# Patient Record
Sex: FEMALE | Race: WHITE | NOT HISPANIC OR LATINO
[De-identification: names, ages, dates, MRNs, and addresses within clinical notes are randomized per-mention and may not be internally consistent; named-entity substitution may affect disease eponyms.]

---

## 2017-05-09 ENCOUNTER — APPOINTMENT (OUTPATIENT)
Dept: DERMATOLOGY | Facility: CLINIC | Age: 82
End: 2017-05-09

## 2018-03-04 ENCOUNTER — TRANSCRIPTION ENCOUNTER (OUTPATIENT)
Age: 83
End: 2018-03-04

## 2019-06-11 ENCOUNTER — APPOINTMENT (OUTPATIENT)
Dept: DERMATOLOGY | Facility: CLINIC | Age: 84
End: 2019-06-11

## 2019-06-19 ENCOUNTER — APPOINTMENT (OUTPATIENT)
Dept: DERMATOLOGY | Facility: CLINIC | Age: 84
End: 2019-06-19
Payer: MEDICARE

## 2019-06-19 PROCEDURE — 99212 OFFICE O/P EST SF 10 MIN: CPT

## 2019-08-03 ENCOUNTER — APPOINTMENT (OUTPATIENT)
Dept: DERMATOLOGY | Facility: CLINIC | Age: 84
End: 2019-08-03
Payer: MEDICARE

## 2019-08-03 PROCEDURE — 99213 OFFICE O/P EST LOW 20 MIN: CPT

## 2019-08-28 ENCOUNTER — FORM ENCOUNTER (OUTPATIENT)
Age: 84
End: 2019-08-28

## 2019-08-29 ENCOUNTER — APPOINTMENT (OUTPATIENT)
Dept: GASTROENTEROLOGY | Facility: CLINIC | Age: 84
End: 2019-08-29
Payer: MEDICARE

## 2019-08-29 ENCOUNTER — OUTPATIENT (OUTPATIENT)
Dept: OUTPATIENT SERVICES | Facility: HOSPITAL | Age: 84
LOS: 1 days | End: 2019-08-29
Payer: MEDICARE

## 2019-08-29 ENCOUNTER — APPOINTMENT (OUTPATIENT)
Dept: CT IMAGING | Facility: CLINIC | Age: 84
End: 2019-08-29
Payer: MEDICARE

## 2019-08-29 VITALS
SYSTOLIC BLOOD PRESSURE: 130 MMHG | BODY MASS INDEX: 18.52 KG/M2 | DIASTOLIC BLOOD PRESSURE: 60 MMHG | HEART RATE: 70 BPM | WEIGHT: 118 LBS | HEIGHT: 67 IN

## 2019-08-29 DIAGNOSIS — K57.32 DIVERTICULITIS OF LARGE INTESTINE W/OUT PERFORATION OR ABSCESS W/OUT BLEEDING: ICD-10-CM

## 2019-08-29 DIAGNOSIS — K57.32 DIVERTICULITIS OF LARGE INTESTINE WITHOUT PERFORATION OR ABSCESS WITHOUT BLEEDING: ICD-10-CM

## 2019-08-29 PROCEDURE — 74176 CT ABD & PELVIS W/O CONTRAST: CPT | Mod: 26

## 2019-08-29 PROCEDURE — 82272 OCCULT BLD FECES 1-3 TESTS: CPT

## 2019-08-29 PROCEDURE — 74176 CT ABD & PELVIS W/O CONTRAST: CPT

## 2019-08-29 PROCEDURE — 99204 OFFICE O/P NEW MOD 45 MIN: CPT

## 2019-08-29 RX ORDER — AMLODIPINE BESYLATE 10 MG/1
10 TABLET ORAL
Refills: 0 | Status: ACTIVE | COMMUNITY

## 2019-08-29 RX ORDER — CLONIDINE HYDROCHLORIDE 0.3 MG/1
TABLET ORAL
Refills: 0 | Status: ACTIVE | COMMUNITY

## 2019-08-29 RX ORDER — SOTALOL HYDROCHLORIDE 80 MG/1
80 TABLET ORAL
Refills: 0 | Status: ACTIVE | COMMUNITY

## 2019-08-29 RX ORDER — FUROSEMIDE 20 MG/1
20 TABLET ORAL
Refills: 0 | Status: ACTIVE | COMMUNITY

## 2019-08-29 RX ORDER — SIMVASTATIN 20 MG/1
20 TABLET, FILM COATED ORAL
Refills: 0 | Status: ACTIVE | COMMUNITY

## 2019-08-29 RX ORDER — MULTIVIT-MIN/FA/LYCOPEN/LUTEIN .4-300-25
TABLET ORAL
Refills: 0 | Status: ACTIVE | COMMUNITY

## 2019-08-29 RX ORDER — ENALAPRIL MALEATE 20 MG/1
20 TABLET ORAL
Refills: 0 | Status: ACTIVE | COMMUNITY

## 2019-08-29 RX ORDER — RIVAROXABAN 15 MG/1
15 TABLET, FILM COATED ORAL
Refills: 0 | Status: ACTIVE | COMMUNITY

## 2019-08-29 NOTE — HISTORY OF PRESENT ILLNESS
[de-identified] : patient was in usual health until about 6 weeks ago had an episode of severe lower abdominal cramps associated vomiting and diarrhea and a temperature 100.1. She had no rectal bleeding. This lasted about a day and by the following day she was a systematic. She did take Imodium. The patient is okay for about 3 weeks and had another episode but less severe which also lasted for a day and then a few days ago had a third episode this time there is no fever and was also less severe it also lasted for a day. The patient was seen here or 5 years ago for colonoscopy for evaluation of diarrhea and GI symptoms and she showed extensive diverticulosis and large hemorrhoids. A CT enteroclysis that time was also negative. She was last seen here 2 years ago for episodic diarrhea and G-tube was taking Imodium on a daily basis once a day Nexium to control her symptoms. She has no symptoms at this time including abdominal pain or diarrhea or weight loss

## 2019-08-29 NOTE — ASSESSMENT
[FreeTextEntry1] : the patient was concern that this may not be an episode of diverticulitis. Although she did have fever and abdominal pain and resolve her he quickly and this is possible the diagnoses but probably unlikely. We'll would not expect it rapidly up resolution with recurrent symptoms Monday to time. However to further evaluate etiology symptoms and rule out diverticulitis a few be appropriate to do abdominal pelvic CT scan. She did have a normal colonoscopy 5 years ago other than extensive diverticulosis. In the meantime she is asymptomatic and I do not think empiric therapy is warranted at this time. The patient will call his results with CT as well as routine blood work and then we will determine what further treatment and evaluation as appropriate

## 2019-08-29 NOTE — PHYSICAL EXAM
[General Appearance - Alert] : alert [Sclera] : the sclera and conjunctiva were normal [PERRL With Normal Accommodation] : pupils were equal in size, round, and reactive to light [General Appearance - In No Acute Distress] : in no acute distress [Extraocular Movements] : extraocular movements were intact [Neck Appearance] : the appearance of the neck was normal [Neck Cervical Mass (___cm)] : no neck mass was observed [Jugular Venous Distention Increased] : there was no jugular-venous distention [Thyroid Diffuse Enlargement] : the thyroid was not enlarged [Thyroid Nodule] : there were no palpable thyroid nodules [Auscultation Breath Sounds / Voice Sounds] : lungs were clear to auscultation bilaterally [Edema] : there was no peripheral edema [Bowel Sounds] : normal bowel sounds [Abdomen Soft] : soft [Abdomen Tenderness] : non-tender [Normal Sphincter Tone] : normal sphincter tone [Abdomen Mass (___ Cm)] : no abdominal mass palpated [No Rectal Mass] : no rectal mass [No CVA Tenderness] : no ~M costovertebral angle tenderness [Occult Blood Positive] : stool was negative for occult blood [] : no rash [Oriented To Time, Place, And Person] : oriented to person, place, and time [Impaired Insight] : insight and judgment were intact [Affect] : the affect was normal

## 2019-08-30 ENCOUNTER — TRANSCRIPTION ENCOUNTER (OUTPATIENT)
Age: 84
End: 2019-08-30

## 2019-08-30 LAB
ALBUMIN SERPL ELPH-MCNC: 4.8 G/DL
ALP BLD-CCNC: 67 U/L
ALT SERPL-CCNC: 33 U/L
ANION GAP SERPL CALC-SCNC: 15 MMOL/L
AST SERPL-CCNC: 36 U/L
BASOPHILS # BLD AUTO: 0.06 K/UL
BASOPHILS NFR BLD AUTO: 0.9 %
BILIRUB SERPL-MCNC: 0.8 MG/DL
BUN SERPL-MCNC: 11 MG/DL
CALCIUM SERPL-MCNC: 10 MG/DL
CHLORIDE SERPL-SCNC: 89 MMOL/L
CO2 SERPL-SCNC: 27 MMOL/L
CREAT SERPL-MCNC: 0.48 MG/DL
EOSINOPHIL # BLD AUTO: 0.15 K/UL
EOSINOPHIL NFR BLD AUTO: 2.3 %
GLUCOSE SERPL-MCNC: 104 MG/DL
HCT VFR BLD CALC: 40 %
HGB BLD-MCNC: 13.2 G/DL
IMM GRANULOCYTES NFR BLD AUTO: 0.3 %
LYMPHOCYTES # BLD AUTO: 1.6 K/UL
LYMPHOCYTES NFR BLD AUTO: 24.9 %
MAN DIFF?: NORMAL
MCHC RBC-ENTMCNC: 31.4 PG
MCHC RBC-ENTMCNC: 33 GM/DL
MCV RBC AUTO: 95.2 FL
MONOCYTES # BLD AUTO: 0.72 K/UL
MONOCYTES NFR BLD AUTO: 11.2 %
NEUTROPHILS # BLD AUTO: 3.88 K/UL
NEUTROPHILS NFR BLD AUTO: 60.4 %
PLATELET # BLD AUTO: 282 K/UL
POTASSIUM SERPL-SCNC: 4.8 MMOL/L
PROT SERPL-MCNC: 7.5 G/DL
RBC # BLD: 4.2 M/UL
RBC # FLD: 14.7 %
SODIUM SERPL-SCNC: 131 MMOL/L
WBC # FLD AUTO: 6.43 K/UL

## 2019-09-05 ENCOUNTER — APPOINTMENT (OUTPATIENT)
Dept: GASTROENTEROLOGY | Facility: CLINIC | Age: 84
End: 2019-09-05
Payer: MEDICARE

## 2019-09-05 VITALS — SYSTOLIC BLOOD PRESSURE: 153 MMHG | HEIGHT: 67 IN | DIASTOLIC BLOOD PRESSURE: 69 MMHG | HEART RATE: 63 BPM

## 2019-09-05 DIAGNOSIS — R19.7 DIARRHEA, UNSPECIFIED: ICD-10-CM

## 2019-09-05 PROCEDURE — 99214 OFFICE O/P EST MOD 30 MIN: CPT

## 2019-09-05 NOTE — HISTORY OF PRESENT ILLNESS
[de-identified] : patient seen last week with episodes of the last 3 months of abdominal pain and diarrhea. She felt she had diverticulitis as she had in the past. She is sent for CT scan which showed diverticulosis and CBC was unremarkable. Patient's been asymptomatic for the last week no abdominal pain or diarrhea. She feels that her symptoms were related to certain foods and she is stopped them and now she's fine.

## 2019-09-05 NOTE — PHYSICAL EXAM
[General Appearance - Alert] : alert [General Appearance - In No Acute Distress] : in no acute distress [Sclera] : the sclera and conjunctiva were normal [PERRL With Normal Accommodation] : pupils were equal in size, round, and reactive to light [Extraocular Movements] : extraocular movements were intact [Neck Appearance] : the appearance of the neck was normal [Neck Cervical Mass (___cm)] : no neck mass was observed [Jugular Venous Distention Increased] : there was no jugular-venous distention [Thyroid Nodule] : there were no palpable thyroid nodules [Thyroid Diffuse Enlargement] : the thyroid was not enlarged [Auscultation Breath Sounds / Voice Sounds] : lungs were clear to auscultation bilaterally [Heart Rate And Rhythm] : heart rate was normal and rhythm regular [Heart Sounds] : normal S1 and S2 [Murmurs] : no murmurs [Heart Sounds Gallop] : no gallops [Heart Sounds Pericardial Friction Rub] : no pericardial rub [Bowel Sounds] : normal bowel sounds [Abdomen Soft] : soft [Abdomen Tenderness] : non-tender [] : no hepato-splenomegaly [Abdomen Mass (___ Cm)] : no abdominal mass palpated [Oriented To Time, Place, And Person] : oriented to person, place, and time [Impaired Insight] : insight and judgment were intact [Affect] : the affect was normal

## 2019-09-05 NOTE — ASSESSMENT
[FreeTextEntry1] : I reviewed the findings with the patient and explained that she does not have diverticulitis. It sounds as if she had a reaction to certain foods which gave her abdominal discomfort and loose bowel movements. I advised to continue watching her diet and stay away from foods that bother her she did develop another problem we could possibly do further evaluation. She will call me as

## 2019-09-23 ENCOUNTER — FORM ENCOUNTER (OUTPATIENT)
Age: 84
End: 2019-09-23

## 2019-09-23 ENCOUNTER — CLINICAL ADVICE (OUTPATIENT)
Age: 84
End: 2019-09-23

## 2019-09-24 ENCOUNTER — APPOINTMENT (OUTPATIENT)
Dept: CT IMAGING | Facility: CLINIC | Age: 84
End: 2019-09-24
Payer: MEDICARE

## 2019-09-24 ENCOUNTER — OUTPATIENT (OUTPATIENT)
Dept: OUTPATIENT SERVICES | Facility: HOSPITAL | Age: 84
LOS: 1 days | End: 2019-09-24
Payer: MEDICARE

## 2019-09-24 DIAGNOSIS — R19.7 DIARRHEA, UNSPECIFIED: ICD-10-CM

## 2019-09-24 PROCEDURE — 74177 CT ABD & PELVIS W/CONTRAST: CPT

## 2019-09-24 PROCEDURE — 82565 ASSAY OF CREATININE: CPT

## 2019-09-24 PROCEDURE — 74177 CT ABD & PELVIS W/CONTRAST: CPT | Mod: 26

## 2019-09-26 ENCOUNTER — OTHER (OUTPATIENT)
Age: 84
End: 2019-09-26

## 2019-09-26 DIAGNOSIS — K58.9 IRRITABLE BOWEL SYNDROME W/OUT DIARRHEA: ICD-10-CM

## 2019-09-26 RX ORDER — DICYCLOMINE HYDROCHLORIDE 10 MG/1
10 CAPSULE ORAL TWICE DAILY
Qty: 180 | Refills: 0 | Status: ACTIVE | COMMUNITY
Start: 2019-09-26 | End: 1900-01-01

## 2020-03-27 RX ORDER — BIFIDOBACTERIUM LONGUM 10MM CELL
4 CAPSULE ORAL DAILY
Qty: 60 | Refills: 3 | Status: ACTIVE | COMMUNITY
Start: 2020-03-27 | End: 1900-01-01

## 2023-05-29 ENCOUNTER — EMERGENCY (EMERGENCY)
Facility: HOSPITAL | Age: 88
LOS: 1 days | Discharge: HOME | End: 2023-05-29
Attending: STUDENT IN AN ORGANIZED HEALTH CARE EDUCATION/TRAINING PROGRAM | Admitting: STUDENT IN AN ORGANIZED HEALTH CARE EDUCATION/TRAINING PROGRAM
Payer: MEDICARE

## 2023-05-29 VITALS
OXYGEN SATURATION: 99 % | TEMPERATURE: 97 F | RESPIRATION RATE: 16 BRPM | HEIGHT: 67 IN | WEIGHT: 119.93 LBS | HEART RATE: 60 BPM | SYSTOLIC BLOOD PRESSURE: 177 MMHG | DIASTOLIC BLOOD PRESSURE: 77 MMHG

## 2023-05-29 VITALS
OXYGEN SATURATION: 99 % | RESPIRATION RATE: 18 BRPM | TEMPERATURE: 97 F | HEART RATE: 60 BPM | SYSTOLIC BLOOD PRESSURE: 145 MMHG | DIASTOLIC BLOOD PRESSURE: 70 MMHG

## 2023-05-29 LAB
ALBUMIN SERPL ELPH-MCNC: 3.6 G/DL — SIGNIFICANT CHANGE UP (ref 3.3–5.2)
ALP SERPL-CCNC: 54 U/L — SIGNIFICANT CHANGE UP (ref 40–120)
ALT FLD-CCNC: 23 U/L — SIGNIFICANT CHANGE UP
ANION GAP SERPL CALC-SCNC: 11 MMOL/L — SIGNIFICANT CHANGE UP (ref 5–17)
APTT BLD: 37.3 SEC — HIGH (ref 27.5–35.5)
AST SERPL-CCNC: 28 U/L — SIGNIFICANT CHANGE UP
BASOPHILS # BLD AUTO: 0.03 K/UL — SIGNIFICANT CHANGE UP (ref 0–0.2)
BASOPHILS NFR BLD AUTO: 0.4 % — SIGNIFICANT CHANGE UP (ref 0–2)
BILIRUB SERPL-MCNC: 0.4 MG/DL — SIGNIFICANT CHANGE UP (ref 0.4–2)
BUN SERPL-MCNC: 42.3 MG/DL — HIGH (ref 8–20)
CALCIUM SERPL-MCNC: 9.2 MG/DL — SIGNIFICANT CHANGE UP (ref 8.4–10.5)
CHLORIDE SERPL-SCNC: 98 MMOL/L — SIGNIFICANT CHANGE UP (ref 96–108)
CO2 SERPL-SCNC: 27 MMOL/L — SIGNIFICANT CHANGE UP (ref 22–29)
CREAT SERPL-MCNC: 0.62 MG/DL — SIGNIFICANT CHANGE UP (ref 0.5–1.3)
EGFR: 85 ML/MIN/1.73M2 — SIGNIFICANT CHANGE UP
EOSINOPHIL # BLD AUTO: 0.07 K/UL — SIGNIFICANT CHANGE UP (ref 0–0.5)
EOSINOPHIL NFR BLD AUTO: 0.9 % — SIGNIFICANT CHANGE UP (ref 0–6)
GLUCOSE SERPL-MCNC: 105 MG/DL — HIGH (ref 70–99)
HCT VFR BLD CALC: 37.6 % — SIGNIFICANT CHANGE UP (ref 34.5–45)
HGB BLD-MCNC: 12.9 G/DL — SIGNIFICANT CHANGE UP (ref 11.5–15.5)
IMM GRANULOCYTES NFR BLD AUTO: 0.3 % — SIGNIFICANT CHANGE UP (ref 0–0.9)
INR BLD: 2.33 RATIO — HIGH (ref 0.88–1.16)
LIDOCAIN IGE QN: 40 U/L — SIGNIFICANT CHANGE UP (ref 22–51)
LYMPHOCYTES # BLD AUTO: 1.54 K/UL — SIGNIFICANT CHANGE UP (ref 1–3.3)
LYMPHOCYTES # BLD AUTO: 20.5 % — SIGNIFICANT CHANGE UP (ref 13–44)
MCHC RBC-ENTMCNC: 33.4 PG — SIGNIFICANT CHANGE UP (ref 27–34)
MCHC RBC-ENTMCNC: 34.3 GM/DL — SIGNIFICANT CHANGE UP (ref 32–36)
MCV RBC AUTO: 97.4 FL — SIGNIFICANT CHANGE UP (ref 80–100)
MONOCYTES # BLD AUTO: 0.78 K/UL — SIGNIFICANT CHANGE UP (ref 0–0.9)
MONOCYTES NFR BLD AUTO: 10.4 % — SIGNIFICANT CHANGE UP (ref 2–14)
NEUTROPHILS # BLD AUTO: 5.09 K/UL — SIGNIFICANT CHANGE UP (ref 1.8–7.4)
NEUTROPHILS NFR BLD AUTO: 67.5 % — SIGNIFICANT CHANGE UP (ref 43–77)
PLATELET # BLD AUTO: 189 K/UL — SIGNIFICANT CHANGE UP (ref 150–400)
POTASSIUM SERPL-MCNC: 3.9 MMOL/L — SIGNIFICANT CHANGE UP (ref 3.5–5.3)
POTASSIUM SERPL-SCNC: 3.9 MMOL/L — SIGNIFICANT CHANGE UP (ref 3.5–5.3)
PROT SERPL-MCNC: 6.4 G/DL — LOW (ref 6.6–8.7)
PROTHROM AB SERPL-ACNC: 27.3 SEC — HIGH (ref 10.5–13.4)
RBC # BLD: 3.86 M/UL — SIGNIFICANT CHANGE UP (ref 3.8–5.2)
RBC # FLD: 14.7 % — HIGH (ref 10.3–14.5)
SODIUM SERPL-SCNC: 135 MMOL/L — SIGNIFICANT CHANGE UP (ref 135–145)
WBC # BLD: 7.53 K/UL — SIGNIFICANT CHANGE UP (ref 3.8–10.5)
WBC # FLD AUTO: 7.53 K/UL — SIGNIFICANT CHANGE UP (ref 3.8–10.5)

## 2023-05-29 PROCEDURE — 70450 CT HEAD/BRAIN W/O DYE: CPT | Mod: MA

## 2023-05-29 PROCEDURE — 93010 ELECTROCARDIOGRAM REPORT: CPT

## 2023-05-29 PROCEDURE — 71045 X-RAY EXAM CHEST 1 VIEW: CPT

## 2023-05-29 PROCEDURE — 36415 COLL VENOUS BLD VENIPUNCTURE: CPT

## 2023-05-29 PROCEDURE — 72125 CT NECK SPINE W/O DYE: CPT | Mod: MA

## 2023-05-29 PROCEDURE — 99285 EMERGENCY DEPT VISIT HI MDM: CPT | Mod: GC

## 2023-05-29 PROCEDURE — 72125 CT NECK SPINE W/O DYE: CPT | Mod: 26,MA

## 2023-05-29 PROCEDURE — 99285 EMERGENCY DEPT VISIT HI MDM: CPT | Mod: 25

## 2023-05-29 PROCEDURE — 82962 GLUCOSE BLOOD TEST: CPT

## 2023-05-29 PROCEDURE — 71045 X-RAY EXAM CHEST 1 VIEW: CPT | Mod: 26

## 2023-05-29 PROCEDURE — 85610 PROTHROMBIN TIME: CPT

## 2023-05-29 PROCEDURE — 85025 COMPLETE CBC W/AUTO DIFF WBC: CPT

## 2023-05-29 PROCEDURE — 80053 COMPREHEN METABOLIC PANEL: CPT

## 2023-05-29 PROCEDURE — 70450 CT HEAD/BRAIN W/O DYE: CPT | Mod: 26,MA

## 2023-05-29 PROCEDURE — 83690 ASSAY OF LIPASE: CPT

## 2023-05-29 PROCEDURE — 93005 ELECTROCARDIOGRAM TRACING: CPT

## 2023-05-29 PROCEDURE — 85730 THROMBOPLASTIN TIME PARTIAL: CPT

## 2023-05-29 RX ORDER — ONDANSETRON 8 MG/1
4 TABLET, FILM COATED ORAL ONCE
Refills: 0 | Status: COMPLETED | OUTPATIENT
Start: 2023-05-29 | End: 2023-05-29

## 2023-05-29 NOTE — ED PROVIDER NOTE - NSFOLLOWUPINSTRUCTIONS_ED_ALL_ED_FT
Return to the ER should your symptoms worsen. follow up with your primary care physician.     Nausea / Vomiting    Nausea is the feeling that you have to vomit. As nausea gets worse, it can lead to vomiting. Vomiting puts you at an increased risk for dehydration. Older adults and people with other diseases or a weak immune system are at higher risk for dehydration. Drink clear fluids in small but frequent amounts as tolerated. Eat bland, easy-to-digest foods in small amounts as tolerated.    SEEK IMMEDIATE MEDICAL CARE IF YOU HAVE ANY OF THE FOLLOWING SYMPTOMS: fever, inability to keep sufficient fluids down, black or bloody vomitus, black or bloody stools, lightheadedness/dizziness, chest pain, severe headache, rash, shortness of breath, cold or clammy skin, confusion, pain with urination, or any signs of dehydration.

## 2023-05-29 NOTE — ED PROVIDER NOTE - OBJECTIVE STATEMENT
89 yo female history of AFIB on Xarelto, CHF, chronic LE edema, HTN, HLD presents s/p falling on concrete earlier this evening. She states that she fell down and struck her head. She is unsure of how long she passed out for. She states that she feels nauseous at this time. Denies fever/chills, cough, sore throat, sob, chest pain, abd pain, n/v/d, urinary complaints, headache, focal neurologic complaints. Priority CT called on arrival, patient taken to CT scan for emergent imaging.

## 2023-05-29 NOTE — ED ADULT TRIAGE NOTE - MODE OF ARRIVAL
Nutrition Care Plan    Nutrition Diagnosis:   Inadequate energy intake related to decreased appetite, nausea as evidenced by diet recall revealing liquid diet 5 PTA. Intervention:  Current Diet: Regular Diet  Daily W Breakfast; Ensure Enlive/standard Oral Supplement, Strawberry 4oz Oral Nutrition Supplement  Modified diet:   recommend consistent carbohydrate moderate diet (hx of type 2 DM, elevated blood sugar)  Commercial beverage:   with breakfast 4 oz standard oral nutrition supplement (175 kcal, 10 gram protein); patient consumes protein shake daily at home    Monitoring and Evaluation:  Amount of food:   Goal for patient to consume >75% of 2-3 balanced meals/day with one or more protein sources. Liquid meal replacement or supplement:   Goal for patient to consume >75% of oral nutrition supplement offered daily. [facility]

## 2023-05-29 NOTE — ED PROVIDER NOTE - CARE PLAN
Principal Discharge DX:	Fall in home  Secondary Diagnosis:	Neck pain  Secondary Diagnosis:	Nausea   1

## 2023-05-29 NOTE — ED ADULT NURSE NOTE - CAS EDP DISCH TYPE
Changing patient's antibiotic for her acute cystitis as her urine culture showed strep agalactiae.  Patient has documented rash allergy to penicillins, therefore we will prescribe cephalosporin Keflex 500 mg every 6 hours for 7-day course.  Portal message sent to patient regarding the above.    Terry Vargas DO  Internal Medicine PGY-3  [facility]    
Home

## 2023-05-29 NOTE — ED PROVIDER NOTE - PATIENT PORTAL LINK FT
You can access the FollowMyHealth Patient Portal offered by [facility] by registering at the following website: [email or web address]. By joining [email or web address]’s FollowMyHealth portal, you will also be able to view your health information using other applications (apps) compatible with our system.

## 2023-05-29 NOTE — ED PROVIDER NOTE - CLINICAL SUMMARY MEDICAL DECISION MAKING FREE TEXT BOX
89 yo female s/p fall at home. Complaining of nausea, generalized weakness. Will obtain CT scan. Basic labs, urine. Monitor and reassess. 88 yo female s/p fall at home. Complaining of nausea, generalized weakness. Will obtain CT scan. Basic labs, urine. Monitor and reassess.    Progress: Labs wnl. Patient feels improved at this time. Discussed return precautions and need for primary care follow up. Spoke daniel isabel at [facility]. Will dc the patient w transportation back to assisted living facility.

## 2023-05-29 NOTE — ED ADULT TRIAGE NOTE - NS ED NURSE DIRECT TO ROOM YN
Health Call Center    Phone Message    May a detailed message be left on voicemail: yes    Reason for Call: Other: Rahel states that patient is receiving Ocrevus through [facility] home infusion will be discharging from service.     Action Taken: Message routed to:  [facility] ([facility] neurology    
Yes

## 2023-05-29 NOTE — ED ADULT TRIAGE NOTE - TEMPERATURE IN CELSIUS (DEGREES C)
PRINCIPAL DISCHARGE DIAGNOSIS  Diagnosis: Immune thrombocytopenic purpura  Assessment and Plan of Treatment: 36.3 PRINCIPAL DISCHARGE DIAGNOSIS  Diagnosis: Immune thrombocytopenic purpura  Assessment and Plan of Treatment: received iv Ig here, cont decadron until 5/25/20  and then prednisone as ordered,  follow up with hematology in 1-2 weeks      SECONDARY DISCHARGE DIAGNOSES  Diagnosis: Transaminitis  Assessment and Plan of Treatment: stable, out patient follow up with PCP PRINCIPAL DISCHARGE DIAGNOSIS  Diagnosis: Immune thrombocytopenic purpura  Assessment and Plan of Treatment: received iv Ig here, cont prednisone as ordered,  follow up with hematology in 1-2 weeks      SECONDARY DISCHARGE DIAGNOSES  Diagnosis: Transaminitis  Assessment and Plan of Treatment: stable, out patient follow up with PCP

## 2023-05-29 NOTE — ED PROVIDER NOTE - PHYSICAL EXAMINATION
Gen: Well appearing in NAD  Head: NC/AT  Neck: trachea midline  Resp:  No distress, lungs cta b/l  Cardiac: Heart irregularly irregular.   Abdomen: Soft, nontender, nondistended.   Ext: no deformities  Neuro:  A&O appears non focal  Skin:  Warm and dry as visualized  Psych:  Normal affect and mood

## 2023-05-29 NOTE — ED ADULT NURSE NOTE - OBJECTIVE STATEMENT
pt comes to ED via [facility] s.p mechanical fall, pt with no loc, reports her walker gave out and she fell, hitting her head. states she takes Xarelto for Afib. pt is in no apparent distress, no obvious head injuries or contusions noted. MTZ with strength and purpose. no dizziness no other complaints.

## 2023-05-29 NOTE — ED ADULT NURSE NOTE - NSFALLOOBREASON_ED_ALL_ED
[facility]  [address]  [facility] [address]  Phone:  [phone]  Fax:  [phone]                                    Briana Jose   MRN: [id number]    Department:  [facility]   Date of Visit:  1/17/2020           After Visit Summary Signature Page    I have received my discharge instructions, and my questions have been answered. I have discussed any challenges I see with this plan with the nurse or doctor.    ..........................................................................................................................................  Patient/Patient Representative Signature      ..........................................................................................................................................  Patient Representative Print Name and Relationship to Patient    ..................................................               ................................................  Date                                   Time    ..........................................................................................................................................  Reviewed by Signature/Title    ...................................................              ..............................................  Date                                               Time          22EPIC Rev 08/18       
Cognitively impaired

## 2023-05-29 NOTE — ED PROVIDER NOTE - ATTENDING CONTRIBUTION TO CARE
ALLAN Quan: 89-year-old female presenting from the [facility] after fall with questionable down time, here is very well appearing, reports she had ?aphasia during the fall, workup here grossly negative, patient feeling asymptomatic, already on statin / anticoagulation, does not want to stay for further eval here, will d/c back to [facility], return precautions advised.     I have personally performed a face to face diagnostic evaluation on this patient.  I have reviewed the resident's note and agree with the history, exam, and plan of care, except as noted.   My medical decision making and observations are found above.

## 2023-05-29 NOTE — ED ADULT TRIAGE NOTE - CHIEF COMPLAINT QUOTE
pt states she was walking to the bathroom today when her walker slipped causing her to fall to the ground.  pt states she thinks she hit her head.  c/o nausea at this time.  Pt is on xarelto.  MD called for dallin in triage.

## 2023-07-08 ENCOUNTER — INPATIENT (INPATIENT)
Facility: HOSPITAL | Age: 88
LOS: 4 days | Discharge: NURSING, REHAB OR LONG-TERM CARE | DRG: 291 | End: 2023-07-13
Attending: GENERAL ACUTE CARE HOSPITAL | Admitting: STUDENT IN AN ORGANIZED HEALTH CARE EDUCATION/TRAINING PROGRAM
Payer: MEDICARE

## 2023-07-08 VITALS
HEIGHT: 67 IN | RESPIRATION RATE: 32 BRPM | DIASTOLIC BLOOD PRESSURE: 91 MMHG | TEMPERATURE: 98 F | OXYGEN SATURATION: 92 % | HEART RATE: 112 BPM | SYSTOLIC BLOOD PRESSURE: 185 MMHG

## 2023-07-08 LAB
ALBUMIN SERPL ELPH-MCNC: 4 G/DL — SIGNIFICANT CHANGE UP (ref 3.3–5.2)
ALP SERPL-CCNC: 80 U/L — SIGNIFICANT CHANGE UP (ref 40–120)
ALT FLD-CCNC: 29 U/L — SIGNIFICANT CHANGE UP
ANION GAP SERPL CALC-SCNC: 13 MMOL/L — SIGNIFICANT CHANGE UP (ref 5–17)
APTT BLD: 33.7 SEC — SIGNIFICANT CHANGE UP (ref 27.5–35.5)
AST SERPL-CCNC: 36 U/L — HIGH
BASOPHILS # BLD AUTO: 0.04 K/UL — SIGNIFICANT CHANGE UP (ref 0–0.2)
BASOPHILS NFR BLD AUTO: 0.4 % — SIGNIFICANT CHANGE UP (ref 0–2)
BILIRUB SERPL-MCNC: 0.5 MG/DL — SIGNIFICANT CHANGE UP (ref 0.4–2)
BUN SERPL-MCNC: 27.4 MG/DL — HIGH (ref 8–20)
CALCIUM SERPL-MCNC: 9.3 MG/DL — SIGNIFICANT CHANGE UP (ref 8.4–10.5)
CHLORIDE SERPL-SCNC: 98 MMOL/L — SIGNIFICANT CHANGE UP (ref 96–108)
CO2 SERPL-SCNC: 27 MMOL/L — SIGNIFICANT CHANGE UP (ref 22–29)
CREAT SERPL-MCNC: 0.5 MG/DL — SIGNIFICANT CHANGE UP (ref 0.5–1.3)
EGFR: 90 ML/MIN/1.73M2 — SIGNIFICANT CHANGE UP
EOSINOPHIL # BLD AUTO: 0.16 K/UL — SIGNIFICANT CHANGE UP (ref 0–0.5)
EOSINOPHIL NFR BLD AUTO: 1.5 % — SIGNIFICANT CHANGE UP (ref 0–6)
GLUCOSE SERPL-MCNC: 99 MG/DL — SIGNIFICANT CHANGE UP (ref 70–99)
HCT VFR BLD CALC: 36.5 % — SIGNIFICANT CHANGE UP (ref 34.5–45)
HGB BLD-MCNC: 12.2 G/DL — SIGNIFICANT CHANGE UP (ref 11.5–15.5)
IMM GRANULOCYTES NFR BLD AUTO: 0.4 % — SIGNIFICANT CHANGE UP (ref 0–0.9)
INR BLD: 2.23 RATIO — HIGH (ref 0.88–1.16)
LIDOCAIN IGE QN: 23 U/L — SIGNIFICANT CHANGE UP (ref 22–51)
LYMPHOCYTES # BLD AUTO: 1.4 K/UL — SIGNIFICANT CHANGE UP (ref 1–3.3)
LYMPHOCYTES # BLD AUTO: 13.1 % — SIGNIFICANT CHANGE UP (ref 13–44)
MAGNESIUM SERPL-MCNC: 1.9 MG/DL — SIGNIFICANT CHANGE UP (ref 1.6–2.6)
MCHC RBC-ENTMCNC: 33.4 GM/DL — SIGNIFICANT CHANGE UP (ref 32–36)
MCHC RBC-ENTMCNC: 34.2 PG — HIGH (ref 27–34)
MCV RBC AUTO: 102.2 FL — HIGH (ref 80–100)
MONOCYTES # BLD AUTO: 0.82 K/UL — SIGNIFICANT CHANGE UP (ref 0–0.9)
MONOCYTES NFR BLD AUTO: 7.7 % — SIGNIFICANT CHANGE UP (ref 2–14)
NEUTROPHILS # BLD AUTO: 8.24 K/UL — HIGH (ref 1.8–7.4)
NEUTROPHILS NFR BLD AUTO: 76.9 % — SIGNIFICANT CHANGE UP (ref 43–77)
NT-PROBNP SERPL-SCNC: 6990 PG/ML — HIGH (ref 0–300)
PLATELET # BLD AUTO: 271 K/UL — SIGNIFICANT CHANGE UP (ref 150–400)
POTASSIUM SERPL-MCNC: 3.7 MMOL/L — SIGNIFICANT CHANGE UP (ref 3.5–5.3)
POTASSIUM SERPL-SCNC: 3.7 MMOL/L — SIGNIFICANT CHANGE UP (ref 3.5–5.3)
PROT SERPL-MCNC: 7.2 G/DL — SIGNIFICANT CHANGE UP (ref 6.6–8.7)
PROTHROM AB SERPL-ACNC: 26.1 SEC — HIGH (ref 10.5–13.4)
RBC # BLD: 3.57 M/UL — LOW (ref 3.8–5.2)
RBC # FLD: 14.2 % — SIGNIFICANT CHANGE UP (ref 10.3–14.5)
SODIUM SERPL-SCNC: 138 MMOL/L — SIGNIFICANT CHANGE UP (ref 135–145)
TROPONIN T SERPL-MCNC: <0.01 NG/ML — SIGNIFICANT CHANGE UP (ref 0–0.06)
WBC # BLD: 10.7 K/UL — HIGH (ref 3.8–10.5)
WBC # FLD AUTO: 10.7 K/UL — HIGH (ref 3.8–10.5)

## 2023-07-08 PROCEDURE — 93010 ELECTROCARDIOGRAM REPORT: CPT

## 2023-07-08 PROCEDURE — 71045 X-RAY EXAM CHEST 1 VIEW: CPT | Mod: 26

## 2023-07-08 PROCEDURE — 99285 EMERGENCY DEPT VISIT HI MDM: CPT

## 2023-07-08 RX ORDER — NITROGLYCERIN 6.5 MG
100 CAPSULE, EXTENDED RELEASE ORAL
Qty: 50 | Refills: 0 | Status: DISCONTINUED | OUTPATIENT
Start: 2023-07-08 | End: 2023-07-09

## 2023-07-08 RX ORDER — RIVAROXABAN 15 MG-20MG
15 KIT ORAL ONCE
Refills: 0 | Status: COMPLETED | OUTPATIENT
Start: 2023-07-08 | End: 2023-07-08

## 2023-07-08 RX ORDER — FUROSEMIDE 40 MG
80 TABLET ORAL ONCE
Refills: 0 | Status: COMPLETED | OUTPATIENT
Start: 2023-07-08 | End: 2023-07-08

## 2023-07-08 RX ORDER — CEFTRIAXONE 500 MG/1
1000 INJECTION, POWDER, FOR SOLUTION INTRAMUSCULAR; INTRAVENOUS ONCE
Refills: 0 | Status: COMPLETED | OUTPATIENT
Start: 2023-07-08 | End: 2023-07-08

## 2023-07-08 RX ORDER — NITROGLYCERIN 6.5 MG
0.4 CAPSULE, EXTENDED RELEASE ORAL
Refills: 0 | Status: DISCONTINUED | OUTPATIENT
Start: 2023-07-08 | End: 2023-07-13

## 2023-07-08 RX ORDER — ONDANSETRON 8 MG/1
4 TABLET, FILM COATED ORAL ONCE
Refills: 0 | Status: COMPLETED | OUTPATIENT
Start: 2023-07-08 | End: 2023-07-08

## 2023-07-08 RX ADMIN — Medication 30 MICROGRAM(S)/MIN: at 20:25

## 2023-07-08 RX ADMIN — Medication 0.4 MILLIGRAM(S): at 19:35

## 2023-07-08 RX ADMIN — Medication 80 MILLIGRAM(S): at 19:50

## 2023-07-08 RX ADMIN — CEFTRIAXONE 100 MILLIGRAM(S): 500 INJECTION, POWDER, FOR SOLUTION INTRAMUSCULAR; INTRAVENOUS at 23:50

## 2023-07-08 RX ADMIN — Medication 20 MILLIGRAM(S): at 23:50

## 2023-07-08 RX ADMIN — ONDANSETRON 4 MILLIGRAM(S): 8 TABLET, FILM COATED ORAL at 20:40

## 2023-07-08 RX ADMIN — Medication 0.3 MILLIGRAM(S): at 23:50

## 2023-07-08 NOTE — ED ADULT NURSE NOTE - NSFALLHARMRISKINTERV_ED_ALL_ED

## 2023-07-08 NOTE — ED ADULT TRIAGE NOTE - MEANS OF ARRIVAL
-- DO NOT REPLY / DO NOT REPLY ALL --  -- Message is from Engagement Center Operations (ECO) --    General Patient Message: Kaylynn called back stating pharmacy has then  amphetamine-dextroamphetamine (Adderall XR) 20 MG 24 hr capsule  (month) and the amphetamine-dextroamphetamine XR (Adderall XR) 5 MG 24 hr capsule (month) do not have 5 MG but have extended relief 20 ML needs doctor to approve and send to pharmacy. Patient is out of medication needing this cared for right away. To call patient to verify.     Caller Information       Type Contact Phone/Fax    04/06/2023 04:04 PM CDT Phone (Incoming) Miri Macias (Self) [phone] (M)    04/06/2023 04:17 PM CDT Phone (Outgoing) Miri Macias (Self) [phone] (M)    Spoke with Patient         Alternative phone number: [phone]    Can a detailed message be left? Yes    Message Turnaround: [place]:    Refer to site's KB page for routing instructions    Please give this turnaround time to the caller:   \"You can expect to receive a response 1-3 business days after your provider's clinical team reviews the message\"               stretcher

## 2023-07-08 NOTE — ED ADULT NURSE NOTE - OBJECTIVE STATEMENT
Assumed care of patient at 1950 in CC. Pt A&Ox3, asking repetitive questions c/o difficulty breathing beginning this PM. Patient currently living at assisted living facility. At time of assessment, patient presents w/ tachypnea, stating "I have too much fluid I can't breathe". 4+ pitting edema in BLE. Patient placed on BiPAP for tachypnea and comfort. In addition, patient c/o RLE tenderness, stating "I have a blood sore here". Patient placed on CM in a-fib rate controlled, SPO2 WNL on BiPAP. EKG obtained at triage.

## 2023-07-08 NOTE — ED PROVIDER NOTE - OBJECTIVE STATEMENT
88yo female with pmh of AFIB on Xarelto, CHF, chronic LE edema, HTN, HLD presents with sob. Pt states for the past few days worsening LE edema, despite taking her lasix and normal urination. Pt also with sob which worsened today, unable to catch her breath and a wet cough. Pt denies fevers/chills, ha, loc, focal neuro deficits, cp/palp,  abd pain/n/v/d, urinary symptoms, recent travel

## 2023-07-08 NOTE — ED PROVIDER NOTE - CLINICAL SUMMARY MEDICAL DECISION MAKING FREE TEXT BOX
89yo female with pmh of AFIB on Xarelto, CHF, chronic LE edema, HTN, HLD presents with sob. 88yo female with pmh of AFIB on Xarelto, CHF, chronic LE edema, HTN, HLD presents with sob, acute resp failure due to CHF exacerbation, pt requiring BIPAP, nitro gtt, lasix, MICU consult, admit 88yo female with pmh of AFIB on Xarelto, CHF, chronic LE edema, HTN, HLD presents with sob, acute resp failure due to CHF exacerbation, pt requiring BIPAP, nitro gtt, lasix, MICU consult, admit    icu evaluated pt - took off bipap and weaned off nitro drip breathing well on NC no icu at this time will admit to tele

## 2023-07-08 NOTE — ED ADULT NURSE REASSESSMENT NOTE - NS ED NURSE REASSESS COMMENT FT1
Patient remains hypertensive, remains on BiPAP. MD Betts made aware, started on Nitroglycerin gtt @ 100mcg/hr.

## 2023-07-08 NOTE — ED ADULT TRIAGE NOTE - CHIEF COMPLAINT QUOTE
patient complaining of shortness of breath with rales, bilateral leg swelling since this morning, tachpneic

## 2023-07-08 NOTE — ED PROVIDER NOTE - PHYSICAL EXAMINATION
Const: Awake, alert and oriented. in resp distress  Eyes: No scleral icterus. EOMI.  Neck:. Soft and supple. Full ROM without pain.  Cardiac: Irregular rate and irregular rhythm. +S1/S2. Peripheral pulses 2+ and symmetric.+pitting LE edema.  Resp: short sentences, decrease air entry, +rales  Abd: Soft, non-tender, non-distended. Normal bowel sounds in all 4 quadrants. No guarding or rebound.  Back: Spine midline and non-tender. No CVAT.  Skin: No rashes, abrasions or lacerations.  Lymph: No cervical lymphadenopathy.  Neuro: Awake, alert & oriented x 3. Moves all extremities symmetrically.

## 2023-07-09 DIAGNOSIS — I50.9 HEART FAILURE, UNSPECIFIED: ICD-10-CM

## 2023-07-09 PROBLEM — I48.91 UNSPECIFIED ATRIAL FIBRILLATION: Chronic | Status: ACTIVE | Noted: 2023-05-29

## 2023-07-09 LAB
ALBUMIN SERPL ELPH-MCNC: 3.5 G/DL — SIGNIFICANT CHANGE UP (ref 3.3–5.2)
ALP SERPL-CCNC: 76 U/L — SIGNIFICANT CHANGE UP (ref 40–120)
ALT FLD-CCNC: 24 U/L — SIGNIFICANT CHANGE UP
ANION GAP SERPL CALC-SCNC: 11 MMOL/L — SIGNIFICANT CHANGE UP (ref 5–17)
AST SERPL-CCNC: 30 U/L — SIGNIFICANT CHANGE UP
BILIRUB SERPL-MCNC: 0.6 MG/DL — SIGNIFICANT CHANGE UP (ref 0.4–2)
BUN SERPL-MCNC: 21.4 MG/DL — HIGH (ref 8–20)
CALCIUM SERPL-MCNC: 9 MG/DL — SIGNIFICANT CHANGE UP (ref 8.4–10.5)
CHLORIDE SERPL-SCNC: 96 MMOL/L — SIGNIFICANT CHANGE UP (ref 96–108)
CO2 SERPL-SCNC: 32 MMOL/L — HIGH (ref 22–29)
CREAT SERPL-MCNC: 0.41 MG/DL — LOW (ref 0.5–1.3)
CRP SERPL-MCNC: 34 MG/L — HIGH
EGFR: 94 ML/MIN/1.73M2 — SIGNIFICANT CHANGE UP
GLUCOSE SERPL-MCNC: 101 MG/DL — HIGH (ref 70–99)
MAGNESIUM SERPL-MCNC: 1.9 MG/DL — SIGNIFICANT CHANGE UP (ref 1.8–2.6)
MAGNESIUM SERPL-MCNC: 2 MG/DL — SIGNIFICANT CHANGE UP (ref 1.6–2.6)
POTASSIUM SERPL-MCNC: 3.5 MMOL/L — SIGNIFICANT CHANGE UP (ref 3.5–5.3)
POTASSIUM SERPL-SCNC: 3.5 MMOL/L — SIGNIFICANT CHANGE UP (ref 3.5–5.3)
PROT SERPL-MCNC: 6.6 G/DL — SIGNIFICANT CHANGE UP (ref 6.6–8.7)
SODIUM SERPL-SCNC: 138 MMOL/L — SIGNIFICANT CHANGE UP (ref 135–145)
TSH SERPL-MCNC: 0.68 UIU/ML — SIGNIFICANT CHANGE UP (ref 0.27–4.2)

## 2023-07-09 PROCEDURE — 93306 TTE W/DOPPLER COMPLETE: CPT | Mod: 26

## 2023-07-09 PROCEDURE — 93970 EXTREMITY STUDY: CPT | Mod: 26

## 2023-07-09 PROCEDURE — 99223 1ST HOSP IP/OBS HIGH 75: CPT

## 2023-07-09 RX ORDER — FUROSEMIDE 40 MG
40 TABLET ORAL
Refills: 0 | Status: DISCONTINUED | OUTPATIENT
Start: 2023-07-09 | End: 2023-07-11

## 2023-07-09 RX ORDER — CEFAZOLIN SODIUM 1 G
1000 VIAL (EA) INJECTION EVERY 8 HOURS
Refills: 0 | Status: DISCONTINUED | OUTPATIENT
Start: 2023-07-09 | End: 2023-07-09

## 2023-07-09 RX ORDER — CEFAZOLIN SODIUM 1 G
1000 VIAL (EA) INJECTION EVERY 8 HOURS
Refills: 0 | Status: DISCONTINUED | OUTPATIENT
Start: 2023-07-09 | End: 2023-07-11

## 2023-07-09 RX ORDER — METOPROLOL TARTRATE 50 MG
2.5 TABLET ORAL EVERY 6 HOURS
Refills: 0 | Status: DISCONTINUED | OUTPATIENT
Start: 2023-07-09 | End: 2023-07-13

## 2023-07-09 RX ORDER — ACETAMINOPHEN 500 MG
650 TABLET ORAL EVERY 6 HOURS
Refills: 0 | Status: DISCONTINUED | OUTPATIENT
Start: 2023-07-09 | End: 2023-07-13

## 2023-07-09 RX ORDER — RIVAROXABAN 15 MG-20MG
15 KIT ORAL DAILY
Refills: 0 | Status: DISCONTINUED | OUTPATIENT
Start: 2023-07-09 | End: 2023-07-13

## 2023-07-09 RX ORDER — LANOLIN ALCOHOL/MO/W.PET/CERES
3 CREAM (GRAM) TOPICAL AT BEDTIME
Refills: 0 | Status: DISCONTINUED | OUTPATIENT
Start: 2023-07-09 | End: 2023-07-13

## 2023-07-09 RX ORDER — METOPROLOL TARTRATE 50 MG
25 TABLET ORAL THREE TIMES A DAY
Refills: 0 | Status: DISCONTINUED | OUTPATIENT
Start: 2023-07-09 | End: 2023-07-13

## 2023-07-09 RX ORDER — RIVAROXABAN 15 MG-20MG
1 KIT ORAL
Refills: 0 | DISCHARGE

## 2023-07-09 RX ORDER — ATORVASTATIN CALCIUM 80 MG/1
40 TABLET, FILM COATED ORAL AT BEDTIME
Refills: 0 | Status: DISCONTINUED | OUTPATIENT
Start: 2023-07-09 | End: 2023-07-12

## 2023-07-09 RX ORDER — CEFTRIAXONE 500 MG/1
1000 INJECTION, POWDER, FOR SOLUTION INTRAMUSCULAR; INTRAVENOUS EVERY 24 HOURS
Refills: 0 | Status: DISCONTINUED | OUTPATIENT
Start: 2023-07-09 | End: 2023-07-09

## 2023-07-09 RX ORDER — BENZOCAINE AND MENTHOL 5; 1 G/100ML; G/100ML
1 LIQUID ORAL EVERY 6 HOURS
Refills: 0 | Status: DISCONTINUED | OUTPATIENT
Start: 2023-07-09 | End: 2023-07-13

## 2023-07-09 RX ORDER — FUROSEMIDE 40 MG
1 TABLET ORAL
Refills: 0 | DISCHARGE

## 2023-07-09 RX ORDER — HYDRALAZINE HCL 50 MG
25 TABLET ORAL EVERY 8 HOURS
Refills: 0 | Status: DISCONTINUED | OUTPATIENT
Start: 2023-07-09 | End: 2023-07-12

## 2023-07-09 RX ORDER — RIVAROXABAN 15 MG-20MG
20 KIT ORAL
Refills: 0 | Status: DISCONTINUED | OUTPATIENT
Start: 2023-07-09 | End: 2023-07-09

## 2023-07-09 RX ORDER — HYDRALAZINE HCL 50 MG
5 TABLET ORAL EVERY 6 HOURS
Refills: 0 | Status: DISCONTINUED | OUTPATIENT
Start: 2023-07-09 | End: 2023-07-12

## 2023-07-09 RX ORDER — ONDANSETRON 8 MG/1
4 TABLET, FILM COATED ORAL EVERY 8 HOURS
Refills: 0 | Status: DISCONTINUED | OUTPATIENT
Start: 2023-07-09 | End: 2023-07-13

## 2023-07-09 RX ORDER — BENZOCAINE AND MENTHOL 5; 1 G/100ML; G/100ML
1 LIQUID ORAL ONCE
Refills: 0 | Status: COMPLETED | OUTPATIENT
Start: 2023-07-09 | End: 2023-07-09

## 2023-07-09 RX ORDER — SOTALOL HCL 120 MG
80 TABLET ORAL EVERY 12 HOURS
Refills: 0 | Status: DISCONTINUED | OUTPATIENT
Start: 2023-07-09 | End: 2023-07-09

## 2023-07-09 RX ADMIN — Medication 80 MILLIGRAM(S): at 07:11

## 2023-07-09 RX ADMIN — Medication 40 MILLIGRAM(S): at 15:46

## 2023-07-09 RX ADMIN — Medication 1000 MILLIGRAM(S): at 15:46

## 2023-07-09 RX ADMIN — Medication 25 MILLIGRAM(S): at 22:59

## 2023-07-09 RX ADMIN — Medication 20 MILLIGRAM(S): at 06:20

## 2023-07-09 RX ADMIN — RIVAROXABAN 15 MILLIGRAM(S): KIT at 00:18

## 2023-07-09 RX ADMIN — Medication 5 MILLIGRAM(S): at 20:00

## 2023-07-09 RX ADMIN — Medication 40 MILLIGRAM(S): at 06:25

## 2023-07-09 RX ADMIN — Medication 25 MILLIGRAM(S): at 15:45

## 2023-07-09 RX ADMIN — Medication 0.3 MILLIGRAM(S): at 22:58

## 2023-07-09 RX ADMIN — CEFTRIAXONE 1000 MILLIGRAM(S): 500 INJECTION, POWDER, FOR SOLUTION INTRAMUSCULAR; INTRAVENOUS at 00:15

## 2023-07-09 RX ADMIN — ATORVASTATIN CALCIUM 40 MILLIGRAM(S): 80 TABLET, FILM COATED ORAL at 21:07

## 2023-07-09 RX ADMIN — ONDANSETRON 4 MILLIGRAM(S): 8 TABLET, FILM COATED ORAL at 21:12

## 2023-07-09 RX ADMIN — Medication 1000 MILLIGRAM(S): at 21:08

## 2023-07-09 RX ADMIN — Medication 25 MILLIGRAM(S): at 15:46

## 2023-07-09 RX ADMIN — Medication 600 MILLIGRAM(S): at 08:30

## 2023-07-09 RX ADMIN — Medication 25 MILLIGRAM(S): at 22:58

## 2023-07-09 RX ADMIN — RIVAROXABAN 15 MILLIGRAM(S): KIT at 15:45

## 2023-07-09 RX ADMIN — Medication 100 MICROGRAM(S)/MIN: at 01:14

## 2023-07-09 RX ADMIN — BENZOCAINE AND MENTHOL 1 LOZENGE: 5; 1 LIQUID ORAL at 17:17

## 2023-07-09 RX ADMIN — ONDANSETRON 4 MILLIGRAM(S): 8 TABLET, FILM COATED ORAL at 08:29

## 2023-07-09 RX ADMIN — Medication 25 MILLIGRAM(S): at 08:30

## 2023-07-09 RX ADMIN — Medication 0.3 MILLIGRAM(S): at 06:19

## 2023-07-09 RX ADMIN — BENZOCAINE AND MENTHOL 1 LOZENGE: 5; 1 LIQUID ORAL at 01:14

## 2023-07-09 RX ADMIN — Medication 0.3 MILLIGRAM(S): at 15:45

## 2023-07-09 NOTE — CONSULT NOTE ADULT - SUBJECTIVE AND OBJECTIVE BOX
Patient is a 89 y/o female who presents with a chief complaint of shortness of breath.    BRIEF HOSPITAL COURSE: 89 y/o F with PMHx of HTN, HLD, and a fib on xarelto who presented to ER complaining of shortness of breath x 1 day and worsening lower extremity edema. Upon arrival, pt was in respiratory distress and subsequently placed on NIPPV. Also found to be hypertensive (max /90). Pt was administered lasix 80mg IV and a nitroglycerin infusion was initiated. ICU consulted.    PAST MEDICAL & SURGICAL HISTORY:  Afib    Allergies  Allergy Status Unknown    Intolerances    FAMILY HISTORY:  Unknown    SOCIAL HISTORY:   No hx of EtOH, tobacco, or illicit drug use.    Review of Systems:  CONSTITUTIONAL: No fever, chills, or fatigue.  EYES: No eye pain, visual disturbances, or discharge.  ENMT:  No difficulty hearing, tinnitus, or vertigo. No sinus or throat pain.  NECK: No pain or stiffness.  RESPIRATORY: No shortness of breath, cough, or wheezing.  CARDIOVASCULAR: No chest pain, palpitations, dizziness, or leg swelling.  GASTROINTESTINAL: No abdominal or epigastric pain. No nausea, vomiting,  diarrhea, or constipation. No hematemesis, melena, or hematochezia.  GENITOURINARY: No dysuria, frequency, hematuria, or incontinence.  NEUROLOGICAL: No headaches, memory loss, loss of strength, numbness, or tremors.  SKIN: No itching, burning, rashes, or lesions.  MUSCULOSKELETAL: No joint pain or swelling; No muscle, back, or extremity pain.  PSYCHIATRIC: No depression, anxiety, mood swings, or difficulty sleeping.    Medications:  nitroglycerin     SubLingual 0.4 milliGRAM(s) SubLingual every 5 minutes PRN  nitroglycerin  Infusion 100 MICROgram(s)/Min IV Continuous <Continuous>    ICU Vital Signs Last 24 Hrs  T(C): 36.9 (08 Jul 2023 19:30), Max: 36.9 (08 Jul 2023 19:30)  T(F): 98.5 (08 Jul 2023 19:30), Max: 98.5 (08 Jul 2023 19:30)  HR: 87 (09 Jul 2023 02:03) (87 - 112)  BP: 125/58 (09 Jul 2023 02:03) (125/58 - 200/90)  BP(mean): --  ABP: --  ABP(mean): --  RR: 18 (09 Jul 2023 02:03) (18 - 32)  SpO2: 100% (09 Jul 2023 02:03) (92% - 100%)    O2 Parameters below as of 09 Jul 2023 02:03  Patient On (Oxygen Delivery Method): nasal cannula  O2 Flow (L/min): 3    Vital Signs Last 24 Hrs  T(C): 36.9 (08 Jul 2023 19:30), Max: 36.9 (08 Jul 2023 19:30)  T(F): 98.5 (08 Jul 2023 19:30), Max: 98.5 (08 Jul 2023 19:30)  HR: 87 (09 Jul 2023 02:03) (87 - 112)  BP: 125/58 (09 Jul 2023 02:03) (125/58 - 200/90)  BP(mean): --  RR: 18 (09 Jul 2023 02:03) (18 - 32)  SpO2: 100% (09 Jul 2023 02:03) (92% - 100%)    Parameters below as of 09 Jul 2023 02:03  Patient On (Oxygen Delivery Method): nasal cannula  O2 Flow (L/min): 3    I&O's Detail    08 Jul 2023 07:01  -  09 Jul 2023 03:33  --------------------------------------------------------  IN:  Total IN: 0 mL    OUT:    Voided (mL): 950 mL  Total OUT: 950 mL    Total NET: -950 mL    LABS:                        12.2   10.70 )-----------( 271      ( 08 Jul 2023 19:40 )             36.5     07-08    138  |  98  |  27.4<H>  ----------------------------<  99  3.7   |  27.0  |  0.50    Ca    9.3      08 Jul 2023 19:40  Mg     1.9     07-08    TPro  7.2  /  Alb  4.0  /  TBili  0.5  /  DBili  x   /  AST  36<H>  /  ALT  29  /  AlkPhos  80  07-08    CARDIAC MARKERS ( 08 Jul 2023 19:40 )  x     / <0.01 ng/mL / x     / x     / x        CAPILLARY BLOOD GLUCOSE    PT/INR - ( 08 Jul 2023 19:40 )   PT: 26.1 sec;   INR: 2.23 ratio    PTT - ( 08 Jul 2023 19:40 )  PTT:33.7 sec    Urinalysis Basic - ( 08 Jul 2023 19:40 )  Color: x / Appearance: x / SG: x / pH: x  Gluc: 99 mg/dL / Ketone: x  / Bili: x / Urobili: x   Blood: x / Protein: x / Nitrite: x   Leuk Esterase: x / RBC: x / WBC x   Sq Epi: x / Non Sq Epi: x / Bacteria: x    CULTURES:    Physical Examination:    General: In no acute distress.    HEENT: Pupils equal, reactive to light. Symmetric. No scleral icterus or injection.    PULM: Clear to auscultation B/L.    NECK: Supple, no lymphadenopathy, trachea midline.    CVS: Regular rate and rhythm, no murmurs appreciated, +s1/s2.    ABD: Soft, nondistended, nontender, normoactive bowel sounds.    EXT: No edema, nontender.    SKIN: Warm and well perfused, no rashes noted.    NEURO: Alert, oriented, interactive, nonfocal.    CRITICAL CARE TIME SPENT: 40 minutes

## 2023-07-09 NOTE — PATIENT PROFILE ADULT - FALL HARM RISK - RISK INTERVENTIONS

## 2023-07-09 NOTE — CONSULT NOTE ADULT - NS PANP COMMENT GEN_ALL_CORE FT
88 y/o F , hard of hearing, with hx of HTN, CHF on lasix,  Afib on xarelto (pt of Dr. Gaming), s/p ablation 2016 + 2019, s/p watchman LA appendage closure on 8/8/2022, uterine cancer s/p hysterectomy, IBS, diverticulosis,  chronic bilateral lower extremity edema, present from [facility] for worsening SOB. Patient was hypertensive, tachypneic, noted to have rales on exam, with b/l LE edema, concern for fluid overload 2/2 to HTN emergency.  Started on lasix/nitro drip/bipap.   CXR does not appear to have significant fluid, but cannot rule out underlying mild vascular congestion.  Patient clinically improved with lasix and nitro.      #acute hypoxic respiratory failure   #HTN emergency  #?mild pulm edema  - can continue lasix 40 mg IV BID for now  - continue PO clonidine, vasotec, and rest of home medication, but avoid hypotension  - Goal  to 160  - obtain echo   - continue sotalol for afib  - cardiology consult   - bipap on stand by   - wean O2 as tolerated, maintain SpO2 > 92%   - if patient reports worsening respiratory status, consider obtaining CT chest     #hx of afib s/p ablation , s/p watchman   - continue xarelto     #b/l LE edema with RLE cellulitis?    - c/w ceftriaxone   - obtain blood culture, UA, urine culture for infectious work up     Dispo: stable for tele, agree with rest of EVELIO Handy's plan.

## 2023-07-09 NOTE — H&P ADULT - HISTORY OF PRESENT ILLNESS
88 y/o F PMHx  HTN, CHF on lasix,  Afib on xarelto (pt of Dr. Gaming), s/p ablation 2016 + 2019, s/p watchman [place] appendage closure on 8/8/2022, uterine cancer s/p hysterectomy, IBS, diverticulosis,  chronic bilateral lower extremity edema, present from [facility] for worsening SOB. Pt reports worsened with exertion. Pt reports that her symptoms started when she swallowed her pills yesteday and felt that one of the pills got stuck in her throat. Pt reported difficulty swallowing since then. Also endorsed b/l leg swelling. Denied chest pain, palpitations, nausea, vomiting. No recent sick contacts.  89 y/o F PMHx  HTN, CHF on lasix,  Afib on xarelto (pt of Dr. Gaming), s/p ablation 2016 + 2019, s/p watchman LA appendage closure on 8/8/2022, uterine cancer s/p hysterectomy, IBS, diverticulosis,  chronic bilateral lower extremity edema, present from [facility] for worsening SOB. Pt reports worsened with exertion. Pt reports that her symptoms started when she swallowed her pills yesteday and felt that one of the pills got stuck in her throat. Pt reported difficulty swallowing since then. Also endorsed b/l leg swelling. Denied chest pain, palpitations, nausea, vomiting. No recent sick contacts.     Pt uses a walker to ambulate.    In the ED, /89>161/84. tachycardic 112, Labs with WBC 10.70, PT 26.1, INR 2.23, BUN/Cr 13/27.4. AST 36, proBNP 6990. CXR read as no acute process, + cardiomegaly. Placed on BIPAP and nitro drip started and lasix 80 mg IV adminstered. ICU was consulted, respiratory status improved and nitro  89 y/o F PMHx  HTN, CHF on lasix,  Afib on xarelto (pt of Dr. Gaming), s/p ablation 2016 + 2019, s/p watchman LA appendage closure on 8/8/2022, uterine cancer s/p hysterectomy, IBS, diverticulosis,  chronic bilateral lower extremity edema, present from [facility] for worsening SOB. Pt reports worsened with exertion. Pt reports that her symptoms started when she swallowed her pills yesteday and felt that one of the pills got stuck in her throat. Pt reported difficulty swallowing since then. Also endorsed b/l leg swelling. Denied chest pain, palpitations, nausea, vomiting. No recent sick contacts.     Pt uses a walker to ambulate.    In the ED, /89>161/84. tachycardic 112, Labs with WBC 10.70, PT 26.1, INR 2.23, BUN/Cr 13/27.4. AST 36, proBNP 6990. CXR read as no acute process, + cardiomegaly. Placed on BIPAP and nitro drip started and lasix 80 mg IV adminstered. ICU was consulted, respiratory status improved and nitro.

## 2023-07-09 NOTE — H&P ADULT - ASSESSMENT
89 y/o F PMHx  HTN, CHF on lasix,  Afib on xarelto (pt of Dr. Gaming), s/p ablation 2016 + 2019, s/p watchman LA appendage closure on 8/8/2022, uterine cancer s/p hysterectomy, IBS, diverticulosis,  chronic bilateral lower extremity edema, present from [facility] for worsening SOB. Imaging with possible pulmonary edema (not read but frank B lines and cephalization present) in the setting of hypertensive urgency and with proBNP elevated concern for CHF exacerbation.    #acute hypoxic respiratory failure  #HTN urgency  #CHF exacerbation?  #history of HTN  s/p lasix and nitro gtt and BIPAP with improvement in symptoms  titrate down O2   continue with lasix 40 mg BID (pt normally on lasix 40 mg PO daily)  follow up ECHO  restart home sotalol, statin  Restart home clonidine TID and enalapril 20 mg daily  Telemetry with cardiac monitoring   Monitor BP--target around 140-160 SBP      #afib  rate controlled  continue with sotalol and xarelto    DVT prophylaxis: Xarelto 88 y/o F PMHx  HTN, CHF on lasix,  Afib on xarelto (pt of Dr. Gaming), s/p ablation 2016 + 2019, s/p watchman LA appendage closure on 8/8/2022, uterine cancer s/p hysterectomy, IBS, diverticulosis,  chronic bilateral lower extremity edema, present from [facility] for worsening SOB. Imaging with possible pulmonary edema (not read but frank B lines and cephalization present) in the setting of hypertensive urgency and with proBNP elevated concern for CHF exacerbation.    #acute hypoxic respiratory failure  #HTN urgency  #CHF exacerbation?  #history of HTN  s/p lasix and nitro gtt and BIPAP with improvement in symptoms  titrate down O2   continue with lasix 40 mg BID (pt normally on lasix 40 mg PO daily)  follow up ECHO  restart home sotalol, statin  Restart home clonidine TID and enalapril 20 mg daily  Telemetry with cardiac monitoring   Monitor BP--target around 140-160 SBP      #afib  rate controlled  continue with sotalol and xarelto    #b/l lower extremity redness  -pt started on ceftriaxone in the ED for possible cellulitis  -continued with ceftriaxone  -follow up ESR and CRP and consider discontinuing if low    #right calf ulcer  present on admission   wound care     DVT prophylaxis: Xarelto

## 2023-07-09 NOTE — CONSULT NOTE ADULT - ASSESSMENT
89 y/o F with PMHx of HTN, HLD, and a fib on xarelto admitted with:    Acute hypoxic respiratory failure, acute pulmonary edema, hypertensive emergency, right lower extremity cellulitis    Patient was seen and examined at the bedside in the ER. Respiratory status much improved s/p initiation of BiPAP and dose of lasix. Pt was transitioned to 3 L nasal cannula, and remained comfortable with adequate O2 saturation. Pt remained on nitroglycerin infusion at 150mcg/min. Pt was administered clonidine 0.3mg and enalapril 20mg with the intent to titrate off nitroglycerin infusion.    Pt was re-evaluated after administration of oral antihypertensives. Now off nitroglycerin infusion with adequate blood pressure. Respiratory status has remained stable as well.    Would also recommend obtaining TTE, continuation of diuretic and home antihypertensive regimen, abx for possible right lower extremity cellulitis, and lower extremities dopplers to rule out DVT.    At this time, pt does not require ICU level of care. Please re-consult if pt's condition deteriorates.    Case discussed with ICU attending, Dr. Hawkins.

## 2023-07-09 NOTE — CHART NOTE - NSCHARTNOTEFT_GEN_A_CORE
patient being seen for chf exacerbation. Patient seen at bedside post duplex and tte.     patient complains of throat pain.     vitals appreciated  pe  GENERAL: patient appears well, n  EYES: sclera clear, no exudates  ENMT: oropharynx clear without erythema, no exudates, no erythema   NECK: supple, soft, no thyromegaly noted  LUNGS: good air entry bilaterally, clear to auscultation, symmetric breath sounds,   HEART: soft S1/S2, irregular rate and rhythm, no murmurs noted, b/l extremity edema 2-3+. +JVD  GASTROINTESTINAL: abdomen is soft, nontender, nondistended, normoactive bowel sounds, no palpable masses  INTEGUMENT: le warm to touch, ERYTHEMA,.   MUSCULOSKELETAL: no clubbing or cyanosis, no obvious deformity  NEUROLOGIC: awake, alert, oriented x3, good muscle tone in 4 extremities,       A/P  88 y/o F PMHx  HTN, CHF on lasix,  Afib on xarelto (pt of Dr. Gaming), s/p ablation 2016 + 2019, s/p watchman [place] appendage closure on 8/8/2022, uterine cancer s/p hysterectomy, IBS, diverticulosis,  chronic bilateral lower extremity edema, presentS from [facility] for worsening SOB. Imaging with possible pulmonary edema (not read but frank B lines and cephalization present) in the setting of hypertensive urgency and with proBNP elevated concern for CHF exacerbation.    #acute hypoxic respiratory failure  #HTN urgency  #CHF exacerbation?  #history of HTN  s/p lasix and nitro gtt and BIPAP with improvement in symptoms  titrate down O2 now on 2Liters   continue with lasix 40 mg BID (pt normally on lasix 40 mg PO daily)  follow up ECHO - if reduced ef will consult cardio    home statin  - change sotalol to metoprolol    home clonidine TID and enalapril 20 mg daily  added po hydraalzine for better bp control  - iv hydralazine prn       #afib  rate controlled  continue with metoprolol and xarelto    #b/l lower extremity redness  -pt started on ceftriaxone in the ED for possible cellulitis  -change to Iv ancef  - blood cultures not sent  - duplex neg for dvt    #right calf ulcer  present on admission   wound care consult pending     #sore throat - cepacol prn     DVT prophylaxis: Xarelto

## 2023-07-09 NOTE — CONSULT NOTE ADULT - SUBJECTIVE AND OBJECTIVE BOX
Patient is a 89y old  Female who presents with a chief complaint of     BRIEF HOSPITAL COURSE:   88 y/o F , hard of hearing, with hx of HTN, CHF on lasix,  Afib on xarelto (pt of Dr. Gaming), s/p ablation 2016 + 2019, s/p watchman [place] appendage closure on 8/8/2022, uterine cancer s/p hysterectomy, IBS, diverticulosis,  chronic bilateral lower extremity edema, present from [facility] for worsening SOB.   Patient reports that she has been taking her vasotec once a day instead of twice a day, and only take clonidine if her BP was high, and her last dose was yesterday, and only taken once.  Was here on 5/2023 for s/p fall, d/c after ct head negative, and BP at the time was 177/77.    Patient present again 7/8/23 for SOB, noted to have wet cough, b/l rales,  elevated BNP, SBP was in 180s, concern for possible pulm edema from HTN emergency.  Started on nitro drip, placed on bipap, received lasix 80 mg IVP 1x, and MICU consulted.     Upon evaluation, patient speaks in full sentences, not in respiratory distress, able to come off bipap.  Nitro drip came down to 100 mcg.  Patient reports feeling better.  Received PO enalapril 20 mg, and clonidine 0.3 mg, also received ceftriaxone for possible cellulitis at the RLE extremity given the painful sore on the calf.      Nitrodrip off at 1 am , SBP remains less than 160 and remains off bipap.   Patient denies acute complaint, reports breathing overall improves.     PAST MEDICAL & SURGICAL HISTORY:  Afib        Allergies    Allergy Status Unknown    Intolerances      FAMILY HISTORY:      Family history otherwise noncontributory.    Social History: Lives in [facility], never smoke     Review of Systems:    ALL OTHER REVIEW OF SYSTEMS EXCEPT PER HPI NEGATIVE.      Medications:    nitroglycerin     SubLingual 0.4 milliGRAM(s) SubLingual every 5 minutes PRN  nitroglycerin  Infusion 100 MICROgram(s)/Min IV Continuous <Continuous>      Vital Signs Last 24 Hrs  T(C): 36.9 (08 Jul 2023 19:30), Max: 36.9 (08 Jul 2023 19:30)  T(F): 98.5 (08 Jul 2023 19:30), Max: 98.5 (08 Jul 2023 19:30)  HR: 87 (09 Jul 2023 02:03) (87 - 112)  BP: 125/58 (09 Jul 2023 02:03) (125/58 - 200/90)  BP(mean): --  RR: 18 (09 Jul 2023 02:03) (18 - 32)  SpO2: 100% (09 Jul 2023 02:03) (92% - 100%)    Parameters below as of 09 Jul 2023 02:03  Patient On (Oxygen Delivery Method): nasal cannula  O2 Flow (L/min): 3          I&O's Detail    08 Jul 2023 07:01  -  09 Jul 2023 03:36  --------------------------------------------------------  IN:  Total IN: 0 mL    OUT:    Voided (mL): 950 mL  Total OUT: 950 mL    Total NET: -950 mL            LABS:                        12.2   10.70 )-----------( 271      ( 08 Jul 2023 19:40 )             36.5     07-08    138  |  98  |  27.4<H>  ----------------------------<  99  3.7   |  27.0  |  0.50    Ca    9.3      08 Jul 2023 19:40  Mg     1.9     07-08    TPro  7.2  /  Alb  4.0  /  TBili  0.5  /  DBili  x   /  AST  36<H>  /  ALT  29  /  AlkPhos  80  07-08      CARDIAC MARKERS ( 08 Jul 2023 19:40 )  x     / <0.01 ng/mL / x     / x     / x          CAPILLARY BLOOD GLUCOSE        PT/INR - ( 08 Jul 2023 19:40 )   PT: 26.1 sec;   INR: 2.23 ratio         PTT - ( 08 Jul 2023 19:40 )  PTT:33.7 sec  Urinalysis Basic - ( 08 Jul 2023 19:40 )    Color: x / Appearance: x / SG: x / pH: x  Gluc: 99 mg/dL / Ketone: x  / Bili: x / Urobili: x   Blood: x / Protein: x / Nitrite: x   Leuk Esterase: x / RBC: x / WBC x   Sq Epi: x / Non Sq Epi: x / Bacteria: x      CULTURES:      Physical Examination:  GENERAL: In NAD   HEENT: NC/AT  NECK: Supple, trachea midline  PULM: CTA b/l   CVS: +S1, S2, RRR  ABD: Soft, non-tender  EXTREMITIES: No pedal edema B/L  SKIN: No open wounds  NEURO: Grossly non-focal    DEVICES:     RADIOLOGY:   C XR clear

## 2023-07-09 NOTE — CONSULT NOTE ADULT - ASSESSMENT
Assessment   89 y/o F , hard of hearing, with hx of HTN, CHF on lasix,  Afib on xarelto (pt of Dr. Gaming), s/p ablation 2016 + 2019, s/p watchman LA appendage closure on 8/8/2022, uterine cancer s/p hysterectomy, IBS, diverticulosis,  chronic bilateral lower extremity edema, present from [facility] for worsening SOB. Patient was hypertensive, tachypneic, noted to have rales on exam, with b/l LE edema, concern for fluid overload 2/2 to HTN emergency.  Started on lasix/nitro drip/bipap.  Patient clinically improved, able to come off nitro and resumed PO HTN med.      #acute hypoxic respiratory failure   #HTN emergency  #?mild pulm edema  - can continue lasix 40 mg IV BID for now  - continue PO clonidine, vasotec, and rest of home medication, but avoid hypotension  - Goal  to 160  - obtain echo   - continue sotalol for afib  - cardiology consult   - bipap on stand by   - wean O2 as tolerated, maintain SpO2 > 92%     #hx of afib s/p ablation , s/p watchman   - continue xarelto     #b/l LE edema with RLE cellulitis?    - c/w ceftriaxone   - obtain blood culture, UA, urine culture for infectious work up       Sedation/Analgesia: none  Vasoactive medications: none  DVT prophylaxis: on xarelto     GI prophylaxis: none  Nutrition: diet  Central line: none  Forman: purewick   Mobility: as tolerated with PT   Family/Patient engagement/GOC: Full     Paul Hawkins M.D  Attending Physician  Pulmonary & Critical Care Medicine  [facility]

## 2023-07-09 NOTE — H&P ADULT - NSHPPHYSICALEXAM_GEN_ALL_CORE
T(C): 36.8 (07-09-23 @ 05:35), Max: 36.9 (07-08-23 @ 19:30)  HR: 96 (07-09-23 @ 05:35) (80 - 112)  BP: 193/99 (07-09-23 @ 05:35) (125/58 - 200/90)  RR: 16 (07-09-23 @ 05:35) (16 - 32)  SpO2: 100% (07-09-23 @ 05:35) (92% - 100%)    GENERAL: patient appears well, no acute distress, appropriate, pleasant  EYES: sclera clear, no exudates  ENMT: oropharynx clear without erythema, no exudates, moist mucous membranes  NECK: supple, soft, no thyromegaly noted  LUNGS: good air entry bilaterally, clear to auscultation, symmetric breath sounds, no wheezing or rhonchi appreciated  HEART: soft S1/S2, irregular rate and rhythm, no murmurs noted, b/l extremity edema 2-3+. +JVD  GASTROINTESTINAL: abdomen is soft, nontender, nondistended, normoactive bowel sounds, no palpable masses  INTEGUMENT: good skin turgor, warm skin, appears well perfused  MUSCULOSKELETAL: no clubbing or cyanosis, no obvious deformity  NEUROLOGIC: awake, alert, oriented x3, good muscle tone in 4 extremities, no obvious sensory deficits  PSYCHIATRIC: mood is good, affect is congruent, linear and logical thought process  HEME/LYMPH: no palpable supraclavicular nodules, no obvious ecchymosis or petechiae T(C): 36.8 (07-09-23 @ 05:35), Max: 36.9 (07-08-23 @ 19:30)  HR: 96 (07-09-23 @ 05:35) (80 - 112)  BP: 193/99 (07-09-23 @ 05:35) (125/58 - 200/90)  RR: 16 (07-09-23 @ 05:35) (16 - 32)  SpO2: 100% (07-09-23 @ 05:35) (92% - 100%)    GENERAL: patient appears well, no acute distress, appropriate, pleasant  EYES: sclera clear, no exudates  ENMT: oropharynx clear without erythema, no exudates, moist mucous membranes  NECK: supple, soft, no thyromegaly noted  LUNGS: good air entry bilaterally, clear to auscultation, symmetric breath sounds, no wheezing or rhonchi appreciated  HEART: soft S1/S2, irregular rate and rhythm, no murmurs noted, b/l extremity edema 2-3+. +JVD  GASTROINTESTINAL: abdomen is soft, nontender, nondistended, normoactive bowel sounds, no palpable masses  INTEGUMENT: good skin turgor, warm skin, appears well perfused. right calf ulceration.   MUSCULOSKELETAL: no clubbing or cyanosis, no obvious deformity  NEUROLOGIC: awake, alert, oriented x3, good muscle tone in 4 extremities, no obvious sensory deficits  PSYCHIATRIC: mood is good, affect is congruent, linear and logical thought process  HEME/LYMPH: no palpable supraclavicular nodules, no obvious ecchymosis or petechiae

## 2023-07-10 DIAGNOSIS — R49.8 OTHER VOICE AND RESONANCE DISORDERS: ICD-10-CM

## 2023-07-10 LAB
ALBUMIN SERPL ELPH-MCNC: 3.5 G/DL — SIGNIFICANT CHANGE UP (ref 3.3–5.2)
ALP SERPL-CCNC: 73 U/L — SIGNIFICANT CHANGE UP (ref 40–120)
ALT FLD-CCNC: 21 U/L — SIGNIFICANT CHANGE UP
ANION GAP SERPL CALC-SCNC: 12 MMOL/L — SIGNIFICANT CHANGE UP (ref 5–17)
ANION GAP SERPL CALC-SCNC: 15 MMOL/L — SIGNIFICANT CHANGE UP (ref 5–17)
AST SERPL-CCNC: 28 U/L — SIGNIFICANT CHANGE UP
BASOPHILS # BLD AUTO: 0.03 K/UL — SIGNIFICANT CHANGE UP (ref 0–0.2)
BASOPHILS NFR BLD AUTO: 0.2 % — SIGNIFICANT CHANGE UP (ref 0–2)
BILIRUB SERPL-MCNC: 0.6 MG/DL — SIGNIFICANT CHANGE UP (ref 0.4–2)
BUN SERPL-MCNC: 28.8 MG/DL — HIGH (ref 8–20)
BUN SERPL-MCNC: 28.8 MG/DL — HIGH (ref 8–20)
CALCIUM SERPL-MCNC: 8.7 MG/DL — SIGNIFICANT CHANGE UP (ref 8.4–10.5)
CALCIUM SERPL-MCNC: 9.1 MG/DL — SIGNIFICANT CHANGE UP (ref 8.4–10.5)
CHLORIDE SERPL-SCNC: 91 MMOL/L — LOW (ref 96–108)
CHLORIDE SERPL-SCNC: 91 MMOL/L — LOW (ref 96–108)
CO2 SERPL-SCNC: 33 MMOL/L — HIGH (ref 22–29)
CO2 SERPL-SCNC: 35 MMOL/L — HIGH (ref 22–29)
CREAT SERPL-MCNC: 0.75 MG/DL — SIGNIFICANT CHANGE UP (ref 0.5–1.3)
CREAT SERPL-MCNC: 0.78 MG/DL — SIGNIFICANT CHANGE UP (ref 0.5–1.3)
EGFR: 73 ML/MIN/1.73M2 — SIGNIFICANT CHANGE UP
EGFR: 76 ML/MIN/1.73M2 — SIGNIFICANT CHANGE UP
EOSINOPHIL # BLD AUTO: 0 K/UL — SIGNIFICANT CHANGE UP (ref 0–0.5)
EOSINOPHIL NFR BLD AUTO: 0 % — SIGNIFICANT CHANGE UP (ref 0–6)
GLUCOSE SERPL-MCNC: 110 MG/DL — HIGH (ref 70–99)
GLUCOSE SERPL-MCNC: 119 MG/DL — HIGH (ref 70–99)
HCT VFR BLD CALC: 38.1 % — SIGNIFICANT CHANGE UP (ref 34.5–45)
HGB BLD-MCNC: 12.5 G/DL — SIGNIFICANT CHANGE UP (ref 11.5–15.5)
IMM GRANULOCYTES NFR BLD AUTO: 0.4 % — SIGNIFICANT CHANGE UP (ref 0–0.9)
INR BLD: 3.54 RATIO — HIGH (ref 0.88–1.16)
LYMPHOCYTES # BLD AUTO: 1.11 K/UL — SIGNIFICANT CHANGE UP (ref 1–3.3)
LYMPHOCYTES # BLD AUTO: 7 % — LOW (ref 13–44)
MAGNESIUM SERPL-MCNC: 1.8 MG/DL — SIGNIFICANT CHANGE UP (ref 1.6–2.6)
MAGNESIUM SERPL-MCNC: 1.8 MG/DL — SIGNIFICANT CHANGE UP (ref 1.6–2.6)
MCHC RBC-ENTMCNC: 32.8 GM/DL — SIGNIFICANT CHANGE UP (ref 32–36)
MCHC RBC-ENTMCNC: 33.6 PG — SIGNIFICANT CHANGE UP (ref 27–34)
MCV RBC AUTO: 102.4 FL — HIGH (ref 80–100)
MONOCYTES # BLD AUTO: 1.39 K/UL — HIGH (ref 0–0.9)
MONOCYTES NFR BLD AUTO: 8.8 % — SIGNIFICANT CHANGE UP (ref 2–14)
NEUTROPHILS # BLD AUTO: 13.16 K/UL — HIGH (ref 1.8–7.4)
NEUTROPHILS NFR BLD AUTO: 83.6 % — HIGH (ref 43–77)
PLATELET # BLD AUTO: 234 K/UL — SIGNIFICANT CHANGE UP (ref 150–400)
POTASSIUM SERPL-MCNC: 3.1 MMOL/L — LOW (ref 3.5–5.3)
POTASSIUM SERPL-MCNC: 3.6 MMOL/L — SIGNIFICANT CHANGE UP (ref 3.5–5.3)
POTASSIUM SERPL-SCNC: 3.1 MMOL/L — LOW (ref 3.5–5.3)
POTASSIUM SERPL-SCNC: 3.6 MMOL/L — SIGNIFICANT CHANGE UP (ref 3.5–5.3)
PROT SERPL-MCNC: 6.5 G/DL — LOW (ref 6.6–8.7)
PROTHROM AB SERPL-ACNC: 41.6 SEC — HIGH (ref 10.5–13.4)
RBC # BLD: 3.72 M/UL — LOW (ref 3.8–5.2)
RBC # FLD: 14.1 % — SIGNIFICANT CHANGE UP (ref 10.3–14.5)
SARS-COV-2 RNA SPEC QL NAA+PROBE: SIGNIFICANT CHANGE UP
SODIUM SERPL-SCNC: 138 MMOL/L — SIGNIFICANT CHANGE UP (ref 135–145)
SODIUM SERPL-SCNC: 138 MMOL/L — SIGNIFICANT CHANGE UP (ref 135–145)
WBC # BLD: 15.76 K/UL — HIGH (ref 3.8–10.5)
WBC # FLD AUTO: 15.76 K/UL — HIGH (ref 3.8–10.5)

## 2023-07-10 PROCEDURE — 99232 SBSQ HOSP IP/OBS MODERATE 35: CPT

## 2023-07-10 RX ORDER — MAGNESIUM SULFATE 500 MG/ML
1 VIAL (ML) INJECTION ONCE
Refills: 0 | Status: COMPLETED | OUTPATIENT
Start: 2023-07-10 | End: 2023-07-10

## 2023-07-10 RX ORDER — POTASSIUM CHLORIDE 20 MEQ
40 PACKET (EA) ORAL EVERY 4 HOURS
Refills: 0 | Status: COMPLETED | OUTPATIENT
Start: 2023-07-10 | End: 2023-07-10

## 2023-07-10 RX ORDER — POTASSIUM CHLORIDE 20 MEQ
40 PACKET (EA) ORAL EVERY 4 HOURS
Refills: 0 | Status: DISCONTINUED | OUTPATIENT
Start: 2023-07-10 | End: 2023-07-10

## 2023-07-10 RX ADMIN — Medication 1000 MILLIGRAM(S): at 21:14

## 2023-07-10 RX ADMIN — Medication 25 MILLIGRAM(S): at 10:42

## 2023-07-10 RX ADMIN — ATORVASTATIN CALCIUM 40 MILLIGRAM(S): 80 TABLET, FILM COATED ORAL at 21:14

## 2023-07-10 RX ADMIN — Medication 40 MILLIEQUIVALENT(S): at 14:32

## 2023-07-10 RX ADMIN — Medication 0.3 MILLIGRAM(S): at 10:42

## 2023-07-10 RX ADMIN — Medication 40 MILLIEQUIVALENT(S): at 16:46

## 2023-07-10 RX ADMIN — Medication 1000 MILLIGRAM(S): at 06:20

## 2023-07-10 RX ADMIN — RIVAROXABAN 15 MILLIGRAM(S): KIT at 14:32

## 2023-07-10 RX ADMIN — Medication 25 MILLIGRAM(S): at 06:25

## 2023-07-10 RX ADMIN — Medication 1000 MILLIGRAM(S): at 14:32

## 2023-07-10 RX ADMIN — Medication 25 MILLIGRAM(S): at 21:15

## 2023-07-10 RX ADMIN — Medication 40 MILLIGRAM(S): at 16:46

## 2023-07-10 RX ADMIN — Medication 20 MILLIGRAM(S): at 10:42

## 2023-07-10 RX ADMIN — Medication 40 MILLIGRAM(S): at 06:22

## 2023-07-10 RX ADMIN — Medication 100 GRAM(S): at 10:42

## 2023-07-10 RX ADMIN — Medication 0.1 MILLIGRAM(S): at 21:14

## 2023-07-10 RX ADMIN — Medication 600 MILLIGRAM(S): at 06:56

## 2023-07-10 NOTE — PROGRESS NOTE ADULT - SUBJECTIVE AND OBJECTIVE BOX
NAN GAYEL    [id number]    89y      Female    INTERVAL HPI/OVERNIGHT EVENTS: patient being seen for chf exacerbation and hoarse voice.     patient continues to have voice loss issues.       REVIEW OF SYSTEMS:    CONSTITUTIONAL: No fever, weight loss, or fatigue  RESPIRATORY: No cough, wheezing, hemoptysis; No shortness of breath  CARDIOVASCULAR: No chest pain, palpitations  GASTROINTESTINAL: No abdominal or epigastric pain. No nausea, vomiting  NEUROLOGICAL: No headaches, memory loss, loss of strength.  MISCELLANEOUS:      Vital Signs Last 24 Hrs  T(C): 36.6 (10 Jul 2023 10:59), Max: 37.2 (09 Jul 2023 19:10)  T(F): 97.9 (10 Jul 2023 10:59), Max: 99 (09 Jul 2023 19:10)  HR: 99 (10 Jul 2023 10:59) (62 - 110)  BP: 160/68 (10 Jul 2023 10:59) (125/60 - 186/85)  BP(mean): 119 (09 Jul 2023 19:10) (119 - 119)  RR: 18 (10 Jul 2023 10:59) (18 - 18)  SpO2: 99% (10 Jul 2023 10:59) (97% - 100%)    Parameters below as of 10 Jul 2023 10:59  Patient On (Oxygen Delivery Method): nasal cannula  O2 Flow (L/min): 3      PHYSICAL EXAM:  GENERAL: patient appears well,   EYES: sclera clear, no exudates  ENMT: oropharynx clear without erythema, no exudates, no erythema   NECK: supple, soft, no thyromegaly noted  LUNGS: good air entry bilaterally, clear to auscultation, symmetric breath sounds,   HEART: soft S1/S2, irregular rate and rhythm, no murmurs noted, b/l extremity edema 2-3+. +JVD  GASTROINTESTINAL: abdomen is soft, nontender, nondistended, normoactive bowel sounds, no palpable masses  INTEGUMENT: le warm to touch, ERYTHEMA,.   MUSCULOSKELETAL: no clubbing or cyanosis, no obvious deformity  NEUROLOGIC: awake, alert, oriented x3, good muscle tone in 4 extremities,     LABS:                        12.5   15.76 )-----------( 234      ( 10 Jul 2023 02:28 )             38.1     07-10    138  |  91<L>  |  28.8<H>  ----------------------------<  110<H>  3.1<L>   |  33.0<H>  |  0.78    Ca    8.7      10 Jul 2023 02:28  Mg     1.8     07-10    TPro  6.5<L>  /  Alb  3.5  /  TBili  0.6  /  DBili  x   /  AST  28  /  ALT  21  /  AlkPhos  73  07-10    PT/INR - ( 10 Jul 2023 02:28 )   PT: 41.6 sec;   INR: 3.54 ratio         PTT - ( 08 Jul 2023 19:40 )  PTT:33.7 sec  Urinalysis Basic - ( 10 Jul 2023 02:28 )    Color: x / Appearance: x / SG: x / pH: x  Gluc: 110 mg/dL / Ketone: x  / Bili: x / Urobili: x   Blood: x / Protein: x / Nitrite: x   Leuk Esterase: x / RBC: x / WBC x   Sq Epi: x / Non Sq Epi: x / Bacteria: x          MEDICATIONS  (STANDING):  atorvastatin 40 milliGRAM(s) Oral at bedtime  ceFAZolin  Injectable. 1000 milliGRAM(s) IV Push every 8 hours  cloNIDine 0.3 milliGRAM(s) Oral three times a day  enalapril 20 milliGRAM(s) Oral daily  furosemide   Injectable 40 milliGRAM(s) IV Push two times a day  hydrALAZINE 25 milliGRAM(s) Oral every 8 hours  metoprolol tartrate 25 milliGRAM(s) Oral three times a day  potassium chloride   Powder 40 milliEquivalent(s) Oral every 4 hours  rivaroxaban 15 milliGRAM(s) Oral daily    MEDICATIONS  (PRN):  acetaminophen     Tablet .. 650 milliGRAM(s) Oral every 6 hours PRN Temp greater or equal to 38C (100.4F), Mild Pain (1 - 3)  aluminum hydroxide/magnesium hydroxide/simethicone Suspension 30 milliLiter(s) Oral every 4 hours PRN Dyspepsia  benzocaine/menthol Lozenge 1 Lozenge Oral every 6 hours PRN Sore Throat  guaiFENesin  milliGRAM(s) Oral every 12 hours PRN Cough  hydrALAZINE Injectable 5 milliGRAM(s) IV Push every 6 hours PRN for sbp above 160 mmhg  melatonin 3 milliGRAM(s) Oral at bedtime PRN Insomnia  metoprolol tartrate Injectable 2.5 milliGRAM(s) IV Push every 6 hours PRN for heart rate above 110 bpm  nitroglycerin     SubLingual 0.4 milliGRAM(s) SubLingual every 5 minutes PRN Chest Pain  ondansetron Injectable 4 milliGRAM(s) IV Push every 8 hours PRN Nausea and/or Vomiting      RADIOLOGY & ADDITIONAL TESTS:

## 2023-07-10 NOTE — SWALLOW BEDSIDE ASSESSMENT ADULT - SLP PERTINENT HISTORY OF CURRENT PROBLEM
89 y/o F PMHx  HTN, CHF on lasix,  Afib on xarelto (pt of Dr. Gaming), s/p ablation 2016 + 2019, s/p watchman [place] appendage closure on 8/8/2022, uterine cancer s/p hysterectomy, IBS, diverticulosis,  chronic bilateral lower extremity edema, present from [facility] for worsening SOB. Pt reports worsened with exertion. Pt reports that her symptoms started when she swallowed her pills yesteday and felt that one of the pills got stuck in her throat. Pt reported difficulty swallowing since then. Also endorsed b/l leg swelling. Denied chest pain, palpitations, nausea, vomiting. No recent sick contacts. Pt reports swallowing difficulty has resolved, reporting eating dinner yesterday and breakfast this morning without incident

## 2023-07-10 NOTE — SWALLOW BEDSIDE ASSESSMENT ADULT - SLP GENERAL OBSERVATIONS
Pt recd A&A, 0x4 in bed, +02 via nc, Pt recd A&A, 0x4 in bed, +02 via nc, hoarse vocal quality (reportedly new onset following pill being stuck), baseline intermittent cough with report of "phlegm," pain 0/10 pre/post eval

## 2023-07-10 NOTE — CONSULT NOTE ADULT - SUBJECTIVE AND OBJECTIVE BOX
[place] [facility], [facility]                                   [address] [place] [address]                                                      PHONE: [phone]                                                         FAX: [phone]  [email or web address]  ---------------------------------------------------------------------------------------------------------------------------------    Reason for Consult: SOB  [facility]: Dr Gaming  HPI:  NAN GAYLE is an 89y Female PMhx Afib sp ablation, ILR, sp Watchman, Chronic Pulm HTN and edema pw SOB that occured after she started choking on her pills.  Afterwards she is hoarse and noted some wheezing. Pt states LE edema is at her baseline but still dose not feel breathing is back to normal.    PAST MEDICAL & SURGICAL HISTORY:  Afib          Allergy Status Unknown      MEDICATIONS  (STANDING):  atorvastatin 40 milliGRAM(s) Oral at bedtime  ceFAZolin  Injectable. 1000 milliGRAM(s) IV Push every 8 hours  cloNIDine 0.3 milliGRAM(s) Oral three times a day  enalapril 20 milliGRAM(s) Oral daily  furosemide   Injectable 40 milliGRAM(s) IV Push two times a day  hydrALAZINE 25 milliGRAM(s) Oral every 8 hours  metoprolol tartrate 25 milliGRAM(s) Oral three times a day  potassium chloride   Powder 40 milliEquivalent(s) Oral every 4 hours  rivaroxaban 15 milliGRAM(s) Oral daily    MEDICATIONS  (PRN):  acetaminophen     Tablet .. 650 milliGRAM(s) Oral every 6 hours PRN Temp greater or equal to 38C (100.4F), Mild Pain (1 - 3)  aluminum hydroxide/magnesium hydroxide/simethicone Suspension 30 milliLiter(s) Oral every 4 hours PRN Dyspepsia  benzocaine/menthol Lozenge 1 Lozenge Oral every 6 hours PRN Sore Throat  guaiFENesin  milliGRAM(s) Oral every 12 hours PRN Cough  hydrALAZINE Injectable 5 milliGRAM(s) IV Push every 6 hours PRN for sbp above 160 mmhg  melatonin 3 milliGRAM(s) Oral at bedtime PRN Insomnia  metoprolol tartrate Injectable 2.5 milliGRAM(s) IV Push every 6 hours PRN for heart rate above 110 bpm  nitroglycerin     SubLingual 0.4 milliGRAM(s) SubLingual every 5 minutes PRN Chest Pain  ondansetron Injectable 4 milliGRAM(s) IV Push every 8 hours PRN Nausea and/or Vomiting      Social History:  Cigarettes:     no               Alchohol:     no            Illicit Drug Abuse:  no  FHx no SCD  ROS: Negative other than as mentioned in HPI.    Vital Signs Last 24 Hrs  T(C): 36.6 (10 Jul 2023 10:59), Max: 37.2 (09 Jul 2023 19:10)  T(F): 97.9 (10 Jul 2023 10:59), Max: 99 (09 Jul 2023 19:10)  HR: 99 (10 Jul 2023 10:59) (62 - 110)  BP: 160/68 (10 Jul 2023 10:59) (125/60 - 192/74)  BP(mean): 119 (09 Jul 2023 19:10) (119 - 119)  RR: 18 (10 Jul 2023 10:59) (18 - 18)  SpO2: 99% (10 Jul 2023 10:59) (97% - 100%)    Parameters below as of 10 Jul 2023 10:59  Patient On (Oxygen Delivery Method): nasal cannula  O2 Flow (L/min): 3    ICU Vital Signs Last 24 Hrs  NAN GAYLE  I&O's Detail    09 Jul 2023 07:01  -  10 Jul 2023 07:00  --------------------------------------------------------  IN:  Total IN: 0 mL    OUT:    Voided (mL): 1000 mL  Total OUT: 1000 mL    Total NET: -1000 mL        I&O's Summary    09 Jul 2023 07:01  -  10 Jul 2023 07:00  --------------------------------------------------------  IN: 0 mL / OUT: 1000 mL / NET: -1000 mL      Drug Dosing Weight  NAN GAYLE      PHYSICAL EXAM:  General:  alert and cooperative.  HEENT: Head; normocephalic, atraumatic.  Eyes: Pupils reactive, cornea wnl.  Neck: ++JVD  CARDIOVASCULAR: Normal S1 and S2, No murmur, rub, gallop or lift.   LUNGS: No rales, rhonchi or wheeze. Normal breath sounds bilaterally.  ABDOMEN: Soft, nontender without mass or organomegaly. bowel sounds normoactive.  EXTREMITIES: 1+ edema. Distal pulses wnl.   SKIN: warm and dry with normal turgor.  NEURO: Alert/oriented x 3/normal motor exam. No pathologic reflexes.    PSYCH: normal affect.        LABS:                        12.5   15.76 )-----------( 234      ( 10 Jul 2023 02:28 )             38.1     07-10    138  |  91<L>  |  28.8<H>  ----------------------------<  110<H>  3.1<L>   |  33.0<H>  |  0.78    Ca    8.7      10 Jul 2023 02:28  Mg     1.8     07-10    TPro  6.5<L>  /  Alb  3.5  /  TBili  0.6  /  DBili  x   /  AST  28  /  ALT  21  /  AlkPhos  73  07-10    NAN GAYLE  CARDIAC MARKERS ( 08 Jul 2023 19:40 )  x     / <0.01 ng/mL / x     / x     / x          PT/INR - ( 10 Jul 2023 02:28 )   PT: 41.6 sec;   INR: 3.54 ratio         PTT - ( 08 Jul 2023 19:40 )  PTT:33.7 sec  Urinalysis Basic - ( 10 Jul 2023 02:28 )    Color: x / Appearance: x / SG: x / pH: x  Gluc: 110 mg/dL / Ketone: x  / Bili: x / Urobili: x   Blood: x / Protein: x / Nitrite: x   Leuk Esterase: x / RBC: x / WBC x   Sq Epi: x / Non Sq Epi: x / Bacteria: x        RADIOLOGY & ADDITIONAL STUDIES:    INTERPRETATION OF TELEMETRY (personally reviewed): no events    ECG: Afib @ 90 PVCs no acute ischemic changes    ECHO: < from: TTE Echo Complete w/ Contrast w/ Doppler (07.09.23 @ 10:34) >  Summary:   1. Normal left ventricular internal cavity size.   2. Normal global left ventricular systolic function.   3. Left ventricular ejection fraction, by visual estimation, is 55 to   60%.   4. Moderate to severe left atrial enlargement.   5. Moderate to severe right atrial enlargement.   6. The mitral in-flow pattern reveals no discernable A-wave, therefore   no comment on diastolic function can be made.   7. Mild mitral annular calcification.   8. Mild thickening of the anterior and posterior mitral valve leaflets.   9. Moderate mitral valve regurgitation.  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  11. Mild-moderate tricuspid regurgitation.  12. Sclerotic aortic valve with normal opening.  13. Mild to moderate aortic regurgitation.  14. Mild to moderate pulmonic valve regurgitation.  15. Estimated pulmonary artery systolic pressure is 51.6 mmHg assuming a   right atrial pressure of 8 mmHg, which is consistent with moderate   pulmonary hypertension.  16. There is no evidence of pericardial effusion.    MD Carlito Electronically signed on 7/9/2023 at 4:42:37 PM    < end of copied text >           Assessment and Plan:  In summary, NAN GAYLE is an 89y Female with PMhx Afib sp ablation, ILR, sp Watchman, Chronic Pulm HTN and edema pw SOB that occured after she started choking on her pills.  Afterwards she is hoarse and noted some wheezing. Pt states LE edema is at her baseline but still dose not feel breathing is back to normal.    1) CW IV lasix, STrict I/Os would consider one more day  2) SOB likely multifactorial including irritation from pills  3) upon discharge would increase daily lasix to 60mg daily

## 2023-07-10 NOTE — CONSULT NOTE ADULT - ASSESSMENT
89F PMHx  HTN, CHF on lasix,  Afib on xarelto (pt of Dr. Gaming), s/p ablation 2016 + 2019, s/p watchman LA appendage closure on 8/8/2022, uterine cancer s/p hysterectomy, IBS, diverticulosis,  chronic bilateral lower extremity edema, present from [facility] for worsening SOB. Pt reports worsened with exertion. Pt reports that her symptoms started when she swallowed her pills yesteday and felt that one of the pills got stuck in her throat. She endorses dysphonia and dysphagia since. Says the dysphonia has been improving and is now able to speak, whereas she had completely lost her voice yesterday. Denies SOB, stridor, odynophagia, globus,  recent surgeries.

## 2023-07-10 NOTE — CONSULT NOTE ADULT - PROBLEM SELECTOR RECOMMENDATION 9
Attempted to perform bedside laryngoscopy, but patient refused saying "I don't need that, it's not necessary."   Explained the risks vs benefits of performing laryngoscopy to determine VC integrity and function   If symptoms are persistent beyond discharge, she may follow up with Dr. Alcaraz as an outpatient  Please reconsult as needed     Plan of care discussed with Dr. Alcaraz

## 2023-07-10 NOTE — PHYSICAL THERAPY INITIAL EVALUATION ADULT - ADDITIONAL COMMENTS
Pt lives in a [facility], PTA, reports amb with RW independently and Modified [place] with ADLs and self care.

## 2023-07-10 NOTE — SWALLOW BEDSIDE ASSESSMENT ADULT - SWALLOW EVAL: RECOMMENDED FEEDING/EATING TECHNIQUES
crush medication PRN or take one at a time with water/in puree/crush medication (when feasible)/oral hygiene/position upright (90 degrees)/small sips/bites

## 2023-07-10 NOTE — SWALLOW BEDSIDE ASSESSMENT ADULT - SWALLOW EVAL: DIAGNOSIS
Oral and pharyngeal stages of swallow appear functional for administered consistencies. No overt s/s aspiration or c/o globus sensation post swallow.

## 2023-07-10 NOTE — CONSULT NOTE ADULT - SUBJECTIVE AND OBJECTIVE BOX
CC: Hypophonia     HPI:   89F PMHx  HTN, CHF on lasix,  Afib on xarelto (pt of Dr. Gaming), s/p ablation 2016 + 2019, s/p watchman LA appendage closure on 8/8/2022, uterine cancer s/p hysterectomy, IBS, diverticulosis,  chronic bilateral lower extremity edema, present from [facility] for worsening SOB. Pt reports worsened with exertion. Pt reports that her symptoms started when she swallowed her pills yesteday and felt that one of the pills got stuck in her throat. She endorses dysphonia and dysphagia since. Says the dysphonia has been improving and is now able to speak, whereas she had completely lost her voice yesterday. Denies SOB, stridor, odynophagia, globus,  recent surgeries.       PAST MEDICAL & SURGICAL HISTORY:  Afib      Allergies  Allergy Status Unknown        MEDICATIONS  (STANDING):  atorvastatin 40 milliGRAM(s) Oral at bedtime  ceFAZolin  Injectable. 1000 milliGRAM(s) IV Push every 8 hours  cloNIDine 0.1 milliGRAM(s) Oral three times a day  enalapril 20 milliGRAM(s) Oral daily  furosemide   Injectable 40 milliGRAM(s) IV Push two times a day  hydrALAZINE 25 milliGRAM(s) Oral every 8 hours  metoprolol tartrate 25 milliGRAM(s) Oral three times a day  potassium chloride   Powder 40 milliEquivalent(s) Oral every 4 hours  rivaroxaban 15 milliGRAM(s) Oral daily    MEDICATIONS  (PRN):  acetaminophen     Tablet .. 650 milliGRAM(s) Oral every 6 hours PRN Temp greater or equal to 38C (100.4F), Mild Pain (1 - 3)  aluminum hydroxide/magnesium hydroxide/simethicone Suspension 30 milliLiter(s) Oral every 4 hours PRN Dyspepsia  benzocaine/menthol Lozenge 1 Lozenge Oral every 6 hours PRN Sore Throat  guaiFENesin  milliGRAM(s) Oral every 12 hours PRN Cough  hydrALAZINE Injectable 5 milliGRAM(s) IV Push every 6 hours PRN for sbp above 160 mmhg  melatonin 3 milliGRAM(s) Oral at bedtime PRN Insomnia  metoprolol tartrate Injectable 2.5 milliGRAM(s) IV Push every 6 hours PRN for heart rate above 110 bpm  nitroglycerin     SubLingual 0.4 milliGRAM(s) SubLingual every 5 minutes PRN Chest Pain  ondansetron Injectable 4 milliGRAM(s) IV Push every 8 hours PRN Nausea and/or Vomiting      Social History:   No pertinent social history     Family history:  No pertinent family history     ROS:   ENT: all negative except as noted in HPI   CV: denies palpitations  Pulm: denies SOB, cough, hemoptysis  GI: denies change in appetite, indigestion, n/v  : denies pertinent urinary symptoms, urgency  Neuro: denies numbness/tingling, loss of sensation  Psych: denies anxiety  MS: denies muscle weakness, instability  Heme: denies easy bruising or bleeding  Endo: denies heat/cold intolerance, excessive sweating  Vascular: denies LE edema    Vital Signs Last 24 Hrs  T(C): 36.6 (10 Jul 2023 10:59), Max: 37.2 (09 Jul 2023 19:10)  T(F): 97.9 (10 Jul 2023 10:59), Max: 99 (09 Jul 2023 19:10)  HR: 84 (10 Jul 2023 12:46) (62 - 110)  BP: 98/55 (10 Jul 2023 12:46) (98/55 - 186/85)  BP(mean): 119 (09 Jul 2023 19:10) (119 - 119)  RR: 18 (10 Jul 2023 10:59) (18 - 18)  SpO2: 99% (10 Jul 2023 10:59) (97% - 100%)    Parameters below as of 10 Jul 2023 10:59  Patient On (Oxygen Delivery Method): nasal cannula  O2 Flow (L/min): 3                            12.5   15.76 )-----------( 234      ( 10 Jul 2023 02:28 )             38.1    07-10    138  |  91<L>  |  28.8<H>  ----------------------------<  110<H>  3.1<L>   |  33.0<H>  |  0.78    Ca    8.7      10 Jul 2023 02:28  Mg     1.8     07-10    TPro  6.5<L>  /  Alb  3.5  /  TBili  0.6  /  DBili  x   /  AST  28  /  ALT  21  /  AlkPhos  73  07-10   PT/INR - ( 10 Jul 2023 02:28 )   PT: 41.6 sec;   INR: 3.54 ratio         PTT - ( 08 Jul 2023 19:40 )  PTT:33.7 sec    PHYSICAL EXAM:  Gen: NAD  Skin: No rashes, bruises, or lesions  Head: Normocephalic, Atraumatic  Face: no edema, erythema, or fluctuance. Parotid glands soft without mass  Eyes: no scleral injection  Ears: external ears without deformity, drainage, discoloration   Nose: Nares bilaterally patent, no discharge   Mouth: No Stridor / Drooling / Trismus.  Mucosa moist, tongue/uvula midline, oropharynx clear  Neck: Flat, supple, no lymphadenopathy, trachea midline, no masses  Lymphatic: No lymphadenopathy  Resp: breathing easily, no stridor  CV: no peripheral edema/cyanosis  GI: nondistended   Peripheral vascular: no JVD or edema  Neuro: facial nerve intact, no facial droop

## 2023-07-11 ENCOUNTER — TRANSCRIPTION ENCOUNTER (OUTPATIENT)
Age: 88
End: 2023-07-11

## 2023-07-11 LAB
ALBUMIN SERPL ELPH-MCNC: 3.3 G/DL — SIGNIFICANT CHANGE UP (ref 3.3–5.2)
ALP SERPL-CCNC: 67 U/L — SIGNIFICANT CHANGE UP (ref 40–120)
ALT FLD-CCNC: 11 U/L — SIGNIFICANT CHANGE UP
ANION GAP SERPL CALC-SCNC: 8 MMOL/L — SIGNIFICANT CHANGE UP (ref 5–17)
AST SERPL-CCNC: 23 U/L — SIGNIFICANT CHANGE UP
BASOPHILS # BLD AUTO: 0.03 K/UL — SIGNIFICANT CHANGE UP (ref 0–0.2)
BASOPHILS NFR BLD AUTO: 0.3 % — SIGNIFICANT CHANGE UP (ref 0–2)
BILIRUB SERPL-MCNC: 0.7 MG/DL — SIGNIFICANT CHANGE UP (ref 0.4–2)
BUN SERPL-MCNC: 40 MG/DL — HIGH (ref 8–20)
CALCIUM SERPL-MCNC: 8.9 MG/DL — SIGNIFICANT CHANGE UP (ref 8.4–10.5)
CHLORIDE SERPL-SCNC: 94 MMOL/L — LOW (ref 96–108)
CO2 SERPL-SCNC: 37 MMOL/L — HIGH (ref 22–29)
CREAT SERPL-MCNC: 0.87 MG/DL — SIGNIFICANT CHANGE UP (ref 0.5–1.3)
EGFR: 64 ML/MIN/1.73M2 — SIGNIFICANT CHANGE UP
EOSINOPHIL # BLD AUTO: 0.09 K/UL — SIGNIFICANT CHANGE UP (ref 0–0.5)
EOSINOPHIL NFR BLD AUTO: 0.8 % — SIGNIFICANT CHANGE UP (ref 0–6)
GLUCOSE SERPL-MCNC: 105 MG/DL — HIGH (ref 70–99)
HCT VFR BLD CALC: 37 % — SIGNIFICANT CHANGE UP (ref 34.5–45)
HGB BLD-MCNC: 12.3 G/DL — SIGNIFICANT CHANGE UP (ref 11.5–15.5)
IMM GRANULOCYTES NFR BLD AUTO: 0.3 % — SIGNIFICANT CHANGE UP (ref 0–0.9)
INR BLD: 2.47 RATIO — HIGH (ref 0.88–1.16)
LYMPHOCYTES # BLD AUTO: 1.54 K/UL — SIGNIFICANT CHANGE UP (ref 1–3.3)
LYMPHOCYTES # BLD AUTO: 13.4 % — SIGNIFICANT CHANGE UP (ref 13–44)
MAGNESIUM SERPL-MCNC: 2.3 MG/DL — SIGNIFICANT CHANGE UP (ref 1.6–2.6)
MCHC RBC-ENTMCNC: 33.2 GM/DL — SIGNIFICANT CHANGE UP (ref 32–36)
MCHC RBC-ENTMCNC: 33.6 PG — SIGNIFICANT CHANGE UP (ref 27–34)
MCV RBC AUTO: 101.1 FL — HIGH (ref 80–100)
MONOCYTES # BLD AUTO: 1.43 K/UL — HIGH (ref 0–0.9)
MONOCYTES NFR BLD AUTO: 12.5 % — SIGNIFICANT CHANGE UP (ref 2–14)
NEUTROPHILS # BLD AUTO: 8.33 K/UL — HIGH (ref 1.8–7.4)
NEUTROPHILS NFR BLD AUTO: 72.7 % — SIGNIFICANT CHANGE UP (ref 43–77)
PLATELET # BLD AUTO: 248 K/UL — SIGNIFICANT CHANGE UP (ref 150–400)
POTASSIUM SERPL-MCNC: 4.8 MMOL/L — SIGNIFICANT CHANGE UP (ref 3.5–5.3)
POTASSIUM SERPL-SCNC: 4.8 MMOL/L — SIGNIFICANT CHANGE UP (ref 3.5–5.3)
PROT SERPL-MCNC: 6.3 G/DL — LOW (ref 6.6–8.7)
PROTHROM AB SERPL-ACNC: 28.9 SEC — HIGH (ref 10.5–13.4)
RBC # BLD: 3.66 M/UL — LOW (ref 3.8–5.2)
RBC # FLD: 14.2 % — SIGNIFICANT CHANGE UP (ref 10.3–14.5)
SODIUM SERPL-SCNC: 139 MMOL/L — SIGNIFICANT CHANGE UP (ref 135–145)
WBC # BLD: 11.45 K/UL — HIGH (ref 3.8–10.5)
WBC # FLD AUTO: 11.45 K/UL — HIGH (ref 3.8–10.5)

## 2023-07-11 PROCEDURE — 99232 SBSQ HOSP IP/OBS MODERATE 35: CPT

## 2023-07-11 RX ORDER — FUROSEMIDE 40 MG
40 TABLET ORAL
Refills: 0 | Status: DISCONTINUED | OUTPATIENT
Start: 2023-07-11 | End: 2023-07-11

## 2023-07-11 RX ORDER — FUROSEMIDE 40 MG
40 TABLET ORAL
Refills: 0 | Status: DISCONTINUED | OUTPATIENT
Start: 2023-07-11 | End: 2023-07-12

## 2023-07-11 RX ADMIN — Medication 25 MILLIGRAM(S): at 21:20

## 2023-07-11 RX ADMIN — Medication 0.1 MILLIGRAM(S): at 05:28

## 2023-07-11 RX ADMIN — RIVAROXABAN 15 MILLIGRAM(S): KIT at 16:24

## 2023-07-11 RX ADMIN — Medication 1000 MILLIGRAM(S): at 05:29

## 2023-07-11 RX ADMIN — Medication 25 MILLIGRAM(S): at 14:17

## 2023-07-11 RX ADMIN — Medication 25 MILLIGRAM(S): at 05:30

## 2023-07-11 RX ADMIN — Medication 0.1 MILLIGRAM(S): at 21:20

## 2023-07-11 RX ADMIN — Medication 2.5 MILLIGRAM(S): at 10:33

## 2023-07-11 RX ADMIN — Medication 40 MILLIGRAM(S): at 05:29

## 2023-07-11 RX ADMIN — Medication 25 MILLIGRAM(S): at 05:29

## 2023-07-11 RX ADMIN — Medication 0.1 MILLIGRAM(S): at 14:17

## 2023-07-11 RX ADMIN — Medication 600 MILLIGRAM(S): at 03:47

## 2023-07-11 RX ADMIN — Medication 5 MILLIGRAM(S): at 08:34

## 2023-07-11 RX ADMIN — ATORVASTATIN CALCIUM 40 MILLIGRAM(S): 80 TABLET, FILM COATED ORAL at 21:20

## 2023-07-11 RX ADMIN — Medication 40 MILLIGRAM(S): at 14:17

## 2023-07-11 RX ADMIN — Medication 20 MILLIGRAM(S): at 05:29

## 2023-07-11 NOTE — DISCHARGE NOTE NURSING/CASE MANAGEMENT/SOCIAL WORK - NSDCFUADDAPPT_GEN_ALL_CORE_FT
STAR INFO:  D/C PLAN IS RETURN TO [facility]  CARDIO F/U-[facility] will follow up with the patient.  PMD F/U-     STAR INFO:  D/C PLAN IS RETURN TO [facility]  CARDIO F/U-[facility] will follow up with the patient.  PMD F/U-    Patient is accepted to [facility]- preferred facility for subacute rehab. Marga, admissions coordinator, confirmed they can accept the patient at 3:30pm today. Marga is aware [facility] will follow up with the patient for follow up appointment. Patient's son, Roderick Zabala ([phone]), is aware and in agreement for patient to attend [facility] today at 3:30pm.

## 2023-07-11 NOTE — PROGRESS NOTE ADULT - SUBJECTIVE AND OBJECTIVE BOX
NAN GAYLE    [id number]    89y      Female    INTERVAL HPI/OVERNIGHT EVENTS:  patient being seen for chf/ pul htn. patient seen at bedside and states feeling better but still fatigued    REVIEW OF SYSTEMS:    CONSTITUTIONAL:  fatigue  RESPIRATORY: No cough, wheezing, hemoptysis; No shortness of breath  CARDIOVASCULAR: No chest pain, palpitations  GASTROINTESTINAL: No abdominal or epigastric pain. No nausea, vomiting  NEUROLOGICAL: No headaches, memory loss, loss of strength.  MISCELLANEOUS:      Vital Signs Last 24 Hrs  T(C): 36.7 (11 Jul 2023 10:51), Max: 36.8 (10 Jul 2023 23:20)  T(F): 98 (11 Jul 2023 10:51), Max: 98.2 (10 Jul 2023 23:20)  HR: 80 (11 Jul 2023 10:51) (48 - 92)  BP: 96/53 (11 Jul 2023 10:51) (96/53 - 176/72)  BP(mean): --  RR: 16 (11 Jul 2023 10:51) (16 - 18)  SpO2: 99% (11 Jul 2023 10:51) (96% - 100%)    Parameters below as of 11 Jul 2023 10:51  Patient On (Oxygen Delivery Method): nasal cannula  O2 Flow (L/min): 3      PHYSICAL EXAM:    GENERAL: patient appears well, thin, cachetic  EYES: sclera clear, no exudates  ENMT: oropharynx clear without erythema, no exudates, no erythema   NECK: supple, soft, no thyromegaly noted  LUNGS: good air entry bilaterally, thin chest  HEART: soft S1/S2, irregular rate and rhythm, no murmurs noted, b/l extremity edema 2-3+. +JVD  GASTROINTESTINAL: abdomen is soft, nontender, nondistended, normoactive bowel sounds, no palpable masses  INTEGUMENT: le improved, less erythema, +edema, muscle wasting  NEUROLOGIC: awake, alert, oriented x3,    LABS:                        12.3   11.45 )-----------( 248      ( 11 Jul 2023 02:56 )             37.0     07-11    139  |  94<L>  |  40.0<H>  ----------------------------<  105<H>  4.8   |  37.0<H>  |  0.87    Ca    8.9      11 Jul 2023 02:56  Mg     2.3     07-11    TPro  6.3<L>  /  Alb  3.3  /  TBili  0.7  /  DBili  x   /  AST  23  /  ALT  11  /  AlkPhos  67  07-11    PT/INR - ( 11 Jul 2023 02:56 )   PT: 28.9 sec;   INR: 2.47 ratio           Urinalysis Basic - ( 11 Jul 2023 02:56 )    Color: x / Appearance: x / SG: x / pH: x  Gluc: 105 mg/dL / Ketone: x  / Bili: x / Urobili: x   Blood: x / Protein: x / Nitrite: x   Leuk Esterase: x / RBC: x / WBC x   Sq Epi: x / Non Sq Epi: x / Bacteria: x          MEDICATIONS  (STANDING):  atorvastatin 40 milliGRAM(s) Oral at bedtime  cloNIDine 0.1 milliGRAM(s) Oral three times a day  enalapril 20 milliGRAM(s) Oral daily  furosemide   Injectable 40 milliGRAM(s) IV Push two times a day  hydrALAZINE 25 milliGRAM(s) Oral every 8 hours  metoprolol tartrate 25 milliGRAM(s) Oral three times a day  rivaroxaban 15 milliGRAM(s) Oral daily    MEDICATIONS  (PRN):  acetaminophen     Tablet .. 650 milliGRAM(s) Oral every 6 hours PRN Temp greater or equal to 38C (100.4F), Mild Pain (1 - 3)  aluminum hydroxide/magnesium hydroxide/simethicone Suspension 30 milliLiter(s) Oral every 4 hours PRN Dyspepsia  benzocaine/menthol Lozenge 1 Lozenge Oral every 6 hours PRN Sore Throat  guaiFENesin  milliGRAM(s) Oral every 12 hours PRN Cough  hydrALAZINE Injectable 5 milliGRAM(s) IV Push every 6 hours PRN for sbp above 160 mmhg  melatonin 3 milliGRAM(s) Oral at bedtime PRN Insomnia  metoprolol tartrate Injectable 2.5 milliGRAM(s) IV Push every 6 hours PRN for heart rate above 110 bpm  nitroglycerin     SubLingual 0.4 milliGRAM(s) SubLingual every 5 minutes PRN Chest Pain  ondansetron Injectable 4 milliGRAM(s) IV Push every 8 hours PRN Nausea and/or Vomiting      RADIOLOGY & ADDITIONAL TESTS:

## 2023-07-11 NOTE — DIETITIAN INITIAL EVALUATION ADULT - NS FNS DIET ORDER
Diet, DASH/TLC:   Sodium & Cholesterol Restricted  1200mL Fluid Restriction ([id number]) (07-09-23 @ 07:39)

## 2023-07-11 NOTE — ADVANCED PRACTICE NURSE CONSULT - RECOMMEDATIONS
·	Cleanse with normal saline, Pat dry, paint periwound with Cavilon liquid skin barrier, cover wound with XEROFORM nonadherent dressing, secure with Allevyn - date and initial dressing changes - (DO NOT PUT ALLEVYN DIRECTLY ONTO OPEN SKIN).    ·	Turn and Reposition Q2-4H  ·	Offload sacral-coccygeal area with positioner pillow, alternate sides Q2-4H  ·	Incontinence care with repositioning Q2-4H  ·	Apply Heel-Offloading cAIRboots or vertical pillows under each leg at all times while in bed. - provide skin checks and foot placement Q8H  ·	Green Waffle Seat cushion at all times when patient is out of bed in chair    -Continue turning/positioning patient from side-to-side q2h while in bed, q1h when/if OOB chair, or in accordance w/ pt's plan of care. Utilize pillows and/or Spry positioner pillow to assist w/ turning/positioning. When/if OOB chair, utilize pillows or chair cushion to offload pressure.   -Continue to offload heels from bed surface with soft pillow under calfs or by applying offloading boots to BLEs.   -Continue applying Coloplast George Protect moisture barrier cream to buttock and perineal area daily and prn after each incontinent episode.    -Continue utilizing one underpad underneath patient to contain incontinence episodes; change pad when saturated/soiled.   -Consider utilizing female incontinence device/Primafit (if patient candidate) to contain urinary incontinence.  -Continue nutrition consult for optimal wound healing & nutritional status.   -Assess skin/wound qshift, report changes to primary provider.     Plan of Care: Primary RN made aware of above recs. Spoke w/ provider Dr. Cohen in regards to above. No further needs/recs from [facility] service at this time. Staff RN to perform routine skin/wound assessment and manage wound care. Questions or concerns or if wound worsens and reconsult needed, please contact [facility].

## 2023-07-11 NOTE — PROGRESS NOTE ADULT - SUBJECTIVE AND OBJECTIVE BOX
[facility] - [facility], THE HEART [facility]ENTER                                   [address] [place] [address]                                                      PHONE: [phone]                                                         FAX: [phone]  [email or web address]  ---------------------------------------------------------------------------------------------------------------------------------    Overnight events/patient complaints: Patient states that she still is hoarse but her voice is improving. Patient also states her breathing is improving.      Allergy Status Unknown    MEDICATIONS  (STANDING):  atorvastatin 40 milliGRAM(s) Oral at bedtime  ceFAZolin  Injectable. 1000 milliGRAM(s) IV Push every 8 hours  cloNIDine 0.1 milliGRAM(s) Oral three times a day  enalapril 20 milliGRAM(s) Oral daily  furosemide    Tablet 40 milliGRAM(s) Oral two times a day  hydrALAZINE 25 milliGRAM(s) Oral every 8 hours  metoprolol tartrate 25 milliGRAM(s) Oral three times a day  rivaroxaban 15 milliGRAM(s) Oral daily    MEDICATIONS  (PRN):  acetaminophen     Tablet .. 650 milliGRAM(s) Oral every 6 hours PRN Temp greater or equal to 38C (100.4F), Mild Pain (1 - 3)  aluminum hydroxide/magnesium hydroxide/simethicone Suspension 30 milliLiter(s) Oral every 4 hours PRN Dyspepsia  benzocaine/menthol Lozenge 1 Lozenge Oral every 6 hours PRN Sore Throat  guaiFENesin  milliGRAM(s) Oral every 12 hours PRN Cough  hydrALAZINE Injectable 5 milliGRAM(s) IV Push every 6 hours PRN for sbp above 160 mmhg  melatonin 3 milliGRAM(s) Oral at bedtime PRN Insomnia  metoprolol tartrate Injectable 2.5 milliGRAM(s) IV Push every 6 hours PRN for heart rate above 110 bpm  nitroglycerin     SubLingual 0.4 milliGRAM(s) SubLingual every 5 minutes PRN Chest Pain  ondansetron Injectable 4 milliGRAM(s) IV Push every 8 hours PRN Nausea and/or Vomiting      Vital Signs Last 24 Hrs  T(C): 36.6 (11 Jul 2023 08:03), Max: 36.8 (10 Jul 2023 23:20)  T(F): 97.8 (11 Jul 2023 08:03), Max: 98.2 (10 Jul 2023 23:20)  HR: 74 (11 Jul 2023 08:03) (48 - 99)  BP: 176/72 (11 Jul 2023 08:03) (98/55 - 176/72)  BP(mean): --  RR: 16 (11 Jul 2023 08:03) (16 - 18)  SpO2: 100% (11 Jul 2023 08:03) (96% - 100%)    Parameters below as of 11 Jul 2023 08:03  Patient On (Oxygen Delivery Method): nasal cannula  O2 Flow (L/min): 3    ICU Vital Signs Last 24 Hrs  NAN NALINI  I&O's Detail    Drug Dosing Weight  NAN GAYLE      PHYSICAL EXAM:  General: NAD  HEENT: Head; normocephalic, atraumatic.  Eyes: EOMI, conjunctiva normal  Neck: Supple, +JVD noted  CARDIOVASCULAR: Irregularly irregular rhythm, regular rate, No murmurs or gallops  LUNGS: Poor inspiratory effort  ABDOMEN: Soft, nontender, non-distended, +bowel sounds  EXTREMITIES: 2+edema b/l (as per patient chronic edema is stable), no cyanosis   SKIN: warm and dry  NEURO: Alert  PSYCH: normal affect.        LABS:                        12.3   11.45 )-----------( 248      ( 11 Jul 2023 02:56 )             37.0     07-11    139  |  94<L>  |  40.0<H>  ----------------------------<  105<H>  4.8   |  37.0<H>  |  0.87    Ca    8.9      11 Jul 2023 02:56  Mg     2.3     07-11    TPro  6.3<L>  /  Alb  3.3  /  TBili  0.7  /  DBili  x   /  AST  23  /  ALT  11  /  AlkPhos  67  07-11    NAN GAYLE      PT/INR - ( 11 Jul 2023 02:56 )   PT: 28.9 sec;   INR: 2.47 ratio           Urinalysis Basic - ( 11 Jul 2023 02:56 )    Color: x / Appearance: x / SG: x / pH: x  Gluc: 105 mg/dL / Ketone: x  / Bili: x / Urobili: x   Blood: x / Protein: x / Nitrite: x   Leuk Esterase: x / RBC: x / WBC x   Sq Epi: x / Non Sq Epi: x / Bacteria: x        RADIOLOGY & ADDITIONAL STUDIES:    INTERPRETATION OF TELEMETRY (personally reviewed): No significant cardiac events.    ECHO: 7/9/23  Summary:   1. Normal left ventricular internal cavity size.   2. Normal global left ventricular systolic function.   3. Left ventricular ejection fraction, by visual estimation, is 55 to   60%.   4. Moderate to severe left atrial enlargement.   5. Moderate to severe right atrial enlargement.   6. The mitral in-flow pattern reveals no discernable A-wave, therefore   no comment on diastolic function can be made.   7. Mild mitral annular calcification.   8. Mild thickening of the anterior and posterior mitral valve leaflets.   9. Moderate mitral valve regurgitation.  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  11. Mild-moderate tricuspid regurgitation.  12. Sclerotic aortic valve with normal opening.  13. Mild to moderate aortic regurgitation.  14. Mild to moderate pulmonic valve regurgitation.  15. Estimated pulmonary artery systolic pressure is 51.6 mmHg assuming a   right atrial pressure of 8 mmHg, which is consistent with moderate   pulmonary hypertension.  16. There is no evidence of pericardial effusion.      ASSESSMENT AND PLAN:  89 year old female with a PMHx of Afib s/p ablation, ILR, s/p Watchman, Chronic Pulm HTN and edema who presents with SOB that occurred after she started choking on her pills.  Afterwards she is hoarse and noted some wheezing. Pt states LE edema is at her baseline but still dose not feel breathing is back to normal.    SOB  - patient hypervolemic on exam   - patient remains on 2L NC, wean as tolerated  - would continue lasix 40mg IV BID for today. Hopefully deescalate tomorrow  - strict I/O  - daily chemistry to evaluate renal function and electrolytes  - daily weights  - patient refused laryngoscopy    Thank you for letting [facility] to assist in the management of this patient. Please call with any questions.

## 2023-07-11 NOTE — DIETITIAN INITIAL EVALUATION ADULT - NSFNSPHYEXAMSKINFT_GEN_A_CORE
Pressure Injury 1: Right:, calf, Stage II  Pressure Injury 2: none, none  Pressure Injury 3: none, none  Pressure Injury 4: none, none  Pressure Injury 5: none, none  Pressure Injury 6: none, none  Pressure Injury 7: none, none  Pressure Injury 8: none, none  Pressure Injury 9: none, none  Pressure Injury 10: none, none  Pressure Injury 11: none, none

## 2023-07-11 NOTE — DISCHARGE NOTE NURSING/CASE MANAGEMENT/SOCIAL WORK - PATIENT PORTAL LINK FT
You can access the FollowMyHealth Patient Portal offered by [facility] by registering at the following website: [email or web address]. By joining [facility]’s FollowMyHealth portal, you will also be able to view your health information using other applications (apps) compatible with our system.

## 2023-07-11 NOTE — ADVANCED PRACTICE NURSE CONSULT - ASSESSMENT
Patient laying supine in bed. HOB elevated 30 degrees. Pt awake, A&Ox3, made aware of purpose of WOCN visit, agreeable to consult.  Patient with limited mobility in bed. 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  Ordered for DASH/TLC diet, current Ayad score of 15. With assistance, patient turned patient to side for skin assessment. Skin assessment as below:    ·	Right Medial Posterior Calf - Mixed Abrasion with fissuring due to edema (5x4.5cm) - circumscribed indurated area with open red/purple friable open skin fissuring.

## 2023-07-11 NOTE — DIETITIAN NUTRITION RISK NOTIFICATION - TREATMENT: THE FOLLOWING DIET HAS BEEN RECOMMENDED
Diet, DASH/TLC:   Sodium & Cholesterol Restricted  1200mL Fluid Restriction ([id number]) (07-09-23 @ 07:39) [Active]

## 2023-07-11 NOTE — DIETITIAN INITIAL EVALUATION ADULT - PERTINENT MEDS FT
MEDICATIONS  (STANDING):  atorvastatin 40 milliGRAM(s) Oral at bedtime  ceFAZolin  Injectable. 1000 milliGRAM(s) IV Push every 8 hours  cloNIDine 0.1 milliGRAM(s) Oral three times a day  enalapril 20 milliGRAM(s) Oral daily  furosemide    Tablet 40 milliGRAM(s) Oral two times a day  hydrALAZINE 25 milliGRAM(s) Oral every 8 hours  metoprolol tartrate 25 milliGRAM(s) Oral three times a day  rivaroxaban 15 milliGRAM(s) Oral daily    MEDICATIONS  (PRN):  acetaminophen     Tablet .. 650 milliGRAM(s) Oral every 6 hours PRN Temp greater or equal to 38C (100.4F), Mild Pain (1 - 3)  aluminum hydroxide/magnesium hydroxide/simethicone Suspension 30 milliLiter(s) Oral every 4 hours PRN Dyspepsia  benzocaine/menthol Lozenge 1 Lozenge Oral every 6 hours PRN Sore Throat  guaiFENesin  milliGRAM(s) Oral every 12 hours PRN Cough  hydrALAZINE Injectable 5 milliGRAM(s) IV Push every 6 hours PRN for sbp above 160 mmhg  melatonin 3 milliGRAM(s) Oral at bedtime PRN Insomnia  metoprolol tartrate Injectable 2.5 milliGRAM(s) IV Push every 6 hours PRN for heart rate above 110 bpm  nitroglycerin     SubLingual 0.4 milliGRAM(s) SubLingual every 5 minutes PRN Chest Pain  ondansetron Injectable 4 milliGRAM(s) IV Push every 8 hours PRN Nausea and/or Vomiting

## 2023-07-11 NOTE — DIETITIAN INITIAL EVALUATION ADULT - PERTINENT LABORATORY DATA
07-11    139  |  94<L>  |  40.0<H>  ----------------------------<  105<H>  4.8   |  37.0<H>  |  0.87    Ca    8.9      11 Jul 2023 02:56  Mg     2.3     07-11    TPro  6.3<L>  /  Alb  3.3  /  TBili  0.7  /  DBili  x   /  AST  23  /  ALT  11  /  AlkPhos  67  07-11

## 2023-07-11 NOTE — DIETITIAN INITIAL EVALUATION ADULT - ORAL INTAKE PTA/DIET HISTORY
Pt reports having decreased appetite over past few months; recently has been trying to intentionally gain weight but has been unsuccessful. Pt reports suffering from IBS and eats only plain/bland foods. Pt endorses weight loss; however unable to quantify. Food preferences obtained. NFPE conducted.

## 2023-07-11 NOTE — DIETITIAN INITIAL EVALUATION ADULT - OTHER INFO
Pt is a 89 y/o F PMHx  HTN, CHF on lasix,  Afib on xarelto (pt of Dr. Gaming), s/p ablation 2016 + 2019, s/p watchman LA appendage closure on 8/8/2022, uterine cancer s/p hysterectomy, IBS, diverticulosis,  chronic bilateral lower extremity edema, presentS from [facility] for worsening SOB. Imaging with possible pulmonary edema in the setting of hypertensive urgency and with proBNP elevated concern for CHF exacerbation.

## 2023-07-11 NOTE — DIETITIAN INITIAL EVALUATION ADULT - ETIOLOGY
Related to inability to meet sufficient protein energy requirements in setting of advanced age, CHF, skin breakdown

## 2023-07-12 ENCOUNTER — TRANSCRIPTION ENCOUNTER (OUTPATIENT)
Age: 88
End: 2023-07-12

## 2023-07-12 LAB
ANION GAP SERPL CALC-SCNC: 10 MMOL/L — SIGNIFICANT CHANGE UP (ref 5–17)
BUN SERPL-MCNC: 40.9 MG/DL — HIGH (ref 8–20)
CALCIUM SERPL-MCNC: 9.2 MG/DL — SIGNIFICANT CHANGE UP (ref 8.4–10.5)
CHLORIDE SERPL-SCNC: 93 MMOL/L — LOW (ref 96–108)
CO2 SERPL-SCNC: 35 MMOL/L — HIGH (ref 22–29)
CREAT SERPL-MCNC: 0.62 MG/DL — SIGNIFICANT CHANGE UP (ref 0.5–1.3)
EGFR: 85 ML/MIN/1.73M2 — SIGNIFICANT CHANGE UP
GLUCOSE SERPL-MCNC: 100 MG/DL — HIGH (ref 70–99)
MAGNESIUM SERPL-MCNC: 2.2 MG/DL — SIGNIFICANT CHANGE UP (ref 1.6–2.6)
POTASSIUM SERPL-MCNC: 4.3 MMOL/L — SIGNIFICANT CHANGE UP (ref 3.5–5.3)
POTASSIUM SERPL-SCNC: 4.3 MMOL/L — SIGNIFICANT CHANGE UP (ref 3.5–5.3)
SODIUM SERPL-SCNC: 138 MMOL/L — SIGNIFICANT CHANGE UP (ref 135–145)

## 2023-07-12 PROCEDURE — 99497 ADVNCD CARE PLAN 30 MIN: CPT | Mod: 25

## 2023-07-12 PROCEDURE — 99233 SBSQ HOSP IP/OBS HIGH 50: CPT

## 2023-07-12 PROCEDURE — 71045 X-RAY EXAM CHEST 1 VIEW: CPT | Mod: 26

## 2023-07-12 PROCEDURE — 99223 1ST HOSP IP/OBS HIGH 75: CPT

## 2023-07-12 RX ORDER — FUROSEMIDE 40 MG
40 TABLET ORAL DAILY
Refills: 0 | Status: DISCONTINUED | OUTPATIENT
Start: 2023-07-12 | End: 2023-07-13

## 2023-07-12 RX ORDER — CEPHALEXIN 500 MG
500 CAPSULE ORAL
Refills: 0 | Status: DISCONTINUED | OUTPATIENT
Start: 2023-07-12 | End: 2023-07-13

## 2023-07-12 RX ORDER — IPRATROPIUM/ALBUTEROL SULFATE 18-103MCG
3 AEROSOL WITH ADAPTER (GRAM) INHALATION EVERY 6 HOURS
Refills: 0 | Status: DISCONTINUED | OUTPATIENT
Start: 2023-07-12 | End: 2023-07-13

## 2023-07-12 RX ADMIN — RIVAROXABAN 15 MILLIGRAM(S): KIT at 13:20

## 2023-07-12 RX ADMIN — Medication 0.1 MILLIGRAM(S): at 05:23

## 2023-07-12 RX ADMIN — Medication 500 MILLIGRAM(S): at 21:26

## 2023-07-12 RX ADMIN — Medication 25 MILLIGRAM(S): at 13:20

## 2023-07-12 RX ADMIN — Medication 3 MILLILITER(S): at 20:44

## 2023-07-12 RX ADMIN — Medication 20 MILLIGRAM(S): at 05:23

## 2023-07-12 RX ADMIN — Medication 3 MILLILITER(S): at 14:24

## 2023-07-12 RX ADMIN — Medication 0.1 MILLIGRAM(S): at 21:26

## 2023-07-12 RX ADMIN — Medication 25 MILLIGRAM(S): at 05:23

## 2023-07-12 RX ADMIN — Medication 40 MILLIGRAM(S): at 13:21

## 2023-07-12 RX ADMIN — Medication 500 MILLIGRAM(S): at 23:15

## 2023-07-12 RX ADMIN — Medication 0.1 MILLIGRAM(S): at 13:21

## 2023-07-12 RX ADMIN — Medication 25 MILLIGRAM(S): at 05:24

## 2023-07-12 RX ADMIN — Medication 25 MILLIGRAM(S): at 21:26

## 2023-07-12 RX ADMIN — Medication 40 MILLIGRAM(S): at 05:23

## 2023-07-12 NOTE — DISCHARGE NOTE PROVIDER - NSDCMRMEDTOKEN_GEN_ALL_CORE_FT
cloNIDine 0.3 mg oral tablet: 1 orally 3 times a day  enalapril 20 mg oral tablet: 1 orally once a day  Lasix 40 mg oral tablet: 1 orally once a day  simvastatin 20 mg oral tablet: 1 orally once a day  sotalol 80 mg oral tablet: 1 orally 2 times a day  Xarelto 15 mg oral tablet: 1 orally once a day   cloNIDine 0.3 mg oral tablet: 1 orally 3 times a day  enalapril 20 mg oral tablet: 1 orally once a day  ipratropium-albuterol 0.5 mg-2.5 mg/3 mL inhalation solution: 3 milliliter(s) inhaled every 6 hours  Lasix 40 mg oral tablet: 1 orally once a day  metoprolol tartrate 25 mg oral tablet: 1 tab(s) orally 3 times a day  Xarelto 15 mg oral tablet: 1 orally once a day

## 2023-07-12 NOTE — CONSULT NOTE ADULT - TIME BILLING
D/W pt, RN, hospitalist Dr Franco    Total time also includes discussion during interdisciplinary team rounds, chart review including but limited to prior admissions/ GOC discussions, review of established ACP documentations ( ex. living will, HCP, MOLST form),  review of medications/ labs/ imaging, examination, care coordination with other health care professionals, documentation EXCLUDING goals of care or advance care planning discussions.

## 2023-07-12 NOTE — CONSULT NOTE ADULT - CONVERSATION DETAILS
Met with pt to introduce role and scope of palliative care team as supportive in the setting of complex comorbidities/serious illnesses, to assist with complex medical decision making and any associated pain or symptom management. Pt was engaged in conversation however she was initially guarded after hearing "Palliative" and stating "doesn't that mean the end." Writer clarified difference between palliative vs hospice care and our supportive care at any stage of serious/chronic illness. She acknowledged understanding.  We reviewed baseline function, comorbidities, current hospital course and plan of care. She states feeling better since fluid had been removed and that her legs are much less swollen. Her primary complaint is that she still feels very weak but is hopeful that with rehab, she will be able to restore her strength back before returning to LTC.    Reviewed any prior HCP / Living Will/ Advance Directive or MOLST Forms.   She stated that primary support system are two sons but that youngest son Roderick is her designated HCP. She did complete a formal HCP as part of her living will. Writer also inquired if any prior conversations regarding life sustaining treatments such as CPR and mechanical intubation if ever down the road her heart should stop or if she was unable to breath. She states "yes" and that "I want everything done" and that her son knows her wishes. Reviewed risk and benefits as well as the importance of ongoing discussions with any acute health events  to ensure prior wishes are still in line with current. She acknowledged understanding. She was appreciative of the support and visit. Psychosocial support provided and all questions answered.     GOC: continue active medical interventions without limitations; FULL CODE. Her goal is to go to [facility] when medically optimized. No MOLST completion warranted at this time given known wishes.    Updated hospitalist on above conversation.

## 2023-07-12 NOTE — DISCHARGE NOTE PROVIDER - ATTENDING DISCHARGE PHYSICAL EXAMINATION:
PHYSICAL EXAM:    GENERAL: elderly female, frail, laying in bed, NAD  HEAD:  Atraumatic, Normocephalic  EYES: EOMI, PERRLA, conjunctiva and sclera clear  ENMT: Moist mucous membranes  NECK: Supple   NERVOUS SYSTEM:  Alert & Oriented X3, Motor Strength 5/5 B/L upper and lower extremities   CHEST/LUNG: Clear to auscultation bilaterally; No rales, rhonchi, wheezing, or rubs  HEART: Regular rate and rhythm; + S1/S2  ABDOMEN: Soft, Nontender, Nondistended; Bowel sounds present  EXTREMITIES: no edema, mild erythema

## 2023-07-12 NOTE — DISCHARGE NOTE PROVIDER - NSDCCPCAREPLAN_GEN_ALL_CORE_FT
PRINCIPAL DISCHARGE DIAGNOSIS  Diagnosis: Acute respiratory failure with hypoxia  Assessment and Plan of Treatment: - In the setting of hypertensive emergency and CHF exacerbation. Treated and improved with IV nitro, IV diuresis, and supplemental oxygen.   - Followed by cardiology, medication regimen adjusted. Follow up outpatient with your PCP and cardiologist for monitoring.      SECONDARY DISCHARGE DIAGNOSES  Diagnosis: CHF exacerbation  Assessment and Plan of Treatment: - Admitted with acute CHF exacerbation. Treated and improving with diuresis.  - TTE revealed preserved EF, b/l enlarged atria, mild-mod valvular disease, pHTN.   - Evalauted by cardiology, please contnue with cardiac medications as directed and follow up outpatient with Cardiology and your PCP.    Diagnosis: Hoarseness  Assessment and Plan of Treatment: - Evaluated by ENT, patient refused laryngoscopy. Follow up outpatient as needed.    Diagnosis: Hypokalemia  Assessment and Plan of Treatment: - Supplemented.    Diagnosis: Leg edema  Assessment and Plan of Treatment: - Started on antibiotics by ED for concern of cellulitis. Treated with diuretics and improving. Follow up outpatient with your PCP for monitoring.     PRINCIPAL DISCHARGE DIAGNOSIS  Diagnosis: Acute respiratory failure with hypoxia  Assessment and Plan of Treatment: - In the setting of hypertensive emergency and CHF exacerbation. Treated and improved with IV nitro, IV diuresis, and supplemental oxygen.   - Please continue PO lasix as ordered  - Repeat CXR as outpatient to ensure resolution of small right effusion  - Follow up outpatient with your PCP and cardiologist for monitoring.      SECONDARY DISCHARGE DIAGNOSES  Diagnosis: CHF exacerbation  Assessment and Plan of Treatment: - Admitted with acute CHF exacerbation. Treated and improving with diuresis.  - TTE revealed preserved EF, b/l enlarged atria, mild-mod valvular disease, pHTN.   - Evalauted by cardiology, please contnue with cardiac medications as directed and follow up outpatient with Cardiology and your PCP.    Diagnosis: Hoarseness  Assessment and Plan of Treatment: - Evaluated by ENT, patient refused laryngoscopy. Follow up outpatient as needed.  - Hoarseness resolved    Diagnosis: Leg edema  Assessment and Plan of Treatment: - Started on antibiotics by ED for concern of cellulitis. Treated with diuretics and improving. Follow up outpatient with your PCP for monitoring.

## 2023-07-12 NOTE — PROGRESS NOTE ADULT - ASSESSMENT
89 y/o F PMHx  HTN, CHF on lasix,  Afib on xarelto (pt of Dr. Gaming), s/p ablation 2016 + 2019, s/p watchman LA appendage closure on 8/8/2022, uterine cancer s/p hysterectomy, IBS, diverticulosis,  chronic bilateral lower extremity edema, presentS from [facility] for worsening SOB. Imaging with possible pulmonary edema (not read but frank B lines and cephalization present) in the setting of hypertensive urgency and with proBNP elevated concern for CHF exacerbation. Patient admitted to medicine and seen by cardio, and continued on iv diuresis. Patient also complained of losing her voice and ent consulted but refused laryngoscopy.     #Acute Hypoxic Respiratory Failure due to Acute on Chronic Diastolic Heart Failure  -hypoxia resolved today; saturating well on room air  -s/p nitro gtt and bipap  -wet cough - CXR with small right effusion  -switch to PO lasix  -continue enalapril and metoprolol  -fluid restrictions  -TTE reviewed  -strict I/os, daily weights  -cardio recs appreciated    #HTN urgency  -BP improved  -continue clonidine, enalapril and lasix  -low sodium    #Hypophonia - resolved  -refused laryngoscopy  -ENT recs appreciated    #Chronic Atrial Fib  rate controlled  continue with metoprolol   continue xarelto    #Bilateral LE cellulitis  -blood cultures negative  -received 3 days of Ancef  -switch to Keflex to complete course      #Right calf ulcer  -present on admission   -continue local wound care    DVT prophylaxis- Xarelto.    Dispo- Medically stable for discharge to [facility] on 7/13.    Plan discussed with patient, RN, SW
89 y/o F PMHx  HTN, CHF on lasix,  Afib on xarelto (pt of Dr. Gaming), s/p ablation 2016 + 2019, s/p watchman LA appendage closure on 8/8/2022, uterine cancer s/p hysterectomy, IBS, diverticulosis,  chronic bilateral lower extremity edema, presentS from [facility] for worsening SOB. Imaging with possible pulmonary edema (not read but frank B lines and cephalization present) in the setting of hypertensive urgency and with proBNP elevated concern for CHF exacerbation. Patient admitted to medicine and seen by cardio, and continued on iv diuresis. Patient also complained of losing her voice and ent consulted but refused laryngoscopy.     #acute hypoxic respiratory failure  #HTN urgency  #CHF exacerbation?  #history of HTN  s/p lasix and nitro gtt and BIPAP with improvement in symptoms  titrate down O2  continue with lasix 40 mg iv bid  cardio following    home statin  - metoprolol    home clonidine TID and enalapril 20 mg daily  - bp controlled    #hoarseness - refused laryngoscopy  - outpatient follow up     #hypokalemia - monitor    #afib  rate controlled  continue with metoprolol and xarelto    #b/l lower extremity redness  -s/p  Iv ancef  -improved  - c.w diuresis     #right calf ulcer  present on admission   - receiving dressing changes    DVT prophylaxis: Xarelto.  spoke to son    maynor Moreno on Dc  likely in 1-2 days
88 y/o F PMHx  HTN, CHF on lasix,  Afib on xarelto (pt of Dr. Gaming), s/p ablation 2016 + 2019, s/p watchman LA appendage closure on 8/8/2022, uterine cancer s/p hysterectomy, IBS, diverticulosis,  chronic bilateral lower extremity edema, presentS from [facility] for worsening SOB. Imaging with possible pulmonary edema (not read but frank B lines and cephalization present) in the setting of hypertensive urgency and with proBNP elevated concern for CHF exacerbation.    #acute hypoxic respiratory failure  #HTN urgency  #CHF exacerbation?  #history of HTN  s/p lasix and nitro gtt and BIPAP with improvement in symptoms  titrate down O2  continue with lasix 40 mg BID (pt normally on lasix 40 mg PO daily)  cardio consulted   home statin  - metoprolol    home clonidine TID and enalapril 20 mg daily   po hydraalzine for better bp control  - iv hydralazine prn     #hypokalemia - replete    #afib  rate controlled  continue with metoprolol and xarelto    #b/l lower extremity redness  - Iv ancef  - blood cultures not sent  - duplex neg for dvt    #right calf ulcer  present on admission   wound care consult pending     #sore throat/ voice loss - ent consult for possible vocal cord dysfunction  - ent consult placed  - patient states a few days ago , she felt that a pill got stuck and never regained her voice    DVT prophylaxis: Xarelto.  spoke to son  likely dc in 1-2 days

## 2023-07-12 NOTE — DISCHARGE NOTE PROVIDER - CARE PROVIDER_API CALL
Yovani Novoa  Cardiovascular Disease  [address] [place] [address]  Phone: [phone]  Fax: [phone]  Follow Up Time: 1 week    PCP,   Phone: (   )    -  Fax: (   )    -  Follow Up Time: 1-3 days    Mehran Alcaraz  Otolaryngology  [address] [facility] [facility] [address] [place] [address]  Phone: [phone]  Fax: [phone]  Follow Up Time: Routine

## 2023-07-12 NOTE — PROGRESS NOTE ADULT - NUTRITIONAL ASSESSMENT
This patient has been assessed with a concern for Malnutrition and has been determined to have a diagnosis/diagnoses of Severe protein-calorie malnutrition.    This patient is being managed with:   Diet DASH/TLC-  Sodium & Cholesterol Restricted  1200mL Fluid Restriction ([id number])  Entered: Jul 9 2023  7:39AM  
This patient has been assessed with a concern for Malnutrition and has been determined to have a diagnosis/diagnoses of Severe protein-calorie malnutrition.    This patient is being managed with:   Diet DASH/TLC-  Sodium & Cholesterol Restricted  1200mL Fluid Restriction ([id number])  Supplement Feeding Modality:  Oral  Ensure Enlive Cans or Servings Per Day:  3       Frequency:  Three Times a day  Entered: Jul 12 2023  7:09AM

## 2023-07-12 NOTE — PROGRESS NOTE ADULT - SUBJECTIVE AND OBJECTIVE BOX
[place] [facility] - [facility], [facility]                                   [address] [place] [address]                                                      PHONE: [phone]                                                         FAX: [phone]  [email or web address]  ---------------------------------------------------------------------------------------------------------------------------------    Overnight events/patient complaints:     Patient states that she still is hoarse but her voice is improving. Patient also states her breathing is improving.      Allergy Status Unknown    MEDICATIONS  (STANDING):  atorvastatin 40 milliGRAM(s) Oral at bedtime  ceFAZolin  Injectable. 1000 milliGRAM(s) IV Push every 8 hours  cloNIDine 0.1 milliGRAM(s) Oral three times a day  enalapril 20 milliGRAM(s) Oral daily  furosemide    Tablet 40 milliGRAM(s) Oral two times a day  hydrALAZINE 25 milliGRAM(s) Oral every 8 hours  metoprolol tartrate 25 milliGRAM(s) Oral three times a day  rivaroxaban 15 milliGRAM(s) Oral daily    MEDICATIONS  (PRN):  acetaminophen     Tablet .. 650 milliGRAM(s) Oral every 6 hours PRN Temp greater or equal to 38C (100.4F), Mild Pain (1 - 3)  aluminum hydroxide/magnesium hydroxide/simethicone Suspension 30 milliLiter(s) Oral every 4 hours PRN Dyspepsia  benzocaine/menthol Lozenge 1 Lozenge Oral every 6 hours PRN Sore Throat  guaiFENesin  milliGRAM(s) Oral every 12 hours PRN Cough  hydrALAZINE Injectable 5 milliGRAM(s) IV Push every 6 hours PRN for sbp above 160 mmhg  melatonin 3 milliGRAM(s) Oral at bedtime PRN Insomnia  metoprolol tartrate Injectable 2.5 milliGRAM(s) IV Push every 6 hours PRN for heart rate above 110 bpm  nitroglycerin     SubLingual 0.4 milliGRAM(s) SubLingual every 5 minutes PRN Chest Pain  ondansetron Injectable 4 milliGRAM(s) IV Push every 8 hours PRN Nausea and/or Vomiting      Vital Signs Last 24 Hrs  T(C): 36.6 (11 Jul 2023 08:03), Max: 36.8 (10 Jul 2023 23:20)  T(F): 97.8 (11 Jul 2023 08:03), Max: 98.2 (10 Jul 2023 23:20)  HR: 74 (11 Jul 2023 08:03) (48 - 99)  BP: 176/72 (11 Jul 2023 08:03) (98/55 - 176/72)  BP(mean): --  RR: 16 (11 Jul 2023 08:03) (16 - 18)  SpO2: 100% (11 Jul 2023 08:03) (96% - 100%)    Parameters below as of 11 Jul 2023 08:03  Patient On (Oxygen Delivery Method): nasal cannula  O2 Flow (L/min): 3    ICU Vital Signs Last 24 Hrs  NAN NALINI  I&O's Detail    Drug Dosing Weight  NAN GAYLE      PHYSICAL EXAM:  General: NAD  HEENT: Head; normocephalic, atraumatic.  Eyes: EOMI, conjunctiva normal  Neck: Supple, +JVD noted  CARDIOVASCULAR: Irregularly irregular rhythm, regular rate, No murmurs or gallops  LUNGS: Poor inspiratory effort  ABDOMEN: Soft, nontender, non-distended, +bowel sounds  EXTREMITIES: 2+edema b/l (as per patient chronic edema is stable), no cyanosis   SKIN: warm and dry  NEURO: Alert  PSYCH: normal affect.        LABS:                        12.3   11.45 )-----------( 248      ( 11 Jul 2023 02:56 )             37.0     07-11    139  |  94<L>  |  40.0<H>  ----------------------------<  105<H>  4.8   |  37.0<H>  |  0.87    Ca    8.9      11 Jul 2023 02:56  Mg     2.3     07-11    TPro  6.3<L>  /  Alb  3.3  /  TBili  0.7  /  DBili  x   /  AST  23  /  ALT  11  /  AlkPhos  67  07-11    NAN GAYLE      PT/INR - ( 11 Jul 2023 02:56 )   PT: 28.9 sec;   INR: 2.47 ratio           Urinalysis Basic - ( 11 Jul 2023 02:56 )    Color: x / Appearance: x / SG: x / pH: x  Gluc: 105 mg/dL / Ketone: x  / Bili: x / Urobili: x   Blood: x / Protein: x / Nitrite: x   Leuk Esterase: x / RBC: x / WBC x   Sq Epi: x / Non Sq Epi: x / Bacteria: x        RADIOLOGY & ADDITIONAL STUDIES:    INTERPRETATION OF TELEMETRY (personally reviewed): No significant cardiac events.    ECHO: 7/9/23  Summary:   1. Normal left ventricular internal cavity size.   2. Normal global left ventricular systolic function.   3. Left ventricular ejection fraction, by visual estimation, is 55 to   60%.   4. Moderate to severe left atrial enlargement.   5. Moderate to severe right atrial enlargement.   6. The mitral in-flow pattern reveals no discernable A-wave, therefore   no comment on diastolic function can be made.   7. Mild mitral annular calcification.   8. Mild thickening of the anterior and posterior mitral valve leaflets.   9. Moderate mitral valve regurgitation.  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  11. Mild-moderate tricuspid regurgitation.  12. Sclerotic aortic valve with normal opening.  13. Mild to moderate aortic regurgitation.  14. Mild to moderate pulmonic valve regurgitation.  15. Estimated pulmonary artery systolic pressure is 51.6 mmHg assuming a   right atrial pressure of 8 mmHg, which is consistent with moderate   pulmonary hypertension.  16. There is no evidence of pericardial effusion.      ASSESSMENT AND PLAN:  89 year old female with a PMHx of Afib s/p ablation, ILR, s/p Watchman, Chronic Pulm HTN and edema who presents with SOB that occurred after she started choking on her pills.  Afterwards she is hoarse and noted some wheezing. Pt states LE edema is at her baseline but still dose not feel breathing is back to normal.    SOB  - patient hypervolemic on exam   - patient remains on 2L NC, wean as tolerated  - Change lasix 40mg PO BID for today.   - strict I/O  - daily chemistry to evaluate renal function and electrolytes  - daily weights  - patient refused laryngoscopy    Thank you for letting [facility] to assist in the management of this patient. Please call with any questions.                 [place] [facility] - [facility], [facility]                                   [address] [place] [address]                                                      PHONE: [phone]                                                         FAX: [phone]  [email or web address]  ---------------------------------------------------------------------------------------------------------------------------------    Overnight events/patient complaints:     Patient states that her breathing issues had resolved back to baseline   No LE edema, no CP      Allergy Status Unknown    MEDICATIONS  (STANDING):  atorvastatin 40 milliGRAM(s) Oral at bedtime  ceFAZolin  Injectable. 1000 milliGRAM(s) IV Push every 8 hours  cloNIDine 0.1 milliGRAM(s) Oral three times a day  enalapril 20 milliGRAM(s) Oral daily  furosemide    Tablet 40 milliGRAM(s) Oral two times a day  hydrALAZINE 25 milliGRAM(s) Oral every 8 hours  metoprolol tartrate 25 milliGRAM(s) Oral three times a day  rivaroxaban 15 milliGRAM(s) Oral daily    MEDICATIONS  (PRN):  acetaminophen     Tablet .. 650 milliGRAM(s) Oral every 6 hours PRN Temp greater or equal to 38C (100.4F), Mild Pain (1 - 3)  aluminum hydroxide/magnesium hydroxide/simethicone Suspension 30 milliLiter(s) Oral every 4 hours PRN Dyspepsia  benzocaine/menthol Lozenge 1 Lozenge Oral every 6 hours PRN Sore Throat  guaiFENesin  milliGRAM(s) Oral every 12 hours PRN Cough  hydrALAZINE Injectable 5 milliGRAM(s) IV Push every 6 hours PRN for sbp above 160 mmhg  melatonin 3 milliGRAM(s) Oral at bedtime PRN Insomnia  metoprolol tartrate Injectable 2.5 milliGRAM(s) IV Push every 6 hours PRN for heart rate above 110 bpm  nitroglycerin     SubLingual 0.4 milliGRAM(s) SubLingual every 5 minutes PRN Chest Pain  ondansetron Injectable 4 milliGRAM(s) IV Push every 8 hours PRN Nausea and/or Vomiting      Vital Signs Last 24 Hrs  T(C): 36.6 (11 Jul 2023 08:03), Max: 36.8 (10 Jul 2023 23:20)  T(F): 97.8 (11 Jul 2023 08:03), Max: 98.2 (10 Jul 2023 23:20)  HR: 74 (11 Jul 2023 08:03) (48 - 99)  BP: 176/72 (11 Jul 2023 08:03) (98/55 - 176/72)  BP(mean): --  RR: 16 (11 Jul 2023 08:03) (16 - 18)  SpO2: 100% (11 Jul 2023 08:03) (96% - 100%)    Parameters below as of 11 Jul 2023 08:03  Patient On (Oxygen Delivery Method): nasal cannula  O2 Flow (L/min): 3    ICU Vital Signs Last 24 Hrs  NAN NALINI  I&O's Detail    Drug Dosing Weight  NAN GAYLE      PHYSICAL EXAM:  General: NAD  HEENT: Head; normocephalic, atraumatic.  Eyes: EOMI, conjunctiva normal  Neck: Supple, +JVD noted  CARDIOVASCULAR: Irregularly irregular rhythm, regular rate, No murmurs or gallops  LUNGS: Poor inspiratory effort  ABDOMEN: Soft, nontender, non-distended, +bowel sounds  EXTREMITIES: 2+edema b/l (as per patient chronic edema is stable), no cyanosis   SKIN: warm and dry  NEURO: Alert  PSYCH: normal affect.        LABS:                        12.3   11.45 )-----------( 248      ( 11 Jul 2023 02:56 )             37.0     07-11    139  |  94<L>  |  40.0<H>  ----------------------------<  105<H>  4.8   |  37.0<H>  |  0.87    Ca    8.9      11 Jul 2023 02:56  Mg     2.3     07-11    TPro  6.3<L>  /  Alb  3.3  /  TBili  0.7  /  DBili  x   /  AST  23  /  ALT  11  /  AlkPhos  67  07-11    NAN GAYLE      PT/INR - ( 11 Jul 2023 02:56 )   PT: 28.9 sec;   INR: 2.47 ratio           Urinalysis Basic - ( 11 Jul 2023 02:56 )    Color: x / Appearance: x / SG: x / pH: x  Gluc: 105 mg/dL / Ketone: x  / Bili: x / Urobili: x   Blood: x / Protein: x / Nitrite: x   Leuk Esterase: x / RBC: x / WBC x   Sq Epi: x / Non Sq Epi: x / Bacteria: x        RADIOLOGY & ADDITIONAL STUDIES:    INTERPRETATION OF TELEMETRY (personally reviewed): No significant cardiac events.    ECHO: 7/9/23  Summary:   1. Normal left ventricular internal cavity size.   2. Normal global left ventricular systolic function.   3. Left ventricular ejection fraction, by visual estimation, is 55 to   60%.   4. Moderate to severe left atrial enlargement.   5. Moderate to severe right atrial enlargement.   6. The mitral in-flow pattern reveals no discernable A-wave, therefore   no comment on diastolic function can be made.   7. Mild mitral annular calcification.   8. Mild thickening of the anterior and posterior mitral valve leaflets.   9. Moderate mitral valve regurgitation.  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  11. Mild-moderate tricuspid regurgitation.  12. Sclerotic aortic valve with normal opening.  13. Mild to moderate aortic regurgitation.  14. Mild to moderate pulmonic valve regurgitation.  15. Estimated pulmonary artery systolic pressure is 51.6 mmHg assuming a   right atrial pressure of 8 mmHg, which is consistent with moderate   pulmonary hypertension.  16. There is no evidence of pericardial effusion.      ASSESSMENT AND PLAN:  89 year old female with a PMHx of Afib s/p ablation, ILR, s/p Watchman, Chronic Pulm HTN and edema who presents with SOB that occurred after she started choking on her pills.  Afterwards she is hoarse and noted some wheezing. Pt states LE edema is at her baseline but still dose not feel breathing is back to normal.    SOB    - patient Euvolemic on exam   - patient remains on 2L NC, wean as tolerated  - Change lasix to 40mg PO daily  - strict I/O  - daily chemistry to evaluate renal function and electrolytes  - daily weights  - patient refused laryngoscopy  - Will follow at [facility] in a few weeks    Thank you for letting [facility] to assist in the management of this patient. Please call with any questions.

## 2023-07-12 NOTE — CONSULT NOTE ADULT - SUBJECTIVE AND OBJECTIVE BOX
[facility] PALLIATIVE MEDICINE CONSULT    CC: Patient is a 89y old  Female who presents with a chief complaint of SOB (12 Jul 2023 09:36)    HPI:  Mrs. Keith is a 89 year old female with PMHx of HTN, HFpEF, chronic Afib on Xarelto, s/p ablation (2016/2019), s/p watchman LA appendage closure on 8/8/2022, uterine cancer s/p hysterectomy, IBS, diverticulosis,  chronic BLE edema present from [facility] for worsening dyspnea especially with exertion. Symptoms started when she swallowed her pills yesteday and felt that one of the pills got stuck in her throat. Pt reported difficulty swallowing since then.  Denied chest pain, palpitations, nausea, vomiting. No recent sick contacts. ROS +acute worsening BLE edema. In the ER, found with uncontrolled  and tachycardiac, labs significant for leukocytosis, elevated BUN/Cr, proBNP 6990. Admitted for acute decompensated chronic HFpEF, hypertensive emergency and TURNER. Palliative consult as pt was identified on [facility] List given underlying comorbidities.     Present Symptoms:     Dyspnea: no  Nausea/Vomiting:  No  Anxiety:   No  Depression:  No  Fatigue:  No  Loss of appetite:  No  Constipation:  None documented     Pain: No             Character-            Duration-            Effect-            Factors-            Frequency-            Location-            Severity-    Pain AD Score:  [email or web address]/[email or web address] (press ctrl + left click to view)    Review of Systems: Reviewed                     Negative: no chest pain or dyspnea at rest                     Positive: BLE edema improved, still with generalized weakness  All others negative    PERTINENT PMH REVIEWED: Yes      PAST MEDICAL & SURGICAL HISTORY:  Chronic Afib on AC  HTN  HFpEF  IBS  Diverticulosis  Chronic BLE edema    FAMILY HISTORY:    Allergies    Allergy Status Unknown    Intolerances    SOCIAL HISTORY:                      Substance history:                    Admitted from:  [facility]                    Children: 2 sons                    Samaritan/spirituality:                    Cultural concerns:       DECISION MAKER(s):[] Health Care Proxy(s)  [] Surrogate(s)  [] Guardian             She states having a formal HCP designated to is youngest son Roderick.      ADVANCE DIRECTIVES/TREATMENT PREFERENCES: FULL CODE    Karnofsky/Palliative Performance Status Version 2:  60%  http://npcrc.org/files/news/palliative_performance_scale_ppsv2.pdf    Baseline ADLs (prior to admission): mostly independent with rolling walker    MEDICATIONS  (STANDING):  albuterol/ipratropium for Nebulization 3 milliLiter(s) Nebulizer every 6 hours  atorvastatin 40 milliGRAM(s) Oral at bedtime  cloNIDine 0.1 milliGRAM(s) Oral three times a day  enalapril 20 milliGRAM(s) Oral daily  furosemide    Tablet 40 milliGRAM(s) Oral daily  hydrALAZINE 25 milliGRAM(s) Oral every 8 hours  metoprolol tartrate 25 milliGRAM(s) Oral three times a day  rivaroxaban 15 milliGRAM(s) Oral daily    MEDICATIONS  (PRN):  acetaminophen     Tablet .. 650 milliGRAM(s) Oral every 6 hours PRN Temp greater or equal to 38C (100.4F), Mild Pain (1 - 3)  aluminum hydroxide/magnesium hydroxide/simethicone Suspension 30 milliLiter(s) Oral every 4 hours PRN Dyspepsia  benzocaine/menthol Lozenge 1 Lozenge Oral every 6 hours PRN Sore Throat  guaiFENesin  milliGRAM(s) Oral every 12 hours PRN Cough  hydrALAZINE Injectable 5 milliGRAM(s) IV Push every 6 hours PRN for sbp above 160 mmhg  melatonin 3 milliGRAM(s) Oral at bedtime PRN Insomnia  metoprolol tartrate Injectable 2.5 milliGRAM(s) IV Push every 6 hours PRN for heart rate above 110 bpm  nitroglycerin     SubLingual 0.4 milliGRAM(s) SubLingual every 5 minutes PRN Chest Pain  ondansetron Injectable 4 milliGRAM(s) IV Push every 8 hours PRN Nausea and/or Vomiting      PHYSICAL EXAM:    Vital Signs Last 24 Hrs  T(C): 36.4 (12 Jul 2023 11:17), Max: 36.9 (11 Jul 2023 21:18)  T(F): 97.5 (12 Jul 2023 11:17), Max: 98.4 (11 Jul 2023 21:18)  HR: 103 (12 Jul 2023 11:17) (93 - 110)  BP: 124/65 (12 Jul 2023 13:19) (123/65 - 167/85)  BP(mean): --  RR: 18 (12 Jul 2023 11:17) (18 - 18)  SpO2: 100% (12 Jul 2023 11:17) (95% - 100%)    Parameters below as of 12 Jul 2023 11:17  Patient On (Oxygen Delivery Method): nasal cannula    General: resting comfortably and no acute distress.     HEENT: mucous membrane moist    Lungs: comfortable nonlabored     CV: S1/S2. Regular rate and rhythm    GI: +BS abdomen soft, nondistended, nontender     MSK: moves all 4 extremities, no cyanosis or edema + weakness       Neuro: nonfocal. alert  oriented x4, interactive and responds appropriately    Skin: warm and dry     LABS:                        12.3   11.45 )-----------( 248      ( 11 Jul 2023 02:56 )             37.0     07-12    138  |  93<L>  |  40.9<H>  ----------------------------<  100<H>  4.3   |  35.0<H>  |  0.62    Ca    9.2      12 Jul 2023 04:58  Mg     2.2     07-12    TPro  6.3<L>  /  Alb  3.3  /  TBili  0.7  /  DBili  x   /  AST  23  /  ALT  11  /  AlkPhos  67  07-11    PT/INR - ( 11 Jul 2023 02:56 )   PT: 28.9 sec;   INR: 2.47 ratio           Urinalysis Basic - ( 12 Jul 2023 04:58 )    Color: x / Appearance: x / SG: x / pH: x  Gluc: 100 mg/dL / Ketone: x  / Bili: x / Urobili: x   Blood: x / Protein: x / Nitrite: x   Leuk Esterase: x / RBC: x / WBC x   Sq Epi: x / Non Sq Epi: x / Bacteria: x      I&O's Summary    11 Jul 2023 07:01  -  12 Jul 2023 07:00  --------------------------------------------------------  IN: 1000 mL / OUT: 1150 mL / NET: -150 mL    12 Jul 2023 07:01  -  12 Jul 2023 13:34  --------------------------------------------------------  IN: 320 mL / OUT: 0 mL / NET: 320 mL        RADIOLOGY & ADDITIONAL STUDIES:    CXR    ACC: [id number] EXAM:  XR CHEST PORTABLE URGENT 1V   ORDERED BY: FRANDY LEONG     PROCEDURE DATE:  07/08/2023          INTERPRETATION:  XR CHEST URGENT dated 7/8/2023 9:25 PM    CLINICAL INFORMATION: Female, 89 years old.  sob.    PRIOR STUDIES: 5/29/2023 at 8:17 PM    FINDINGS: Persistent cardiomegaly with left atrial appendage dilatation.   Senescent changes of the thoracic aorta is uncoiled and calcified. No   recent pleural or parenchymal pathology. No pneumothorax. Marked   degenerative changes of both glenohumeral joints with superior   subluxation of humeral heads again noted. Left recorder again seen.    IMPRESSION:  No recent pleural or parenchymal pathology.  Additional stable findings as above.    --- End of Report ---  TYREE MENDOZA MD; Attending Radiologist  This document has been electronically signed. Jul 9 2023 10:41AM    BLE Doppler    ACC: [id number] EXAM:  US DPLX LWR EXT VEINS COMPL BI   ORDERED BY: GUILLAUME VALENCIA     PROCEDURE DATE:  07/09/2023          INTERPRETATION:  CLINICAL INFORMATION: Bilateral lower extremity   swelling. Assess for DVT.    COMPARISON: None available.    TECHNIQUE: Duplex sonography of the BILATERAL LOWER extremity veins with   color and spectral Doppler, with and without compression.    FINDINGS:    RIGHT:  Normal compressibility of the RIGHT common femoral, femoral and popliteal   veins.  Doppler examination shows normal spontaneous and phasic flow.  Patency of the right posterior tibial vein identified. The remaining   right calf veins are not visualized, precluding assessment.    LEFT:  Normal compressibility of the LEFT common femoral, femoral and popliteal   veins.  Doppler examination shows normal spontaneous and phasic flow.  Patency of the tibioperoneal trunk region identified. The remaining left   calf veins are not visualized, precluding assessment.    IMPRESSION:  No evidence of DVT bilateral common femoral, femoral or popliteal venous   segments. Patent right posterior tibial vein. Patent tibioperoneal trunk   on the left. The remaining calf venous structures bilaterally are not   visualized, precluding assessment.  --- End of Report ---  ZANDRA LLAMAS MD; Attending Radiologist  This document has been electronically signed. Jul 9 2023 10:30AM    TTE  Summary:   1. Normal left ventricular internal cavity size.   2. Normal global left ventricular systolic function.   3. Left ventricular ejection fraction, by visual estimation, is 55 to 60%.   4. Moderate to severe left atrial enlargement.   5. Moderate to severe right atrial enlargement.   6. The mitral in-flow pattern reveals no discernable A-wave, therefore   no comment on diastolic function can be made.   7. Mild mitral annular calcification.   8. Mild thickening of the anterior and posterior mitral valve leaflets.   9. Moderate mitral valve regurgitation.  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  11. Mild-moderate tricuspid regurgitation.  12. Sclerotic aortic valve with normal opening.  13. Mild to moderate aortic regurgitation.  14. Mild to moderate pulmonic valve regurgitation.  15. Estimated pulmonary artery systolic pressure is 51.6 mmHg assuming a   right atrial pressure of 8 mmHg, which is consistent with moderate pulmonary hypertension.  16. There is no evidence of pericardial effusion.    MD Carlito Electronically signed on 7/9/2023 at 4:42:37 PM  *** Final ***    NEUROLOGICAL MEDICATIONS/OPIOIDS/BENZODIAZEPINE OVER PAST 24 HOURS: none     [facility] PALLIATIVE MEDICINE CONSULT    CC: Patient is a 89y old  Female who presents with a chief complaint of SOB (12 Jul 2023 09:36)    HPI:  Mrs. Keith is a 89 year old female with PMHx of HTN, HFpEF, chronic Afib on Xarelto, s/p ablation (2016/2019), s/p watchman LA appendage closure on 8/8/2022, uterine cancer s/p hysterectomy, IBS, diverticulosis,  [facility]. Symptoms started when she swallowed her pills yesteday and felt that one of the pills got stuck in her throat. Pt reported difficulty swallowing since then.  Denied chest pain, palpitations, nausea, vomiting. No recent sick contacts. ROS +acute worsening BLE edema. In the ER, found with uncontrolled  and tachycardiac, labs significant for leukocytosis and proBNP 6990. Admitted for acute decompensated chronic HFpEF, hypertensive emergency. Palliative consult as pt was identified on [facility] List given underlying comorbidities.     Present Symptoms:     Dyspnea: no  Nausea/Vomiting:  No  Anxiety:   No  Depression:  No  Fatigue:  No  Loss of appetite:  No  Constipation:  None documented     Pain: No             Character-            Duration-            Effect-            Factors-            Frequency-            Location-            Severity-    Pain AD Score:  [email or web address]/[email or web address].pdf (press ctrl + left click to view)    Review of Systems: Reviewed                     Negative: no chest pain or dyspnea at rest                     Positive: BLE edema improved, still with generalized weakness  All others negative    PERTINENT PMH REVIEWED: Yes      PAST MEDICAL & SURGICAL HISTORY:  Chronic Afib on AC  HTN  HFpEF  IBS  Diverticulosis  Chronic BLE edema    FAMILY HISTORY:    Allergies    Allergy Status Unknown    Intolerances    SOCIAL HISTORY:                      Substance history:                    [facility]                    Children: 2 sons                    Bahai/spirituality:                    Cultural concerns:       DECISION MAKER(s):[] Health Care Proxy(s)  [] Surrogate(s)  [] Guardian             She states having a formal HCP designated to is youngest son Roderick.      ADVANCE DIRECTIVES/TREATMENT PREFERENCES: FULL CODE    Karnofsky/Palliative Performance Status Version 2:  60%  http://npcrc.org/files/news/palliative_performance_scale_ppsv2.pdf    Baseline ADLs (prior to admission): mostly independent with rolling walker    MEDICATIONS  (STANDING):  albuterol/ipratropium for Nebulization 3 milliLiter(s) Nebulizer every 6 hours  atorvastatin 40 milliGRAM(s) Oral at bedtime  cloNIDine 0.1 milliGRAM(s) Oral three times a day  enalapril 20 milliGRAM(s) Oral daily  furosemide    Tablet 40 milliGRAM(s) Oral daily  hydrALAZINE 25 milliGRAM(s) Oral every 8 hours  metoprolol tartrate 25 milliGRAM(s) Oral three times a day  rivaroxaban 15 milliGRAM(s) Oral daily    MEDICATIONS  (PRN):  acetaminophen     Tablet .. 650 milliGRAM(s) Oral every 6 hours PRN Temp greater or equal to 38C (100.4F), Mild Pain (1 - 3)  aluminum hydroxide/magnesium hydroxide/simethicone Suspension 30 milliLiter(s) Oral every 4 hours PRN Dyspepsia  benzocaine/menthol Lozenge 1 Lozenge Oral every 6 hours PRN Sore Throat  guaiFENesin  milliGRAM(s) Oral every 12 hours PRN Cough  hydrALAZINE Injectable 5 milliGRAM(s) IV Push every 6 hours PRN for sbp above 160 mmhg  melatonin 3 milliGRAM(s) Oral at bedtime PRN Insomnia  metoprolol tartrate Injectable 2.5 milliGRAM(s) IV Push every 6 hours PRN for heart rate above 110 bpm  nitroglycerin     SubLingual 0.4 milliGRAM(s) SubLingual every 5 minutes PRN Chest Pain  ondansetron Injectable 4 milliGRAM(s) IV Push every 8 hours PRN Nausea and/or Vomiting      PHYSICAL EXAM:    Vital Signs Last 24 Hrs  T(C): 36.4 (12 Jul 2023 11:17), Max: 36.9 (11 Jul 2023 21:18)  T(F): 97.5 (12 Jul 2023 11:17), Max: 98.4 (11 Jul 2023 21:18)  HR: 103 (12 Jul 2023 11:17) (93 - 110)  BP: 124/65 (12 Jul 2023 13:19) (123/65 - 167/85)  BP(mean): --  RR: 18 (12 Jul 2023 11:17) (18 - 18)  SpO2: 100% (12 Jul 2023 11:17) (95% - 100%)    Parameters below as of 12 Jul 2023 11:17  Patient On (Oxygen Delivery Method): nasal cannula    General: resting comfortably and no acute distress.     HEENT: mucous membrane moist    Lungs: comfortable nonlabored     CV: S1/S2. Regular rate and rhythm    GI: +BS abdomen soft, nondistended, nontender     MSK: moves all 4 extremities, no cyanosis or edema + weakness       Neuro: nonfocal. alert  oriented x4, interactive and responds appropriately    Skin: warm and dry     LABS:                        12.3   11.45 )-----------( 248      ( 11 Jul 2023 02:56 )             37.0     07-12    138  |  93<L>  |  40.9<H>  ----------------------------<  100<H>  4.3   |  35.0<H>  |  0.62    Ca    9.2      12 Jul 2023 04:58  Mg     2.2     07-12    TPro  6.3<L>  /  Alb  3.3  /  TBili  0.7  /  DBili  x   /  AST  23  /  ALT  11  /  AlkPhos  67  07-11    PT/INR - ( 11 Jul 2023 02:56 )   PT: 28.9 sec;   INR: 2.47 ratio           Urinalysis Basic - ( 12 Jul 2023 04:58 )    Color: x / Appearance: x / SG: x / pH: x  Gluc: 100 mg/dL / Ketone: x  / Bili: x / Urobili: x   Blood: x / Protein: x / Nitrite: x   Leuk Esterase: x / RBC: x / WBC x   Sq Epi: x / Non Sq Epi: x / Bacteria: x      I&O's Summary    11 Jul 2023 07:01  -  12 Jul 2023 07:00  --------------------------------------------------------  IN: 1000 mL / OUT: 1150 mL / NET: -150 mL    12 Jul 2023 07:01  -  12 Jul 2023 13:34  --------------------------------------------------------  IN: 320 mL / OUT: 0 mL / NET: 320 mL        RADIOLOGY & ADDITIONAL STUDIES:    CXR    ACC: [id number] EXAM:  XR CHEST PORTABLE URGENT 1V   ORDERED BY: FRANDY LEONG     PROCEDURE DATE:  07/08/2023          INTERPRETATION:  XR CHEST URGENT dated 7/8/2023 9:25 PM    CLINICAL INFORMATION: Female, 89 years old.  sob.    PRIOR STUDIES: 5/29/2023 at 8:17 PM    FINDINGS: Persistent cardiomegaly with left atrial appendage dilatation.   Senescent changes of the thoracic aorta is uncoiled and calcified. No   recent pleural or parenchymal pathology. No pneumothorax. Marked   degenerative changes of both glenohumeral joints with superior   subluxation of humeral heads again noted. Left recorder again seen.    IMPRESSION:  No recent pleural or parenchymal pathology.  Additional stable findings as above.    --- End of Report ---  TYREE MENDOZA MD; Attending Radiologist  This document has been electronically signed. Jul 9 2023 10:41AM    BLE Doppler    ACC: [id number] EXAM:  US DPLX LWR EXT VEINS COMPL BI   ORDERED BY: GUILLAUME VALENCIA     PROCEDURE DATE:  07/09/2023          INTERPRETATION:  CLINICAL INFORMATION: Bilateral lower extremity   swelling. Assess for DVT.    COMPARISON: None available.    TECHNIQUE: Duplex sonography of the BILATERAL LOWER extremity veins with   color and spectral Doppler, with and without compression.    FINDINGS:    RIGHT:  Normal compressibility of the RIGHT common femoral, femoral and popliteal   veins.  Doppler examination shows normal spontaneous and phasic flow.  Patency of the right posterior tibial vein identified. The remaining   right calf veins are not visualized, precluding assessment.    LEFT:  Normal compressibility of the LEFT common femoral, femoral and popliteal   veins.  Doppler examination shows normal spontaneous and phasic flow.  Patency of the tibioperoneal trunk region identified. The remaining left   calf veins are not visualized, precluding assessment.    IMPRESSION:  No evidence of DVT bilateral common femoral, femoral or popliteal venous   segments. Patent right posterior tibial vein. Patent tibioperoneal trunk   on the left. The remaining calf venous structures bilaterally are not   visualized, precluding assessment.  --- End of Report ---  ZANDRA LLAMAS MD; Attending Radiologist  This document has been electronically signed. Jul 9 2023 10:30AM    TTE  Summary:   1. Normal left ventricular internal cavity size.   2. Normal global left ventricular systolic function.   3. Left ventricular ejection fraction, by visual estimation, is 55 to 60%.   4. Moderate to severe left atrial enlargement.   5. Moderate to severe right atrial enlargement.   6. The mitral in-flow pattern reveals no discernable A-wave, therefore   no comment on diastolic function can be made.   7. Mild mitral annular calcification.   8. Mild thickening of the anterior and posterior mitral valve leaflets.   9. Moderate mitral valve regurgitation.  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  11. Mild-moderate tricuspid regurgitation.  12. Sclerotic aortic valve with normal opening.  13. Mild to moderate aortic regurgitation.  14. Mild to moderate pulmonic valve regurgitation.  15. Estimated pulmonary artery systolic pressure is 51.6 mmHg assuming a   right atrial pressure of 8 mmHg, which is consistent with moderate pulmonary hypertension.  16. There is no evidence of pericardial effusion.    MD Carlito Electronically signed on 7/9/2023 at 4:42:37 PM  *** Final ***    NEUROLOGICAL MEDICATIONS/OPIOIDS/BENZODIAZEPINE OVER PAST 24 HOURS: none

## 2023-07-12 NOTE — DISCHARGE NOTE PROVIDER - NSDCFUADDAPPT_GEN_ALL_CORE_FT
[facility] INFO:  D/C PLAN IS RETURN TO [facility]  CARDIO F/U-[place] Office will follow up with the patient.  PMD F/U-

## 2023-07-12 NOTE — CONSULT NOTE ADULT - ASSESSMENT
89 year old female with hx of HTN, HFpEF, chronic Afib on Xarelto, s/p ablation (2016/2019), s/p watchman LA appendage closure on 8/8/2022, uterine cancer s/p hysterectomy, IBS, diverticulosis, chronic BLE edema present from [facility] for dyspnea on exertion, admitted for acute decompensated HFpEF, hypertensive emergency and TURNER. Palliative consulted as pt was identified on [facility] List given underlying CHF.     Acute Decompensated HFpEF  - TTE with preserved EF 55-60%, moderate valvular disease, moderate pHTN  - on PO lasix   - cardiology following, input appreciated     Dyspnea on Exertion  - improved, weaned off supplemental oxygen, maintaining adequate sats on RA  - monitor O2 sat closely    Debility  - at baseline, ambulates with RW and mostly independent of ADLs at [facility]  - PT rec home with 24/7 care vs CARISSA --> pt wishes to pursue CARISSA for strengthening prior to returning back to [facility]  - fall precaution    Palliative Care Encounter  Advance Care Planning  - see goc note above  - Has formal HCP designated to youngest son Roderick and a living will. Advance directives reviewed and wishes to remain FULL CODE.   - GOC: continue pursuit of all active medical interventions without limitations with eventual goal of CARISSA when medically optimized  - Emotional support provided and all questions answered.     No further recommendations from a palliative standpoint. Will sign off. Please reconsult PRN if goals of care should change and assistance needed in further collaborative family discussions. Dispo planning per CCC. Ongoing medical mgnt per primary team.      89 year old female with hx of HTN, HFpEF, chronic Afib on Xarelto, s/p ablation (2016/2019), s/p watchman LA appendage closure on 8/8/2022, uterine cancer s/p hysterectomy, IBS, diverticulosis, chronic BLE edema present from [facility] for dyspnea on exertion, admitted for acute decompensated HFpEF and hypertensive emergency. Palliative consulted as pt was identified on [facility] List given underlying CHF.     Acute Decompensated HFpEF  - TTE with preserved EF 55-60%, moderate valvular disease, moderate pHTN  - on PO lasix   - cardiology following, input appreciated     Dyspnea on Exertion  - improved, weaned off supplemental oxygen, maintaining adequate sats on RA  - monitor O2 sat closely    Debility  - at baseline, ambulates with RW and mostly independent of ADLs at [facility]  - PT rec home with 24/7 care vs CARISSA --> pt wishes to pursue CARISSA for strengthening prior to returning back to [facility]  - fall precaution    Palliative Care Encounter  Advance Care Planning  - see goc note above  - Has formal HCP designated to youngest son Roderick and a living will. Advance directives reviewed and wishes to remain FULL CODE.   - GOC: continue pursuit of all active medical interventions without limitations with eventual goal of CARISSA when medically optimized  - Emotional support provided and all questions answered.     No further recommendations from a palliative standpoint. Will sign off. Please reconsult PRN if goals of care should change and assistance needed in further collaborative family discussions. Dispo planning per CCC. Ongoing medical mgnt per primary team.

## 2023-07-12 NOTE — DISCHARGE NOTE PROVIDER - HOSPITAL COURSE
89 year old female with a PMHx of HTN, CHF,  Afib on xarelto, s/p ablation x2, s/p watchman LA appendage closure on 8/8/2022, uterine cancer s/p hysterectomy, IBS, diverticulosis, chronic bilateral lower extremity edema, who presented to [facility] ED with c/o worsening SOB and LE edema. In the ED, patient severely hypertensive with respiratory distress requiring BiPAP. Elevated BNP noted on labs, and CXR with findings concerning for vascular congestion. Patient was started on BiPAP, Nitro gtt, and IV Lasix, and admitted for management of fluid overload in the setting of hypertensive emergency. MICU was conuslted, and patient was weaned off BiPAP which was well tolerated. Cardiology was consulted, TTE with preserved EF, b/l atrial enlargement, pHTN, mild-moderate valvular disease. Patient was continued in diuresis with improvement in volume and respiratory status. Medications were adjusted, and blood pressure remained stable with better control. Patient with c/o hoarseness for which ENT was consulted. Offered laryngoscopy however patient refused. During hospitalization patient with treated with IV Ancef for concern of LE cellulitis. Received wound care/dressing changes with improvement. Electrolytes monitored and repleted as needed. Evaluated by PT who recommend CARISSA.

## 2023-07-12 NOTE — PROGRESS NOTE ADULT - SUBJECTIVE AND OBJECTIVE BOX
CHIEF COMPLAINT/INTERVAL HISTORY:    Patient is a 89y old  Female who presents with a chief complaint of SOB (12 Jul 2023 16:13)    SUBJECTIVE & OBJECTIVE: Pt seen and examined at bedside. No overnight events. Reports feeling better; weaned off O2. Wet cough noted at bedside. CXR with small right effusion. On PO Lasix.     ROS: No chest pain, palpitations, SOB, light headedness, dizziness, headache, nausea/vomiting, fevers/chills, abdominal pain.    ICU Vital Signs Last 24 Hrs  T(C): 36.8 (12 Jul 2023 16:17), Max: 36.9 (11 Jul 2023 21:18)  T(F): 98.2 (12 Jul 2023 16:17), Max: 98.4 (11 Jul 2023 21:18)  HR: 82 (12 Jul 2023 16:17) (82 - 103)  BP: 109/65 (12 Jul 2023 16:17) (109/65 - 167/85)  RR: 18 (12 Jul 2023 16:17) (18 - 18)  SpO2: 93% (12 Jul 2023 16:17) (93% - 100%)    O2 Parameters below as of 12 Jul 2023 14:25  Patient On (Oxygen Delivery Method): room air    MEDICATIONS  (STANDING):  albuterol/ipratropium for Nebulization 3 milliLiter(s) Nebulizer every 6 hours  cloNIDine 0.1 milliGRAM(s) Oral three times a day  enalapril 20 milliGRAM(s) Oral daily  furosemide    Tablet 40 milliGRAM(s) Oral daily  metoprolol tartrate 25 milliGRAM(s) Oral three times a day  rivaroxaban 15 milliGRAM(s) Oral daily    MEDICATIONS  (PRN):  acetaminophen     Tablet .. 650 milliGRAM(s) Oral every 6 hours PRN Temp greater or equal to 38C (100.4F), Mild Pain (1 - 3)  aluminum hydroxide/magnesium hydroxide/simethicone Suspension 30 milliLiter(s) Oral every 4 hours PRN Dyspepsia  benzocaine/menthol Lozenge 1 Lozenge Oral every 6 hours PRN Sore Throat  guaiFENesin  milliGRAM(s) Oral every 12 hours PRN Cough  melatonin 3 milliGRAM(s) Oral at bedtime PRN Insomnia  metoprolol tartrate Injectable 2.5 milliGRAM(s) IV Push every 6 hours PRN for heart rate above 110 bpm  nitroglycerin     SubLingual 0.4 milliGRAM(s) SubLingual every 5 minutes PRN Chest Pain  ondansetron Injectable 4 milliGRAM(s) IV Push every 8 hours PRN Nausea and/or Vomiting      LABS:                        12.3   11.45 )-----------( 248      ( 11 Jul 2023 02:56 )             37.0     07-12    138  |  93<L>  |  40.9<H>  ----------------------------<  100<H>  4.3   |  35.0<H>  |  0.62    Ca    9.2      12 Jul 2023 04:58  Mg     2.2     07-12    TPro  6.3<L>  /  Alb  3.3  /  TBili  0.7  /  DBili  x   /  AST  23  /  ALT  11  /  AlkPhos  67  07-11    PT/INR - ( 11 Jul 2023 02:56 )   PT: 28.9 sec;   INR: 2.47 ratio           Urinalysis Basic - ( 12 Jul 2023 04:58 )    Color: x / Appearance: x / SG: x / pH: x  Gluc: 100 mg/dL / Ketone: x  / Bili: x / Urobili: x   Blood: x / Protein: x / Nitrite: x   Leuk Esterase: x / RBC: x / WBC x   Sq Epi: x / Non Sq Epi: x / Bacteria: x    PHYSICAL EXAM:    GENERAL: elderly female, frail, laying in bed, NAD  HEAD:  Atraumatic, Normocephalic  EYES: EOMI, PERRLA, conjunctiva and sclera clear  ENMT: Moist mucous membranes  NECK: Supple   NERVOUS SYSTEM:  Alert & Oriented X3, Motor Strength 5/5 B/L upper and lower extremities   CHEST/LUNG: Clear to auscultation bilaterally; No rales, rhonchi, wheezing, or rubs  HEART: Regular rate and rhythm; + S1/S2  ABDOMEN: Soft, Nontender, Nondistended; Bowel sounds present  EXTREMITIES: no pedal edema

## 2023-07-12 NOTE — DISCHARGE NOTE PROVIDER - PROVIDER TOKENS
PROVIDER:[TOKEN:[[id number]],FOLLOWUP:[1 week]],FREE:[LAST:[PCP],PHONE:[(   )    -],FAX:[(   )    -],FOLLOWUP:[1-3 days]],PROVIDER:[TOKEN:[[id number]],FOLLOWUP:[Routine]]

## 2023-07-13 VITALS
DIASTOLIC BLOOD PRESSURE: 74 MMHG | HEART RATE: 101 BPM | TEMPERATURE: 99 F | SYSTOLIC BLOOD PRESSURE: 136 MMHG | RESPIRATION RATE: 18 BRPM | OXYGEN SATURATION: 100 %

## 2023-07-13 LAB
ANION GAP SERPL CALC-SCNC: 11 MMOL/L — SIGNIFICANT CHANGE UP (ref 5–17)
BASOPHILS # BLD AUTO: 0.03 K/UL — SIGNIFICANT CHANGE UP (ref 0–0.2)
BASOPHILS NFR BLD AUTO: 0.3 % — SIGNIFICANT CHANGE UP (ref 0–2)
BUN SERPL-MCNC: 44.1 MG/DL — HIGH (ref 8–20)
CALCIUM SERPL-MCNC: 8.7 MG/DL — SIGNIFICANT CHANGE UP (ref 8.4–10.5)
CHLORIDE SERPL-SCNC: 89 MMOL/L — LOW (ref 96–108)
CO2 SERPL-SCNC: 35 MMOL/L — HIGH (ref 22–29)
CREAT SERPL-MCNC: 0.54 MG/DL — SIGNIFICANT CHANGE UP (ref 0.5–1.3)
EGFR: 88 ML/MIN/1.73M2 — SIGNIFICANT CHANGE UP
EOSINOPHIL # BLD AUTO: 0.09 K/UL — SIGNIFICANT CHANGE UP (ref 0–0.5)
EOSINOPHIL NFR BLD AUTO: 1 % — SIGNIFICANT CHANGE UP (ref 0–6)
GLUCOSE SERPL-MCNC: 96 MG/DL — SIGNIFICANT CHANGE UP (ref 70–99)
HCT VFR BLD CALC: 38.8 % — SIGNIFICANT CHANGE UP (ref 34.5–45)
HGB BLD-MCNC: 12.9 G/DL — SIGNIFICANT CHANGE UP (ref 11.5–15.5)
IMM GRANULOCYTES NFR BLD AUTO: 0.3 % — SIGNIFICANT CHANGE UP (ref 0–0.9)
LYMPHOCYTES # BLD AUTO: 1.69 K/UL — SIGNIFICANT CHANGE UP (ref 1–3.3)
LYMPHOCYTES # BLD AUTO: 18.9 % — SIGNIFICANT CHANGE UP (ref 13–44)
MAGNESIUM SERPL-MCNC: 2.1 MG/DL — SIGNIFICANT CHANGE UP (ref 1.6–2.6)
MCHC RBC-ENTMCNC: 33.2 GM/DL — SIGNIFICANT CHANGE UP (ref 32–36)
MCHC RBC-ENTMCNC: 33.5 PG — SIGNIFICANT CHANGE UP (ref 27–34)
MCV RBC AUTO: 100.8 FL — HIGH (ref 80–100)
MONOCYTES # BLD AUTO: 1.09 K/UL — HIGH (ref 0–0.9)
MONOCYTES NFR BLD AUTO: 12.2 % — SIGNIFICANT CHANGE UP (ref 2–14)
NEUTROPHILS # BLD AUTO: 6.03 K/UL — SIGNIFICANT CHANGE UP (ref 1.8–7.4)
NEUTROPHILS NFR BLD AUTO: 67.3 % — SIGNIFICANT CHANGE UP (ref 43–77)
PHOSPHATE SERPL-MCNC: 3.4 MG/DL — SIGNIFICANT CHANGE UP (ref 2.4–4.7)
PLATELET # BLD AUTO: 294 K/UL — SIGNIFICANT CHANGE UP (ref 150–400)
POTASSIUM SERPL-MCNC: 3.6 MMOL/L — SIGNIFICANT CHANGE UP (ref 3.5–5.3)
POTASSIUM SERPL-SCNC: 3.6 MMOL/L — SIGNIFICANT CHANGE UP (ref 3.5–5.3)
RBC # BLD: 3.85 M/UL — SIGNIFICANT CHANGE UP (ref 3.8–5.2)
RBC # FLD: 13.8 % — SIGNIFICANT CHANGE UP (ref 10.3–14.5)
SODIUM SERPL-SCNC: 135 MMOL/L — SIGNIFICANT CHANGE UP (ref 135–145)
WBC # BLD: 8.96 K/UL — SIGNIFICANT CHANGE UP (ref 3.8–10.5)
WBC # FLD AUTO: 8.96 K/UL — SIGNIFICANT CHANGE UP (ref 3.8–10.5)

## 2023-07-13 PROCEDURE — 71045 X-RAY EXAM CHEST 1 VIEW: CPT

## 2023-07-13 PROCEDURE — 84443 ASSAY THYROID STIM HORMONE: CPT

## 2023-07-13 PROCEDURE — 84100 ASSAY OF PHOSPHORUS: CPT

## 2023-07-13 PROCEDURE — 86140 C-REACTIVE PROTEIN: CPT

## 2023-07-13 PROCEDURE — 94660 CPAP INITIATION&MGMT: CPT

## 2023-07-13 PROCEDURE — 92610 EVALUATE SWALLOWING FUNCTION: CPT

## 2023-07-13 PROCEDURE — 85610 PROTHROMBIN TIME: CPT

## 2023-07-13 PROCEDURE — 85025 COMPLETE CBC W/AUTO DIFF WBC: CPT

## 2023-07-13 PROCEDURE — 87635 SARS-COV-2 COVID-19 AMP PRB: CPT

## 2023-07-13 PROCEDURE — 96368 THER/DIAG CONCURRENT INF: CPT

## 2023-07-13 PROCEDURE — 83690 ASSAY OF LIPASE: CPT

## 2023-07-13 PROCEDURE — 97163 PT EVAL HIGH COMPLEX 45 MIN: CPT

## 2023-07-13 PROCEDURE — 83880 ASSAY OF NATRIURETIC PEPTIDE: CPT

## 2023-07-13 PROCEDURE — 36415 COLL VENOUS BLD VENIPUNCTURE: CPT

## 2023-07-13 PROCEDURE — 93005 ELECTROCARDIOGRAM TRACING: CPT

## 2023-07-13 PROCEDURE — 85730 THROMBOPLASTIN TIME PARTIAL: CPT

## 2023-07-13 PROCEDURE — 92526 ORAL FUNCTION THERAPY: CPT

## 2023-07-13 PROCEDURE — 80053 COMPREHEN METABOLIC PANEL: CPT

## 2023-07-13 PROCEDURE — C8929: CPT

## 2023-07-13 PROCEDURE — 99239 HOSP IP/OBS DSCHRG MGMT >30: CPT

## 2023-07-13 PROCEDURE — 99285 EMERGENCY DEPT VISIT HI MDM: CPT

## 2023-07-13 PROCEDURE — 94640 AIRWAY INHALATION TREATMENT: CPT

## 2023-07-13 PROCEDURE — 96366 THER/PROPH/DIAG IV INF ADDON: CPT

## 2023-07-13 PROCEDURE — 80048 BASIC METABOLIC PNL TOTAL CA: CPT

## 2023-07-13 PROCEDURE — 96365 THER/PROPH/DIAG IV INF INIT: CPT

## 2023-07-13 PROCEDURE — 96375 TX/PRO/DX INJ NEW DRUG ADDON: CPT

## 2023-07-13 PROCEDURE — 93970 EXTREMITY STUDY: CPT

## 2023-07-13 PROCEDURE — 83735 ASSAY OF MAGNESIUM: CPT

## 2023-07-13 PROCEDURE — 84484 ASSAY OF TROPONIN QUANT: CPT

## 2023-07-13 RX ORDER — SIMVASTATIN 20 MG/1
1 TABLET, FILM COATED ORAL
Refills: 0 | DISCHARGE

## 2023-07-13 RX ORDER — METOPROLOL TARTRATE 50 MG
2 TABLET ORAL
Qty: 0 | Refills: 0 | DISCHARGE
Start: 2023-07-13

## 2023-07-13 RX ORDER — IPRATROPIUM/ALBUTEROL SULFATE 18-103MCG
3 AEROSOL WITH ADAPTER (GRAM) INHALATION
Qty: 0 | Refills: 0 | DISCHARGE
Start: 2023-07-13

## 2023-07-13 RX ORDER — CEPHALEXIN 500 MG
1 CAPSULE ORAL
Qty: 0 | Refills: 0 | DISCHARGE
Start: 2023-07-13

## 2023-07-13 RX ORDER — SOTALOL HCL 120 MG
1 TABLET ORAL
Refills: 0 | DISCHARGE

## 2023-07-13 RX ADMIN — Medication 3 MILLILITER(S): at 03:47

## 2023-07-13 RX ADMIN — Medication 0.1 MILLIGRAM(S): at 05:26

## 2023-07-13 RX ADMIN — Medication 20 MILLIGRAM(S): at 05:26

## 2023-07-13 RX ADMIN — Medication 25 MILLIGRAM(S): at 12:55

## 2023-07-13 RX ADMIN — Medication 0.1 MILLIGRAM(S): at 12:54

## 2023-07-13 RX ADMIN — Medication 40 MILLIGRAM(S): at 05:26

## 2023-07-13 RX ADMIN — Medication 500 MILLIGRAM(S): at 12:54

## 2023-07-13 RX ADMIN — Medication 3 MILLILITER(S): at 09:59

## 2023-07-13 RX ADMIN — RIVAROXABAN 15 MILLIGRAM(S): KIT at 12:55

## 2023-07-13 RX ADMIN — Medication 25 MILLIGRAM(S): at 05:25

## 2023-07-13 RX ADMIN — Medication 500 MILLIGRAM(S): at 05:26

## 2023-07-27 ENCOUNTER — TRANSCRIPTION ENCOUNTER (OUTPATIENT)
Age: 88
End: 2023-07-27

## 2023-08-07 ENCOUNTER — TRANSCRIPTION ENCOUNTER (OUTPATIENT)
Age: 88
End: 2023-08-07

## 2023-08-19 ENCOUNTER — INPATIENT (INPATIENT)
Facility: HOSPITAL | Age: 88
LOS: 2 days | Discharge: NURSING, REHAB OR LONG-TERM CARE | DRG: 178 | End: 2023-08-22
Attending: INTERNAL MEDICINE | Admitting: STUDENT IN AN ORGANIZED HEALTH CARE EDUCATION/TRAINING PROGRAM
Payer: MEDICARE

## 2023-08-19 VITALS
HEART RATE: 57 BPM | WEIGHT: 111.99 LBS | DIASTOLIC BLOOD PRESSURE: 115 MMHG | OXYGEN SATURATION: 86 % | HEIGHT: 67 IN | RESPIRATION RATE: 19 BRPM | SYSTOLIC BLOOD PRESSURE: 202 MMHG | TEMPERATURE: 98 F

## 2023-08-19 DIAGNOSIS — I48.91 UNSPECIFIED ATRIAL FIBRILLATION: ICD-10-CM

## 2023-08-19 LAB
ALBUMIN SERPL ELPH-MCNC: 3.8 G/DL — SIGNIFICANT CHANGE UP (ref 3.3–5.2)
ALP SERPL-CCNC: 64 U/L — SIGNIFICANT CHANGE UP (ref 40–120)
ALT FLD-CCNC: 20 U/L — SIGNIFICANT CHANGE UP
ANION GAP SERPL CALC-SCNC: 12 MMOL/L — SIGNIFICANT CHANGE UP (ref 5–17)
APTT BLD: 41 SEC — HIGH (ref 24.5–35.6)
AST SERPL-CCNC: 51 U/L — HIGH
BASOPHILS # BLD AUTO: 0.05 K/UL — SIGNIFICANT CHANGE UP (ref 0–0.2)
BASOPHILS NFR BLD AUTO: 0.5 % — SIGNIFICANT CHANGE UP (ref 0–2)
BILIRUB SERPL-MCNC: 0.6 MG/DL — SIGNIFICANT CHANGE UP (ref 0.4–2)
BUN SERPL-MCNC: 26.4 MG/DL — HIGH (ref 8–20)
CALCIUM SERPL-MCNC: 9.3 MG/DL — SIGNIFICANT CHANGE UP (ref 8.4–10.5)
CHLORIDE SERPL-SCNC: 93 MMOL/L — LOW (ref 96–108)
CO2 SERPL-SCNC: 27 MMOL/L — SIGNIFICANT CHANGE UP (ref 22–29)
CREAT SERPL-MCNC: 0.51 MG/DL — SIGNIFICANT CHANGE UP (ref 0.5–1.3)
EGFR: 89 ML/MIN/1.73M2 — SIGNIFICANT CHANGE UP
EOSINOPHIL # BLD AUTO: 0.13 K/UL — SIGNIFICANT CHANGE UP (ref 0–0.5)
EOSINOPHIL NFR BLD AUTO: 1.4 % — SIGNIFICANT CHANGE UP (ref 0–6)
GLUCOSE SERPL-MCNC: 137 MG/DL — HIGH (ref 70–99)
HCT VFR BLD CALC: 38.6 % — SIGNIFICANT CHANGE UP (ref 34.5–45)
HGB BLD-MCNC: 12.7 G/DL — SIGNIFICANT CHANGE UP (ref 11.5–15.5)
IMM GRANULOCYTES NFR BLD AUTO: 0.4 % — SIGNIFICANT CHANGE UP (ref 0–0.9)
INR BLD: 2.34 RATIO — HIGH (ref 0.85–1.18)
LYMPHOCYTES # BLD AUTO: 1.27 K/UL — SIGNIFICANT CHANGE UP (ref 1–3.3)
LYMPHOCYTES # BLD AUTO: 13.5 % — SIGNIFICANT CHANGE UP (ref 13–44)
MAGNESIUM SERPL-MCNC: 2 MG/DL — SIGNIFICANT CHANGE UP (ref 1.8–2.6)
MCHC RBC-ENTMCNC: 32.9 GM/DL — SIGNIFICANT CHANGE UP (ref 32–36)
MCHC RBC-ENTMCNC: 32.9 PG — SIGNIFICANT CHANGE UP (ref 27–34)
MCV RBC AUTO: 100 FL — SIGNIFICANT CHANGE UP (ref 80–100)
MONOCYTES # BLD AUTO: 0.78 K/UL — SIGNIFICANT CHANGE UP (ref 0–0.9)
MONOCYTES NFR BLD AUTO: 8.3 % — SIGNIFICANT CHANGE UP (ref 2–14)
NEUTROPHILS # BLD AUTO: 7.15 K/UL — SIGNIFICANT CHANGE UP (ref 1.8–7.4)
NEUTROPHILS NFR BLD AUTO: 75.9 % — SIGNIFICANT CHANGE UP (ref 43–77)
NT-PROBNP SERPL-SCNC: 3542 PG/ML — HIGH (ref 0–300)
PLATELET # BLD AUTO: 265 K/UL — SIGNIFICANT CHANGE UP (ref 150–400)
POTASSIUM SERPL-MCNC: 5.1 MMOL/L — SIGNIFICANT CHANGE UP (ref 3.5–5.3)
POTASSIUM SERPL-SCNC: 5.1 MMOL/L — SIGNIFICANT CHANGE UP (ref 3.5–5.3)
PROT SERPL-MCNC: 7.5 G/DL — SIGNIFICANT CHANGE UP (ref 6.6–8.7)
PROTHROM AB SERPL-ACNC: 25.3 SEC — HIGH (ref 9.5–13)
RBC # BLD: 3.86 M/UL — SIGNIFICANT CHANGE UP (ref 3.8–5.2)
RBC # FLD: 14.1 % — SIGNIFICANT CHANGE UP (ref 10.3–14.5)
SODIUM SERPL-SCNC: 132 MMOL/L — LOW (ref 135–145)
TROPONIN T SERPL-MCNC: <0.01 NG/ML — SIGNIFICANT CHANGE UP (ref 0–0.06)
WBC # BLD: 9.42 K/UL — SIGNIFICANT CHANGE UP (ref 3.8–10.5)
WBC # FLD AUTO: 9.42 K/UL — SIGNIFICANT CHANGE UP (ref 3.8–10.5)

## 2023-08-19 PROCEDURE — 99223 1ST HOSP IP/OBS HIGH 75: CPT

## 2023-08-19 PROCEDURE — 71045 X-RAY EXAM CHEST 1 VIEW: CPT | Mod: 26

## 2023-08-19 PROCEDURE — G1004: CPT

## 2023-08-19 PROCEDURE — 71250 CT THORAX DX C-: CPT | Mod: 26,ME

## 2023-08-19 PROCEDURE — 99285 EMERGENCY DEPT VISIT HI MDM: CPT

## 2023-08-19 PROCEDURE — 70490 CT SOFT TISSUE NECK W/O DYE: CPT | Mod: 26,MG

## 2023-08-19 RX ORDER — FUROSEMIDE 40 MG
40 TABLET ORAL ONCE
Refills: 0 | Status: COMPLETED | OUTPATIENT
Start: 2023-08-19 | End: 2023-08-19

## 2023-08-19 RX ORDER — AZITHROMYCIN 500 MG/1
500 TABLET, FILM COATED ORAL ONCE
Refills: 0 | Status: COMPLETED | OUTPATIENT
Start: 2023-08-19 | End: 2023-08-19

## 2023-08-19 RX ORDER — METOPROLOL TARTRATE 50 MG
5 TABLET ORAL ONCE
Refills: 0 | Status: COMPLETED | OUTPATIENT
Start: 2023-08-19 | End: 2023-08-19

## 2023-08-19 RX ORDER — CEFTRIAXONE 500 MG/1
1000 INJECTION, POWDER, FOR SOLUTION INTRAMUSCULAR; INTRAVENOUS ONCE
Refills: 0 | Status: COMPLETED | OUTPATIENT
Start: 2023-08-19 | End: 2023-08-19

## 2023-08-19 RX ORDER — CEFTRIAXONE 500 MG/1
1000 INJECTION, POWDER, FOR SOLUTION INTRAMUSCULAR; INTRAVENOUS ONCE
Refills: 0 | Status: DISCONTINUED | OUTPATIENT
Start: 2023-08-19 | End: 2023-08-19

## 2023-08-19 RX ORDER — METOPROLOL TARTRATE 50 MG
25 TABLET ORAL THREE TIMES A DAY
Refills: 0 | Status: DISCONTINUED | OUTPATIENT
Start: 2023-08-19 | End: 2023-08-22

## 2023-08-19 RX ORDER — RIVAROXABAN 15 MG-20MG
15 KIT ORAL
Refills: 0 | Status: DISCONTINUED | OUTPATIENT
Start: 2023-08-19 | End: 2023-08-22

## 2023-08-19 RX ORDER — SIMVASTATIN 20 MG/1
1 TABLET, FILM COATED ORAL
Refills: 0 | DISCHARGE

## 2023-08-19 RX ORDER — NITROGLYCERIN 6.5 MG
0.4 CAPSULE, EXTENDED RELEASE ORAL ONCE
Refills: 0 | Status: COMPLETED | OUTPATIENT
Start: 2023-08-19 | End: 2023-08-19

## 2023-08-19 RX ADMIN — Medication 40 MILLIGRAM(S): at 10:27

## 2023-08-19 RX ADMIN — AZITHROMYCIN 255 MILLIGRAM(S): 500 TABLET, FILM COATED ORAL at 19:02

## 2023-08-19 RX ADMIN — Medication 0.4 MILLIGRAM(S): at 10:27

## 2023-08-19 RX ADMIN — Medication 5 MILLIGRAM(S): at 19:01

## 2023-08-19 RX ADMIN — CEFTRIAXONE 1000 MILLIGRAM(S): 500 INJECTION, POWDER, FOR SOLUTION INTRAMUSCULAR; INTRAVENOUS at 19:02

## 2023-08-19 NOTE — H&P ADULT - NSHPPHYSICALEXAM_GEN_ALL_CORE
Vital Signs Last 24 Hrs  T(C): 36.8 (19 Aug 2023 23:13), Max: 37.2 (19 Aug 2023 15:56)  T(F): 98.2 (19 Aug 2023 23:13), Max: 99 (19 Aug 2023 15:56)  HR: 83 (19 Aug 2023 23:13) (57 - 132)  BP: 196/90 (19 Aug 2023 23:13) (95/68 - 219/98)  BP(mean): --  RR: 20 (19 Aug 2023 23:13) (19 - 20)  SpO2: 100% (19 Aug 2023 23:13) (86% - 100%)    Parameters below as of 19 Aug 2023 23:13  Patient On (Oxygen Delivery Method): nasal cannula  O2 Flow (L/min): 2    General: pt. in bed not in distress  eyes : PERRL. intact EOM.  HENT: AT, NC.  no throat erythema or exudate.   Neck: supple. no JVD.   Chest: few scattered rhonchi rt. mid lung, no rales appreciated.   Heart: S1,S2. RRR. no heart murmur. + ankle edema.   Abdomen: soft. non-tender. non-distended. + BS.   Ext: no calf tenderness. moves all ext. independently   vascular : distal pulses palapble  Neuro: AAO x3. no focal weakness. no speech disorder  Skin: rt. posterior calf area with a wound, round a little smaller than a  quarter, covered with dressing, moist base, no foul smell , no surrounding erythema.   psych : mood ok, no si/hi

## 2023-08-19 NOTE — ED PROVIDER NOTE - CARE PLAN
Principal Discharge DX:	Pneumonia  Secondary Diagnosis:	Acute on chronic systolic congestive heart failure   1

## 2023-08-19 NOTE — H&P ADULT - NSHPREVIEWOFSYSTEMS_GEN_ALL_CORE
88 y/o female resident of [facility] living came in after she reported  pt. took sips of water in front of me and reports no trouble swallowing. pt, likely has rt. middle lobe infiltrate, ct neck soft tissue : No radiopaque foreign body appreciated within imaged portions of the esophagus.

## 2023-08-19 NOTE — ED ADULT NURSE REASSESSMENT NOTE - NS ED NURSE REASSESS COMMENT FT1
Patient resting comfortably in the bed. Vss Cardaic monitor AFIB. Sating 98% on 2L, RR even and unlabored, Pending CT Scan. No acute distress ordered.

## 2023-08-19 NOTE — H&P ADULT - ASSESSMENT
89 y/o female resident of [facility] came in after she felt that her multivitamin tab was stuck in her throat. pt. had cough and per record EMS reported o2 sat 85 %. pt. was admitted recently , july 2023 for similar episode of choking on pill. At the time of admission pt. reports that her throat is ok and does not feel anything stuck in the throat.pt. came to the ER around 10 am. no cp, no sob. no abd. pain, no n/v/d. reports chronic ankle edema and also reports seen by wound doctor over posterior side of her calf.   pt. took sips of water in front of me and reports no trouble swallowing. pt, likely has rt. middle lobe infiltrate, ct neck soft tissue : No radiopaque foreign body appreciated within imaged portions of the esophagus.

## 2023-08-19 NOTE — H&P ADULT - HISTORY OF PRESENT ILLNESS
89 y/o female resident of [facility] was brought in, per pt. she used vitamin pill and felt it was stuck in her throat. pt. had some cough and per er chart EMS reported hypoxia of 85 % RA . At the time of admission pt. stated that he throat is fine now and does not feel anything stuck in the throat. pt. is afebrile. pt. does not report swallow difficulty. no cp, no abd. pain, no n/v/d. pt. took sips of water in front of me and reports no trouble swallowing. pt, likely has rt. middle lobe infiltrate, ct neck soft tissue : No radiopaque foreign body appreciated within imaged portions of the esophagus.  88 y/o female resident of [facility] was brought in, per pt. she used vitamin pill and felt it was stuck in her throat. pt. had some cough and per er chart EMS reported hypoxia of 85 % RA . At the time of admission pt. stated that he throat is fine now and does not feel anything stuck in the throat. ( pt. came to the ER about 10 am on the day of admission )  pt. is afebrile. pt. does not report swallow difficulty. no cp, no abd. pain, no n/v/d. pt. stated that she has a wound on her leg, back of her calf and wound care doctor sees her, also reports chronic ankle edema.  pt. took sips of water in front of me and reports no trouble swallowing. pt, likely has rt. middle lobe infiltrate, ct neck soft tissue : No radiopaque foreign body appreciated within imaged portions of the esophagus.  88 y/o female resident of [facility] was brought in, per pt. she used vitamin pill and felt it was stuck in her throat. pt. had some cough and per er chart EMS reported hypoxia of 85 % RA . At the time of admission pt. stated that he throat is fine now and does not feel anything stuck in the throat. ( pt. came to the ER about 10 am on the day of admission ) pt. was admitted at [facility] recently , july 2023 with similar episode of sob when pt. choked on pill.  pt. is afebrile. pt. does not report swallow difficulty. no cp, no abd. pain, no n/v/d. pt. stated that she has a wound on her leg, back of her calf and wound care doctor sees her, also reports chronic ankle edema.  pt. took sips of water in front of me and reports no trouble swallowing. pt, likely has rt. middle lobe infiltrate, ct neck soft tissue : No radiopaque foreign body appreciated within imaged portions of the esophagus.  89 y/o female resident of [facility] living came in after she reported  pt. took sips of water in front of me and reports no trouble swallowing. pt, likely has rt. middle lobe infiltrate, ct neck soft tissue : No radiopaque foreign body appreciated within imaged portions of the esophagus.  88 y/o female resident of [facility] came in after she felt that her multivitamin tab was stuck in her throat. pt. had cough and per record EMS reported o2 sat 85 %. pt. was admitted recently , july 2023 for similar episode of choking on pill. At the time of admission pt. reports that her throat is ok and does not feel anything stuck in the throat.pt. came to the ER around 10 am. no cp, no sob. no abd. pain, no n/v/d. reports chronic ankle edema and also reports seen by wound doctor over posterior side of her calf.   pt. took sips of water in front of me and reports no trouble swallowing. pt, likely has rt. middle lobe infiltrate, ct neck soft tissue : No radiopaque foreign body appreciated within imaged portions of the esophagus.

## 2023-08-19 NOTE — H&P ADULT - NSICDXPASTMEDICALHX_GEN_ALL_CORE_FT
PAST MEDICAL HISTORY:  Afib      PAST MEDICAL HISTORY:  Afib     H/O CHF     HTN (hypertension)      PAST MEDICAL HISTORY:  Afib     H/O CHF     HTN (hypertension)     Uterine cancer

## 2023-08-19 NOTE — H&P ADULT - PROBLEM SELECTOR PLAN 6
u/a just collected by pt's nurse who reports urine is very foul smelling, follow u/a , urine culture, pt. is on iv rocephin.

## 2023-08-19 NOTE — ED ADULT NURSE NOTE - NSFALLHARMRISKINTERV_ED_ALL_ED
Assistance OOB with selected safe patient handling equipment if applicable/Assistance with ambulation/Communicate risk of Fall with Harm to all staff, patient, and family/Monitor gait and stability/Provide visual cue: red socks, yellow wristband, yellow gown, etc/Reinforce activity limits and safety measures with patient and family/Bed in lowest position, wheels locked, appropriate side rails in place/Call bell, personal items and telephone in reach/Instruct patient to call for assistance before getting out of bed/chair/stretcher/Non-slip footwear applied when patient is off stretcher/[place] to call system/Physically safe environment - no spills, clutter or unnecessary equipment/Purposeful Proactive Rounding/Room/bathroom lighting operational, light cord in reach

## 2023-08-19 NOTE — ED PROVIDER NOTE - PHYSICAL EXAMINATION
General: NAD, well-nourished, well-appearing  Head: NC/AT, EOMI  Neck:  trachea midline  Lungs: crackles from mid lungs to bilateral bases, tachypneic   CVS: S1S2, tachycardic, no m/g/r  Abd: +BS, s/nt/nd, no organomegaly  Ext: 2+ radial and pedal pulses, 2+ bilateral lower extremity edema   Neuro: AAOx3, no sensory/motor deficits

## 2023-08-19 NOTE — ED PROVIDER NOTE - CLINICAL SUMMARY MEDICAL DECISION MAKING FREE TEXT BOX
patient arrived s/p choking episode with hypoxia, hypertension, tachycardia. found to have bilateral rales.  ddx included but not limited to asp pna vs chf.  given diuresis and responded well to diuresis.  found to have infiltrate on ct. will admit for iv abx, and cardiology eval.

## 2023-08-19 NOTE — H&P ADULT - PROBLEM SELECTOR PLAN 4
chronic chf with preserved EF, pt. does not appear in acute chf, got iv lasix in the ER, will keep on her po lasix 40 mg daily.

## 2023-08-19 NOTE — ED ADULT NURSE NOTE - ED STAT RN HANDOFF DETAILS
Report given to MARIA ISABEL Calhoun.  Patient transported with tele without incident.  VS stable prior to transport, a-fib on monitor since arrival.

## 2023-08-19 NOTE — ED ADULT TRIAGE NOTE - CHIEF COMPLAINT QUOTE
Pt BIBA from the [facility] s/p choking on a multivitamin. Pt denies any chest pain at this time, but is c.o SOB. Pt states she feels like the multivitamin is still stuck in her throat.

## 2023-08-19 NOTE — ED ADULT NURSE NOTE - OBJECTIVE STATEMENT
Patient presented to ED complaining of choking on a centrum silver for about 30min. PMHX, CHF, Afib on xarelto, also presenting with a cough. Patient on 2L N/C for SOB sating at 99%. Patient AXOX4. No acute distress noted.

## 2023-08-19 NOTE — H&P ADULT - PROBLEM SELECTOR PLAN 1
aspiration ? silent aspiration ?   will keep on iv rocephin and iv zithromax  follow cultures  puree diet and swallow eval.

## 2023-08-19 NOTE — ED PROVIDER NOTE - OBJECTIVE STATEMENT
HPI:  88 y/o F; with PMH signif for CHF, HTN, HLD, a-fib on xarelto; now p/w choking episode approximately 30 minutes PTA. Pt states she took a centrum silver and feels as if it was stuck in throat, states she cannot adequately breath, has had cough and SOB s/p episode, pt was hypoxic to 85% as per EMS. Pt denies chest pain, denies palpitations. Pt denies preceding fevers, chills or sweats denies coughing prior to episode today.     PMH:  CHF, HTN, HLD, a-fib on xarelto  SOCIAL: +social EtOH use, denies tobacco/illicit drug use

## 2023-08-20 DIAGNOSIS — S81.801A UNSPECIFIED OPEN WOUND, RIGHT LOWER LEG, INITIAL ENCOUNTER: ICD-10-CM

## 2023-08-20 DIAGNOSIS — R13.10 DYSPHAGIA, UNSPECIFIED: ICD-10-CM

## 2023-08-20 DIAGNOSIS — Z90.710 ACQUIRED ABSENCE OF BOTH CERVIX AND UTERUS: Chronic | ICD-10-CM

## 2023-08-20 DIAGNOSIS — I50.9 HEART FAILURE, UNSPECIFIED: ICD-10-CM

## 2023-08-20 DIAGNOSIS — I10 ESSENTIAL (PRIMARY) HYPERTENSION: ICD-10-CM

## 2023-08-20 DIAGNOSIS — J18.9 PNEUMONIA, UNSPECIFIED ORGANISM: ICD-10-CM

## 2023-08-20 DIAGNOSIS — R39.89 OTHER SYMPTOMS AND SIGNS INVOLVING THE GENITOURINARY SYSTEM: ICD-10-CM

## 2023-08-20 LAB
ANION GAP SERPL CALC-SCNC: 12 MMOL/L — SIGNIFICANT CHANGE UP (ref 5–17)
BASOPHILS # BLD AUTO: 0.04 K/UL — SIGNIFICANT CHANGE UP (ref 0–0.2)
BASOPHILS NFR BLD AUTO: 0.3 % — SIGNIFICANT CHANGE UP (ref 0–2)
BUN SERPL-MCNC: 26.9 MG/DL — HIGH (ref 8–20)
CALCIUM SERPL-MCNC: 9.4 MG/DL — SIGNIFICANT CHANGE UP (ref 8.4–10.5)
CHLORIDE SERPL-SCNC: 91 MMOL/L — LOW (ref 96–108)
CO2 SERPL-SCNC: 30 MMOL/L — HIGH (ref 22–29)
CREAT SERPL-MCNC: 0.64 MG/DL — SIGNIFICANT CHANGE UP (ref 0.5–1.3)
EGFR: 84 ML/MIN/1.73M2 — SIGNIFICANT CHANGE UP
EOSINOPHIL # BLD AUTO: 0.07 K/UL — SIGNIFICANT CHANGE UP (ref 0–0.5)
EOSINOPHIL NFR BLD AUTO: 0.5 % — SIGNIFICANT CHANGE UP (ref 0–6)
GLUCOSE SERPL-MCNC: 99 MG/DL — SIGNIFICANT CHANGE UP (ref 70–99)
HCT VFR BLD CALC: 39.1 % — SIGNIFICANT CHANGE UP (ref 34.5–45)
HGB BLD-MCNC: 13 G/DL — SIGNIFICANT CHANGE UP (ref 11.5–15.5)
IMM GRANULOCYTES NFR BLD AUTO: 0.5 % — SIGNIFICANT CHANGE UP (ref 0–0.9)
LYMPHOCYTES # BLD AUTO: 1.63 K/UL — SIGNIFICANT CHANGE UP (ref 1–3.3)
LYMPHOCYTES # BLD AUTO: 12.3 % — LOW (ref 13–44)
MCHC RBC-ENTMCNC: 33.2 GM/DL — SIGNIFICANT CHANGE UP (ref 32–36)
MCHC RBC-ENTMCNC: 33.5 PG — SIGNIFICANT CHANGE UP (ref 27–34)
MCV RBC AUTO: 100.8 FL — HIGH (ref 80–100)
MONOCYTES # BLD AUTO: 1.34 K/UL — HIGH (ref 0–0.9)
MONOCYTES NFR BLD AUTO: 10.1 % — SIGNIFICANT CHANGE UP (ref 2–14)
NEUTROPHILS # BLD AUTO: 10.11 K/UL — HIGH (ref 1.8–7.4)
NEUTROPHILS NFR BLD AUTO: 76.3 % — SIGNIFICANT CHANGE UP (ref 43–77)
PLATELET # BLD AUTO: 191 K/UL — SIGNIFICANT CHANGE UP (ref 150–400)
POTASSIUM SERPL-MCNC: 4.9 MMOL/L — SIGNIFICANT CHANGE UP (ref 3.5–5.3)
POTASSIUM SERPL-SCNC: 4.9 MMOL/L — SIGNIFICANT CHANGE UP (ref 3.5–5.3)
RBC # BLD: 3.88 M/UL — SIGNIFICANT CHANGE UP (ref 3.8–5.2)
RBC # FLD: 14.6 % — HIGH (ref 10.3–14.5)
SODIUM SERPL-SCNC: 133 MMOL/L — LOW (ref 135–145)
WBC # BLD: 13.25 K/UL — HIGH (ref 3.8–10.5)
WBC # FLD AUTO: 13.25 K/UL — HIGH (ref 3.8–10.5)

## 2023-08-20 PROCEDURE — 99233 SBSQ HOSP IP/OBS HIGH 50: CPT

## 2023-08-20 RX ORDER — SIMVASTATIN 20 MG/1
20 TABLET, FILM COATED ORAL AT BEDTIME
Refills: 0 | Status: DISCONTINUED | OUTPATIENT
Start: 2023-08-20 | End: 2023-08-22

## 2023-08-20 RX ORDER — METOPROLOL TARTRATE 50 MG
5 TABLET ORAL EVERY 6 HOURS
Refills: 0 | Status: DISCONTINUED | OUTPATIENT
Start: 2023-08-20 | End: 2023-08-22

## 2023-08-20 RX ORDER — FUROSEMIDE 40 MG
40 TABLET ORAL DAILY
Refills: 0 | Status: DISCONTINUED | OUTPATIENT
Start: 2023-08-20 | End: 2023-08-22

## 2023-08-20 RX ORDER — HALOPERIDOL DECANOATE 100 MG/ML
0.25 INJECTION INTRAMUSCULAR EVERY 6 HOURS
Refills: 0 | Status: DISCONTINUED | OUTPATIENT
Start: 2023-08-20 | End: 2023-08-22

## 2023-08-20 RX ORDER — CEFTRIAXONE 500 MG/1
1000 INJECTION, POWDER, FOR SOLUTION INTRAMUSCULAR; INTRAVENOUS EVERY 24 HOURS
Refills: 0 | Status: DISCONTINUED | OUTPATIENT
Start: 2023-08-20 | End: 2023-08-22

## 2023-08-20 RX ORDER — MULTIVIT-MIN/FERROUS GLUCONATE 9 MG/15 ML
15 LIQUID (ML) ORAL DAILY
Refills: 0 | Status: DISCONTINUED | OUTPATIENT
Start: 2023-08-20 | End: 2023-08-22

## 2023-08-20 RX ORDER — AZITHROMYCIN 500 MG/1
500 TABLET, FILM COATED ORAL EVERY 24 HOURS
Refills: 0 | Status: DISCONTINUED | OUTPATIENT
Start: 2023-08-20 | End: 2023-08-20

## 2023-08-20 RX ADMIN — CEFTRIAXONE 1000 MILLIGRAM(S): 500 INJECTION, POWDER, FOR SOLUTION INTRAMUSCULAR; INTRAVENOUS at 18:40

## 2023-08-20 RX ADMIN — Medication 0.2 MILLIGRAM(S): at 00:15

## 2023-08-20 RX ADMIN — RIVAROXABAN 15 MILLIGRAM(S): KIT at 00:15

## 2023-08-20 RX ADMIN — Medication 20 MILLIGRAM(S): at 05:19

## 2023-08-20 RX ADMIN — Medication 0.3 MILLIGRAM(S): at 05:19

## 2023-08-20 RX ADMIN — Medication 0.3 MILLIGRAM(S): at 22:10

## 2023-08-20 RX ADMIN — RIVAROXABAN 15 MILLIGRAM(S): KIT at 18:40

## 2023-08-20 RX ADMIN — SIMVASTATIN 20 MILLIGRAM(S): 20 TABLET, FILM COATED ORAL at 22:10

## 2023-08-20 RX ADMIN — Medication 15 MILLILITER(S): at 12:15

## 2023-08-20 RX ADMIN — Medication 25 MILLIGRAM(S): at 22:10

## 2023-08-20 RX ADMIN — Medication 25 MILLIGRAM(S): at 05:19

## 2023-08-20 RX ADMIN — Medication 25 MILLIGRAM(S): at 13:57

## 2023-08-20 RX ADMIN — Medication 40 MILLIGRAM(S): at 05:19

## 2023-08-20 RX ADMIN — Medication 5 MILLIGRAM(S): at 18:40

## 2023-08-20 RX ADMIN — Medication 0.3 MILLIGRAM(S): at 13:58

## 2023-08-20 NOTE — PATIENT PROFILE ADULT - FLU SEASON?
Patients insurance will not cover Lansoprazole 30mg BID. But it will cover 30 day supply at 1 capsule daily. After speaking to NOEMI Marquez this [facility] sent in a 30 day supply with no refills. Patient was called and it was discussed that the 30 day supply was sent and that she is to take it BID for 14 days and then restart her Omeprazole. Patient stated understanding. No

## 2023-08-20 NOTE — PATIENT PROFILE ADULT - FALL HARM RISK - RISK INTERVENTIONS

## 2023-08-21 ENCOUNTER — TRANSCRIPTION ENCOUNTER (OUTPATIENT)
Age: 88
End: 2023-08-21

## 2023-08-21 LAB
ANION GAP SERPL CALC-SCNC: 10 MMOL/L — SIGNIFICANT CHANGE UP (ref 5–17)
BUN SERPL-MCNC: 34.5 MG/DL — HIGH (ref 8–20)
CALCIUM SERPL-MCNC: 9 MG/DL — SIGNIFICANT CHANGE UP (ref 8.4–10.5)
CHLORIDE SERPL-SCNC: 91 MMOL/L — LOW (ref 96–108)
CO2 SERPL-SCNC: 30 MMOL/L — HIGH (ref 22–29)
CREAT SERPL-MCNC: 0.67 MG/DL — SIGNIFICANT CHANGE UP (ref 0.5–1.3)
EGFR: 84 ML/MIN/1.73M2 — SIGNIFICANT CHANGE UP
GLUCOSE SERPL-MCNC: 127 MG/DL — HIGH (ref 70–99)
HCT VFR BLD CALC: 37.3 % — SIGNIFICANT CHANGE UP (ref 34.5–45)
HGB BLD-MCNC: 12.2 G/DL — SIGNIFICANT CHANGE UP (ref 11.5–15.5)
MCHC RBC-ENTMCNC: 32.7 GM/DL — SIGNIFICANT CHANGE UP (ref 32–36)
MCHC RBC-ENTMCNC: 32.7 PG — SIGNIFICANT CHANGE UP (ref 27–34)
MCV RBC AUTO: 100 FL — SIGNIFICANT CHANGE UP (ref 80–100)
PLATELET # BLD AUTO: 179 K/UL — SIGNIFICANT CHANGE UP (ref 150–400)
POTASSIUM SERPL-MCNC: 4.3 MMOL/L — SIGNIFICANT CHANGE UP (ref 3.5–5.3)
POTASSIUM SERPL-SCNC: 4.3 MMOL/L — SIGNIFICANT CHANGE UP (ref 3.5–5.3)
RBC # BLD: 3.73 M/UL — LOW (ref 3.8–5.2)
RBC # FLD: 14.5 % — SIGNIFICANT CHANGE UP (ref 10.3–14.5)
SODIUM SERPL-SCNC: 131 MMOL/L — LOW (ref 135–145)
WBC # BLD: 12.04 K/UL — HIGH (ref 3.8–10.5)
WBC # FLD AUTO: 12.04 K/UL — HIGH (ref 3.8–10.5)

## 2023-08-21 PROCEDURE — 74220 X-RAY XM ESOPHAGUS 1CNTRST: CPT | Mod: 26

## 2023-08-21 PROCEDURE — 99233 SBSQ HOSP IP/OBS HIGH 50: CPT

## 2023-08-21 RX ADMIN — SIMVASTATIN 20 MILLIGRAM(S): 20 TABLET, FILM COATED ORAL at 22:34

## 2023-08-21 RX ADMIN — CEFTRIAXONE 1000 MILLIGRAM(S): 500 INJECTION, POWDER, FOR SOLUTION INTRAMUSCULAR; INTRAVENOUS at 17:51

## 2023-08-21 RX ADMIN — Medication 0.3 MILLIGRAM(S): at 05:25

## 2023-08-21 RX ADMIN — RIVAROXABAN 15 MILLIGRAM(S): KIT at 17:51

## 2023-08-21 RX ADMIN — Medication 25 MILLIGRAM(S): at 22:34

## 2023-08-21 RX ADMIN — Medication 25 MILLIGRAM(S): at 05:24

## 2023-08-21 RX ADMIN — Medication 0.3 MILLIGRAM(S): at 15:18

## 2023-08-21 RX ADMIN — Medication 40 MILLIGRAM(S): at 05:24

## 2023-08-21 RX ADMIN — Medication 0.3 MILLIGRAM(S): at 22:34

## 2023-08-21 RX ADMIN — Medication 20 MILLIGRAM(S): at 05:25

## 2023-08-21 NOTE — DISCHARGE NOTE NURSING/CASE MANAGEMENT/SOCIAL WORK - PATIENT PORTAL LINK FT
You can access the FollowMyHealth Patient Portal offered by [facility] by registering at the following website: [email or web address]. By joining [employer]’s FollowMyHealth portal, you will also be able to view your health information using other applications (apps) compatible with our system.

## 2023-08-21 NOTE — PROGRESS NOTE ADULT - ASSESSMENT
89 y/o female resident of [facility] assisted living with PMH of HTN, CHFpEF,  Afib on xarelto, s/p ablation x2, s/p watchman LA appendage closure on 8/8/2022, uterine cancer s/p hysterectomy, IBS, diverticulosis, chronic bilateral lower extremity edema, preserved EF, b/l atrial enlargement, pHTN, mild-moderate valvular disease came in after she felt that her multivitamin tab was stuck in her throat. pt. had cough and per record EMS reported o2 sat 85 %. pt. was admitted recently , july 2023 for similar episode of choking on pill. At the time of admission pt. reports that her throat is ok and does not feel anything stuck in the throat.pt. came to the ER around 10 am. no cp, no sob. no abd. pain, no n/v/d. reports chronic ankle edema and also reports seen by wound doctor over posterior side of her calf.   pt. took sips of water in ED and reports no trouble swallowing. pt, likely has rt. middle lobe infiltrate, ct neck soft tissue : No radiopaque foreign body appreciated within imaged portions of the esophagus.     # R mid lobe Pneumonia.   # Leucocytosis  CT- right mid lobe infiltrate  aspiration ? silent aspiration ? given dysphagia  will keep on IV Rocephin and d/c zithromax  follow cultures  soft and bite sized diet and swallow eval.    # Pill dysphagia.   today and recent dysphagia on pill,   will keep on soft and bite sized diet and swallow eval requested.  crush all meds or liquid forms  esophagram ordered. if abn will involve GI  CT neck- non acute    # Accelerated hypertension. now controlled  will continue home meds and iv lopressor prn. adjust home meds per bp response.    #  Chronic CHFpEF  # A fib rate controlled  clinically euvolemic  chronic chf with preserved EF, pt. does not appear in acute chf, got iv Lasix in the ER, will keep on her po Lasix 40 mg daily.  COnt Xarelto  CHADsVASC 5    # Wound of right leg.    chronic appearing rt. posterior calf area wound, no signs of infection, wound care consult.    # Suspected UTI.    u/a just collected by pt's nurse who reports urine is very foul smelling, follow u/a , urine culture, pt. is on iv Rocephin    Xarelto  
88 y/o female resident of [facility] assisted living with PMH of HTN, CHFpEF,  Afib on xarelto, s/p ablation x2, s/p watchman [place] appendage closure on 8/8/2022, uterine cancer s/p hysterectomy, IBS, diverticulosis, chronic bilateral lower extremity edema, preserved EF, b/l atrial enlargement, pHTN, mild-moderate valvular disease came in after she felt that her multivitamin tab was stuck in her throat. pt. had cough and per record EMS reported o2 sat 85 %. pt. was admitted recently , july 2023 for similar episode of choking on pill. At the time of admission pt. reports that her throat is ok and does not feel anything stuck in the throat.pt. came to the ER around 10 am. no cp, no sob. no abd. pain, no n/v/d. reports chronic ankle edema and also reports seen by wound doctor over posterior side of her calf.   pt. took sips of water in ED and reports no trouble swallowing. pt, likely has rt. middle lobe infiltrate, ct neck soft tissue : No radiopaque foreign body appreciated within imaged portions of the esophagus.     # R mid lobe Pneumonia.   # Leucocytosis  CT- right mid lobe infiltrate  aspiration ? silent aspiration ? given dysphagia  will keep on IV Rocephin and d/c zithromax  follow cultures  soft and bite sized diet and swallow eval.    # Pill dysphagia.   today and recent dysphagia on pill,   will keep on soft and bite sized diet and swallow eval requested.  crush all meds or liquid forms  esophagram today. if abn will involve GI  CT neck- non acute    # Accelerated hypertension. now controlled  will continue home meds and iv lopressor prn. adjust home meds per bp response.    # Chronic CHFpEF  # A fib rate controlled  clinically euvolemic  chronic chf with preserved EF, pt. does not appear in acute chf, got iv Lasix in the ER, will keep on her po Lasix 40 mg daily.  Cont Xarelto  CHADsVASC 5    # Wound of right leg.    chronic appearing rt. posterior calf area wound, no signs of infection, wound care consult.    # Suspected UTI.    u/a just collected by pt's nurse who reports urine is very foul smelling, follow u/a , urine culture, pt. is on iv Rocephin    Xarelto    If esophagram non acute plan discharge back to [facility] with augmentin  [facility] needs PT eval. ordered

## 2023-08-21 NOTE — DISCHARGE NOTE NURSING/CASE MANAGEMENT/SOCIAL WORK - NSDCFUADDAPPT_GEN_ALL_CORE_FT
STAR :   Patient was receiving [facility] services for SN and PT prior to hospital admission and wants to resume the same  Patient would like to use [facility] meds to bed to fill any new medications this hospitalization  Patient is adamantly declining a prescheduled appt with Pulmonology.   Case Management office attempted to preschedule an appt with patient 's primary MD and was told that the office will not accept appointment. Patient has to call herself to set up.      STAR :   Patient was receiving [facility] services for SN and PT prior to hospital admission. Patient's discharge plan is now [facility]   [facility] meds to bed not applicable   Patient is adamantly declining a prescheduled appt with Pulmonology.   Case Management office attempted to preschedule an appt with patient 's primary MD and was told that the office will not accept appointment. Patient has to call herself to set up.

## 2023-08-21 NOTE — PROGRESS NOTE ADULT - SUBJECTIVE AND OBJECTIVE BOX
HEALTH ISSUES - PROBLEM Dx:  89 y/o female resident of [facility] living came in after she felt that her multivitamin tab was stuck in her throat    # hypoxia  # pill dysphagia    INTERVAL HPI/ OVERNIGHT EVENTS:    comfortable  able tolerate both solids and liquids here  discussed planof care- s/s eval and esophagogram  explained to crush meds at home    REVIEW OF SYSTEMS:    as above    Vital Signs Last 24 Hrs  T(C): 36.9 (20 Aug 2023 11:45), Max: 37.2 (19 Aug 2023 15:56)  T(F): 98.4 (20 Aug 2023 11:45), Max: 99 (19 Aug 2023 15:56)  HR: 101 (20 Aug 2023 11:45) (64 - 132)  BP: 107/58 (20 Aug 2023 11:45) (95/68 - 196/90)  BP(mean): --  RR: 20 (20 Aug 2023 11:45) (20 - 20)  SpO2: 97% (20 Aug 2023 11:45) (97% - 100%)    Parameters below as of 20 Aug 2023 11:45  Patient On (Oxygen Delivery Method): room air      PHYSICAL EXAM-  General: pt. in bed not in distress  eyes : PERRL. intact EOM.  HENT: AT, NC.  no throat erythema or exudate.   Neck: supple. no JVD.   Chest: few scattered rhonchi rt. mid lung, no rales appreciated.   Heart: S1,S2. RRR. no heart murmur. + ankle edema.   Abdomen: soft. non-tender. non-distended. + BS.   Ext: no calf tenderness. moves all ext. independently   vascular : distal pulses palapble  Neuro: AAO x3. no focal weakness. no speech disorder  Skin: rt. posterior calf area with a wound, round a little smaller than a  quarter, covered with dressing, moist base, no foul smell , no surrounding erythema.   psych : mood ok, no si/hi      MEDICATIONS  (STANDING):  azithromycin  IVPB 500 milliGRAM(s) IV Intermittent every 24 hours  cefTRIAXone Injectable. 1000 milliGRAM(s) IV Push every 24 hours  cloNIDine 0.3 milliGRAM(s) Oral three times a day  enalapril 20 milliGRAM(s) Oral daily  furosemide    Tablet 40 milliGRAM(s) Oral daily  metoprolol tartrate 25 milliGRAM(s) Oral three times a day  multivitamin/minerals/iron Oral Solution (CENTRUM) 15 milliLiter(s) Oral daily  rivaroxaban 15 milliGRAM(s) Oral with dinner  simvastatin 20 milliGRAM(s) Oral at bedtime    MEDICATIONS  (PRN):  haloperidol    Injectable 0.25 milliGRAM(s) IntraMuscular every 6 hours PRN Agitation  metoprolol tartrate Injectable 5 milliGRAM(s) IV Push every 6 hours PRN for sbp above 170      LABS:                        13.0   13.25 )-----------( 191      ( 20 Aug 2023 02:38 )             39.1     08-20    133<L>  |  91<L>  |  26.9<H>  ----------------------------<  99  4.9   |  30.0<H>  |  0.64    Ca    9.4      20 Aug 2023 02:38  Mg     2.0     08-19    TPro  7.5  /  Alb  3.8  /  TBili  0.6  /  DBili  x   /  AST  51<H>  /  ALT  20  /  AlkPhos  64  08-19    PT/INR - ( 19 Aug 2023 10:00 )   PT: 25.3 sec;   INR: 2.34 ratio         PTT - ( 19 Aug 2023 10:00 )  PTT:41.0 sec  Urinalysis Basic - ( 20 Aug 2023 02:38 )    Color: x / Appearance: x / SG: x / pH: x  Gluc: 99 mg/dL / Ketone: x  / Bili: x / Urobili: x   Blood: x / Protein: x / Nitrite: x   Leuk Esterase: x / RBC: x / WBC x   Sq Epi: x / Non Sq Epi: x / Bacteria: x    
HEALTH ISSUES - PROBLEM Dx:    pill dysphagia  asp pna    INTERVAL HPI/ OVERNIGHT EVENTS:    for esophagram today  cmfortable  passed s/s eval  eager to go back to alc    REVIEW OF SYSTEMS:    as abpve    Vital Signs Last 24 Hrs  T(C): 36.8 (21 Aug 2023 11:10), Max: 36.9 (20 Aug 2023 18:43)  T(F): 98.2 (21 Aug 2023 11:10), Max: 98.4 (20 Aug 2023 18:43)  HR: 68 (21 Aug 2023 11:10) (68 - 99)  BP: 106/60 (21 Aug 2023 11:10) (106/60 - 174/76)  BP(mean): --  RR: 18 (21 Aug 2023 11:10) (18 - 20)  SpO2: 95% (21 Aug 2023 11:10) (95% - 100%)    Parameters below as of 21 Aug 2023 11:10  Patient On (Oxygen Delivery Method): nasal cannula  O2 Flow (L/min): 2      PHYSICAL EXAM-  General: pt. in bed not in distress  eyes : PERRL. intact EOM.  HENT: AT, NC.  no throat erythema or exudate.   Neck: supple. no JVD.   Chest: few scattered rhonchi rt. mid lung, no rales appreciated.   Heart: S1,S2. RRR. no heart murmur. + ankle edema.   Abdomen: soft. non-tender. non-distended. + BS.   Ext: no calf tenderness. moves all ext. independently   vascular : distal pulses palapble  Neuro: AAO x3. no focal weakness. no speech disorder  Skin: rt. posterior calf area with a wound, round a little smaller than a  quarter, covered with dressing, moist base, no foul smell , no surrounding erythema.   psych : mood ok, no si/hi    MEDICATIONS  (STANDING):  cefTRIAXone Injectable. 1000 milliGRAM(s) IV Push every 24 hours  cloNIDine 0.3 milliGRAM(s) Oral three times a day  enalapril 20 milliGRAM(s) Oral daily  furosemide    Tablet 40 milliGRAM(s) Oral daily  metoprolol tartrate 25 milliGRAM(s) Oral three times a day  multivitamin/minerals/iron Oral Solution (CENTRUM) 15 milliLiter(s) Oral daily  rivaroxaban 15 milliGRAM(s) Oral with dinner  simvastatin 20 milliGRAM(s) Oral at bedtime    MEDICATIONS  (PRN):  haloperidol    Injectable 0.25 milliGRAM(s) IntraMuscular every 6 hours PRN Agitation  metoprolol tartrate Injectable 5 milliGRAM(s) IV Push every 6 hours PRN for sbp above 170      LABS:                        12.2   12.04 )-----------( 179      ( 21 Aug 2023 02:09 )             37.3     08-21    131<L>  |  91<L>  |  34.5<H>  ----------------------------<  127<H>  4.3   |  30.0<H>  |  0.67    Ca    9.0      21 Aug 2023 02:09        Urinalysis Basic - ( 21 Aug 2023 02:09 )    Color: x / Appearance: x / SG: x / pH: x  Gluc: 127 mg/dL / Ketone: x  / Bili: x / Urobili: x   Blood: x / Protein: x / Nitrite: x   Leuk Esterase: x / RBC: x / WBC x   Sq Epi: x / Non Sq Epi: x / Bacteria: x

## 2023-08-21 NOTE — DISCHARGE NOTE NURSING/CASE MANAGEMENT/SOCIAL WORK - NSDCPEFALRISK_GEN_ALL_CORE
For information on Fall & Injury Prevention, visit: [email or web address] OR  [email or web address] OR  https://www.cdc.gov/steadi/patient.html

## 2023-08-21 NOTE — SWALLOW BEDSIDE ASSESSMENT ADULT - SWALLOW EVAL: DIAGNOSIS
Jocelyne-pharyngeal swallow clinically assessed to be WFL w/ no overt s/s of penetration/aspiration observed across consistencies.

## 2023-08-21 NOTE — SWALLOW BEDSIDE ASSESSMENT ADULT - COMMENTS
Pt known to this service from prior hospitalization, pt last seen on 7/13/23 w/ recommendation for regular/thin

## 2023-08-21 NOTE — SWALLOW BEDSIDE ASSESSMENT ADULT - SLP PERTINENT HISTORY OF CURRENT PROBLEM
As per H&P: "89 y/o female resident of [facility] came in after she felt that her multivitamin tab was stuck in her throat. pt. had cough and per record EMS reported o2 sat 85 %. pt. was admitted recently , july 2023 for similar episode of choking on pill. At the time of admission pt. reports that her throat is ok and does not feel anything stuck in the throat.pt. came to the ER around 10 am. no cp, no sob. no abd. pain, no n/v/d. reports chronic ankle edema and also reports seen by wound doctor over posterior side of her calf. pt. took sips of water in front of me and reports no trouble swallowing. pt, likely has rt. middle lobe infiltrate, ct neck soft tissue : No radiopaque foreign body appreciated within imaged portions of the esophagus."

## 2023-08-22 ENCOUNTER — TRANSCRIPTION ENCOUNTER (OUTPATIENT)
Age: 88
End: 2023-08-22

## 2023-08-22 VITALS
TEMPERATURE: 99 F | HEART RATE: 99 BPM | OXYGEN SATURATION: 94 % | RESPIRATION RATE: 19 BRPM | DIASTOLIC BLOOD PRESSURE: 73 MMHG | SYSTOLIC BLOOD PRESSURE: 159 MMHG

## 2023-08-22 PROCEDURE — 85610 PROTHROMBIN TIME: CPT

## 2023-08-22 PROCEDURE — 80053 COMPREHEN METABOLIC PANEL: CPT

## 2023-08-22 PROCEDURE — 85025 COMPLETE CBC W/AUTO DIFF WBC: CPT

## 2023-08-22 PROCEDURE — 84484 ASSAY OF TROPONIN QUANT: CPT

## 2023-08-22 PROCEDURE — 87040 BLOOD CULTURE FOR BACTERIA: CPT

## 2023-08-22 PROCEDURE — 99239 HOSP IP/OBS DSCHRG MGMT >30: CPT

## 2023-08-22 PROCEDURE — 85730 THROMBOPLASTIN TIME PARTIAL: CPT

## 2023-08-22 PROCEDURE — 83735 ASSAY OF MAGNESIUM: CPT

## 2023-08-22 PROCEDURE — 99285 EMERGENCY DEPT VISIT HI MDM: CPT

## 2023-08-22 PROCEDURE — 74220 X-RAY XM ESOPHAGUS 1CNTRST: CPT

## 2023-08-22 PROCEDURE — 96375 TX/PRO/DX INJ NEW DRUG ADDON: CPT

## 2023-08-22 PROCEDURE — 83880 ASSAY OF NATRIURETIC PEPTIDE: CPT

## 2023-08-22 PROCEDURE — 97163 PT EVAL HIGH COMPLEX 45 MIN: CPT

## 2023-08-22 PROCEDURE — G1004: CPT

## 2023-08-22 PROCEDURE — 85027 COMPLETE CBC AUTOMATED: CPT

## 2023-08-22 PROCEDURE — 36415 COLL VENOUS BLD VENIPUNCTURE: CPT

## 2023-08-22 PROCEDURE — 80048 BASIC METABOLIC PNL TOTAL CA: CPT

## 2023-08-22 PROCEDURE — 71250 CT THORAX DX C-: CPT | Mod: ME

## 2023-08-22 PROCEDURE — 70490 CT SOFT TISSUE NECK W/O DYE: CPT | Mod: MG

## 2023-08-22 PROCEDURE — 71045 X-RAY EXAM CHEST 1 VIEW: CPT

## 2023-08-22 PROCEDURE — 92610 EVALUATE SWALLOWING FUNCTION: CPT

## 2023-08-22 PROCEDURE — 96374 THER/PROPH/DIAG INJ IV PUSH: CPT

## 2023-08-22 PROCEDURE — 93005 ELECTROCARDIOGRAM TRACING: CPT

## 2023-08-22 RX ADMIN — Medication 20 MILLIGRAM(S): at 05:32

## 2023-08-22 RX ADMIN — Medication 0.3 MILLIGRAM(S): at 05:33

## 2023-08-22 RX ADMIN — Medication 25 MILLIGRAM(S): at 14:59

## 2023-08-22 RX ADMIN — Medication 25 MILLIGRAM(S): at 05:31

## 2023-08-22 RX ADMIN — Medication 40 MILLIGRAM(S): at 05:32

## 2023-08-22 RX ADMIN — Medication 0.3 MILLIGRAM(S): at 14:59

## 2023-08-22 NOTE — DISCHARGE NOTE PROVIDER - PROVIDER RX CONTACT NUMBER
Detail Level: Detailed Render In Strict Bullet Format?: No Initiate Treatment: mupirocin 2 % topical ointment Sig: apply in each nares bid x 7 days based on stated Staph culture. Lesion cultured today may be in too late of a stage for successfule culture.\\n [phone]

## 2023-08-22 NOTE — DISCHARGE NOTE PROVIDER - NSDCCPCAREPLAN_GEN_ALL_CORE_FT
PRINCIPAL DISCHARGE DIAGNOSIS  Diagnosis: Pneumonia  Assessment and Plan of Treatment:       SECONDARY DISCHARGE DIAGNOSES  Diagnosis: Accelerated hypertension  Assessment and Plan of Treatment:     Diagnosis: Pill dysphagia  Assessment and Plan of Treatment:     Diagnosis: Acute on chronic systolic congestive heart failure  Assessment and Plan of Treatment:

## 2023-08-22 NOTE — DISCHARGE NOTE PROVIDER - HOSPITAL COURSE
89 y/o female resident of [facility] assisted living with PMH of HTN, CHFpEF,  Afib on xarelto, s/p ablation x2, s/p watchman LA appendage closure on 8/8/2022, uterine cancer s/p hysterectomy, IBS, diverticulosis, chronic bilateral lower extremity edema, preserved EF, b/l atrial enlargement, pHTN, mild-moderate valvular disease came in after she felt that her multivitamin tab was stuck in her throat. pt. had cough and per record EMS reported o2 sat 85 %. pt. was admitted recently , july 2023 for similar episode of choking on pill. At the time of admission pt. reports that her throat is ok and does not feel anything stuck in the throat. pt. came to the ER around 10 am. no cp, no sob. no abd. pain, no n/v/d. reports chronic ankle edema and also reports seen by wound doctor over posterior side of her calf.   pt. took sips of water in ED and reports no trouble swallowing. pt, likely has rt. middle lobe infiltrate, ct neck soft tissue : No radiopaque foreign body appreciated within imaged portions of the esophagus.   Esophagram- no acute lesions.  recommend liquid form of meds and or crush tablet meds.  Medically stable and agreeable with discharge and follow up plan. Patient was advised to return to ED if any symptoms occur or worsen.  TIME 45 mins   89 y/o female resident of [facility] assisted living with PMH of HTN, CHFpEF,  Afib on xarelto, s/p ablation x2, s/p watchman LA appendage closure on 8/8/2022, uterine cancer s/p hysterectomy, IBS, diverticulosis, chronic bilateral lower extremity edema, preserved EF, b/l atrial enlargement, pHTN, mild-moderate valvular disease came in after she felt that her multivitamin tab was stuck in her throat. pt. had cough and per record EMS reported o2 sat 85 %. pt. was admitted recently , july 2023 for similar episode of choking on pill. At the time of admission pt. reports that her throat is ok and does not feel anything stuck in the throat. pt. came to the ER around 10 am. no cp, no sob. no abd. pain, no n/v/d. reports chronic ankle edema and also reports seen by wound doctor over posterior side of her calf.   pt. took sips of water in ED and reports no trouble swallowing. pt, likely has rt. middle lobe infiltrate, ct neck soft tissue : No radiopaque foreign body appreciated within imaged portions of the esophagus.   Esophagram- no acute lesions.  recommend liquid form of meds and or crush tablet meds.  Medically stable and agreeable with discharge and follow up plan. Patient was advised to return to ED if any symptoms occur or worsen.  TIME 45 mins

## 2023-08-22 NOTE — DISCHARGE NOTE PROVIDER - NSDCCONDITION_GEN_ALL_CORE
Phone belinda to patient notified her of thyroid ultrasound results and treatment plan.   Placed order for thyroid ultrasound for 1 year, patient verbalized understanding
Stable

## 2023-08-22 NOTE — DISCHARGE NOTE PROVIDER - ATTENDING DISCHARGE PHYSICAL EXAMINATION:
Vital Signs Last 24 Hrs  T(C): 36.3 (08-22-23 @ 07:17), Max: 36.8 (08-21-23 @ 11:10)  T(F): 97.3 (08-22-23 @ 07:17), Max: 98.2 (08-21-23 @ 11:10)  HR: 86 (08-22-23 @ 07:17) (68 - 86)  BP: 127/62 (08-22-23 @ 07:17) (100/51 - 146/72)  BP(mean): --  RR: 18 (08-22-23 @ 07:17) (17 - 18)  SpO2: 93% (08-22-23 @ 07:17) (93% - 98%)    General: pt. in bed not in distress  eyes : PERRL. intact EOM.  HENT: AT, NC.  no throat erythema or exudate.   Neck: supple. no JVD.   Chest: few scattered rhonchi rt. mid lung, no rales appreciated.   Heart: S1,S2. RRR. no heart murmur. + ankle edema.   Abdomen: soft. non-tender. non-distended. + BS.   Ext: no calf tenderness. moves all ext. independently   vascular : distal pulses palapble  Neuro: AAO x3. no focal weakness. no speech disorder  Skin: rt. posterior calf area with a wound, round a little smaller than a  quarter, covered with dressing, moist base, no foul smell , no surrounding erythema.   psych : mood ok, no si/hi

## 2023-08-22 NOTE — DISCHARGE NOTE PROVIDER - NSDCMRMEDTOKEN_GEN_ALL_CORE_FT
cloNIDine 0.3 mg oral tablet: 1 orally 3 times a day  enalapril 20 mg oral tablet: 1 orally once a day  Lasix 40 mg oral tablet: 1 orally once a day  metoprolol tartrate 25 mg oral tablet: 1 tab(s) orally 3 times a day  Multiple Vitamins oral tablet: 1 tab(s) orally once a day  Xarelto 15 mg oral tablet: 1 orally once a day  Zocor 20 mg oral tablet: 1 tab(s) orally once a day (at bedtime)   Augmentin 500 mg-125 mg oral tablet: 1 tab(s) orally 2 times a day  cloNIDine 0.3 mg oral tablet: 1 orally 3 times a day  enalapril 20 mg oral tablet: 1 orally once a day  Lasix 40 mg oral tablet: 1 orally once a day  metoprolol tartrate 25 mg oral tablet: 1 tab(s) orally 3 times a day  Multiple Vitamins oral tablet: 1 tab(s) orally once a day  Xarelto 15 mg oral tablet: 1 orally once a day  Zocor 20 mg oral tablet: 1 tab(s) orally once a day (at bedtime)

## 2023-08-22 NOTE — DISCHARGE NOTE PROVIDER - NSDCFUADDAPPT_GEN_ALL_CORE_FT
STAR :   Patient was receiving [facility] services for SN and PT prior to hospital admission and wants to resume the same  Patient would like to use [facility] meds to bed to fill any new medications this hospitalization  Patient is adamantly declining a prescheduled appt with Pulmonology.   Case Management office attempted to preschedule an appt with patient 's primary MD and was told that the office will not accept appointment. Patient has to call herself to set up.

## 2023-08-22 NOTE — DISCHARGE NOTE PROVIDER - NSDCFUADDINST_GEN_ALL_CORE_FT
WOUND CARE:  NICCI gallagher. Calcium alginate and DCD every other day to the calf wound.    DIET:  recommend using liquid form of medications when possible. if not then crush all meds and administer to avoid pill dysphagia. WOUND CARE:  NS, pat dry, apply AQUACEL to open wound beds, cover with ABD pad or 4x4 gauze, wrap with Kerlix - change MWF or PRN with strike through saturation    DIET:  recommend using liquid form of medications when possible. if not then crush all meds and administer to avoid pill dysphagia.

## 2023-08-24 ENCOUNTER — TRANSCRIPTION ENCOUNTER (OUTPATIENT)
Age: 88
End: 2023-08-24

## 2023-08-25 LAB
CULTURE RESULTS: SIGNIFICANT CHANGE UP
CULTURE RESULTS: SIGNIFICANT CHANGE UP
SPECIMEN SOURCE: SIGNIFICANT CHANGE UP
SPECIMEN SOURCE: SIGNIFICANT CHANGE UP

## 2023-09-19 ENCOUNTER — TRANSCRIPTION ENCOUNTER (OUTPATIENT)
Age: 88
End: 2023-09-19

## 2023-10-12 NOTE — DISCHARGE NOTE PROVIDER - DISCHARGE DATE
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 He shared that he was able to pulled weeds yesterday without any complications.      Pain:  [] Yes   [x] No      Location: right shoulder tightness   Pain Ratin/10    Objective  Modalities:  Precautions: Protocol given by Dr. Chon Shaikh Phase IV - 21 weeks post op as of 10/12/2023   Exercises:  Exercise Reps/ Time Weight/ Level Comments   UBE Forward/Retro 2/2     Standing (R) Shoulder ER 10 reps x 3 sets Green band  With towel   Standing (R) Shoulder IR 10 reps x 3 sets  Green band With towel   Standing Horizontal Shoulder Abduction 10 reps x 3 sets Green band    Seated (R) bicep curl 20 reps x 3 sets 4 lbs     Seated (R) Shoulder Abduction  10 reps x 2 sets  Hold for 2-3 seconds at end range    Standing (R) rhythmic stabilization; ball to wall 30 sec in each direction 4 lb ball Up/down  Lateral  Clockwise/counterclockwise     Standing Shoulder Rows 10 reps x 3 sets 20 lbs    Wall push up + 10 reps x 2 sets     Latissimus Pull Downs  10 reps  x 2 sets 15 lbs      Passive Stretching   Supine (R) shoulder flexion stretch 30 sec
13-Jul-2023

## 2024-01-01 NOTE — PHYSICAL THERAPY INITIAL EVALUATION ADULT - BALANCE DISTURBANCE, IDENTIFIED IMPAIRMENT CONTRIBUTE, REHAB EVAL
Diet, Breastfeeding:     Breastfeeding Frequency: ad russ     Special Instructions for Nursing:  on demand, unless medically contraindicated (11-04-24 @ 00:38) [Active]       decreased strength

## 2024-03-29 ENCOUNTER — INPATIENT (INPATIENT)
Facility: HOSPITAL | Age: 89
LOS: 4 days | Discharge: NURSING, REHAB OR LONG-TERM CARE | DRG: 305 | End: 2024-04-03
Attending: INTERNAL MEDICINE | Admitting: STUDENT IN AN ORGANIZED HEALTH CARE EDUCATION/TRAINING PROGRAM
Payer: MEDICARE

## 2024-03-29 VITALS
SYSTOLIC BLOOD PRESSURE: 180 MMHG | OXYGEN SATURATION: 96 % | DIASTOLIC BLOOD PRESSURE: 90 MMHG | RESPIRATION RATE: 18 BRPM | TEMPERATURE: 98 F | HEART RATE: 85 BPM | WEIGHT: 139.99 LBS

## 2024-03-29 DIAGNOSIS — Z90.710 ACQUIRED ABSENCE OF BOTH CERVIX AND UTERUS: Chronic | ICD-10-CM

## 2024-03-29 PROBLEM — I10 ESSENTIAL (PRIMARY) HYPERTENSION: Chronic | Status: ACTIVE | Noted: 2023-08-19

## 2024-03-29 PROBLEM — Z86.79 PERSONAL HISTORY OF OTHER DISEASES OF THE CIRCULATORY SYSTEM: Chronic | Status: ACTIVE | Noted: 2023-08-19

## 2024-03-29 PROBLEM — C55 MALIGNANT NEOPLASM OF UTERUS, PART UNSPECIFIED: Chronic | Status: ACTIVE | Noted: 2023-08-20

## 2024-03-29 LAB
ALBUMIN SERPL ELPH-MCNC: 4.1 G/DL — SIGNIFICANT CHANGE UP (ref 3.3–5.2)
ALP SERPL-CCNC: 82 U/L — SIGNIFICANT CHANGE UP (ref 40–120)
ALT FLD-CCNC: 14 U/L — SIGNIFICANT CHANGE UP
ANION GAP SERPL CALC-SCNC: 15 MMOL/L — SIGNIFICANT CHANGE UP (ref 5–17)
APTT BLD: 32.4 SEC — SIGNIFICANT CHANGE UP (ref 24.5–35.6)
AST SERPL-CCNC: 38 U/L — HIGH
BASOPHILS # BLD AUTO: 0.04 K/UL — SIGNIFICANT CHANGE UP (ref 0–0.2)
BASOPHILS NFR BLD AUTO: 0.4 % — SIGNIFICANT CHANGE UP (ref 0–2)
BILIRUB SERPL-MCNC: 0.5 MG/DL — SIGNIFICANT CHANGE UP (ref 0.4–2)
BLD GP AB SCN SERPL QL: SIGNIFICANT CHANGE UP
BUN SERPL-MCNC: 33.2 MG/DL — HIGH (ref 8–20)
CALCIUM SERPL-MCNC: 9.2 MG/DL — SIGNIFICANT CHANGE UP (ref 8.4–10.5)
CHLORIDE SERPL-SCNC: 94 MMOL/L — LOW (ref 96–108)
CO2 SERPL-SCNC: 27 MMOL/L — SIGNIFICANT CHANGE UP (ref 22–29)
CREAT SERPL-MCNC: 0.51 MG/DL — SIGNIFICANT CHANGE UP (ref 0.5–1.3)
EGFR: 89 ML/MIN/1.73M2 — SIGNIFICANT CHANGE UP
EOSINOPHIL # BLD AUTO: 0.05 K/UL — SIGNIFICANT CHANGE UP (ref 0–0.5)
EOSINOPHIL NFR BLD AUTO: 0.5 % — SIGNIFICANT CHANGE UP (ref 0–6)
GLUCOSE SERPL-MCNC: 137 MG/DL — HIGH (ref 70–99)
HCT VFR BLD CALC: 36.8 % — SIGNIFICANT CHANGE UP (ref 34.5–45)
HGB BLD-MCNC: 12.1 G/DL — SIGNIFICANT CHANGE UP (ref 11.5–15.5)
IMM GRANULOCYTES NFR BLD AUTO: 0.5 % — SIGNIFICANT CHANGE UP (ref 0–0.9)
INR BLD: 2 RATIO — HIGH (ref 0.85–1.18)
LYMPHOCYTES # BLD AUTO: 1.36 K/UL — SIGNIFICANT CHANGE UP (ref 1–3.3)
LYMPHOCYTES # BLD AUTO: 13.2 % — SIGNIFICANT CHANGE UP (ref 13–44)
MCHC RBC-ENTMCNC: 32.1 PG — SIGNIFICANT CHANGE UP (ref 27–34)
MCHC RBC-ENTMCNC: 32.9 GM/DL — SIGNIFICANT CHANGE UP (ref 32–36)
MCV RBC AUTO: 97.6 FL — SIGNIFICANT CHANGE UP (ref 80–100)
MONOCYTES # BLD AUTO: 1.22 K/UL — HIGH (ref 0–0.9)
MONOCYTES NFR BLD AUTO: 11.9 % — SIGNIFICANT CHANGE UP (ref 2–14)
NEUTROPHILS # BLD AUTO: 7.55 K/UL — HIGH (ref 1.8–7.4)
NEUTROPHILS NFR BLD AUTO: 73.5 % — SIGNIFICANT CHANGE UP (ref 43–77)
PLATELET # BLD AUTO: 198 K/UL — SIGNIFICANT CHANGE UP (ref 150–400)
POTASSIUM SERPL-MCNC: 5.3 MMOL/L — SIGNIFICANT CHANGE UP (ref 3.5–5.3)
POTASSIUM SERPL-SCNC: 5.3 MMOL/L — SIGNIFICANT CHANGE UP (ref 3.5–5.3)
PROT SERPL-MCNC: 7.6 G/DL — SIGNIFICANT CHANGE UP (ref 6.6–8.7)
PROTHROM AB SERPL-ACNC: 21.8 SEC — HIGH (ref 9.5–13)
RBC # BLD: 3.77 M/UL — LOW (ref 3.8–5.2)
RBC # FLD: 15.5 % — HIGH (ref 10.3–14.5)
SODIUM SERPL-SCNC: 136 MMOL/L — SIGNIFICANT CHANGE UP (ref 135–145)
WBC # BLD: 10.27 K/UL — SIGNIFICANT CHANGE UP (ref 3.8–10.5)
WBC # FLD AUTO: 10.27 K/UL — SIGNIFICANT CHANGE UP (ref 3.8–10.5)

## 2024-03-29 PROCEDURE — 73620 X-RAY EXAM OF FOOT: CPT | Mod: 26,LT

## 2024-03-29 PROCEDURE — 73502 X-RAY EXAM HIP UNI 2-3 VIEWS: CPT | Mod: 26,LT

## 2024-03-29 PROCEDURE — 70450 CT HEAD/BRAIN W/O DYE: CPT | Mod: 26,MC

## 2024-03-29 PROCEDURE — 73700 CT LOWER EXTREMITY W/O DYE: CPT | Mod: 26,LT,MC

## 2024-03-29 PROCEDURE — 72192 CT PELVIS W/O DYE: CPT | Mod: 26,MC

## 2024-03-29 PROCEDURE — 99223 1ST HOSP IP/OBS HIGH 75: CPT | Mod: FS,25

## 2024-03-29 PROCEDURE — 29515 APPLICATION SHORT LEG SPLINT: CPT | Mod: LT

## 2024-03-29 PROCEDURE — 73610 X-RAY EXAM OF ANKLE: CPT | Mod: 26,LT

## 2024-03-29 PROCEDURE — 73562 X-RAY EXAM OF KNEE 3: CPT | Mod: 26,LT

## 2024-03-29 RX ORDER — ACETAMINOPHEN 500 MG
975 TABLET ORAL ONCE
Refills: 0 | Status: COMPLETED | OUTPATIENT
Start: 2024-03-29 | End: 2024-03-29

## 2024-03-29 RX ORDER — ACETAMINOPHEN 500 MG
1000 TABLET ORAL ONCE
Refills: 0 | Status: COMPLETED | OUTPATIENT
Start: 2024-03-29 | End: 2024-03-29

## 2024-03-29 RX ADMIN — Medication 975 MILLIGRAM(S): at 17:27

## 2024-03-29 RX ADMIN — Medication 1000 MILLIGRAM(S): at 11:58

## 2024-03-29 RX ADMIN — Medication 400 MILLIGRAM(S): at 23:23

## 2024-03-29 RX ADMIN — Medication 975 MILLIGRAM(S): at 22:33

## 2024-03-29 NOTE — ED ADULT NURSE NOTE - CHIEF COMPLAINT QUOTE
S/P mechanical fall from standing height. Pt states her legs gave out causing her to fall. C/O left ankle pain. Bilateral lower extremity edema noted, more so on left side. Denies hitting head or LOC.  PT had watchman procedure done last week.

## 2024-03-29 NOTE — ED PROVIDER NOTE - ATTENDING CONTRIBUTION TO CARE
Pt states that she was transferring from her walker to her chair and lost her balance and injured her L ankle. Pt fell onto her butt. Pt did not hit her head. Pt only with L ankle pain. no other complaints.    physical - rrr. ctab. abd - soft, nt. L ankle swelling with ttp.    plan - labs and imaging reviewed. Pt with L fibular fx.  Pt pending CTs. Pt signed out to dr. carrillo.

## 2024-03-29 NOTE — ED PROVIDER NOTE - CLINICAL SUMMARY MEDICAL DECISION MAKING FREE TEXT BOX
89 year old female sent from [facility] s/p mechanical fall from standing height, now with LLE pain and L ankle swelling. Plan for pain control,  labs, x-rays, CT, reassess.

## 2024-03-29 NOTE — ED ADULT NURSE NOTE - OBJECTIVE STATEMENT
Assumed pt care at 1700.  Sent from assisted living after her legs gave out and she landed on the floor on her buttocks.  She denies hitting head.  c/o left leg pain.  No obvious signs of trauma present.  Bilateral 3+ lower extremity pitting edema present.  s/p watchman placement CHS 5 days ago.

## 2024-03-29 NOTE — ED PROVIDER NOTE - PHYSICAL EXAMINATION
Gen: well appearing, no acute distress  Head: normocephalic, atraumatic  EENT: EOMI, moist mucous membranes  Lung: no increased work of breathing, clear to auscultation bilaterally, speaking in full sentences  CV: irregular rate, regular rhythm  Abd: soft, non-tender, non-distended,  no CVA tenderness  MSK: (+) LLE edema and redness, (+)ttp of L ankle, (+)LLE appears shortened and externally rotated. (+)limited ROM of LLE of hip/knee/ankle 2/2 pain. Pelvis stable. no midline spinal ttp/stepoffs.   Neuro: Awake, alert, no focal neurologic deficits

## 2024-03-29 NOTE — ED ADULT TRIAGE NOTE - CHIEF COMPLAINT QUOTE
Take the antibiotics prescribed as directed. Drink plenty of clear liquids to ensure hydration and help with constipation. Use the stool softener prescribed as directed daily. Arrange follow-up with Dr. Lopez Pouch service as discussed.
S/P mechanical fall from standing height. Pt states her legs gave out causing her to fall. C/O left ankle pain. Bilateral lower extremity edema noted, more so on left side. Denies hitting head or LOC.  PT had watchman procedure done last week.

## 2024-03-29 NOTE — ED ADULT NURSE NOTE - NSFALLHARMRISKINTERV_ED_ALL_ED

## 2024-03-29 NOTE — ED PROVIDER NOTE - OBJECTIVE STATEMENT
89 year old female with PMHx 89 year old female with PMHx HTN, CHFpEF, afib on Xarelto, s/p ablation x2, s/p watchman LA appendage closure on 8/8/2022, recent watchman procedure 3/25/24,  uterine CA s/p hysterectomy, IBS, diverticulosis, chronic b/l extremity edema BIBA from [facility] for evaluation s/p fall. Patient states while transferring from her walker to her chair her legs gave out and she fell onto her bottom. Thinks she twisted her L ankle during the fall. Complaints of pain to her entire left leg. Denies any head trauma or LOC. Was helped up by staff. notes had 2 day hospital stay for watchman procedure 5 days ago, after which she felt her left leg was weaker and had some difficulty ambulating due to this, however has been slowly regaining her strength until today's fall. Denies any palpitations, lightheadedness, chest pain, shortness of breath, other complaints.

## 2024-03-29 NOTE — ED PROVIDER NOTE - PROGRESS NOTE DETAILS
Patient signed out to me by Dr. Stahl- patient with L ankle pain s/p mechanical fall from standing. XR initially with ?fx of ankle- however CT imaging shows no acute fracture. Splint changed to aircast. Patient placed in CDU for PT/SW eval.

## 2024-03-30 DIAGNOSIS — R06.09 OTHER FORMS OF DYSPNEA: ICD-10-CM

## 2024-03-30 LAB
ANION GAP SERPL CALC-SCNC: 14 MMOL/L — SIGNIFICANT CHANGE UP (ref 5–17)
APPEARANCE UR: CLEAR — SIGNIFICANT CHANGE UP
BACTERIA # UR AUTO: ABNORMAL /HPF
BASOPHILS # BLD AUTO: 0.04 K/UL — SIGNIFICANT CHANGE UP (ref 0–0.2)
BASOPHILS NFR BLD AUTO: 0.4 % — SIGNIFICANT CHANGE UP (ref 0–2)
BILIRUB UR-MCNC: NEGATIVE — SIGNIFICANT CHANGE UP
BUN SERPL-MCNC: 26.6 MG/DL — HIGH (ref 8–20)
CALCIUM SERPL-MCNC: 9 MG/DL — SIGNIFICANT CHANGE UP (ref 8.4–10.5)
CAST: 1 /LPF — SIGNIFICANT CHANGE UP (ref 0–4)
CHLORIDE SERPL-SCNC: 97 MMOL/L — SIGNIFICANT CHANGE UP (ref 96–108)
CO2 SERPL-SCNC: 27 MMOL/L — SIGNIFICANT CHANGE UP (ref 22–29)
COLOR SPEC: SIGNIFICANT CHANGE UP
CREAT SERPL-MCNC: 0.5 MG/DL — SIGNIFICANT CHANGE UP (ref 0.5–1.3)
DIFF PNL FLD: NEGATIVE — SIGNIFICANT CHANGE UP
EGFR: 89 ML/MIN/1.73M2 — SIGNIFICANT CHANGE UP
EOSINOPHIL # BLD AUTO: 0.18 K/UL — SIGNIFICANT CHANGE UP (ref 0–0.5)
EOSINOPHIL NFR BLD AUTO: 1.9 % — SIGNIFICANT CHANGE UP (ref 0–6)
FLUAV AG NPH QL: SIGNIFICANT CHANGE UP
FLUBV AG NPH QL: SIGNIFICANT CHANGE UP
GLUCOSE SERPL-MCNC: 82 MG/DL — SIGNIFICANT CHANGE UP (ref 70–99)
GLUCOSE UR QL: NEGATIVE MG/DL — SIGNIFICANT CHANGE UP
HCT VFR BLD CALC: 34.6 % — SIGNIFICANT CHANGE UP (ref 34.5–45)
HGB BLD-MCNC: 11.4 G/DL — LOW (ref 11.5–15.5)
IMM GRANULOCYTES NFR BLD AUTO: 0.3 % — SIGNIFICANT CHANGE UP (ref 0–0.9)
KETONES UR-MCNC: ABNORMAL MG/DL
LEUKOCYTE ESTERASE UR-ACNC: NEGATIVE — SIGNIFICANT CHANGE UP
LYMPHOCYTES # BLD AUTO: 1.55 K/UL — SIGNIFICANT CHANGE UP (ref 1–3.3)
LYMPHOCYTES # BLD AUTO: 16.3 % — SIGNIFICANT CHANGE UP (ref 13–44)
MCHC RBC-ENTMCNC: 32.2 PG — SIGNIFICANT CHANGE UP (ref 27–34)
MCHC RBC-ENTMCNC: 32.9 GM/DL — SIGNIFICANT CHANGE UP (ref 32–36)
MCV RBC AUTO: 97.7 FL — SIGNIFICANT CHANGE UP (ref 80–100)
MONOCYTES # BLD AUTO: 1.48 K/UL — HIGH (ref 0–0.9)
MONOCYTES NFR BLD AUTO: 15.6 % — HIGH (ref 2–14)
NEUTROPHILS # BLD AUTO: 6.22 K/UL — SIGNIFICANT CHANGE UP (ref 1.8–7.4)
NEUTROPHILS NFR BLD AUTO: 65.5 % — SIGNIFICANT CHANGE UP (ref 43–77)
NITRITE UR-MCNC: NEGATIVE — SIGNIFICANT CHANGE UP
NT-PROBNP SERPL-SCNC: 6656 PG/ML — HIGH (ref 0–300)
PH UR: 6.5 — SIGNIFICANT CHANGE UP (ref 5–8)
PLATELET # BLD AUTO: 201 K/UL — SIGNIFICANT CHANGE UP (ref 150–400)
POTASSIUM SERPL-MCNC: 4.2 MMOL/L — SIGNIFICANT CHANGE UP (ref 3.5–5.3)
POTASSIUM SERPL-SCNC: 4.2 MMOL/L — SIGNIFICANT CHANGE UP (ref 3.5–5.3)
PROT UR-MCNC: 30 MG/DL
RBC # BLD: 3.54 M/UL — LOW (ref 3.8–5.2)
RBC # FLD: 15.6 % — HIGH (ref 10.3–14.5)
RBC CASTS # UR COMP ASSIST: 1 /HPF — SIGNIFICANT CHANGE UP (ref 0–4)
RSV RNA NPH QL NAA+NON-PROBE: SIGNIFICANT CHANGE UP
SARS-COV-2 RNA SPEC QL NAA+PROBE: SIGNIFICANT CHANGE UP
SODIUM SERPL-SCNC: 138 MMOL/L — SIGNIFICANT CHANGE UP (ref 135–145)
SP GR SPEC: >1.03 — HIGH (ref 1–1.03)
SQUAMOUS # UR AUTO: 2 /HPF — SIGNIFICANT CHANGE UP (ref 0–5)
TROPONIN T, HIGH SENSITIVITY RESULT: 169 NG/L — HIGH (ref 0–51)
TROPONIN T, HIGH SENSITIVITY RESULT: 190 NG/L — HIGH (ref 0–51)
UROBILINOGEN FLD QL: 1 MG/DL — SIGNIFICANT CHANGE UP (ref 0.2–1)
WBC # BLD: 9.5 K/UL — SIGNIFICANT CHANGE UP (ref 3.8–10.5)
WBC # FLD AUTO: 9.5 K/UL — SIGNIFICANT CHANGE UP (ref 3.8–10.5)
WBC UR QL: 0 /HPF — SIGNIFICANT CHANGE UP (ref 0–5)

## 2024-03-30 PROCEDURE — 71045 X-RAY EXAM CHEST 1 VIEW: CPT | Mod: 26

## 2024-03-30 PROCEDURE — 99233 SBSQ HOSP IP/OBS HIGH 50: CPT | Mod: FS

## 2024-03-30 PROCEDURE — 93010 ELECTROCARDIOGRAM REPORT: CPT

## 2024-03-30 PROCEDURE — 27808 TREATMENT OF ANKLE FRACTURE: CPT | Mod: LT

## 2024-03-30 PROCEDURE — 99223 1ST HOSP IP/OBS HIGH 75: CPT

## 2024-03-30 PROCEDURE — 99223 1ST HOSP IP/OBS HIGH 75: CPT | Mod: FS,57

## 2024-03-30 RX ORDER — METOPROLOL TARTRATE 50 MG
5 TABLET ORAL ONCE
Refills: 0 | Status: DISCONTINUED | OUTPATIENT
Start: 2024-03-30 | End: 2024-03-30

## 2024-03-30 RX ORDER — POTASSIUM CHLORIDE 20 MEQ
10 PACKET (EA) ORAL DAILY
Refills: 0 | Status: DISCONTINUED | OUTPATIENT
Start: 2024-03-30 | End: 2024-03-31

## 2024-03-30 RX ORDER — LANOLIN ALCOHOL/MO/W.PET/CERES
3 CREAM (GRAM) TOPICAL AT BEDTIME
Refills: 0 | Status: DISCONTINUED | OUTPATIENT
Start: 2024-03-30 | End: 2024-04-03

## 2024-03-30 RX ORDER — METOPROLOL TARTRATE 50 MG
25 TABLET ORAL
Refills: 0 | Status: DISCONTINUED | OUTPATIENT
Start: 2024-03-30 | End: 2024-03-30

## 2024-03-30 RX ORDER — RIVAROXABAN 15 MG-20MG
15 KIT ORAL
Refills: 0 | Status: DISCONTINUED | OUTPATIENT
Start: 2024-03-31 | End: 2024-04-03

## 2024-03-30 RX ORDER — ACETAMINOPHEN 500 MG
650 TABLET ORAL EVERY 6 HOURS
Refills: 0 | Status: DISCONTINUED | OUTPATIENT
Start: 2024-03-30 | End: 2024-04-03

## 2024-03-30 RX ORDER — SIMVASTATIN 20 MG/1
20 TABLET, FILM COATED ORAL
Refills: 0 | Status: DISCONTINUED | OUTPATIENT
Start: 2024-03-30 | End: 2024-04-03

## 2024-03-30 RX ORDER — SENNA PLUS 8.6 MG/1
2 TABLET ORAL AT BEDTIME
Refills: 0 | Status: DISCONTINUED | OUTPATIENT
Start: 2024-03-30 | End: 2024-04-03

## 2024-03-30 RX ORDER — FUROSEMIDE 40 MG
40 TABLET ORAL
Refills: 0 | Status: DISCONTINUED | OUTPATIENT
Start: 2024-03-30 | End: 2024-04-03

## 2024-03-30 RX ORDER — METOPROLOL TARTRATE 50 MG
50 TABLET ORAL
Refills: 0 | Status: DISCONTINUED | OUTPATIENT
Start: 2024-03-30 | End: 2024-04-03

## 2024-03-30 RX ORDER — PANTOPRAZOLE SODIUM 20 MG/1
40 TABLET, DELAYED RELEASE ORAL
Refills: 0 | Status: DISCONTINUED | OUTPATIENT
Start: 2024-03-30 | End: 2024-04-03

## 2024-03-30 RX ORDER — LACTOBACILLUS ACIDOPHILUS 100MM CELL
1 CAPSULE ORAL DAILY
Refills: 0 | Status: DISCONTINUED | OUTPATIENT
Start: 2024-03-30 | End: 2024-04-03

## 2024-03-30 RX ADMIN — Medication 50 MILLIGRAM(S): at 19:25

## 2024-03-30 RX ADMIN — Medication 0.3 MILLIGRAM(S): at 23:00

## 2024-03-30 RX ADMIN — Medication 20 MILLIGRAM(S): at 08:49

## 2024-03-30 RX ADMIN — Medication 25 MILLIGRAM(S): at 09:36

## 2024-03-30 RX ADMIN — Medication 650 MILLIGRAM(S): at 19:25

## 2024-03-30 RX ADMIN — Medication 0.3 MILLIGRAM(S): at 13:48

## 2024-03-30 RX ADMIN — Medication 10 MILLIEQUIVALENT(S): at 11:45

## 2024-03-30 RX ADMIN — Medication 25 MILLIGRAM(S): at 12:58

## 2024-03-30 RX ADMIN — Medication 1 TABLET(S): at 11:45

## 2024-03-30 RX ADMIN — Medication 40 MILLIGRAM(S): at 09:36

## 2024-03-30 NOTE — ED ADULT NURSE REASSESSMENT NOTE - NS ED NURSE REASSESS COMMENT FT1
Assumed Care for patient from RN. Patient AXOX4, Vss, Afib on monitor. RR even and unlabored.  Son at bedside. Patient resting comfortably on the stretcher. Patient denies pain at this time. No acute distress noted.
MD at bedside for left ankle splint.
patient and family informed plan of care and states understanding, safety precautions in place and call bell at reach.
patient lying on stretcher quietly, complaining of left ankle after fall, no acute distress noted at this time and patient pending CT scan.
patient repositioned for comfort, safety precautions in place and call bell at reach.
plan of care assumed from ZURI NICOLAS @7050. no S&S of acute distress, pt resting comfortably on stretcher. c/o pain to LLE; medicated as per EMR. orthopedic came to place a brace on LLE. pt denies CP/pressure/tightness, palpitations, SOB/dyspnea, dizziness/headache/lightheadedness/blurry vision, tingling/numbness, fevers/chills, N/V/D, urinary S&S upon RN assessment. pt admitted to [facility] to SDU. plan of care reviewed with pt. pt in understanding of plan of care.

## 2024-03-30 NOTE — ED CDU PROVIDER INITIAL DAY NOTE - CLINICAL SUMMARY MEDICAL DECISION MAKING FREE TEXT BOX
89 year old female with PMHx HTN, CHF, afib on Xarelto, s/p ablation x2, s/p watchman LA appendage closure on 8/8/2022, recent watchman procedure 3/25/24 on [facility] by Dr Gaming ,  uterine CA s/p hysterectomy, IBS, diverticulosis, chronic b/l extremity edema BIBA from [facility] for evaluation s/p fall. Patient states while transferring from her walker to her chair her legs gave out and she fell onto her bottom. Thinks she twisted her L ankle during the fall. Complaints of pain to her entire left leg. Denies any head trauma or LOC. Was helped up by staff. notes had 2 day hospital stay for watchman procedure 5 days ago, after which she felt her left leg was weaker and had some difficulty ambulating due to pain.  admits she has been coughing with a clear phlegm.  denies any chest pain shortness of breath fever  -  A-fib on Xarelto/ metoprolol  - history of CHF patient is on Lasix and potassium will cont   - hypertension on metoprolol and clonidine  - CT scan of the lower extremity does revile fracture possible sprain  - PT evaluation  - pain control   will obtain a chest x-ray/ flu COVID swab

## 2024-03-30 NOTE — ED ADULT NURSE REASSESSMENT NOTE - NSFALLHARMRISKINTERV_ED_ALL_ED

## 2024-03-30 NOTE — H&P ADULT - NSHPPHYSICALEXAM_GEN_ALL_CORE
GENERAL: pt examined bedside, laying comfortably in bed in NAD  HEENT: NC/AT, moist oral mucosa, clear conjunctiva, sclera nonicteric  RESPIRATORY: Normal respiratory effort, no wheezing, rhonchi, rales  CARDIOVASCULAR: irregularly irregular rate and rhythm, normal S1 and S2  ABDOMEN: soft, NT/ND, + bowel sounds, no rebound/guarding  MSK: L-ankle swelling and in an air cast  EXTREMITIES: No cynaosis, no clubbing, LLE edema   PSYCH: affect appropriate and cooperative  NEUROLOGY: A+O to person, place, and time, no focal neurologic deficits appreciated  SKIN: No rashes or no palpable lesions

## 2024-03-30 NOTE — ED CDU PROVIDER SUBSEQUENT DAY NOTE - ATTENDING APP SHARED VISIT CONTRIBUTION OF CARE
I agree with the PA's note and was available for any issues/concerns. I was directly involved in patient care. My brief overall assessment is as follows: tachycardic, with some hypoxia, dyspnea, unable to get up with PT

## 2024-03-30 NOTE — PHYSICAL THERAPY INITIAL EVALUATION ADULT - ADDITIONAL COMMENTS
Pt reports independent with RW PTA, denies fall until current one. Pt was had cardiac procedure at [facility] last week, reports weakness and fatigue since discharge.

## 2024-03-30 NOTE — CONSULT NOTE ADULT - SUBJECTIVE AND OBJECTIVE BOX
[facility], THE HEART CENTER                              [address] [place] [address]                                                 PHONE: [phone]                                                 FAX: [phone]  ------------------------------------------------------------------------------------------------    Chief complaint: Fall    89y Female with past medical history as under from [facility] for evaluation s/p fall. Patient states while transferring from her walker to her chair her legs gave out and she fell onto her bottom. Thinks she twisted her L ankle during the fall. Complaints of pain to her entire left leg. Denies any head trauma or LOC. Was helped up by staff. notes had 2 day hospital stay for watchman procedure 5 days ago, after which she felt her left leg was weaker and had some difficulty ambulating due to this, however has been slowly regaining her strength until today's fall.  At the time of evaluation, pt reports feeling well. She reports feeling well since her procedure.    PAST MEDICAL & SURGICAL HISTORY:  Afib      HTN (hypertension)      H/O CHF      Uterine cancer      S/P hysterectomy          hydrALAZINE (Other; Flushing)  Lipitor (Other; Flushing)      Vital Signs Last 24 Hrs  T(C): 37.1 (30 Mar 2024 11:14), Max: 37.1 (30 Mar 2024 07:36)  T(F): 98.7 (30 Mar 2024 11:14), Max: 98.8 (30 Mar 2024 07:36)  HR: 109 (30 Mar 2024 11:14) (85 - 125)  BP: 164/76 (30 Mar 2024 11:14) (138/55 - 185/99)  BP(mean): --  RR: 26 (30 Mar 2024 11:14) (18 - 30)  SpO2: 96% (30 Mar 2024 11:14) (89% - 100%)    Parameters below as of 29 Mar 2024 22:32  Patient On (Oxygen Delivery Method): nasal cannula  O2 Flow (L/min): 2      RELEVANT PHYSICAL EXAM:  Neck: No obvious JVD  Cardiovascular: irregular S1, S2  Respiratory: Lungs clear to auscultation; no crepitations, no wheeze  Musculoskeletal: Trace edema    LABS:                        11.4   9.50  )-----------( 201      ( 30 Mar 2024 06:40 )             34.6     03-30    138  |  97  |  26.6<H>  ----------------------------<  82  4.2   |  27.0  |  0.50    Ca    9.0      30 Mar 2024 06:40    TPro  7.6  /  Alb  4.1  /  TBili  0.5  /  DBili  x   /  AST  38<H>  /  ALT  14  /  AlkPhos  82  03-29        PT/INR - ( 29 Mar 2024 19:02 )   PT: 21.8 sec;   INR: 2.00 ratio         PTT - ( 29 Mar 2024 19:02 )  PTT:32.4 sec    RADIOLOGY & ADDITIONAL STUDIES: (reviewed)  CXR was independently visualized/reviewed and demonstrated: No acute parenchymaldisease    CARDIOLOGY TESTING:(reviewed)     ECG (Independent visualization): AF    ECHOCARDIOGRAM :  1. Normal left ventricular internal cavity size.   2. Normal global left ventricular systolic function.   3. Left ventricular ejection fraction, by visual estimation, is 55 to   60%.   4. Moderate to severe left atrial enlargement.   5. Moderate to severe right atrial enlargement.   6. The mitral in-flow pattern reveals no discernable A-wave, therefore   no comment on diastolic function can be made.   7. Mild mitral annular calcification.   8. Mild thickening of the anterior and posterior mitral valve leaflets.   9. Moderate mitral valve regurgitation.  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  11. Mild-moderate tricuspid regurgitation.  12. Sclerotic aortic valve with normal opening.  13. Mild to moderate aortic regurgitation.  14. Mild to moderate pulmonic valve regurgitation.  15. Estimated pulmonary artery systolic pressure is 51.6 mmHg assuming a   right atrial pressure of 8 mmHg, which is consistent with moderate   pulmonary hypertension.  16. There is no evidence of pericardial effusion.    TELEMETRY independently visualized/reviewed and demonstrated : AF    MEDICATIONS:(reviewed)  Home Medications:    MEDICATIONS  (STANDING):  cloNIDine 0.3 milliGRAM(s) Oral <User Schedule>  enalapril 20 milliGRAM(s) Oral with breakfast  furosemide    Tablet 40 milliGRAM(s) Oral <User Schedule>  lactobacillus acidophilus 1 Tablet(s) Oral daily  metoprolol tartrate 25 milliGRAM(s) Oral <User Schedule>  metoprolol tartrate Injectable 5 milliGRAM(s) IV Push once  multivitamin 1 Tablet(s) Oral daily  potassium chloride    Tablet ER 10 milliEquivalent(s) Oral daily  simvastatin 20 milliGRAM(s) Oral with breakfast    MEDICATIONS  (PRN):  senna 2 Tablet(s) Oral at bedtime PRN Constipation    ASSESSMENT AND PLAN:    89 year old female with a PMHx of Afib s/p ablation, ILR, now s/p WATCHMAN from [facility] for evaluation s/p fall. CT with nondisplaced distal fibular fracture and Questionable medial malleolus fracture    Recent WATCHMAN at [facility] with Dr. Gaming. Echo done yesterday post procedure with no effusion  AF rates controlled. Continue metoprolol. Can increase if needed  Continue statin  BP elevated related to pain in the L foot. Continue clonidine, enalapril                                                [facility], [facility]                              [address] [place] [address]                                                 PHONE: [phone]                                                 FAX: [phone]  ------------------------------------------------------------------------------------------------    Chief complaint: Fall    89y Female with past medical history as under from [facility] for evaluation s/p fall. Patient states while transferring from her walker to her chair her legs gave out and she fell onto her bottom. Thinks she twisted her L ankle during the fall. Complaints of pain to her entire left leg. Denies any head trauma or LOC. Was helped up by staff. notes had 2 day hospital stay for watchman procedure 5 days ago, after which she felt her left leg was weaker and had some difficulty ambulating due to this, however has been slowly regaining her strength until today's fall.  At the time of evaluation, pt reports feeling well. She reports feeling well since her procedure.    PAST MEDICAL & SURGICAL HISTORY:  Afib      HTN (hypertension)      H/O CHF      Uterine cancer      S/P hysterectomy          hydrALAZINE (Other; Flushing)  Lipitor (Other; Flushing)      Vital Signs Last 24 Hrs  T(C): 37.1 (30 Mar 2024 11:14), Max: 37.1 (30 Mar 2024 07:36)  T(F): 98.7 (30 Mar 2024 11:14), Max: 98.8 (30 Mar 2024 07:36)  HR: 109 (30 Mar 2024 11:14) (85 - 125)  BP: 164/76 (30 Mar 2024 11:14) (138/55 - 185/99)  BP(mean): --  RR: 26 (30 Mar 2024 11:14) (18 - 30)  SpO2: 96% (30 Mar 2024 11:14) (89% - 100%)    Parameters below as of 29 Mar 2024 22:32  Patient On (Oxygen Delivery Method): nasal cannula  O2 Flow (L/min): 2      RELEVANT PHYSICAL EXAM:  Neck: No obvious JVD  Cardiovascular: irregular S1, S2  Respiratory: Lungs clear to auscultation; no crepitations, no wheeze  Musculoskeletal: Trace edema    LABS:                        11.4   9.50  )-----------( 201      ( 30 Mar 2024 06:40 )             34.6     03-30    138  |  97  |  26.6<H>  ----------------------------<  82  4.2   |  27.0  |  0.50    Ca    9.0      30 Mar 2024 06:40    TPro  7.6  /  Alb  4.1  /  TBili  0.5  /  DBili  x   /  AST  38<H>  /  ALT  14  /  AlkPhos  82  03-29        PT/INR - ( 29 Mar 2024 19:02 )   PT: 21.8 sec;   INR: 2.00 ratio         PTT - ( 29 Mar 2024 19:02 )  PTT:32.4 sec    RADIOLOGY & ADDITIONAL STUDIES: (reviewed)  CXR was independently visualized/reviewed and demonstrated: No acute parenchymaldisease    CARDIOLOGY TESTING:(reviewed)     ECG (Independent visualization): AF    ECHOCARDIOGRAM :  1. Normal left ventricular internal cavity size.   2. Normal global left ventricular systolic function.   3. Left ventricular ejection fraction, by visual estimation, is 55 to   60%.   4. Moderate to severe left atrial enlargement.   5. Moderate to severe right atrial enlargement.   6. The mitral in-flow pattern reveals no discernable A-wave, therefore   no comment on diastolic function can be made.   7. Mild mitral annular calcification.   8. Mild thickening of the anterior and posterior mitral valve leaflets.   9. Moderate mitral valve regurgitation.  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  11. Mild-moderate tricuspid regurgitation.  12. Sclerotic aortic valve with normal opening.  13. Mild to moderate aortic regurgitation.  14. Mild to moderate pulmonic valve regurgitation.  15. Estimated pulmonary artery systolic pressure is 51.6 mmHg assuming a   right atrial pressure of 8 mmHg, which is consistent with moderate   pulmonary hypertension.  16. There is no evidence of pericardial effusion.    TELEMETRY independently visualized/reviewed and demonstrated : AF    MEDICATIONS:(reviewed)  Home Medications:    MEDICATIONS  (STANDING):  cloNIDine 0.3 milliGRAM(s) Oral <User Schedule>  enalapril 20 milliGRAM(s) Oral with breakfast  furosemide    Tablet 40 milliGRAM(s) Oral <User Schedule>  lactobacillus acidophilus 1 Tablet(s) Oral daily  metoprolol tartrate 25 milliGRAM(s) Oral <User Schedule>  metoprolol tartrate Injectable 5 milliGRAM(s) IV Push once  multivitamin 1 Tablet(s) Oral daily  potassium chloride    Tablet ER 10 milliEquivalent(s) Oral daily  simvastatin 20 milliGRAM(s) Oral with breakfast    MEDICATIONS  (PRN):  senna 2 Tablet(s) Oral at bedtime PRN Constipation    ASSESSMENT AND PLAN:    89 year old female with a PMHx of Afib s/p ablation, ILR, now s/p WATCHMAN from [facility] for evaluation s/p fall. CT with nondisplaced distal fibular fracture and Questionable medial malleolus fracture    Recent WATCHMAN at [facility] with Dr. Gaming. Echo done yesterday post procedure with no effusion  AF rates controlled. Continue metoprolol. Can increase if needed  Non MI troponin elevation related to recent WATCHMAN procedure. Low suspicion of ACS at this time  Continue statin  BP elevated related to pain in the L foot. Continue clonidine, enalapril

## 2024-03-30 NOTE — PHYSICAL THERAPY INITIAL EVALUATION ADULT - PERTINENT HX OF CURRENT PROBLEM, REHAB EVAL
Pt is 89 year old female with PMHx HTN, CHF, afib on Xarelto, s/p ablation x2, s/p watchman LA appendage closure on 8/8/2022, recent watchman procedure 3/25/24 on [facility] by Dr Gaming ,  chronic b/l extremity edema BIBA from [facility] for evaluation s/p fall. CT scan negative for fracture, air cast for ankle sprain

## 2024-03-30 NOTE — ED CDU PROVIDER INITIAL DAY NOTE - ATTENDING APP SHARED VISIT CONTRIBUTION OF CARE
I performed a history and physical exam of the patient and discussed their management with the advanced care provider. I reviewed the advanced care provider's note and agree with the documented findings and plan of care. My medical decision making and objective findings are found below.      89-year-old female past medical history of hypertension, CHF, A-fib on Xarelto status post ablation, status post Watchman presenting with left lower extremity swelling and pain status post fall.  patient found to have left ankle sprain.  Placed in Aircast.  Patient unable to bear weight because of her symptoms, placed in CDU for physical therapy and social work evaluation.

## 2024-03-30 NOTE — CONSULT NOTE ADULT - NS ATTEND AMEND GEN_ALL_CORE FT
A/P: 89F with right ankle fracture    1. Pain control  2. WBAT RLE in boot with assistive devices  3. PT/OOB  4. DVT PPX  5. No ortho surgical intervention. Ortho stable. Follow up with Dr. Lundberg in 1-2 weeks as outpatient

## 2024-03-30 NOTE — PHYSICAL THERAPY INITIAL EVALUATION ADULT - RANGE OF MOTION EXAMINATION, REHAB EVAL
left ankle NA- aircast/bilateral lower extremity ROM was WFL (within functional limits)/deficits as listed below

## 2024-03-30 NOTE — ED CDU PROVIDER DISPOSITION NOTE - ATTENDING CONTRIBUTION TO CARE
Tato: I performed a face to face evaluation of this patient and performed a full history and physical examination on the patient.  I agree with the resident's history, physical examination, and plan of the patient unless otherwise noted. My brief assessment is as follows: 89 y/o F with PMHx HTN, CHF, afib on Xarelto, s/p ablation x2, s/p watchman LA appendage closure on 8/8/2022, recent watchman procedure 3/25/24 on [facility] by Dr Gaming ,  uterine CA s/p hysterectomy, IBS, diverticulosis, chronic b/l extremity edema present for fall, with afib rvr, and some hypoxia/dyspnea. elevated trop. ct ankle showing non displaced fib fracture, questionable medial mall fracture wihtout displacement. pt admitted for elevated trop/afib/hypoxia.

## 2024-03-30 NOTE — ED CDU PROVIDER SUBSEQUENT DAY NOTE - CLINICAL SUMMARY MEDICAL DECISION MAKING FREE TEXT BOX
89 year old female with PMHx HTN, CHF, afib on Xarelto, s/p ablation x2, s/p watchman LA appendage closure on 8/8/2022, recent watchman procedure 3/25/24 on [facility] by Dr Gaming ,  uterine CA s/p hysterectomy, IBS, diverticulosis, chronic b/l extremity edema BIBA from [facility] for evaluation s/p fall. Patient states while transferring from her walker to her chair her legs gave out and she fell onto her bottom. Thinks she twisted her L ankle during the fall. Complaints of pain to her entire left leg. Denies any head trauma or LOC. Was helped up by staff. notes had 2 day hospital stay for watchman procedure 5 days ago, after which she felt her left leg was weaker and had some difficulty ambulating due to pain.  admits she has been coughing with a clear phlegm.  denies any chest pain shortness of breath fever  -  A-fib on Xarelto/ metoprolol  - history of CHF patient is on Lasix and potassium will cont   - hypertension on metoprolol and clonidine  - CT scan of the lower extremity does revile fracture possible sprain  - PT evaluation  - pain control   will obtain a chest x-ray/ flu COVID swab  pending UA

## 2024-03-30 NOTE — CHART NOTE - NSCHARTNOTEFT_GEN_A_CORE
Ms. Keith was fit with a cam walker fracture orthosis today. The brace will stabilize the fracture in all necessary planes. The straps should be snug, but not necessarily tight. The straps can be loosened or tightened as necessary. A sneaker should be worn on the opposite foot when ambulating to even out her height. Protective socks were given to the patient. Use and care were explained. Instructions and contact info were provided.     ANDRES Dixon  [facility]  [phone]

## 2024-03-30 NOTE — H&P ADULT - ASSESSMENT
89 y/o F w/ PMH of AF (s/p watchman 1 week ago), HTN, HLD presented [facility] after mechanical fall.  Pt was initially placed in observation.  She was found to have a distal LLE fracture and LE immobilized.  While in observation pt went into AF RVR and BP uncontrolled w/ SBP >200.  Trops also noted to be elevated at 190 but EKG w/out acute ischemic changes.  Cardiology and ortho consulted.  Pt unable to ambulate w/ PT and will need CARISSA placement  Medicine called for admission.          HTN Crisis   - Resumed all home medications   - Increased dose of metoprolol for bp and better rate control  - BP noted to be down trending but still w/ SBP >180  - Trop elevated however likely due to recent watchman procedure and repeat is down trending   - EKG without acute ischemic changes   - No schistocytes on smear and renal fxn normal   - Monitor in SDU for q2h VS   - Once BP controlled can down grade to tele      AF w/ RVR  - s/p watchman procedure last week   - c/w xarelto uninterrupted for at least 1month s/p watchman   - Increased dose of metprolol for better rate control   - If needed will give IV cardizem   - BNP elevated due to AF rvr but clinically euvolemic and CXR w/out pulm vascular congestion   - UA w/out pyuria and RVP negative but will order Bcxs to r/o infection as etiology of RVR  - Monitor on telemetry       Acute traumatic LLE fracture   - CTH negative   - CT pelvis negative   - CT LLE significant for nondisplaced distal fibular fracture and questionable medial malleolus fracture   - Keep NWB to LLE   - Fall precautions   - PT consulted and recommending CARISSA  - Ortho consulted and recs noted       Troponin elevation   - Could be related to HTN crisis but more likely result of recent watchman procedure   - Repeat trop down trending   - EKG w/ no acute ischemic changes   - BNP elevated but likely from AF rvr   - Cardiology following and recs noted   - TTE from 2023 reviewed and noted above   - Monitor on telemetry       Chronic HFpEF  - TTE w/ LVEF 55-60% in July 2023  - Clinically euvolemic at this time   - CXR w/out evidence of pulm vascular congestion   - BNP elevated but more likely from AF rvr   - c/w lasix po   - Monitor daily weight, strict I/O's and fluid restrict   - Maintain K>4 and Mg>2  - Monitor on telemetry       Normocytic anemia  - Slight drop in h/h since obs placement   - Low suspicion for GIB however will repeat CBC in AM to reassess   - Transfuse for Hb<8      VTE ppx: c/w xarelto       Dispo: will need CARISSA placement once medically stable for d/c.

## 2024-03-30 NOTE — H&P ADULT - NSHPLABSRESULTS_GEN_ALL_CORE
11.4   9.50  )-----------( 201      ( 30 Mar 2024 06:40 )             34.6       03-30    138  |  97  |  26.6<H>  ----------------------------<  82  4.2   |  27.0  |  0.50    Ca    9.0      30 Mar 2024 06:40    TPro  7.6  /  Alb  4.1  /  TBili  0.5  /  DBili  x   /  AST  38<H>  /  ALT  14  /  AlkPhos  82  03-29        Troponin T, High Sensitivity (03.30.24 @ 06:40)    Troponin T, High Sensitivity Result: 190: *      Troponin T, High Sensitivity (03.30.24 @ 13:21)    Troponin T, High Sensitivity Result: 169: *      Pro-Brain Natriuretic Peptide (03.30.24 @ 06:40)    Pro-Brain Natriuretic Peptide: 6656 pg/mL      < from: Xray Chest 1 View-PORTABLE IMMEDIATE (Xray Chest 1 View-PORTABLE IMMEDIATE .) (03.30.24 @ 02:41) >    IMPRESSION: No acute parenchymaldisease    < end of copied text >      < from: CT Pelvis Bony Only No Cont (03.29.24 @ 21:36) >    IMPRESSION: Demineralized bones. No fracture. If there is persistent   concern for an occult fracture, MRI can be obtained.    < end of copied text >    < from: CT Head No Cont (03.29.24 @ 21:35) >    IMPRESSION:    No acute intracranial abnormality.    Mild brain volume loss.    White matter changes that likely represent microvasculardisease.    < end of copied text >      < from: CT Ankle No Cont, Left (03.29.24 @ 21:36) >    IMPRESSION: Nondisplaced distal fibular fracture. Questionable medial   malleolus fracture. These findings were described on the report by [facility]   but review of the medical record reveals a diagnosis of a fracture by the   clinical team.    < end of copied text >      < from: TTE Echo Complete w/ Contrast w/ Doppler (07.09.23 @ 10:34) >      Summary:   1. Normal left ventricular internal cavity size.   2. Normal global left ventricular systolic function.   3. Left ventricular ejection fraction, by visual estimation, is 55 to   60%.   4. Moderate to severe left atrial enlargement.   5. Moderate to severe right atrial enlargement.   6. The mitral in-flow pattern reveals no discernable A-wave, therefore   no comment on diastolic function can be made.   7. Mild mitral annular calcification.   8. Mild thickening of the anterior and posterior mitral valve leaflets.   9. Moderate mitral valve regurgitation.  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  11. Mild-moderate tricuspid regurgitation.  12. Sclerotic aortic valve with normal opening.  13. Mild to moderate aortic regurgitation.  14. Mild to moderate pulmonic valve regurgitation.  15. Estimated pulmonary artery systolic pressure is 51.6 mmHg assuming a   right atrial pressure of 8 mmHg, which is consistent with moderate   pulmonary hypertension.  16. There is no evidence of pericardial effusion.    < end of copied text >

## 2024-03-30 NOTE — H&P ADULT - NSHPREVIEWOFSYSTEMS_GEN_ALL_CORE
CONSTITUTIONAL: no fevers, chills, night sweats, weight changes  HEENT: no vision changes or diplopia, no tinnitus, no sore throat  RESPIRATORY: denies dyspnea, sob, nocturnal cough, sputum or hemoptysis  CARDIOVASCULAR: no CP, palpitations or lower extremity edema  GI: no dysphagia, nausea, abd pain, constipation, diarrhea, stool change or blood in stool  : no dysuria or hematuria, no flank pain, no incontinence or urinary retention  MSK: LLE pain  INTEGUMENTARY: no rashes or lesions  NEUROLOGICAL: no HA, confusion, syncope, numbness, weakness, tremors or ataxia  PSYCH: denies depression  ENDOCRINE: no polyuria, polydipsia, no temp intolerance, no tremors, no changes in skin, hair or nails  HEMATOLOGIC/LYMPHATIC: no lymph node enlargement, abnormal bruising or bleeding

## 2024-03-30 NOTE — ED CDU PROVIDER INITIAL DAY NOTE - PHYSICAL EXAMINATION
Gen: well appearing, no acute distress- on cardiac monitoring    Head: normocephalic, atraumatic  HEENT: EOMI, moist mucous membranes  Lung: no increased work of breathing, crackle noted on  B/l lower lobe   CV: irregular rate, regular rhythm  Abd: soft, non-tender, non-distended,  no CVA tenderness  MSK: (+) LLE edema and redness, ( on Velcro splint)  (+)ttp of L ankle, (+)LLE appears shortened and externally rotated. (+)limited ROM improved after pain med . Pelvis stable. no midline spinal ttp/stepoffs.   Neuro: Awake, alert x 2-3 , no focal neurologic deficits

## 2024-03-30 NOTE — ED CDU PROVIDER DISPOSITION NOTE - CLINICAL COURSE
89 y/o F with PMHx HTN, CHF, afib on Xarelto, s/p ablation x2, s/p watchman LA appendage closure on 8/8/2022, recent watchman procedure 3/25/24 on [facility] by Dr Gaming ,  uterine CA s/p hysterectomy, IBS, diverticulosis, chronic b/l extremity edema BIBA from [facility] for evaluation s/p fall.  Patient states while transferring from her walker to her chair her legs gave out and she fell onto her bottom. Thinks she twisted her L ankle during the fall. Complaints of pain to her entire left leg. Denies any head trauma or LOC. CT ankle showing L distal fibular fx and questionable medial malleolus fx, placed in air cast by initial ED provider and placed in observation for PT eval. Patient reassessed this morning right after she had her PT eval. Dyspneic on exam and sating 80s on RA and tachy to 130s on monitor and in afib. Trop and BNP added onto labs. Trop 190, EKG with no gross ischemic changes. Cards consulted. PT recommending CARISSA. Patient admitted to medicine for further management. Ortho consulted at time of admission as well.

## 2024-03-30 NOTE — H&P ADULT - HISTORY OF PRESENT ILLNESS
89 y/o F w/ PMH of AF (s/p watchman 1 week ago), HTN, HLD presented from [facility] after mechanical fall.  Pt was initially placed in observation.  She was found to have a distal LLE fracture and LE immobilized.  While in observation pt went into AF RVR and BP uncontrolled w/ SBP >200.  Trops also noted to be elevated at 190 but EKG w/out acute ischemic changes.  Cardiology and ortho consulted.  Pt unable to ambulate w/ PT and will need CARISSA placement  Medicine called for admission.

## 2024-03-30 NOTE — CONSULT NOTE ADULT - SUBJECTIVE AND OBJECTIVE BOX
Pt Name: NAN GAYLE    MRN: [id number]      Patient is a 89y Female presenting to the emergency department with a chief complaint of right ankle pain s/p mech fall yesterday. PMHx HTN, CHFpEF, afib on Xarelto, s/p ablation x2, s/p watchman LA appendage closure on 8/8/2022, recent watchman procedure 3/25/24,  uterine CA s/p hysterectomy, IBS, diverticulosis, chronic b/l extremity edema BIBA from [facility] for evaluation s/p fall. Pt was splinted by ED staff, XR and CT scan done. Patient admitted for rehab placement, requesting Isamar Helton, ortho consulted for nondisplaced left distal fibula fx and questionable medial malleolus fx. Patient currently in aircast in ED. Denies head trauma, LOC, syncopal episode. Denies hip pain, or any other orthopedic complaints besides left ankle. Denies numbness/tingling.     PAST MEDICAL & SURGICAL HISTORY:  PAST MEDICAL & SURGICAL HISTORY:  Afib      HTN (hypertension)      H/O CHF      Uterine cancer      S/P hysterectomy          Allergies: hydrALAZINE (Other; Flushing)  Lipitor (Other; Flushing)      Medications: acetaminophen     Tablet .. 650 milliGRAM(s) Oral every 6 hours PRN  aluminum hydroxide/magnesium hydroxide/simethicone Suspension 30 milliLiter(s) Oral every 4 hours PRN  cloNIDine 0.3 milliGRAM(s) Oral <User Schedule>  enalapril 20 milliGRAM(s) Oral with breakfast  furosemide    Tablet 40 milliGRAM(s) Oral <User Schedule>  lactobacillus acidophilus 1 Tablet(s) Oral daily  melatonin 3 milliGRAM(s) Oral at bedtime PRN  metoprolol tartrate 50 milliGRAM(s) Oral <User Schedule>  multivitamin 1 Tablet(s) Oral daily  potassium chloride    Tablet ER 10 milliEquivalent(s) Oral daily  senna 2 Tablet(s) Oral at bedtime PRN  simvastatin 20 milliGRAM(s) Oral with breakfast      FAMILY HISTORY:  No pertinent family history in first degree relatives    : non-contributory    Social History:                           11.4   9.50  )-----------( 201      ( 30 Mar 2024 06:40 )             34.6       03-30    138  |  97  |  26.6<H>  ----------------------------<  82  4.2   |  27.0  |  0.50    Ca    9.0      30 Mar 2024 06:40    TPro  7.6  /  Alb  4.1  /  TBili  0.5  /  DBili  x   /  AST  38<H>  /  ALT  14  /  AlkPhos  82  03-29      Vital Signs Last 24 Hrs  T(C): 37.1 (30 Mar 2024 11:14), Max: 37.1 (30 Mar 2024 07:36)  T(F): 98.7 (30 Mar 2024 11:14), Max: 98.8 (30 Mar 2024 07:36)  HR: 107 (30 Mar 2024 14:30) (85 - 125)  BP: 188/80 (30 Mar 2024 14:30) (138/55 - 196/130)  BP(mean): --  RR: 26 (30 Mar 2024 14:30) (18 - 30)  SpO2: 98% (30 Mar 2024 14:30) (89% - 100%)    Parameters below as of 30 Mar 2024 14:30  Patient On (Oxygen Delivery Method): nasal cannula  O2 Flow (L/min): 2      Daily     Daily       PHYSICAL EXAM:      Appearance: Alert, responsive, in no acute distress.  LLE: +pedal edema noted, +air cast noted to left ankle, skin intact, TTP over medial and lateral malleoli, no knee/thigh/hip pain, +ROM toes intact, SILT, DP intact, BCR, no cyanosis,     Imaging Studies:    < from: CT Ankle No Cont, Left (03.29.24 @ 21:36) >    ACC: [id number] EXAM:  CT ANKLE ONLY LT   ORDERED BY: JOHNY CROOK     PROCEDURE DATE:  03/29/2024        INTERPRETATION:  PROCEDURE INFORMATION:  Exam: CT Left Lower Extremity Without Contrast, Ankle  Exam date and time: 3/29/2024 9:25 PM  Age:89 years old  Clinical indication: L ankle pain    TECHNIQUE:  Imaging protocol: CT of the left lower extremity without contrast was   performed. Exam focused on the ankle.    COMPARISON:  DX XR ANKLE COMPLETE 3 VIEWS LEFT 3/29/2024 6:22 PM    FINDINGS:    The bones are diffusely demineralized. There is a nondisplaced obliquely   oriented fracture of the distal fibula above the level of the tibiotalar   joint. There is a small cortical avulsion fracture from the inferior tip   of the fibula Thereis obliquely oriented. Thin lucency within the medial   malleolus that may represent a nondisplaced fracture.    Varinder adjacent soft tissue swelling.    There is fiberglass cast material.    IMPRESSION: Nondisplaced distal fibular fracture. Questionable medial   malleolus fracture. These findings were described on the report by [facility]   but review of the medical record reveals a diagnosis of a fracture by the   clinical team.    --- End of Report ---      ZANDRA CABALLERO MD; Attending Radiologist  This document has been electronically signed. Mar 30 2024  8:31AM    < end of copied text >      A/P:  Pt is a  89y Female with  found to have left nondisplaced distal fibula fx, questionable medial malleolus fx    PLAN:   * Pain Control  * NWB LLE  * definitive plan pending review of images by orthopedic attending Pt Name: NAN GAYLE    MRN: [id number]      Patient is a 89y Female presenting to the emergency department with a chief complaint of right ankle pain s/p mech fall yesterday. PMHx HTN, CHFpEF, afib on Xarelto, s/p ablation x2, s/p watchman LA appendage closure on 8/8/2022, recent watchman procedure 3/25/24,  uterine CA s/p hysterectomy, IBS, diverticulosis, chronic b/l extremity edema BIBA from [facility] for evaluation s/p fall. Pt was splinted by ED staff, XR and CT scan done. Patient admitted for rehab placement, requesting Isamar Helton, ortho consulted for nondisplaced left distal fibula fx and questionable medial malleolus fx. Patient currently in aircast in ED. Denies head trauma, LOC, syncopal episode. Denies hip pain, or any other orthopedic complaints besides left ankle. Denies numbness/tingling.     PAST MEDICAL & SURGICAL HISTORY:  PAST MEDICAL & SURGICAL HISTORY:  Afib      HTN (hypertension)      H/O CHF      Uterine cancer      S/P hysterectomy          Allergies: hydrALAZINE (Other; Flushing)  Lipitor (Other; Flushing)      Medications: acetaminophen     Tablet .. 650 milliGRAM(s) Oral every 6 hours PRN  aluminum hydroxide/magnesium hydroxide/simethicone Suspension 30 milliLiter(s) Oral every 4 hours PRN  cloNIDine 0.3 milliGRAM(s) Oral <User Schedule>  enalapril 20 milliGRAM(s) Oral with breakfast  furosemide    Tablet 40 milliGRAM(s) Oral <User Schedule>  lactobacillus acidophilus 1 Tablet(s) Oral daily  melatonin 3 milliGRAM(s) Oral at bedtime PRN  metoprolol tartrate 50 milliGRAM(s) Oral <User Schedule>  multivitamin 1 Tablet(s) Oral daily  potassium chloride    Tablet ER 10 milliEquivalent(s) Oral daily  senna 2 Tablet(s) Oral at bedtime PRN  simvastatin 20 milliGRAM(s) Oral with breakfast      FAMILY HISTORY:  No pertinent family history in first degree relatives    : non-contributory    Social History:                           11.4   9.50  )-----------( 201      ( 30 Mar 2024 06:40 )             34.6       03-30    138  |  97  |  26.6<H>  ----------------------------<  82  4.2   |  27.0  |  0.50    Ca    9.0      30 Mar 2024 06:40    TPro  7.6  /  Alb  4.1  /  TBili  0.5  /  DBili  x   /  AST  38<H>  /  ALT  14  /  AlkPhos  82  03-29      Vital Signs Last 24 Hrs  T(C): 37.1 (30 Mar 2024 11:14), Max: 37.1 (30 Mar 2024 07:36)  T(F): 98.7 (30 Mar 2024 11:14), Max: 98.8 (30 Mar 2024 07:36)  HR: 107 (30 Mar 2024 14:30) (85 - 125)  BP: 188/80 (30 Mar 2024 14:30) (138/55 - 196/130)  BP(mean): --  RR: 26 (30 Mar 2024 14:30) (18 - 30)  SpO2: 98% (30 Mar 2024 14:30) (89% - 100%)    Parameters below as of 30 Mar 2024 14:30  Patient On (Oxygen Delivery Method): nasal cannula  O2 Flow (L/min): 2      Daily     Daily       PHYSICAL EXAM:      Appearance: Alert, responsive, in no acute distress.  LLE: +pedal edema noted, +air cast noted to left ankle, skin intact, TTP over medial and lateral malleoli, no knee/thigh/hip pain, +ROM toes intact, SILT, DP intact, BCR, no cyanosis,     Imaging Studies:    < from: CT Ankle No Cont, Left (03.29.24 @ 21:36) >    ACC: [id number] EXAM:  CT ANKLE ONLY LT   ORDERED BY: JOHNY CROOK     PROCEDURE DATE:  03/29/2024        INTERPRETATION:  PROCEDURE INFORMATION:  Exam: CT Left Lower Extremity Without Contrast, Ankle  Exam date and time: 3/29/2024 9:25 PM  Age:89 years old  Clinical indication: L ankle pain    TECHNIQUE:  Imaging protocol: CT of the left lower extremity without contrast was   performed. Exam focused on the ankle.    COMPARISON:  DX XR ANKLE COMPLETE 3 VIEWS LEFT 3/29/2024 6:22 PM    FINDINGS:    The bones are diffusely demineralized. There is a nondisplaced obliquely   oriented fracture of the distal fibula above the level of the tibiotalar   joint. There is a small cortical avulsion fracture from the inferior tip   of the fibula Thereis obliquely oriented. Thin lucency within the medial   malleolus that may represent a nondisplaced fracture.    Varinder adjacent soft tissue swelling.    There is fiberglass cast material.    IMPRESSION: Nondisplaced distal fibular fracture. Questionable medial   malleolus fracture. These findings were described on the report by [facility]   but review of the medical record reveals a diagnosis of a fracture by the   clinical team.    --- End of Report ---      ZANDRA CABALLERO MD; Attending Radiologist  This document has been electronically signed. Mar 30 2024  8:31AM    < end of copied text >      A/P:  Pt is a  89y Female with  found to have left nondisplaced distal fibula fx, questionable medial malleolus fx    PLAN:   * Pain Control  * NWB LLE  * CAM boot ordered for LLE  * discussed with Dr Lundberg - caroline morales once CAM boot placed

## 2024-03-30 NOTE — ED CDU PROVIDER INITIAL DAY NOTE - OBJECTIVE STATEMENT
89 year old female with PMHx HTN, CHF, afib on Xarelto, s/p ablation x2, s/p watchman LA appendage closure on 8/8/2022, recent watchman procedure 3/25/24 on [facility] by Dr Gaming ,  uterine CA s/p hysterectomy, IBS, diverticulosis, chronic b/l extremity edema BIBA from [facility] for evaluation s/p fall. Patient states while transferring from her walker to her chair her legs gave out and she fell onto her bottom. Thinks she twisted her L ankle during the fall. Complaints of pain to her entire left leg. Denies any head trauma or LOC. Was helped up by staff. notes had 2 day hospital stay for watchman procedure 5 days ago, after which she felt her left leg was weaker and had some difficulty ambulating due to pain.  admits she has been coughing with a clear phlegm.  denies any chest pain shortness of breath fever

## 2024-03-31 ENCOUNTER — TRANSCRIPTION ENCOUNTER (OUTPATIENT)
Age: 89
End: 2024-03-31

## 2024-03-31 LAB
ALBUMIN SERPL ELPH-MCNC: 3.3 G/DL — SIGNIFICANT CHANGE UP (ref 3.3–5.2)
ALP SERPL-CCNC: 70 U/L — SIGNIFICANT CHANGE UP (ref 40–120)
ALT FLD-CCNC: 11 U/L — SIGNIFICANT CHANGE UP
ANION GAP SERPL CALC-SCNC: 13 MMOL/L — SIGNIFICANT CHANGE UP (ref 5–17)
AST SERPL-CCNC: 21 U/L — SIGNIFICANT CHANGE UP
BASOPHILS # BLD AUTO: 0 K/UL — SIGNIFICANT CHANGE UP (ref 0–0.2)
BASOPHILS NFR BLD AUTO: 0 % — SIGNIFICANT CHANGE UP (ref 0–2)
BILIRUB SERPL-MCNC: 0.9 MG/DL — SIGNIFICANT CHANGE UP (ref 0.4–2)
BUN SERPL-MCNC: 20.3 MG/DL — HIGH (ref 8–20)
CALCIUM SERPL-MCNC: 8.8 MG/DL — SIGNIFICANT CHANGE UP (ref 8.4–10.5)
CHLORIDE SERPL-SCNC: 99 MMOL/L — SIGNIFICANT CHANGE UP (ref 96–108)
CO2 SERPL-SCNC: 25 MMOL/L — SIGNIFICANT CHANGE UP (ref 22–29)
CREAT SERPL-MCNC: 0.46 MG/DL — LOW (ref 0.5–1.3)
EGFR: 91 ML/MIN/1.73M2 — SIGNIFICANT CHANGE UP
EOSINOPHIL # BLD AUTO: 0.1 K/UL — SIGNIFICANT CHANGE UP (ref 0–0.5)
EOSINOPHIL NFR BLD AUTO: 0.9 % — SIGNIFICANT CHANGE UP (ref 0–6)
GIANT PLATELETS BLD QL SMEAR: PRESENT — SIGNIFICANT CHANGE UP
GLUCOSE SERPL-MCNC: 109 MG/DL — HIGH (ref 70–99)
HCT VFR BLD CALC: 36.2 % — SIGNIFICANT CHANGE UP (ref 34.5–45)
HGB BLD-MCNC: 11.6 G/DL — SIGNIFICANT CHANGE UP (ref 11.5–15.5)
LYMPHOCYTES # BLD AUTO: 0.5 K/UL — LOW (ref 1–3.3)
LYMPHOCYTES # BLD AUTO: 4.5 % — LOW (ref 13–44)
MAGNESIUM SERPL-MCNC: 1.9 MG/DL — SIGNIFICANT CHANGE UP (ref 1.6–2.6)
MANUAL SMEAR VERIFICATION: SIGNIFICANT CHANGE UP
MCHC RBC-ENTMCNC: 32 GM/DL — SIGNIFICANT CHANGE UP (ref 32–36)
MCHC RBC-ENTMCNC: 32 PG — SIGNIFICANT CHANGE UP (ref 27–34)
MCV RBC AUTO: 99.7 FL — SIGNIFICANT CHANGE UP (ref 80–100)
MONOCYTES # BLD AUTO: 1 K/UL — HIGH (ref 0–0.9)
MONOCYTES NFR BLD AUTO: 9 % — SIGNIFICANT CHANGE UP (ref 2–14)
MRSA PCR RESULT.: SIGNIFICANT CHANGE UP
NEUTROPHILS # BLD AUTO: 9.13 K/UL — HIGH (ref 1.8–7.4)
NEUTROPHILS NFR BLD AUTO: 82 % — HIGH (ref 43–77)
PHOSPHATE SERPL-MCNC: 2.5 MG/DL — SIGNIFICANT CHANGE UP (ref 2.4–4.7)
PLAT MORPH BLD: NORMAL — SIGNIFICANT CHANGE UP
PLATELET # BLD AUTO: 183 K/UL — SIGNIFICANT CHANGE UP (ref 150–400)
POIKILOCYTOSIS BLD QL AUTO: SLIGHT — SIGNIFICANT CHANGE UP
POLYCHROMASIA BLD QL SMEAR: SLIGHT — SIGNIFICANT CHANGE UP
POTASSIUM SERPL-MCNC: 4 MMOL/L — SIGNIFICANT CHANGE UP (ref 3.5–5.3)
POTASSIUM SERPL-SCNC: 4 MMOL/L — SIGNIFICANT CHANGE UP (ref 3.5–5.3)
PROT SERPL-MCNC: 6.5 G/DL — LOW (ref 6.6–8.7)
RAPID RVP RESULT: SIGNIFICANT CHANGE UP
RBC # BLD: 3.63 M/UL — LOW (ref 3.8–5.2)
RBC # FLD: 15.5 % — HIGH (ref 10.3–14.5)
RBC BLD AUTO: ABNORMAL
S AUREUS DNA NOSE QL NAA+PROBE: SIGNIFICANT CHANGE UP
SARS-COV-2 RNA SPEC QL NAA+PROBE: SIGNIFICANT CHANGE UP
SODIUM SERPL-SCNC: 137 MMOL/L — SIGNIFICANT CHANGE UP (ref 135–145)
TARGETS BLD QL SMEAR: SLIGHT — SIGNIFICANT CHANGE UP
VARIANT LYMPHS # BLD: 3.6 % — SIGNIFICANT CHANGE UP (ref 0–6)
WBC # BLD: 11.13 K/UL — HIGH (ref 3.8–10.5)
WBC # FLD AUTO: 11.13 K/UL — HIGH (ref 3.8–10.5)

## 2024-03-31 PROCEDURE — 99232 SBSQ HOSP IP/OBS MODERATE 35: CPT

## 2024-03-31 RX ORDER — POTASSIUM CHLORIDE 20 MEQ
10 PACKET (EA) ORAL DAILY
Refills: 0 | Status: DISCONTINUED | OUTPATIENT
Start: 2024-03-31 | End: 2024-04-02

## 2024-03-31 RX ORDER — LABETALOL HCL 100 MG
5 TABLET ORAL ONCE
Refills: 0 | Status: COMPLETED | OUTPATIENT
Start: 2024-03-31 | End: 2024-03-31

## 2024-03-31 RX ADMIN — Medication 10 MILLIEQUIVALENT(S): at 18:35

## 2024-03-31 RX ADMIN — RIVAROXABAN 15 MILLIGRAM(S): KIT at 18:36

## 2024-03-31 RX ADMIN — Medication 20 MILLIGRAM(S): at 08:44

## 2024-03-31 RX ADMIN — Medication 40 MILLIGRAM(S): at 08:44

## 2024-03-31 RX ADMIN — Medication 50 MILLIGRAM(S): at 18:35

## 2024-03-31 RX ADMIN — Medication 1 TABLET(S): at 13:51

## 2024-03-31 RX ADMIN — Medication 50 MILLIGRAM(S): at 13:51

## 2024-03-31 RX ADMIN — Medication 0.3 MILLIGRAM(S): at 13:51

## 2024-03-31 RX ADMIN — Medication 5 MILLIGRAM(S): at 04:11

## 2024-03-31 RX ADMIN — Medication 0.3 MILLIGRAM(S): at 05:17

## 2024-03-31 RX ADMIN — PANTOPRAZOLE SODIUM 40 MILLIGRAM(S): 20 TABLET, DELAYED RELEASE ORAL at 05:17

## 2024-03-31 RX ADMIN — Medication 650 MILLIGRAM(S): at 16:38

## 2024-03-31 RX ADMIN — Medication 50 MILLIGRAM(S): at 08:44

## 2024-03-31 NOTE — DISCHARGE NOTE PROVIDER - CARE PROVIDERS DIRECT ADDRESSES
[email or web address]

## 2024-03-31 NOTE — DISCHARGE NOTE PROVIDER - PROVIDER TOKENS
PROVIDER:[TOKEN:[[id number]],FOLLOWUP:[2 weeks]] PROVIDER:[TOKEN:[[id number]],FOLLOWUP:[2 weeks]],PROVIDER:[TOKEN:[[id number]]]

## 2024-03-31 NOTE — DISCHARGE NOTE PROVIDER - NSDCFUADDINST_GEN_ALL_CORE_FT
ORTHO: Pain control. Weight bearing as tolerated Right lower extremity in boot with assistive devices. DVT PPX. No ortho surgical intervention. Follow up with Dr. Lundberg in 1-2 weeks as outpatient. ORTHO: Pain control. Weight bearing as tolerated left lower extremity in boot with assistive devices. DVT PPX. No ortho surgical intervention. Follow up with Dr. Lundberg in 1-2 weeks as outpatient.

## 2024-03-31 NOTE — DISCHARGE NOTE PROVIDER - HOSPITAL COURSE
89 y/o F w/ PMH of AF (s/p watchman 1 week ago), HTN, HLD presented [facility] after mechanical fall.  Pt was initially placed in observation.  She was found to have a distal LLE fracture and LE immobilized.  While in observation pt went into AF RVR and BP uncontrolled w/ SBP >200.  Trops also noted to be elevated at 190 but EKG w/out acute ischemic changes. Medicine called for admission.  Afib and HTN crisis resolved with pharmacological treatment.  Pt was seen in consult by ortho, non op managemnet. Recommended to keep NWB to LLE and PT recommending CARISSA on discharge. Hospital stay complicated by fever of unclear etiology. Pt was observed off abx and Infectious work up was negative and no further febrile episodes were noted.            89 y/o F w/ PMH of AF (s/p watchman 1 week ago), HTN, HLD presented from [facility] after mechanical fall.  Pt was initially placed in observation.  She was found to have a distal LLE fracture and LE immobilized.  While in observation pt went into AF RVR and BP uncontrolled w/ SBP >200.  Trops also noted to be elevated at 190 but EKG w/out acute ischemic changes. Medicine called for admission.  Afib and HTN crisis resolved with pharmacological treatment.  Pt was seen in consult by ortho, non op managemnet. - Ortho WBAT RLE in boot with assistive devices  No ortho surgical intervention. Ortho stable. Follow up with Dr. Lundberg in 1-2 weeks as outpatient. Hospital stay complicated by fever, likely from UTI and constipation.  to complete vantin on dc and aggressive bowel regimen.

## 2024-03-31 NOTE — DISCHARGE NOTE PROVIDER - NSDCCPCAREPLAN_GEN_ALL_CORE_FT
PRINCIPAL DISCHARGE DIAGNOSIS  Diagnosis: Left fibular fracture  Assessment and Plan of Treatment: - CT LLE significant for nondisplaced distal fibular fracture and questionable medial malleolus fracture   - Keep NWB to LLE   - CARISSA on discharge  - Outpatinet ortho follow up, contact info provided      SECONDARY DISCHARGE DIAGNOSES  Diagnosis: Fever  Assessment and Plan of Treatment: Resolved  Infectious work up negative    Diagnosis: Chronic atrial fibrillation  Assessment and Plan of Treatment: - S/p watchman procedure last week   - c/w Xarelto uninterrupted for at least 1month s/p watchman   - c/w Metoprolol    Diagnosis: HTN (hypertension)  Assessment and Plan of Treatment: -Continue antihypertensives as prescribed     PRINCIPAL DISCHARGE DIAGNOSIS  Diagnosis: Left fibular fracture  Assessment and Plan of Treatment: - CT LLE significant for nondisplaced distal fibular fracture and questionable medial malleolus fracture   - Keep NWB to LLE   - CARISSA on discharge  - Outpatinet ortho follow up, contact info provided      SECONDARY DISCHARGE DIAGNOSES  Diagnosis: Chronic atrial fibrillation  Assessment and Plan of Treatment: - S/p watchman procedure last week   - c/w Xarelto uninterrupted for at least 1month s/p watchman   - c/w Metoprolol    Diagnosis: HTN (hypertension)  Assessment and Plan of Treatment: -Continue antihypertensives as prescribed    Diagnosis: Acute UTI  Assessment and Plan of Treatment: finish course of vantin

## 2024-03-31 NOTE — DISCHARGE NOTE PROVIDER - CARE PROVIDER_API CALL
Edenilson Lundberg  Orthopaedic Surgery  [address] [place] [id number]  Phone: [phone]  Fax: [phone]  Follow Up Time: 2 weeks   Edenilson Lundberg  Orthopaedic Surgery  [address] [place] [address]  Phone: [phone]  Fax: [phone]  Follow Up Time: 2 weeks    Mark Gutierrez  [address] [address]  Phone: [phone]  Fax: [phone]  Follow Up Time:

## 2024-03-31 NOTE — PATIENT PROFILE ADULT - FUNCTIONAL ASSESSMENT - BASIC MOBILITY 6.
2-calculated by average/Not able to assess (calculate score using [facility] averaging method)

## 2024-03-31 NOTE — PROGRESS NOTE ADULT - ASSESSMENT
89 y/o F w/ PMH of AF (s/p watchman 1 week ago), HTN, HLD presented from [facility] after mechanical fall.  Pt was initially placed in observation.  She was found to have a distal LLE fracture and LE immobilized.  While in observation pt went into AF RVR and BP uncontrolled w/ SBP >200.  Trops also noted to be elevated at 190 but EKG w/out acute ischemic changes.  Cardiology and ortho consulted.  Pt unable to ambulate w/ PT and will need CARISSA placement  Medicine called for admission.      HTN Crisis - now BP controlled  - Resumed all home medications   - c/w home clonidine, Vasotec, Lasix   - downgrade to tele    AF w/ RVR  - s/p watchman procedure last week   - c/w Xarelto uninterrupted for at least 1month s/p watchman   - c/w Metoprolol   - f/u BCx - ordered to r/o infectious etiology of RVR  - Monitor on telemetry     Acute traumatic LLE fracture   - CTH negative   - CT pelvis negative   - CT LLE significant for nondisplaced distal fibular fracture and questionable medial malleolus fracture   - Keep NWB to LLE   - Fall precautions   - PT consulted and recommending CARISSA  - Ortho consulted and recs noted     Troponin elevation   - Could be related to HTN crisis but more likely result of recent watchman procedure   - Repeat trop down trending   - EKG w/ no acute ischemic changes   - BNP elevated but likely from AF rvr   - Cardiology following and recs noted   - TTE from 2023 reviewed and noted above   - Monitor on telemetry     Chronic HFpEF  - TTE w/ LVEF 55-60% in July 2023  - Clinically euvolemic at this time   - CXR w/out evidence of pulm vascular congestion   - BNP elevated but more likely from AF rvr   - c/w lasix po   - Monitor daily weight, strict I/O's and fluid restrict   - Maintain K>4 and Mg>2  - Monitor on telemetry     Normocytic anemia  - Slight drop in h/h since obs placement   - Low suspicion for GIB however will repeat CBC in AM to reassess   - Transfuse for Hb<8    VTE ppx: c/w xarelto   Dispo: CARISSA once Bcx negative, likely 1-2 days

## 2024-03-31 NOTE — DISCHARGE NOTE PROVIDER - ATTENDING DISCHARGE PHYSICAL EXAMINATION:
awake, alert, nonfocal neuro  rrr s1s2  ctab  soft abdomen  no LE edema   ROS negative. tolerating diet. +BM

## 2024-03-31 NOTE — DISCHARGE NOTE PROVIDER - NSDCMRMEDTOKEN_GEN_ALL_CORE_FT
cloNIDine 0.3 mg oral tablet: 1 orally 3 times a day  enalapril 20 mg oral tablet: 1 orally once a day  Lasix 40 mg oral tablet: 1 orally once a day  metoprolol tartrate 25 mg oral tablet: 1 tab(s) orally 3 times a day  Multiple Vitamins oral tablet: 1 tab(s) orally once a day  Xarelto 15 mg oral tablet: 1 orally once a day  Zocor 20 mg oral tablet: 1 tab(s) orally once a day (at bedtime)   acetaminophen 325 mg oral tablet: 2 tab(s) orally every 6 hours As needed Temp greater or equal to 38C (100.4F), Mild Pain (1 - 3)  bisacodyl 10 mg rectal suppository: 1 suppository(ies) rectal once a day As needed Constipation  cloNIDine 0.3 mg oral tablet: 1 orally 3 times a day  enalapril 20 mg oral tablet: 1 orally once a day  Lasix 40 mg oral tablet: 1 orally once a day  metoprolol tartrate 25 mg oral tablet: 2 tab(s) orally 3 times a day  mineral oil rectal enema: 133 milliliter(s) rectal once a day As needed constipation  Multiple Vitamins oral tablet: 1 tab(s) orally once a day  oxyCODONE 5 mg oral tablet: 1 tab(s) orally every 6 hours As needed Severe Pain (7 - 10)  pantoprazole 40 mg oral delayed release tablet: 1 tab(s) orally once a day (before a meal)  polyethylene glycol 3350 oral powder for reconstitution: 17 gram(s) orally 2 times a day  senna leaf extract oral tablet: 2 tab(s) orally once a day (at bedtime)  Vantin 100 mg oral tablet: 1 tab(s) orally 2 times a day for 3 days  Xarelto 15 mg oral tablet: 1 orally once a day  Zocor 20 mg oral tablet: 1 tab(s) orally once a day (at bedtime)

## 2024-03-31 NOTE — PROGRESS NOTE ADULT - SUBJECTIVE AND OBJECTIVE BOX
[facility]                              [address] [place] [address]                                                 PHONE: [phone]                                                 FAX: [phone]  -----------------------------------------------------------------------------------------------  Pt seen and examined. FU for AF    Overnight events/Complaints: Pt without complains.    Vital Signs Last 24 Hrs  T(C): 37.1 (31 Mar 2024 08:00), Max: 37.1 (30 Mar 2024 11:14)  T(F): 98.7 (31 Mar 2024 08:00), Max: 98.7 (30 Mar 2024 11:14)  HR: 91 (31 Mar 2024 10:00) (85 - 111)  BP: 147/81 (31 Mar 2024 10:00) (118/68 - 196/130)  BP(mean): 100 (31 Mar 2024 10:00) (91 - 103)  RR: 20 (31 Mar 2024 10:00) (17 - 26)  SpO2: 98% (31 Mar 2024 10:00) (92% - 100%)    Parameters below as of 31 Mar 2024 10:00  Patient On (Oxygen Delivery Method): room air      I&O's Summary    30 Mar 2024 07:01  -  31 Mar 2024 07:00  --------------------------------------------------------  IN: 300 mL / OUT: 250 mL / NET: 50 mL        RELEVANT PHYSICAL EXAM:  Neck: No obvious JVD  Cardiovascular: irregular S1, S2  Respiratory: Lungs clear to auscultation; no crepitations, no wheeze  Musculoskeletal: No edema        LABS:                        11.6   11.13 )-----------( 183      ( 31 Mar 2024 06:50 )             36.2     03-31    137  |  99  |  20.3<H>  ----------------------------<  109<H>  4.0   |  25.0  |  0.46<L>    Ca    8.8      31 Mar 2024 06:50  Phos  2.5     03-31  Mg     1.9     03-31    TPro  6.5<L>  /  Alb  3.3  /  TBili  0.9  /  DBili  x   /  AST  21  /  ALT  11  /  AlkPhos  70  03-31        PT/INR - ( 29 Mar 2024 19:02 )   PT: 21.8 sec;   INR: 2.00 ratio         PTT - ( 29 Mar 2024 19:02 )  PTT:32.4 sec    RADIOLOGY & ADDITIONAL STUDIES: (reviewed)  CXR was independently visualized/reviewed and demonstrated: No acute parenchymal disease    CARDIOLOGY TESTING:(reviewed)     ECG (Independent visualization): AF    ECHOCARDIOGRAM :  1. Normal left ventricular internal cavity size.   2. Normal global left ventricular systolic function.   3. Left ventricular ejection fraction, by visual estimation, is 55 to   60%.   4. Moderate to severe left atrial enlargement.   5. Moderate to severe right atrial enlargement.   6. The mitral in-flow pattern reveals no discernable A-wave, therefore   no comment on diastolic function can be made.   7. Mild mitral annular calcification.   8. Mild thickening of the anterior and posterior mitral valve leaflets.   9. Moderate mitral valve regurgitation.  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  11. Mild-moderate tricuspid regurgitation.  12. Sclerotic aortic valve with normal opening.  13. Mild to moderate aortic regurgitation.  14. Mild to moderate pulmonic valve regurgitation.  15. Estimated pulmonary artery systolic pressure is 51.6 mmHg assuming a   right atrial pressure of 8 mmHg, which is consistent with moderate   pulmonary hypertension.  16. There is no evidence of pericardial effusion.      MEDICATIONS:(reviewed)  MEDICATIONS  (STANDING):  cloNIDine 0.3 milliGRAM(s) Oral <User Schedule>  enalapril 20 milliGRAM(s) Oral with breakfast  furosemide    Tablet 40 milliGRAM(s) Oral <User Schedule>  lactobacillus acidophilus 1 Tablet(s) Oral daily  metoprolol tartrate 50 milliGRAM(s) Oral <User Schedule>  multivitamin 1 Tablet(s) Oral daily  pantoprazole    Tablet 40 milliGRAM(s) Oral before breakfast  potassium chloride    Tablet ER 10 milliEquivalent(s) Oral daily  rivaroxaban 15 milliGRAM(s) Oral with dinner  simvastatin 20 milliGRAM(s) Oral with breakfast      ASSESSMENT AND PLAN:    89 year old female with a PMHx of Afib s/p ablation, ILR, now s/p WATCHMAN from [facility] for evaluation s/p fall. CT with nondisplaced distal fibular fracture and Questionable medial malleolus fracture    1.Recent WATCHMAN at [facility] with Dr. Gaming. Echo done post procedure with no effusion  2.AF rates controlled. Continue metoprolol.   3.Non MI troponin elevation related to recent WATCHMAN procedure. Low suspicion of ACS at this time  4.Continue statin  5.BP controlled. Continue clonidine, enalapril    No further inpt cardiac work-up needed. Pls recall if any qns/concerns.

## 2024-03-31 NOTE — PATIENT PROFILE ADULT - FALL HARM RISK - HARM RISK INTERVENTIONS

## 2024-03-31 NOTE — PROGRESS NOTE ADULT - SUBJECTIVE AND OBJECTIVE BOX
Lia Landa M.D.    Patient is a 89y old  Female who presents with a chief complaint of htn emergency (31 Mar 2024 10:44)      SUBJECTIVE / OVERNIGHT EVENTS: no event overnight. Pain controlled.     Patient denies chest pain, SOB, abd pain, N/V, fever, chills, dysuria or any other complaints. All remainder ROS negative.     MEDICATIONS  (STANDING):  cloNIDine 0.3 milliGRAM(s) Oral <User Schedule>  enalapril 20 milliGRAM(s) Oral with breakfast  furosemide    Tablet 40 milliGRAM(s) Oral <User Schedule>  lactobacillus acidophilus 1 Tablet(s) Oral daily  metoprolol tartrate 50 milliGRAM(s) Oral <User Schedule>  multivitamin 1 Tablet(s) Oral daily  pantoprazole    Tablet 40 milliGRAM(s) Oral before breakfast  potassium chloride    Tablet ER 10 milliEquivalent(s) Oral daily  rivaroxaban 15 milliGRAM(s) Oral with dinner  simvastatin 20 milliGRAM(s) Oral with breakfast    MEDICATIONS  (PRN):  acetaminophen     Tablet .. 650 milliGRAM(s) Oral every 6 hours PRN Temp greater or equal to 38C (100.4F), Mild Pain (1 - 3)  aluminum hydroxide/magnesium hydroxide/simethicone Suspension 30 milliLiter(s) Oral every 4 hours PRN Dyspepsia  melatonin 3 milliGRAM(s) Oral at bedtime PRN Insomnia  senna 2 Tablet(s) Oral at bedtime PRN Constipation      I&O's Summary    30 Mar 2024 07:01  -  31 Mar 2024 07:00  --------------------------------------------------------  IN: 300 mL / OUT: 250 mL / NET: 50 mL        PHYSICAL EXAM:  Vital Signs Last 24 Hrs  T(C): 37.1 (31 Mar 2024 08:00), Max: 37.1 (31 Mar 2024 08:00)  T(F): 98.7 (31 Mar 2024 08:00), Max: 98.7 (31 Mar 2024 08:00)  HR: 91 (31 Mar 2024 10:00) (85 - 107)  BP: 147/81 (31 Mar 2024 10:00) (118/68 - 188/80)  BP(mean): 100 (31 Mar 2024 10:00) (91 - 103)  RR: 20 (31 Mar 2024 10:00) (17 - 26)  SpO2: 98% (31 Mar 2024 10:00) (92% - 100%)    Parameters below as of 31 Mar 2024 10:00  Patient On (Oxygen Delivery Method): room air    CONSTITUTIONAL: NAD, well-groomed  RESPIRATORY: Normal respiratory effort; lungs are clear to auscultation bilaterally  CARDIOVASCULAR: Irregular irregular, no LE edema, LLE in immobilizer   ABDOMEN: Nontender to palpation, normoactive bowel sounds  PSYCH: A+O x3; affect appropriate  SKIN: No rashes; no palpable lesions    LABS:                        11.6   11.13 )-----------( 183      ( 31 Mar 2024 06:50 )             36.2     03-31    137  |  99  |  20.3<H>  ----------------------------<  109<H>  4.0   |  25.0  |  0.46<L>    Ca    8.8      31 Mar 2024 06:50  Phos  2.5     03-31  Mg     1.9     03-31    TPro  6.5<L>  /  Alb  3.3  /  TBili  0.9  /  DBili  x   /  AST  21  /  ALT  11  /  AlkPhos  70  03-31    PT/INR - ( 29 Mar 2024 19:02 )   PT: 21.8 sec;   INR: 2.00 ratio         PTT - ( 29 Mar 2024 19:02 )  PTT:32.4 sec      Urinalysis Basic - ( 31 Mar 2024 06:50 )    Color: x / Appearance: x / SG: x / pH: x  Gluc: 109 mg/dL / Ketone: x  / Bili: x / Urobili: x   Blood: x / Protein: x / Nitrite: x   Leuk Esterase: x / RBC: x / WBC x   Sq Epi: x / Non Sq Epi: x / Bacteria: x        CAPILLARY BLOOD GLUCOSE          RADIOLOGY & ADDITIONAL TESTS:  Results Reviewed:   Imaging Personally Reviewed:  Electrocardiogram Personally Reviewed:

## 2024-04-01 LAB
ANION GAP SERPL CALC-SCNC: 12 MMOL/L — SIGNIFICANT CHANGE UP (ref 5–17)
BUN SERPL-MCNC: 24.5 MG/DL — HIGH (ref 8–20)
CALCIUM SERPL-MCNC: 9 MG/DL — SIGNIFICANT CHANGE UP (ref 8.4–10.5)
CHLORIDE SERPL-SCNC: 100 MMOL/L — SIGNIFICANT CHANGE UP (ref 96–108)
CO2 SERPL-SCNC: 28 MMOL/L — SIGNIFICANT CHANGE UP (ref 22–29)
CREAT SERPL-MCNC: 0.56 MG/DL — SIGNIFICANT CHANGE UP (ref 0.5–1.3)
EGFR: 87 ML/MIN/1.73M2 — SIGNIFICANT CHANGE UP
GLUCOSE SERPL-MCNC: 104 MG/DL — HIGH (ref 70–99)
HCT VFR BLD CALC: 35.3 % — SIGNIFICANT CHANGE UP (ref 34.5–45)
HGB BLD-MCNC: 11.7 G/DL — SIGNIFICANT CHANGE UP (ref 11.5–15.5)
MCHC RBC-ENTMCNC: 32.5 PG — SIGNIFICANT CHANGE UP (ref 27–34)
MCHC RBC-ENTMCNC: 33.1 GM/DL — SIGNIFICANT CHANGE UP (ref 32–36)
MCV RBC AUTO: 98.1 FL — SIGNIFICANT CHANGE UP (ref 80–100)
PLATELET # BLD AUTO: 204 K/UL — SIGNIFICANT CHANGE UP (ref 150–400)
POTASSIUM SERPL-MCNC: 4.5 MMOL/L — SIGNIFICANT CHANGE UP (ref 3.5–5.3)
POTASSIUM SERPL-SCNC: 4.5 MMOL/L — SIGNIFICANT CHANGE UP (ref 3.5–5.3)
PROCALCITONIN SERPL-MCNC: 0.14 NG/ML — HIGH (ref 0.02–0.1)
RBC # BLD: 3.6 M/UL — LOW (ref 3.8–5.2)
RBC # FLD: 15.2 % — HIGH (ref 10.3–14.5)
SODIUM SERPL-SCNC: 140 MMOL/L — SIGNIFICANT CHANGE UP (ref 135–145)
WBC # BLD: 10.26 K/UL — SIGNIFICANT CHANGE UP (ref 3.8–10.5)
WBC # FLD AUTO: 10.26 K/UL — SIGNIFICANT CHANGE UP (ref 3.8–10.5)

## 2024-04-01 PROCEDURE — 71045 X-RAY EXAM CHEST 1 VIEW: CPT | Mod: 26

## 2024-04-01 PROCEDURE — 99232 SBSQ HOSP IP/OBS MODERATE 35: CPT

## 2024-04-01 RX ORDER — OXYCODONE HYDROCHLORIDE 5 MG/1
2.5 TABLET ORAL
Refills: 0 | Status: DISCONTINUED | OUTPATIENT
Start: 2024-04-01 | End: 2024-04-01

## 2024-04-01 RX ORDER — OXYCODONE HYDROCHLORIDE 5 MG/1
5 TABLET ORAL EVERY 6 HOURS
Refills: 0 | Status: DISCONTINUED | OUTPATIENT
Start: 2024-04-01 | End: 2024-04-03

## 2024-04-01 RX ADMIN — OXYCODONE HYDROCHLORIDE 2.5 MILLIGRAM(S): 5 TABLET ORAL at 15:18

## 2024-04-01 RX ADMIN — Medication 0.3 MILLIGRAM(S): at 22:51

## 2024-04-01 RX ADMIN — Medication 0.3 MILLIGRAM(S): at 13:05

## 2024-04-01 RX ADMIN — Medication 10 MILLIEQUIVALENT(S): at 13:06

## 2024-04-01 RX ADMIN — OXYCODONE HYDROCHLORIDE 5 MILLIGRAM(S): 5 TABLET ORAL at 23:51

## 2024-04-01 RX ADMIN — Medication 650 MILLIGRAM(S): at 06:04

## 2024-04-01 RX ADMIN — OXYCODONE HYDROCHLORIDE 5 MILLIGRAM(S): 5 TABLET ORAL at 18:07

## 2024-04-01 RX ADMIN — Medication 0.3 MILLIGRAM(S): at 05:26

## 2024-04-01 RX ADMIN — Medication 650 MILLIGRAM(S): at 13:06

## 2024-04-01 RX ADMIN — Medication 50 MILLIGRAM(S): at 08:37

## 2024-04-01 RX ADMIN — PANTOPRAZOLE SODIUM 40 MILLIGRAM(S): 20 TABLET, DELAYED RELEASE ORAL at 06:04

## 2024-04-01 RX ADMIN — OXYCODONE HYDROCHLORIDE 5 MILLIGRAM(S): 5 TABLET ORAL at 22:51

## 2024-04-01 RX ADMIN — Medication 20 MILLIGRAM(S): at 08:37

## 2024-04-01 RX ADMIN — OXYCODONE HYDROCHLORIDE 5 MILLIGRAM(S): 5 TABLET ORAL at 17:37

## 2024-04-01 RX ADMIN — Medication 50 MILLIGRAM(S): at 17:35

## 2024-04-01 RX ADMIN — Medication 3 MILLIGRAM(S): at 22:51

## 2024-04-01 RX ADMIN — Medication 50 MILLIGRAM(S): at 13:06

## 2024-04-01 RX ADMIN — RIVAROXABAN 15 MILLIGRAM(S): KIT at 17:35

## 2024-04-01 RX ADMIN — Medication 650 MILLIGRAM(S): at 14:06

## 2024-04-01 RX ADMIN — OXYCODONE HYDROCHLORIDE 2.5 MILLIGRAM(S): 5 TABLET ORAL at 14:18

## 2024-04-01 RX ADMIN — Medication 1 TABLET(S): at 13:05

## 2024-04-01 RX ADMIN — Medication 40 MILLIGRAM(S): at 08:37

## 2024-04-01 RX ADMIN — Medication 650 MILLIGRAM(S): at 06:53

## 2024-04-01 RX ADMIN — SIMVASTATIN 20 MILLIGRAM(S): 20 TABLET, FILM COATED ORAL at 08:37

## 2024-04-01 NOTE — PROGRESS NOTE ADULT - ASSESSMENT
89 y/o F w/ PMH of AF (s/p watchman 1 week ago), HTN, HLD presented [facility] fall.  Pt was initially placed in observation.  She was found to have a distal LLE fracture and LE immobilized.  While in observation pt went into AF RVR and BP uncontrolled w/ SBP >200.  Trops also noted to be elevated at 190 but EKG w/out acute ischemic changes.  Cardiology and ortho consulted.  Pt unable to ambulate w/ PT and will need CARISSA placement  Medicine called for admission.      Fever  - unclear etiology, with negative fracture  - Bcx NGTD, f/u CXR  - UA negative  - pt refused CT scan  - monitor BM and fever curve    HTN Crisis - now BP controlled  - Resumed all home medications   - c/w home clonidine, Vasotec, Lasix   - downgrade to tele    AF w/ RVR  - s/p watchman procedure last week   - c/w Xarelto uninterrupted for at least 1month s/p watchman   - c/w Metoprolol   - BCx NGTD  - Monitor on telemetry     Acute traumatic LLE fracture   - CTH negative   - CT pelvis negative   - CT LLE significant for nondisplaced distal fibular fracture and questionable medial malleolus fracture   - Keep NWB to LLE   - Fall precautions   - PT consulted and recommending CARISSA  - Ortho consulted and recs noted     Troponin elevation   - Could be related to HTN crisis but more likely result of recent watchman procedure   - Repeat trop down trending   - EKG w/ no acute ischemic changes   - BNP elevated but likely from AF rvr   - Cardiology following and recs noted   - TTE from 2023 reviewed and noted above   - Monitor on telemetry     Chronic HFpEF  - TTE w/ LVEF 55-60% in July 2023  - Clinically euvolemic at this time   - CXR w/out evidence of pulm vascular congestion   - BNP elevated but more likely from AF rvr   - c/w lasix po   - Monitor daily weight, strict I/O's and fluid restrict   - Maintain K>4 and Mg>2  - Monitor on telemetry     Normocytic anemia  - Slight drop in h/h since obs placement   - Low suspicion for GIB however will repeat CBC in AM to reassess   - Transfuse for Hb<8    VTE ppx: c/w xarelto   Dispo: CAR[facility]SA in AM if remains afebrile  89 y/o F w/ PMH of AF (s/p watchman 1 week ago), HTN, HLD presented [facility] after mechanical fall.  Pt was initially placed in observation.  She was found to have a distal LLE fracture and LE immobilized.  While in observation pt went into AF RVR and BP uncontrolled w/ SBP >200.  Trops also noted to be elevated at 190 but EKG w/out acute ischemic changes.  Cardiology and ortho consulted.  Pt unable to ambulate w/ PT and will need CARISSA placement  Medicine called for admission.      Fever  - unclear etiology, with negative fracture  - Bcx NGTD, f/u CXR  - procalc 0.14  - UA negative  - pt refused CT scan  - monitor BM and fever curve    HTN Crisis - now BP controlled  - Resumed all home medications   - c/w home clonidine, Vasotec, Lasix   - downgrade to tele    AF w/ RVR  - s/p watchman procedure last week   - c/w Xarelto uninterrupted for at least 1month s/p watchman   - c/w Metoprolol   - BCx NGTD  - Monitor on telemetry     Acute traumatic LLE fracture   - CTH negative   - CT pelvis negative   - CT LLE significant for nondisplaced distal fibular fracture and questionable medial malleolus fracture   - Keep NWB to LLE   - Fall precautions   - PT consulted and recommending CARISSA  - Ortho consulted and recs noted     Troponin elevation   - Could be related to HTN crisis but more likely result of recent watchman procedure   - Repeat trop down trending   - EKG w/ no acute ischemic changes   - BNP elevated but likely from AF rvr   - Cardiology following and recs noted   - TTE from 2023 reviewed and noted above   - Monitor on telemetry     Chronic HFpEF  - TTE w/ LVEF 55-60% in July 2023  - Clinically euvolemic at this time   - CXR w/out evidence of pulm vascular congestion   - BNP elevated but more likely from AF rvr   - c/w lasix po   - Monitor daily weight, strict I/O's and fluid restrict   - Maintain K>4 and Mg>2  - Monitor on telemetry     Normocytic anemia  - Slight drop in h/h since obs placement   - Low suspicion for GIB however will repeat CBC in AM to reassess   - Transfuse for Hb<8    VTE ppx: c/w xarelto   Dispo: [facility] in AM if remains afebrile  89 y/o F w/ PMH of AF (s/p watchman 1 week ago), HTN, HLD [facility].  Pt was initially placed in observation.  She was found to have a distal LLE fracture and LE immobilized.  While in observation pt went into AF RVR and BP uncontrolled w/ SBP >200.  Trops also noted to be elevated at 190 but EKG w/out acute ischemic changes.  Cardiology and ortho consulted.  Pt unable to ambulate w/ PT and will need CARISSA placement  Medicine called for admission.      Fever  - Tmax of 102.3 on 3/31 evening  - unclear etiology  - Bcx NGTD, f/u CXR  - monitor off abx  - procalc 0.14  - UA negative and pt without symptoms   - pt decline CT scan  - monitor BM and fever curve    Acute traumatic LLE fracture   - CTH negative   - CT pelvis negative   - CT LLE significant for nondisplaced distal fibular fracture and questionable medial malleolus fracture   - Keep NWB to LLE   - Fall precautions   - PT consulted and recommending CARISSA  - Ortho consulted and recs noted   - Pain regimen - pt is now agreeable to oxycodone    HTN Crisis - now BP controlled  - Resumed all home medications   - c/w home clonidine, Vasotec, Lasix   - downgrade to tele    AF w/ RVR  - s/p watchman procedure last week   - c/w Xarelto uninterrupted for at least 1month s/p watchman   - c/w Metoprolol   - BCx NGTD  - Monitor on telemetry    Troponin elevation   - Could be related to HTN crisis but more likely result of recent watchman procedure   - Repeat trop down trending   - EKG w/ no acute ischemic changes   - BNP elevated but likely from AF rvr   - Cardiology following and recs noted   - TTE from 2023 reviewed and noted above   - Monitor on telemetry     Chronic HFpEF  - TTE w/ LVEF 55-60% in July 2023  - Clinically euvolemic at this time   - CXR w/out evidence of pulm vascular congestion   - BNP elevated but more likely from AF rvr   - c/w lasix po   - Monitor daily weight, strict I/O's and fluid restrict   - Maintain K>4 and Mg>2  - Monitor on telemetry     Normocytic anemia  - CBC stable    VTE ppx: c/w xarelto   Dispo: [facility] in AM if remains afebrile and pain controlled

## 2024-04-01 NOTE — PROGRESS NOTE ADULT - SUBJECTIVE AND OBJECTIVE BOX
Lia Landa M.D.    Patient is a 89y old  Female who presents with a chief complaint of htn emergency (31 Mar 2024 17:00)      SUBJECTIVE / OVERNIGHT EVENTS: febrile yesterday up to 102.3. No other fevers.     Patient denies chest pain, SOB, abd pain, N/V, fever, chills, dysuria or any other complaints. All remainder ROS negative.     MEDICATIONS  (STANDING):  cloNIDine 0.3 milliGRAM(s) Oral <User Schedule>  enalapril 20 milliGRAM(s) Oral with breakfast  furosemide    Tablet 40 milliGRAM(s) Oral <User Schedule>  lactobacillus acidophilus 1 Tablet(s) Oral daily  metoprolol tartrate 50 milliGRAM(s) Oral <User Schedule>  multivitamin 1 Tablet(s) Oral daily  pantoprazole    Tablet 40 milliGRAM(s) Oral before breakfast  potassium chloride   Powder 10 milliEquivalent(s) Oral daily  rivaroxaban 15 milliGRAM(s) Oral with dinner  simvastatin 20 milliGRAM(s) Oral with breakfast    MEDICATIONS  (PRN):  acetaminophen     Tablet .. 650 milliGRAM(s) Oral every 6 hours PRN Temp greater or equal to 38C (100.4F), Mild Pain (1 - 3)  aluminum hydroxide/magnesium hydroxide/simethicone Suspension 30 milliLiter(s) Oral every 4 hours PRN Dyspepsia  melatonin 3 milliGRAM(s) Oral at bedtime PRN Insomnia  senna 2 Tablet(s) Oral at bedtime PRN Constipation      I&O's Summary    01 Apr 2024 07:01  -  01 Apr 2024 11:04  --------------------------------------------------------  IN: 240 mL / OUT: 0 mL / NET: 240 mL        PHYSICAL EXAM:  Vital Signs Last 24 Hrs  T(C): 36.7 (01 Apr 2024 08:00), Max: 39.1 (31 Mar 2024 16:33)  T(F): 98 (01 Apr 2024 08:00), Max: 102.3 (31 Mar 2024 16:33)  HR: 86 (01 Apr 2024 10:00) (63 - 96)  BP: 109/51 (01 Apr 2024 10:00) (104/54 - 177/95)  BP(mean): 68 (01 Apr 2024 10:00) (60 - 88)  RR: 30 (01 Apr 2024 10:00) (20 - 30)  SpO2: 96% (01 Apr 2024 10:00) (95% - 100%)    Parameters below as of 01 Apr 2024 10:00    O2 Flow (L/min): 2    CONSTITUTIONAL: NAD, well-groomed  RESPIRATORY: Normal respiratory effort; lungs are clear to auscultation bilaterally  CARDIOVASCULAR: Irregular irregular, no LE edema, LLE in immobilizer   ABDOMEN: Nontender to palpation, normoactive bowel sounds  PSYCH: A+O x3; affect appropriate  SKIN: No rashes; no palpable lesions    LABS:                        11.7   10.26 )-----------( 204      ( 01 Apr 2024 05:38 )             35.3     04-01    140  |  100  |  24.5<H>  ----------------------------<  104<H>  4.5   |  28.0  |  0.56    Ca    9.0      01 Apr 2024 05:38  Phos  2.5     03-31  Mg     1.9     03-31    TPro  6.5<L>  /  Alb  3.3  /  TBili  0.9  /  DBili  x   /  AST  21  /  ALT  11  /  AlkPhos  70  03-31          Urinalysis Basic - ( 01 Apr 2024 05:38 )    Color: x / Appearance: x / SG: x / pH: x  Gluc: 104 mg/dL / Ketone: x  / Bili: x / Urobili: x   Blood: x / Protein: x / Nitrite: x   Leuk Esterase: x / RBC: x / WBC x   Sq Epi: x / Non Sq Epi: x / Bacteria: x        Culture - Blood (collected 30 Mar 2024 16:20)  Source: .Blood Blood-Peripheral  Preliminary Report (31 Mar 2024 23:01):    No growth at 24 hours    Culture - Blood (collected 30 Mar 2024 16:15)  Source: .Blood Blood-Peripheral  Preliminary Report (31 Mar 2024 23:01):    No growth at 24 hours      CAPILLARY BLOOD GLUCOSE          RADIOLOGY & ADDITIONAL TESTS:  Results Reviewed:   Imaging Personally Reviewed:  Electrocardiogram Personally Reviewed: Lia Landa M.D.    Patient is a 89y old  Female who presents with a chief complaint of htn emergency (31 Mar 2024 17:00)      SUBJECTIVE / OVERNIGHT EVENTS: febrile yesterday up to 102.3. Also with poorly controlled leg pain.     Patient denies chest pain, SOB, abd pain, N/V, fever, chills, dysuria or any other complaints. All remainder ROS negative.     MEDICATIONS  (STANDING):  cloNIDine 0.3 milliGRAM(s) Oral <User Schedule>  enalapril 20 milliGRAM(s) Oral with breakfast  furosemide    Tablet 40 milliGRAM(s) Oral <User Schedule>  lactobacillus acidophilus 1 Tablet(s) Oral daily  metoprolol tartrate 50 milliGRAM(s) Oral <User Schedule>  multivitamin 1 Tablet(s) Oral daily  pantoprazole    Tablet 40 milliGRAM(s) Oral before breakfast  potassium chloride   Powder 10 milliEquivalent(s) Oral daily  rivaroxaban 15 milliGRAM(s) Oral with dinner  simvastatin 20 milliGRAM(s) Oral with breakfast    MEDICATIONS  (PRN):  acetaminophen     Tablet .. 650 milliGRAM(s) Oral every 6 hours PRN Temp greater or equal to 38C (100.4F), Mild Pain (1 - 3)  aluminum hydroxide/magnesium hydroxide/simethicone Suspension 30 milliLiter(s) Oral every 4 hours PRN Dyspepsia  melatonin 3 milliGRAM(s) Oral at bedtime PRN Insomnia  senna 2 Tablet(s) Oral at bedtime PRN Constipation      I&O's Summary    01 Apr 2024 07:01  -  01 Apr 2024 11:04  --------------------------------------------------------  IN: 240 mL / OUT: 0 mL / NET: 240 mL        PHYSICAL EXAM:  Vital Signs Last 24 Hrs  T(C): 36.7 (01 Apr 2024 08:00), Max: 39.1 (31 Mar 2024 16:33)  T(F): 98 (01 Apr 2024 08:00), Max: 102.3 (31 Mar 2024 16:33)  HR: 86 (01 Apr 2024 10:00) (63 - 96)  BP: 109/51 (01 Apr 2024 10:00) (104/54 - 177/95)  BP(mean): 68 (01 Apr 2024 10:00) (60 - 88)  RR: 30 (01 Apr 2024 10:00) (20 - 30)  SpO2: 96% (01 Apr 2024 10:00) (95% - 100%)    Parameters below as of 01 Apr 2024 10:00    O2 Flow (L/min): 2    CONSTITUTIONAL: NAD, well-groomed  RESPIRATORY: Normal respiratory effort; lungs are clear to auscultation bilaterally  CARDIOVASCULAR: Irregular irregular, no LE edema, LLE in immobilizer   ABDOMEN: Nontender to palpation, normoactive bowel sounds  PSYCH: A+O x3; affect appropriate  SKIN: No rashes; no palpable lesions    LABS:                        11.7   10.26 )-----------( 204      ( 01 Apr 2024 05:38 )             35.3     04-01    140  |  100  |  24.5<H>  ----------------------------<  104<H>  4.5   |  28.0  |  0.56    Ca    9.0      01 Apr 2024 05:38  Phos  2.5     03-31  Mg     1.9     03-31    TPro  6.5<L>  /  Alb  3.3  /  TBili  0.9  /  DBili  x   /  AST  21  /  ALT  11  /  AlkPhos  70  03-31          Urinalysis Basic - ( 01 Apr 2024 05:38 )    Color: x / Appearance: x / SG: x / pH: x  Gluc: 104 mg/dL / Ketone: x  / Bili: x / Urobili: x   Blood: x / Protein: x / Nitrite: x   Leuk Esterase: x / RBC: x / WBC x   Sq Epi: x / Non Sq Epi: x / Bacteria: x        Culture - Blood (collected 30 Mar 2024 16:20)  Source: .Blood Blood-Peripheral  Preliminary Report (31 Mar 2024 23:01):    No growth at 24 hours    Culture - Blood (collected 30 Mar 2024 16:15)  Source: .Blood Blood-Peripheral  Preliminary Report (31 Mar 2024 23:01):    No growth at 24 hours      CAPILLARY BLOOD GLUCOSE          RADIOLOGY & ADDITIONAL TESTS:  Results Reviewed:   Imaging Personally Reviewed:  Electrocardiogram Personally Reviewed:

## 2024-04-02 ENCOUNTER — TRANSCRIPTION ENCOUNTER (OUTPATIENT)
Age: 89
End: 2024-04-02

## 2024-04-02 LAB
-  AMOXICILLIN/CLAVULANIC ACID: SIGNIFICANT CHANGE UP
-  AMPICILLIN/SULBACTAM: SIGNIFICANT CHANGE UP
-  AMPICILLIN: SIGNIFICANT CHANGE UP
-  AMPICILLIN: SIGNIFICANT CHANGE UP
-  AZTREONAM: SIGNIFICANT CHANGE UP
-  CEFAZOLIN: SIGNIFICANT CHANGE UP
-  CEFEPIME: SIGNIFICANT CHANGE UP
-  CEFOXITIN: SIGNIFICANT CHANGE UP
-  CEFTRIAXONE: SIGNIFICANT CHANGE UP
-  CEFUROXIME: SIGNIFICANT CHANGE UP
-  CIPROFLOXACIN: SIGNIFICANT CHANGE UP
-  CIPROFLOXACIN: SIGNIFICANT CHANGE UP
-  ERTAPENEM: SIGNIFICANT CHANGE UP
-  GENTAMICIN: SIGNIFICANT CHANGE UP
-  LEVOFLOXACIN: SIGNIFICANT CHANGE UP
-  LEVOFLOXACIN: SIGNIFICANT CHANGE UP
-  MEROPENEM: SIGNIFICANT CHANGE UP
-  NITROFURANTOIN: SIGNIFICANT CHANGE UP
-  NITROFURANTOIN: SIGNIFICANT CHANGE UP
-  PIPERACILLIN/TAZOBACTAM: SIGNIFICANT CHANGE UP
-  TETRACYCLINE: SIGNIFICANT CHANGE UP
-  TOBRAMYCIN: SIGNIFICANT CHANGE UP
-  TRIMETHOPRIM/SULFAMETHOXAZOLE: SIGNIFICANT CHANGE UP
-  VANCOMYCIN: SIGNIFICANT CHANGE UP
CULTURE RESULTS: ABNORMAL
METHOD TYPE: SIGNIFICANT CHANGE UP
METHOD TYPE: SIGNIFICANT CHANGE UP
ORGANISM # SPEC MICROSCOPIC CNT: ABNORMAL
ORGANISM # SPEC MICROSCOPIC CNT: ABNORMAL
ORGANISM # SPEC MICROSCOPIC CNT: SIGNIFICANT CHANGE UP
SPECIMEN SOURCE: SIGNIFICANT CHANGE UP

## 2024-04-02 PROCEDURE — 99232 SBSQ HOSP IP/OBS MODERATE 35: CPT

## 2024-04-02 RX ORDER — CEFTRIAXONE 500 MG/1
1000 INJECTION, POWDER, FOR SOLUTION INTRAMUSCULAR; INTRAVENOUS EVERY 24 HOURS
Refills: 0 | Status: DISCONTINUED | OUTPATIENT
Start: 2024-04-02 | End: 2024-04-03

## 2024-04-02 RX ORDER — POLYETHYLENE GLYCOL 3350 17 G/17G
17 POWDER, FOR SOLUTION ORAL
Refills: 0 | Status: DISCONTINUED | OUTPATIENT
Start: 2024-04-02 | End: 2024-04-03

## 2024-04-02 RX ORDER — SENNA PLUS 8.6 MG/1
2 TABLET ORAL AT BEDTIME
Refills: 0 | Status: DISCONTINUED | OUTPATIENT
Start: 2024-04-02 | End: 2024-04-03

## 2024-04-02 RX ORDER — MINERAL OIL
133 OIL (ML) MISCELLANEOUS DAILY
Refills: 0 | Status: DISCONTINUED | OUTPATIENT
Start: 2024-04-02 | End: 2024-04-03

## 2024-04-02 RX ADMIN — PANTOPRAZOLE SODIUM 40 MILLIGRAM(S): 20 TABLET, DELAYED RELEASE ORAL at 06:12

## 2024-04-02 RX ADMIN — Medication 50 MILLIGRAM(S): at 17:42

## 2024-04-02 RX ADMIN — OXYCODONE HYDROCHLORIDE 5 MILLIGRAM(S): 5 TABLET ORAL at 15:49

## 2024-04-02 RX ADMIN — OXYCODONE HYDROCHLORIDE 5 MILLIGRAM(S): 5 TABLET ORAL at 09:18

## 2024-04-02 RX ADMIN — Medication 50 MILLIGRAM(S): at 09:18

## 2024-04-02 RX ADMIN — Medication 650 MILLIGRAM(S): at 14:55

## 2024-04-02 RX ADMIN — OXYCODONE HYDROCHLORIDE 5 MILLIGRAM(S): 5 TABLET ORAL at 09:48

## 2024-04-02 RX ADMIN — SENNA PLUS 2 TABLET(S): 8.6 TABLET ORAL at 21:50

## 2024-04-02 RX ADMIN — Medication 40 MILLIGRAM(S): at 09:25

## 2024-04-02 RX ADMIN — Medication 1 TABLET(S): at 09:26

## 2024-04-02 RX ADMIN — Medication 0.3 MILLIGRAM(S): at 14:27

## 2024-04-02 RX ADMIN — Medication 0.3 MILLIGRAM(S): at 06:12

## 2024-04-02 RX ADMIN — Medication 20 MILLIGRAM(S): at 09:17

## 2024-04-02 RX ADMIN — SIMVASTATIN 20 MILLIGRAM(S): 20 TABLET, FILM COATED ORAL at 09:18

## 2024-04-02 RX ADMIN — OXYCODONE HYDROCHLORIDE 5 MILLIGRAM(S): 5 TABLET ORAL at 15:19

## 2024-04-02 RX ADMIN — Medication 0.3 MILLIGRAM(S): at 21:50

## 2024-04-02 RX ADMIN — POLYETHYLENE GLYCOL 3350 17 GRAM(S): 17 POWDER, FOR SOLUTION ORAL at 17:42

## 2024-04-02 RX ADMIN — Medication 650 MILLIGRAM(S): at 14:27

## 2024-04-02 RX ADMIN — Medication 50 MILLIGRAM(S): at 14:27

## 2024-04-02 RX ADMIN — RIVAROXABAN 15 MILLIGRAM(S): KIT at 17:43

## 2024-04-02 RX ADMIN — CEFTRIAXONE 1000 MILLIGRAM(S): 500 INJECTION, POWDER, FOR SOLUTION INTRAMUSCULAR; INTRAVENOUS at 09:26

## 2024-04-02 NOTE — PROGRESS NOTE ADULT - ASSESSMENT
89 y/o F w/ PMH of AF (s/p watchman 1 week ago), HTN, HLD presented from [facility] after mechanical fall.  Pt was initially placed in observation.  She was found to have a distal LLE fracture and LE immobilized.  While in observation pt went into AF RVR and BP uncontrolled w/ SBP >200.  Trops also noted to be elevated at 190 but EKG w/out acute ischemic changes.  Cardiology and ortho consulted.  Pt unable to ambulate w/ PT and will need CARISSA placement  Medicine called for admission.      Fever likely UTI /constipation   rocephin/ bowel regimen   negative blood cx/ cxr     Acute traumatic LLE fracture   - CTH negative   - CT pelvis negative   - CT LLE significant for nondisplaced distal fibular fracture and questionable medial malleolus fracture   - WBAT to LLE in boot    - Fall precautions   - PT consulted and recommending CARISSA  - Ortho WBAT RLE in boot with assistive devices  No ortho surgical intervention. Ortho stable. Follow up with Dr. Lundberg in 1-2 weeks as outpatient.       HTN Crisis - now BP controlled  - Resumed all home medications   - c/w home clonidine, Vasotec, Lasix   - downgrade to tele    AF w/ RVR  - s/p watchman procedure last week   - c/w Xarelto uninterrupted for at least 1month s/p watchman   - c/w Metoprolol     Troponin elevation   - likely result of recent watchman procedure   - Repeat trop down trending   - EKG w/ no acute ischemic changes   - BNP elevated but likely from AF rvr   - Cardiology signed off     Chronic HFpEF  - TTE w/ LVEF 55-60% in July 2023  - c/w lasix po     Normocytic anemia  - CBC stable    VTE ppx: c/w xarelto     dc planning  po abx on dc pending urine sensitivities

## 2024-04-02 NOTE — DISCHARGE NOTE NURSING/CASE MANAGEMENT/SOCIAL WORK - NSDCFUADDAPPT_GEN_ALL_CORE_FT
[facility] Patient for Heart Failure  D/C Plan is for [facility]    Please see below for post hospital follow up information     1. [facility] Meds To Bed: [phone] N/A    2. Home Care:  N/A    3. Transitional Care Services: [phone]    4, A. Primary Care Appointment:  N/A    4, B. Specialty Appointment:  Cardiology Follow Up appointment    5. [facility] Education Packet -  Provided    [facility] for Heart Failure  D/C Plan is for [facility]    Please see below for post hospital follow up information     1. [facility] Meds To Bed: [phone] N/A    2. Home Care:  N/A    3. Transitional Care Services: [phone]    4, A. Primary Care Appointment:  N/A    4, B. Specialty Appointment:  [facility] ([phone]) will call the patient after discharge   to schedule follow up Cardiology appointment.    5. [facility] Education Packet -  Provided  Discharge to [facility].    [place] Patient for Heart Failure  D/C Plan is for [facility]    Please see below for post hospital follow up information     1. [facility] Meds To Bed: [phone] N/A    2. Home Care:  N/A    3. Transitional Care Services: [phone]    4, A. Primary Care Appointment:  N/A    4, B. Specialty Appointment:  [facility] ([phone]) will call the patient after discharge   to schedule follow up Cardiology appointment.    5. [facility] Education Packet -  Provided

## 2024-04-02 NOTE — PROGRESS NOTE ADULT - SUBJECTIVE AND OBJECTIVE BOX
seen for fracture    no bm for days  no dysuria  pain ok in left leg in boot     MEDICATIONS  (STANDING):  cefTRIAXone Injectable. 1000 milliGRAM(s) IV Push every 24 hours  cloNIDine 0.3 milliGRAM(s) Oral <User Schedule>  enalapril 20 milliGRAM(s) Oral with breakfast  furosemide    Tablet 40 milliGRAM(s) Oral <User Schedule>  lactobacillus acidophilus 1 Tablet(s) Oral daily  metoprolol tartrate 50 milliGRAM(s) Oral <User Schedule>  multivitamin 1 Tablet(s) Oral daily  pantoprazole    Tablet 40 milliGRAM(s) Oral before breakfast  polyethylene glycol 3350 17 Gram(s) Oral two times a day  rivaroxaban 15 milliGRAM(s) Oral with dinner  senna 2 Tablet(s) Oral at bedtime  simvastatin 20 milliGRAM(s) Oral with breakfast    MEDICATIONS  (PRN):  acetaminophen     Tablet .. 650 milliGRAM(s) Oral every 6 hours PRN Temp greater or equal to 38C (100.4F), Mild Pain (1 - 3)  aluminum hydroxide/magnesium hydroxide/simethicone Suspension 30 milliLiter(s) Oral every 4 hours PRN Dyspepsia  bisacodyl Suppository 10 milliGRAM(s) Rectal daily PRN Constipation  melatonin 3 milliGRAM(s) Oral at bedtime PRN Insomnia  mineral oil enema 133 milliLiter(s) Rectal daily PRN constipation  oxyCODONE    IR 5 milliGRAM(s) Oral every 6 hours PRN Severe Pain (7 - 10)  senna 2 Tablet(s) Oral at bedtime PRN Constipation      Allergies    hydrALAZINE (Other; Flushing)  Lipitor (Other; Flushing)      Vital Signs Last 24 Hrs  T(C): 36.7 (02 Apr 2024 10:45), Max: 36.9 (01 Apr 2024 16:20)  T(F): 98 (02 Apr 2024 10:45), Max: 98.4 (01 Apr 2024 16:20)  HR: 88 (02 Apr 2024 10:45) (77 - 91)  BP: 136/75 (02 Apr 2024 10:45) (125/48 - 160/74)  BP(mean): 106 (01 Apr 2024 20:00) (71 - 106)  RR: 18 (02 Apr 2024 05:48) (18 - 28)  SpO2: 100% (02 Apr 2024 10:45) (97% - 100%)    Parameters below as of 02 Apr 2024 05:48  Patient On (Oxygen Delivery Method): nasal cannula  O2 Flow (L/min): 2      PHYSICAL EXAM:    GENERAL: NAD  CHEST/LUNG: Clear to ausculation bilaterall  HEART: Regular rate and rhythm; S1 S2  ABDOMEN: Soft, Nontender, Bowel sounds present  EXTREMITIES: no  edema left boot   NERVOUS SYSTEM:  Alert & Oriented X3, gen weakness     LABS:                        11.7   10.26 )-----------( 204      ( 01 Apr 2024 05:38 )             35.3     04-01    140  |  100  |  24.5<H>  ----------------------------<  104<H>  4.5   |  28.0  |  0.56    Ca    9.0      01 Apr 2024 05:38        Urinalysis Basic - ( 01 Apr 2024 05:38 )    Color: x / Appearance: x / SG: x / pH: x  Gluc: 104 mg/dL / Ketone: x  / Bili: x / Urobili: x   Blood: x / Protein: x / Nitrite: x   Leuk Esterase: x / RBC: x / WBC x   Sq Epi: x / Non Sq Epi: x / Bacteria: x        CAPILLARY BLOOD GLUCOSE            RADIOLOGY & ADDITIONAL TESTS:

## 2024-04-02 NOTE — DISCHARGE NOTE NURSING/CASE MANAGEMENT/SOCIAL WORK - PATIENT PORTAL LINK FT
You can access the FollowMyHealth Patient Portal offered by [facility] by registering at the following website: [email or web address]. By joining [email or web address] FollowMyHealth portal, you will also be able to view your health information using other applications (apps) compatible with our system.

## 2024-04-03 VITALS
TEMPERATURE: 98 F | DIASTOLIC BLOOD PRESSURE: 70 MMHG | RESPIRATION RATE: 18 BRPM | HEART RATE: 89 BPM | OXYGEN SATURATION: 97 % | SYSTOLIC BLOOD PRESSURE: 149 MMHG

## 2024-04-03 PROCEDURE — 93005 ELECTROCARDIOGRAM TRACING: CPT

## 2024-04-03 PROCEDURE — 0225U NFCT DS DNA&RNA 21 SARSCOV2: CPT

## 2024-04-03 PROCEDURE — 99285 EMERGENCY DEPT VISIT HI MDM: CPT | Mod: 25

## 2024-04-03 PROCEDURE — 85730 THROMBOPLASTIN TIME PARTIAL: CPT

## 2024-04-03 PROCEDURE — 86900 BLOOD TYPING SEROLOGIC ABO: CPT

## 2024-04-03 PROCEDURE — 87086 URINE CULTURE/COLONY COUNT: CPT

## 2024-04-03 PROCEDURE — 80048 BASIC METABOLIC PNL TOTAL CA: CPT

## 2024-04-03 PROCEDURE — 73502 X-RAY EXAM HIP UNI 2-3 VIEWS: CPT

## 2024-04-03 PROCEDURE — 81001 URINALYSIS AUTO W/SCOPE: CPT

## 2024-04-03 PROCEDURE — 70450 CT HEAD/BRAIN W/O DYE: CPT | Mod: MC

## 2024-04-03 PROCEDURE — 85027 COMPLETE CBC AUTOMATED: CPT

## 2024-04-03 PROCEDURE — 87040 BLOOD CULTURE FOR BACTERIA: CPT

## 2024-04-03 PROCEDURE — 36415 COLL VENOUS BLD VENIPUNCTURE: CPT

## 2024-04-03 PROCEDURE — 86901 BLOOD TYPING SEROLOGIC RH(D): CPT

## 2024-04-03 PROCEDURE — 72192 CT PELVIS W/O DYE: CPT | Mod: MC

## 2024-04-03 PROCEDURE — 99239 HOSP IP/OBS DSCHRG MGMT >30: CPT

## 2024-04-03 PROCEDURE — 84145 PROCALCITONIN (PCT): CPT

## 2024-04-03 PROCEDURE — 73620 X-RAY EXAM OF FOOT: CPT

## 2024-04-03 PROCEDURE — 86850 RBC ANTIBODY SCREEN: CPT

## 2024-04-03 PROCEDURE — 73610 X-RAY EXAM OF ANKLE: CPT

## 2024-04-03 PROCEDURE — 87641 MR-STAPH DNA AMP PROBE: CPT

## 2024-04-03 PROCEDURE — 87640 STAPH A DNA AMP PROBE: CPT

## 2024-04-03 PROCEDURE — 87077 CULTURE AEROBIC IDENTIFY: CPT

## 2024-04-03 PROCEDURE — 80053 COMPREHEN METABOLIC PANEL: CPT

## 2024-04-03 PROCEDURE — 85610 PROTHROMBIN TIME: CPT

## 2024-04-03 PROCEDURE — G0378: CPT

## 2024-04-03 PROCEDURE — 87637 SARSCOV2&INF A&B&RSV AMP PRB: CPT

## 2024-04-03 PROCEDURE — 87186 SC STD MICRODIL/AGAR DIL: CPT

## 2024-04-03 PROCEDURE — 73700 CT LOWER EXTREMITY W/O DYE: CPT | Mod: MC

## 2024-04-03 PROCEDURE — 83735 ASSAY OF MAGNESIUM: CPT

## 2024-04-03 PROCEDURE — 85025 COMPLETE CBC W/AUTO DIFF WBC: CPT

## 2024-04-03 PROCEDURE — 73562 X-RAY EXAM OF KNEE 3: CPT

## 2024-04-03 PROCEDURE — 84100 ASSAY OF PHOSPHORUS: CPT

## 2024-04-03 PROCEDURE — 71045 X-RAY EXAM CHEST 1 VIEW: CPT

## 2024-04-03 PROCEDURE — 84484 ASSAY OF TROPONIN QUANT: CPT

## 2024-04-03 PROCEDURE — 83880 ASSAY OF NATRIURETIC PEPTIDE: CPT

## 2024-04-03 PROCEDURE — 96374 THER/PROPH/DIAG INJ IV PUSH: CPT

## 2024-04-03 RX ORDER — SENNA PLUS 8.6 MG/1
2 TABLET ORAL
Qty: 0 | Refills: 0 | DISCHARGE
Start: 2024-04-03

## 2024-04-03 RX ORDER — CEFPODOXIME PROXETIL 100 MG
1 TABLET ORAL
Qty: 0 | Refills: 0 | DISCHARGE

## 2024-04-03 RX ORDER — POLYETHYLENE GLYCOL 3350 17 G/17G
17 POWDER, FOR SOLUTION ORAL
Qty: 0 | Refills: 0 | DISCHARGE
Start: 2024-04-03

## 2024-04-03 RX ORDER — OXYCODONE HYDROCHLORIDE 5 MG/1
1 TABLET ORAL
Qty: 0 | Refills: 0 | DISCHARGE
Start: 2024-04-03

## 2024-04-03 RX ORDER — MINERAL OIL
133 OIL (ML) MISCELLANEOUS
Qty: 0 | Refills: 0 | DISCHARGE
Start: 2024-04-03

## 2024-04-03 RX ORDER — PANTOPRAZOLE SODIUM 20 MG/1
1 TABLET, DELAYED RELEASE ORAL
Qty: 0 | Refills: 0 | DISCHARGE
Start: 2024-04-03

## 2024-04-03 RX ORDER — ACETAMINOPHEN 500 MG
2 TABLET ORAL
Qty: 0 | Refills: 0 | DISCHARGE
Start: 2024-04-03

## 2024-04-03 RX ADMIN — Medication 0.3 MILLIGRAM(S): at 05:01

## 2024-04-03 RX ADMIN — OXYCODONE HYDROCHLORIDE 5 MILLIGRAM(S): 5 TABLET ORAL at 10:07

## 2024-04-03 RX ADMIN — SIMVASTATIN 20 MILLIGRAM(S): 20 TABLET, FILM COATED ORAL at 09:34

## 2024-04-03 RX ADMIN — OXYCODONE HYDROCHLORIDE 5 MILLIGRAM(S): 5 TABLET ORAL at 09:37

## 2024-04-03 RX ADMIN — CEFTRIAXONE 1000 MILLIGRAM(S): 500 INJECTION, POWDER, FOR SOLUTION INTRAMUSCULAR; INTRAVENOUS at 09:34

## 2024-04-03 RX ADMIN — Medication 1 TABLET(S): at 11:40

## 2024-04-03 RX ADMIN — Medication 20 MILLIGRAM(S): at 09:34

## 2024-04-03 RX ADMIN — PANTOPRAZOLE SODIUM 40 MILLIGRAM(S): 20 TABLET, DELAYED RELEASE ORAL at 05:02

## 2024-04-03 RX ADMIN — POLYETHYLENE GLYCOL 3350 17 GRAM(S): 17 POWDER, FOR SOLUTION ORAL at 05:02

## 2024-04-03 RX ADMIN — Medication 40 MILLIGRAM(S): at 09:37

## 2024-04-03 RX ADMIN — Medication 50 MILLIGRAM(S): at 09:34

## 2024-05-06 ENCOUNTER — APPOINTMENT (OUTPATIENT)
Dept: ORTHOPEDIC SURGERY | Facility: CLINIC | Age: 89
End: 2024-05-06
Payer: MEDICARE

## 2024-05-06 VITALS
SYSTOLIC BLOOD PRESSURE: 160 MMHG | DIASTOLIC BLOOD PRESSURE: 72 MMHG | BODY MASS INDEX: 21.69 KG/M2 | HEART RATE: 85 BPM | HEIGHT: 66 IN | WEIGHT: 135 LBS

## 2024-05-06 DIAGNOSIS — M25.512 PAIN IN LEFT SHOULDER: ICD-10-CM

## 2024-05-06 DIAGNOSIS — M25.532 PAIN IN LEFT WRIST: ICD-10-CM

## 2024-05-06 PROCEDURE — 73030 X-RAY EXAM OF SHOULDER: CPT | Mod: LT

## 2024-05-06 PROCEDURE — 73610 X-RAY EXAM OF ANKLE: CPT | Mod: LT

## 2024-05-06 PROCEDURE — 99204 OFFICE O/P NEW MOD 45 MIN: CPT

## 2024-05-06 PROCEDURE — 73110 X-RAY EXAM OF WRIST: CPT | Mod: LT

## 2024-05-06 PROCEDURE — 99214 OFFICE O/P EST MOD 30 MIN: CPT

## 2024-06-03 ENCOUNTER — APPOINTMENT (OUTPATIENT)
Dept: ORTHOPEDIC SURGERY | Facility: CLINIC | Age: 89
End: 2024-06-03

## 2024-06-03 DIAGNOSIS — S82.842D DISPLACED BIMALLEOLAR FRACTURE OF LEFT LOWER LEG, SUBSEQUENT ENCOUNTER FOR CLOSED FRACTURE WITH ROUTINE HEALING: ICD-10-CM

## 2024-06-03 PROCEDURE — 73610 X-RAY EXAM OF ANKLE: CPT | Mod: 26,LT

## 2024-06-03 PROCEDURE — 99024 POSTOP FOLLOW-UP VISIT: CPT

## 2024-06-08 PROBLEM — S82.842D CLOSED BIMALLEOLAR FRACTURE OF LEFT ANKLE WITH ROUTINE HEALING, SUBSEQUENT ENCOUNTER: Status: ACTIVE | Noted: 2024-05-05

## 2024-06-27 NOTE — ED ADULT TRIAGE NOTE - WEIGHT METHOD
Refill Request     CONFIRM preferrred pharmacy with the patient.    If Mail Order Rx - Pend for 90 day refill.      Last Seen: Last Seen Department: 4/18/2024  Last Seen by PCP: 4/18/2024    Last Written: 4.8.24     If no future appointment scheduled, route STAFF MESSAGE with patient name to the  Pool for scheduling.      Next Appointment:   Future Appointments   Date Time Provider Department Center   10/17/2024  1:00 PM Thu Yoder, APRN - CNP [facility]  [place] [facility]       Message sent to  to schedule appt with patient?  NO      celecoxib (CELEBREX) 200 MG capsule [[id number]]       stated

## 2024-07-15 ENCOUNTER — EMERGENCY (EMERGENCY)
Facility: HOSPITAL | Age: 89
LOS: 1 days | Discharge: NURSING, REHAB OR LONG-TERM CARE | End: 2024-07-15
Attending: STUDENT IN AN ORGANIZED HEALTH CARE EDUCATION/TRAINING PROGRAM | Admitting: STUDENT IN AN ORGANIZED HEALTH CARE EDUCATION/TRAINING PROGRAM

## 2024-07-15 VITALS
OXYGEN SATURATION: 96 % | WEIGHT: 139.99 LBS | RESPIRATION RATE: 18 BRPM | HEART RATE: 85 BPM | DIASTOLIC BLOOD PRESSURE: 75 MMHG | TEMPERATURE: 97 F | SYSTOLIC BLOOD PRESSURE: 120 MMHG

## 2024-07-15 DIAGNOSIS — Z90.710 ACQUIRED ABSENCE OF BOTH CERVIX AND UTERUS: Chronic | ICD-10-CM

## 2024-07-15 LAB
ALBUMIN SERPL ELPH-MCNC: 4 G/DL — SIGNIFICANT CHANGE UP (ref 3.3–5)
ALP SERPL-CCNC: 81 U/L — SIGNIFICANT CHANGE UP (ref 40–120)
ALT FLD-CCNC: 12 U/L — SIGNIFICANT CHANGE UP (ref 4–33)
ANION GAP SERPL CALC-SCNC: 16 MMOL/L — HIGH (ref 7–14)
AST SERPL-CCNC: 26 U/L — SIGNIFICANT CHANGE UP (ref 4–32)
BASOPHILS # BLD AUTO: 0.03 K/UL — SIGNIFICANT CHANGE UP (ref 0–0.2)
BASOPHILS NFR BLD AUTO: 0.3 % — SIGNIFICANT CHANGE UP (ref 0–2)
BILIRUB SERPL-MCNC: 0.7 MG/DL — SIGNIFICANT CHANGE UP (ref 0.2–1.2)
BUN SERPL-MCNC: 50 MG/DL — HIGH (ref 7–23)
CALCIUM SERPL-MCNC: 9.8 MG/DL — SIGNIFICANT CHANGE UP (ref 8.4–10.5)
CHLORIDE SERPL-SCNC: 102 MMOL/L — SIGNIFICANT CHANGE UP (ref 98–107)
CO2 SERPL-SCNC: 20 MMOL/L — LOW (ref 22–31)
CREAT SERPL-MCNC: 1.04 MG/DL — SIGNIFICANT CHANGE UP (ref 0.5–1.3)
EGFR: 51 ML/MIN/1.73M2 — LOW
EOSINOPHIL # BLD AUTO: 0.04 K/UL — SIGNIFICANT CHANGE UP (ref 0–0.5)
EOSINOPHIL NFR BLD AUTO: 0.3 % — SIGNIFICANT CHANGE UP (ref 0–6)
GLUCOSE SERPL-MCNC: 120 MG/DL — HIGH (ref 70–99)
HCT VFR BLD CALC: 37.7 % — SIGNIFICANT CHANGE UP (ref 34.5–45)
HGB BLD-MCNC: 12.9 G/DL — SIGNIFICANT CHANGE UP (ref 11.5–15.5)
IANC: 9.69 K/UL — HIGH (ref 1.8–7.4)
IMM GRANULOCYTES NFR BLD AUTO: 0.4 % — SIGNIFICANT CHANGE UP (ref 0–0.9)
LIDOCAIN IGE QN: 27 U/L — SIGNIFICANT CHANGE UP (ref 7–60)
LYMPHOCYTES # BLD AUTO: 1.12 K/UL — SIGNIFICANT CHANGE UP (ref 1–3.3)
LYMPHOCYTES # BLD AUTO: 9.8 % — LOW (ref 13–44)
MCHC RBC-ENTMCNC: 31.1 PG — SIGNIFICANT CHANGE UP (ref 27–34)
MCHC RBC-ENTMCNC: 34.2 GM/DL — SIGNIFICANT CHANGE UP (ref 32–36)
MCV RBC AUTO: 90.8 FL — SIGNIFICANT CHANGE UP (ref 80–100)
MONOCYTES # BLD AUTO: 0.54 K/UL — SIGNIFICANT CHANGE UP (ref 0–0.9)
MONOCYTES NFR BLD AUTO: 4.7 % — SIGNIFICANT CHANGE UP (ref 2–14)
NEUTROPHILS # BLD AUTO: 9.69 K/UL — HIGH (ref 1.8–7.4)
NEUTROPHILS NFR BLD AUTO: 84.5 % — HIGH (ref 43–77)
NRBC # BLD: 0 /100 WBCS — SIGNIFICANT CHANGE UP (ref 0–0)
NRBC # FLD: 0 K/UL — SIGNIFICANT CHANGE UP (ref 0–0)
PLATELET # BLD AUTO: 308 K/UL — SIGNIFICANT CHANGE UP (ref 150–400)
POTASSIUM SERPL-MCNC: 5.7 MMOL/L — HIGH (ref 3.5–5.3)
POTASSIUM SERPL-SCNC: 5.7 MMOL/L — HIGH (ref 3.5–5.3)
PROT SERPL-MCNC: 8.2 G/DL — SIGNIFICANT CHANGE UP (ref 6–8.3)
RBC # BLD: 4.15 M/UL — SIGNIFICANT CHANGE UP (ref 3.8–5.2)
RBC # FLD: 16.4 % — HIGH (ref 10.3–14.5)
SODIUM SERPL-SCNC: 138 MMOL/L — SIGNIFICANT CHANGE UP (ref 135–145)
WBC # BLD: 11.47 K/UL — HIGH (ref 3.8–10.5)
WBC # FLD AUTO: 11.47 K/UL — HIGH (ref 3.8–10.5)

## 2024-07-15 PROCEDURE — 99284 EMERGENCY DEPT VISIT MOD MDM: CPT | Mod: FS

## 2024-07-15 RX ORDER — SODIUM CHLORIDE 0.9 % (FLUSH) 0.9 %
500 SYRINGE (ML) INJECTION ONCE
Refills: 0 | Status: COMPLETED | OUTPATIENT
Start: 2024-07-15 | End: 2024-07-15

## 2024-07-15 RX ADMIN — Medication 500 MILLILITER(S): at 23:02

## 2024-07-16 VITALS
OXYGEN SATURATION: 97 % | RESPIRATION RATE: 18 BRPM | DIASTOLIC BLOOD PRESSURE: 83 MMHG | SYSTOLIC BLOOD PRESSURE: 151 MMHG | HEART RATE: 88 BPM | TEMPERATURE: 97 F

## 2024-07-16 LAB
ANION GAP SERPL CALC-SCNC: 17 MMOL/L — HIGH (ref 7–14)
BUN SERPL-MCNC: 45 MG/DL — HIGH (ref 7–23)
CALCIUM SERPL-MCNC: 8.8 MG/DL — SIGNIFICANT CHANGE UP (ref 8.4–10.5)
CHLORIDE SERPL-SCNC: 102 MMOL/L — SIGNIFICANT CHANGE UP (ref 98–107)
CO2 SERPL-SCNC: 19 MMOL/L — LOW (ref 22–31)
CREAT SERPL-MCNC: 0.92 MG/DL — SIGNIFICANT CHANGE UP (ref 0.5–1.3)
EGFR: 59 ML/MIN/1.73M2 — LOW
FLUAV AG NPH QL: SIGNIFICANT CHANGE UP
FLUBV AG NPH QL: SIGNIFICANT CHANGE UP
GLUCOSE SERPL-MCNC: 104 MG/DL — HIGH (ref 70–99)
MAGNESIUM SERPL-MCNC: 1.9 MG/DL — SIGNIFICANT CHANGE UP (ref 1.6–2.6)
PHOSPHATE SERPL-MCNC: 3.9 MG/DL — SIGNIFICANT CHANGE UP (ref 2.5–4.5)
POTASSIUM SERPL-MCNC: 4 MMOL/L — SIGNIFICANT CHANGE UP (ref 3.5–5.3)
POTASSIUM SERPL-SCNC: 4 MMOL/L — SIGNIFICANT CHANGE UP (ref 3.5–5.3)
RSV RNA NPH QL NAA+NON-PROBE: SIGNIFICANT CHANGE UP
SARS-COV-2 RNA SPEC QL NAA+PROBE: SIGNIFICANT CHANGE UP
SODIUM SERPL-SCNC: 138 MMOL/L — SIGNIFICANT CHANGE UP (ref 135–145)

## 2024-07-16 RX ORDER — ONDANSETRON HYDROCHLORIDE 2 MG/ML
4 INJECTION INTRAMUSCULAR; INTRAVENOUS ONCE
Refills: 0 | Status: COMPLETED | OUTPATIENT
Start: 2024-07-16 | End: 2024-07-16

## 2024-07-16 RX ORDER — SODIUM ZIRCONIUM CYCLOSILICATE 10 G/10G
10 POWDER, FOR SUSPENSION ORAL ONCE
Refills: 0 | Status: COMPLETED | OUTPATIENT
Start: 2024-07-16 | End: 2024-07-16

## 2024-07-16 RX ORDER — SODIUM CHLORIDE 0.9 % (FLUSH) 0.9 %
500 SYRINGE (ML) INJECTION ONCE
Refills: 0 | Status: COMPLETED | OUTPATIENT
Start: 2024-07-16 | End: 2024-07-16

## 2024-07-16 RX ADMIN — SODIUM ZIRCONIUM CYCLOSILICATE 10 GRAM(S): 10 POWDER, FOR SUSPENSION ORAL at 01:13

## 2024-07-16 RX ADMIN — ONDANSETRON HYDROCHLORIDE 4 MILLIGRAM(S): 2 INJECTION INTRAMUSCULAR; INTRAVENOUS at 04:19

## 2024-07-16 RX ADMIN — Medication 500 MILLILITER(S): at 01:12

## 2024-07-16 RX ADMIN — ONDANSETRON HYDROCHLORIDE 4 MILLIGRAM(S): 2 INJECTION INTRAMUSCULAR; INTRAVENOUS at 01:12

## 2024-07-24 ENCOUNTER — TRANSCRIPTION ENCOUNTER (OUTPATIENT)
Age: 89
End: 2024-07-24

## 2024-07-26 ENCOUNTER — TRANSCRIPTION ENCOUNTER (OUTPATIENT)
Age: 89
End: 2024-07-26

## 2024-08-23 RX ORDER — CEFUROXIME SODIUM 1.5 G
1 VIAL (EA) INJECTION
Refills: 0 | DISCHARGE
Start: 2024-08-23 | End: 2024-09-02

## 2024-08-26 ENCOUNTER — APPOINTMENT (OUTPATIENT)
Dept: ORTHOPEDIC SURGERY | Facility: CLINIC | Age: 89
End: 2024-08-26

## 2024-08-29 ENCOUNTER — INPATIENT (INPATIENT)
Facility: HOSPITAL | Age: 89
LOS: 9 days | Discharge: NURSING, REHAB OR LONG-TERM CARE | DRG: 392 | End: 2024-09-08
Attending: INTERNAL MEDICINE | Admitting: INTERNAL MEDICINE
Payer: MEDICARE

## 2024-08-29 VITALS
OXYGEN SATURATION: 93 % | TEMPERATURE: 99 F | HEART RATE: 90 BPM | SYSTOLIC BLOOD PRESSURE: 132 MMHG | HEIGHT: 64 IN | DIASTOLIC BLOOD PRESSURE: 74 MMHG | WEIGHT: 110.01 LBS | RESPIRATION RATE: 20 BRPM

## 2024-08-29 DIAGNOSIS — I48.20 CHRONIC ATRIAL FIBRILLATION, UNSPECIFIED: ICD-10-CM

## 2024-08-29 DIAGNOSIS — S81.819A LACERATION WITHOUT FOREIGN BODY, UNSPECIFIED LOWER LEG, INITIAL ENCOUNTER: ICD-10-CM

## 2024-08-29 DIAGNOSIS — I50.22 CHRONIC SYSTOLIC (CONGESTIVE) HEART FAILURE: ICD-10-CM

## 2024-08-29 DIAGNOSIS — R11.2 NAUSEA WITH VOMITING, UNSPECIFIED: ICD-10-CM

## 2024-08-29 DIAGNOSIS — A41.9 SEPSIS, UNSPECIFIED ORGANISM: ICD-10-CM

## 2024-08-29 DIAGNOSIS — K80.50 CALCULUS OF BILE DUCT WITHOUT CHOLANGITIS OR CHOLECYSTITIS WITHOUT OBSTRUCTION: ICD-10-CM

## 2024-08-29 DIAGNOSIS — Z90.710 ACQUIRED ABSENCE OF BOTH CERVIX AND UTERUS: Chronic | ICD-10-CM

## 2024-08-29 DIAGNOSIS — I10 ESSENTIAL (PRIMARY) HYPERTENSION: ICD-10-CM

## 2024-08-29 DIAGNOSIS — K83.1 OBSTRUCTION OF BILE DUCT: ICD-10-CM

## 2024-08-29 DIAGNOSIS — Z29.9 ENCOUNTER FOR PROPHYLACTIC MEASURES, UNSPECIFIED: ICD-10-CM

## 2024-08-29 LAB
ALBUMIN SERPL ELPH-MCNC: 3.4 G/DL — SIGNIFICANT CHANGE UP (ref 3.3–5)
ALP SERPL-CCNC: 80 U/L — SIGNIFICANT CHANGE UP (ref 40–120)
ALT FLD-CCNC: 12 U/L — SIGNIFICANT CHANGE UP (ref 10–45)
ANION GAP SERPL CALC-SCNC: 13 MMOL/L — SIGNIFICANT CHANGE UP (ref 5–17)
ANISOCYTOSIS BLD QL: SLIGHT — SIGNIFICANT CHANGE UP
APPEARANCE UR: ABNORMAL
APTT BLD: 34.6 SEC — SIGNIFICANT CHANGE UP (ref 24.5–35.6)
AST SERPL-CCNC: 23 U/L — SIGNIFICANT CHANGE UP (ref 10–40)
BACTERIA # UR AUTO: NEGATIVE /HPF — SIGNIFICANT CHANGE UP
BASOPHILS # BLD AUTO: 0 K/UL — SIGNIFICANT CHANGE UP (ref 0–0.2)
BASOPHILS NFR BLD AUTO: 0 % — SIGNIFICANT CHANGE UP (ref 0–2)
BILIRUB SERPL-MCNC: 0.8 MG/DL — SIGNIFICANT CHANGE UP (ref 0.2–1.2)
BILIRUB UR-MCNC: NEGATIVE — SIGNIFICANT CHANGE UP
BUN SERPL-MCNC: 15 MG/DL — SIGNIFICANT CHANGE UP (ref 7–23)
CALCIUM SERPL-MCNC: 9 MG/DL — SIGNIFICANT CHANGE UP (ref 8.4–10.5)
CAST: 11 /LPF — HIGH (ref 0–4)
CHLORIDE SERPL-SCNC: 99 MMOL/L — SIGNIFICANT CHANGE UP (ref 96–108)
CO2 SERPL-SCNC: 28 MMOL/L — SIGNIFICANT CHANGE UP (ref 22–31)
COLOR SPEC: SIGNIFICANT CHANGE UP
COMMENT - URINE: SIGNIFICANT CHANGE UP
CREAT SERPL-MCNC: 0.53 MG/DL — SIGNIFICANT CHANGE UP (ref 0.5–1.3)
DACRYOCYTES BLD QL SMEAR: SLIGHT — SIGNIFICANT CHANGE UP
DIFF PNL FLD: NEGATIVE — SIGNIFICANT CHANGE UP
EGFR: 88 ML/MIN/1.73M2 — SIGNIFICANT CHANGE UP
EOSINOPHIL # BLD AUTO: 0 K/UL — SIGNIFICANT CHANGE UP (ref 0–0.5)
EOSINOPHIL NFR BLD AUTO: 0 % — SIGNIFICANT CHANGE UP (ref 0–6)
FLUAV AG NPH QL: SIGNIFICANT CHANGE UP
FLUBV AG NPH QL: SIGNIFICANT CHANGE UP
GAS PNL BLDV: SIGNIFICANT CHANGE UP
GLUCOSE SERPL-MCNC: 138 MG/DL — HIGH (ref 70–99)
GLUCOSE UR QL: NEGATIVE MG/DL — SIGNIFICANT CHANGE UP
HCT VFR BLD CALC: 31.3 % — LOW (ref 34.5–45)
HGB BLD-MCNC: 10.2 G/DL — LOW (ref 11.5–15.5)
INR BLD: 2.72 RATIO — HIGH (ref 0.85–1.18)
KETONES UR-MCNC: ABNORMAL MG/DL
LACTATE SERPL-SCNC: 1.9 MMOL/L — SIGNIFICANT CHANGE UP (ref 0.5–2)
LACTATE SERPL-SCNC: 2.6 MMOL/L — HIGH (ref 0.5–2)
LEUKOCYTE ESTERASE UR-ACNC: ABNORMAL
LYMPHOCYTES # BLD AUTO: 0.44 K/UL — LOW (ref 1–3.3)
LYMPHOCYTES # BLD AUTO: 1.7 % — LOW (ref 13–44)
MACROCYTES BLD QL: SLIGHT — SIGNIFICANT CHANGE UP
MANUAL SMEAR VERIFICATION: SIGNIFICANT CHANGE UP
MCHC RBC-ENTMCNC: 32.4 PG — SIGNIFICANT CHANGE UP (ref 27–34)
MCHC RBC-ENTMCNC: 32.6 GM/DL — SIGNIFICANT CHANGE UP (ref 32–36)
MCV RBC AUTO: 99.4 FL — SIGNIFICANT CHANGE UP (ref 80–100)
MONOCYTES # BLD AUTO: 0.2 K/UL — SIGNIFICANT CHANGE UP (ref 0–0.9)
MONOCYTES NFR BLD AUTO: 0.8 % — LOW (ref 2–14)
NEUTROPHILS # BLD AUTO: 24.95 K/UL — HIGH (ref 1.8–7.4)
NEUTROPHILS NFR BLD AUTO: 91.7 % — HIGH (ref 43–77)
NEUTS BAND # BLD: 5.8 % — SIGNIFICANT CHANGE UP (ref 0–8)
NITRITE UR-MCNC: NEGATIVE — SIGNIFICANT CHANGE UP
OVALOCYTES BLD QL SMEAR: SLIGHT — SIGNIFICANT CHANGE UP
PH UR: 5 — SIGNIFICANT CHANGE UP (ref 5–8)
PLAT MORPH BLD: NORMAL — SIGNIFICANT CHANGE UP
PLATELET # BLD AUTO: 271 K/UL — SIGNIFICANT CHANGE UP (ref 150–400)
POIKILOCYTOSIS BLD QL AUTO: SLIGHT — SIGNIFICANT CHANGE UP
POTASSIUM SERPL-MCNC: 4.3 MMOL/L — SIGNIFICANT CHANGE UP (ref 3.5–5.3)
POTASSIUM SERPL-SCNC: 4.3 MMOL/L — SIGNIFICANT CHANGE UP (ref 3.5–5.3)
PROT SERPL-MCNC: 7.2 G/DL — SIGNIFICANT CHANGE UP (ref 6–8.3)
PROT UR-MCNC: 100 MG/DL
PROTHROM AB SERPL-ACNC: 29.1 SEC — HIGH (ref 9.5–13)
RBC # BLD: 3.15 M/UL — LOW (ref 3.8–5.2)
RBC # FLD: 18.6 % — HIGH (ref 10.3–14.5)
RBC BLD AUTO: ABNORMAL
RBC CASTS # UR COMP ASSIST: 4 /HPF — SIGNIFICANT CHANGE UP (ref 0–4)
REVIEW: SIGNIFICANT CHANGE UP
RSV RNA NPH QL NAA+NON-PROBE: SIGNIFICANT CHANGE UP
SARS-COV-2 RNA SPEC QL NAA+PROBE: SIGNIFICANT CHANGE UP
SODIUM SERPL-SCNC: 140 MMOL/L — SIGNIFICANT CHANGE UP (ref 135–145)
SP GR SPEC: 1.02 — SIGNIFICANT CHANGE UP (ref 1–1.03)
SQUAMOUS # UR AUTO: 8 /HPF — HIGH (ref 0–5)
STOMATOCYTES BLD QL SMEAR: SLIGHT — SIGNIFICANT CHANGE UP
UROBILINOGEN FLD QL: 0.2 MG/DL — SIGNIFICANT CHANGE UP (ref 0.2–1)
WBC # BLD: 25.59 K/UL — HIGH (ref 3.8–10.5)
WBC # FLD AUTO: 25.59 K/UL — HIGH (ref 3.8–10.5)
WBC UR QL: 7 /HPF — HIGH (ref 0–5)

## 2024-08-29 PROCEDURE — 74177 CT ABD & PELVIS W/CONTRAST: CPT | Mod: 26,MC

## 2024-08-29 PROCEDURE — 99223 1ST HOSP IP/OBS HIGH 75: CPT

## 2024-08-29 PROCEDURE — 76705 ECHO EXAM OF ABDOMEN: CPT | Mod: 26

## 2024-08-29 PROCEDURE — 71045 X-RAY EXAM CHEST 1 VIEW: CPT | Mod: 26

## 2024-08-29 PROCEDURE — 99285 EMERGENCY DEPT VISIT HI MDM: CPT | Mod: GC

## 2024-08-29 RX ORDER — METOPROLOL TARTRATE 100 MG/1
50 TABLET ORAL DAILY
Refills: 0 | Status: DISCONTINUED | OUTPATIENT
Start: 2024-08-29 | End: 2024-09-08

## 2024-08-29 RX ORDER — ACETAMINOPHEN 325 MG/1
975 TABLET ORAL EVERY 6 HOURS
Refills: 0 | Status: DISCONTINUED | OUTPATIENT
Start: 2024-08-29 | End: 2024-09-08

## 2024-08-29 RX ORDER — CLONIDINE HCL 0.2 MG
0.1 TABLET ORAL
Refills: 0 | Status: DISCONTINUED | OUTPATIENT
Start: 2024-08-29 | End: 2024-09-08

## 2024-08-29 RX ORDER — FAMOTIDINE 10 MG/ML
20 INJECTION INTRAVENOUS ONCE
Refills: 0 | Status: COMPLETED | OUTPATIENT
Start: 2024-08-29 | End: 2024-08-29

## 2024-08-29 RX ORDER — ACETAMINOPHEN 325 MG/1
750 TABLET ORAL ONCE
Refills: 0 | Status: COMPLETED | OUTPATIENT
Start: 2024-08-29 | End: 2024-08-29

## 2024-08-29 RX ORDER — VANCOMYCIN/0.9 % SOD CHLORIDE 1.75G/25
1000 PLASTIC BAG, INJECTION (ML) INTRAVENOUS ONCE
Refills: 0 | Status: COMPLETED | OUTPATIENT
Start: 2024-08-29 | End: 2024-08-29

## 2024-08-29 RX ORDER — PIPERACILLIN SODIUM AND TAZOBACTAM SODIUM 3; .375 G/15ML; G/15ML
3.38 INJECTION, POWDER, FOR SOLUTION INTRAVENOUS EVERY 8 HOURS
Refills: 0 | Status: DISCONTINUED | OUTPATIENT
Start: 2024-08-30 | End: 2024-09-04

## 2024-08-29 RX ORDER — PIPERACILLIN SODIUM AND TAZOBACTAM SODIUM 3; .375 G/15ML; G/15ML
3.38 INJECTION, POWDER, FOR SOLUTION INTRAVENOUS ONCE
Refills: 0 | Status: COMPLETED | OUTPATIENT
Start: 2024-08-30 | End: 2024-08-30

## 2024-08-29 RX ORDER — ENOXAPARIN SODIUM 100 MG/ML
40 INJECTION SUBCUTANEOUS EVERY 24 HOURS
Refills: 0 | Status: DISCONTINUED | OUTPATIENT
Start: 2024-08-29 | End: 2024-09-08

## 2024-08-29 RX ORDER — ONDANSETRON 2 MG/ML
4 INJECTION, SOLUTION INTRAMUSCULAR; INTRAVENOUS ONCE
Refills: 0 | Status: COMPLETED | OUTPATIENT
Start: 2024-08-29 | End: 2024-08-29

## 2024-08-29 RX ORDER — BACITRACIN 500 UNIT/G
1 OINTMENT (GRAM) TOPICAL EVERY 8 HOURS
Refills: 0 | Status: DISCONTINUED | OUTPATIENT
Start: 2024-08-29 | End: 2024-08-31

## 2024-08-29 RX ORDER — MAGNESIUM, ALUMINUM HYDROXIDE 200-225/5
30 SUSPENSION, ORAL (FINAL DOSE FORM) ORAL ONCE
Refills: 0 | Status: COMPLETED | OUTPATIENT
Start: 2024-08-29 | End: 2024-08-29

## 2024-08-29 RX ORDER — PIPERACILLIN SODIUM AND TAZOBACTAM SODIUM 3; .375 G/15ML; G/15ML
3.38 INJECTION, POWDER, FOR SOLUTION INTRAVENOUS ONCE
Refills: 0 | Status: COMPLETED | OUTPATIENT
Start: 2024-08-29 | End: 2024-08-29

## 2024-08-29 RX ORDER — ONDANSETRON 2 MG/ML
4 INJECTION, SOLUTION INTRAMUSCULAR; INTRAVENOUS EVERY 8 HOURS
Refills: 0 | Status: DISCONTINUED | OUTPATIENT
Start: 2024-08-29 | End: 2024-09-08

## 2024-08-29 RX ORDER — SIMVASTATIN 10 MG
20 TABLET ORAL AT BEDTIME
Refills: 0 | Status: DISCONTINUED | OUTPATIENT
Start: 2024-08-29 | End: 2024-09-08

## 2024-08-29 RX ORDER — ENALAPRIL MALEATE 5 MG/1
20 TABLET ORAL DAILY
Refills: 0 | Status: DISCONTINUED | OUTPATIENT
Start: 2024-08-29 | End: 2024-09-08

## 2024-08-29 RX ADMIN — Medication 20 MILLIGRAM(S): at 22:52

## 2024-08-29 RX ADMIN — Medication 30 MILLILITER(S): at 10:31

## 2024-08-29 RX ADMIN — ONDANSETRON 4 MILLIGRAM(S): 2 INJECTION, SOLUTION INTRAMUSCULAR; INTRAVENOUS at 10:31

## 2024-08-29 RX ADMIN — Medication 1550 MILLILITER(S): at 10:31

## 2024-08-29 RX ADMIN — Medication 250 MILLIGRAM(S): at 13:07

## 2024-08-29 RX ADMIN — PIPERACILLIN SODIUM AND TAZOBACTAM SODIUM 200 GRAM(S): 3; .375 INJECTION, POWDER, FOR SOLUTION INTRAVENOUS at 11:18

## 2024-08-29 RX ADMIN — Medication 1 APPLICATION(S): at 22:14

## 2024-08-29 RX ADMIN — ACETAMINOPHEN 300 MILLIGRAM(S): 325 TABLET ORAL at 11:08

## 2024-08-29 RX ADMIN — FAMOTIDINE 20 MILLIGRAM(S): 10 INJECTION INTRAVENOUS at 10:31

## 2024-08-29 RX ADMIN — PIPERACILLIN SODIUM AND TAZOBACTAM SODIUM 200 GRAM(S): 3; .375 INJECTION, POWDER, FOR SOLUTION INTRAVENOUS at 22:14

## 2024-08-29 NOTE — H&P ADULT - HISTORY OF PRESENT ILLNESS
89-year-old female history of A-fib, no longer on AC s/p watchman, CHF, HTN, recent admission from 7/17-7/25 for diverticulitis, presenting to the emergency department for acute onset nausea and vomiting last night.  Reports that she has had significant nausea with sensation when she needs to vomit, nonbloody diarrhea.  Of note patient has a right lower extremity wound that is open healing by secondary intention but she reports has had wound care come daily.  Reports malaise but denies subjective fevers as far she knows.  He was sent in by nursing facility for further evaluation.  Denies chest pain, shortness of breath, cough.  Denies rashes.  Denies urinary symptoms.  No known sick contacts.  89-year-old female history of A-fib, no longer on AC s/p watchman, Pulm HTN, HTN, recent admission from 7/17-7/25 for diverticulitis, presenting to the emergency department for acute onset nausea and vomiting last night.  Reports that she has had significant nausea with sensation when she needs to vomit. She has also been experiencing nonbloody diarrhea.  About a week ago, patient suffered a laceration on her anterior RLE at her facility while being assisted into her wheelchair. She has been getting cefuroxime at the facility for surrounding cellulitis of the area. She denies any pain in the area. Reports malaise but denies subjective fevers as far she knows.  Denies chest pain, shortness of breath, cough.  Denies rashes.  Denies urinary symptoms.  No known sick contacts.     In the ED, patient was febrile with Tmax 102.1. CT A/P without evidence of diverticulitis or obvious infectious source however noted to have gallbladder wall edema. US obtained showed distended gallbladder with sludge without evidence of stones. No sonographic davidson sign. Pt was given vancomycin and zosyn for empiric treatment. Admitted for further management.

## 2024-08-29 NOTE — ED PROVIDER NOTE - ATTENDING CONTRIBUTION TO CARE
I performed a history and physical exam of the patient and discussed their management with the resident and /or advanced care provider. I reviewed the resident and /or ACP's note and agree with the documented findings and plan of care. My medical decision making and observations are found above.  Lungs clear, red warm area around lt calf wound, abd soft

## 2024-08-29 NOTE — H&P ADULT - NSHPPHYSICALEXAM_GEN_ALL_CORE
Vital Signs Last 24 Hrs  T(C): 36.9 (29 Aug 2024 19:40), Max: 38.9 (29 Aug 2024 10:20)  T(F): 98.4 (29 Aug 2024 19:40), Max: 102.1 (29 Aug 2024 10:20)  HR: 93 (29 Aug 2024 19:40) (77 - 127)  BP: 126/68 (29 Aug 2024 19:40) (108/55 - 162/85)  BP(mean): 84 (29 Aug 2024 19:40) (71 - 84)  RR: 16 (29 Aug 2024 19:40) (16 - 24)  SpO2: 97% (29 Aug 2024 19:40) (93% - 99%)    Parameters below as of 29 Aug 2024 19:40  Patient On (Oxygen Delivery Method): room air        CONSTITUTIONAL: Well-groomed, in no apparent distress  EYES: No conjunctival or scleral injection, non-icteric; PERRLA and symmetric  ENMT: No external nasal lesions; nasal mucosa not inflamed; oral mucosa with moist membranes  RESPIRATORY: Breathing comfortably; lungs CTA without wheeze/rhonchi/rales  CARDIOVASCULAR: +S1S2, irregular rhythm, no M/G/R;lower extremities with significant 3+ edema up to shin  GASTROINTESTINAL: soft nondistended, nontender, No palpable masses, +BS throughout, no rebound/guarding  MUSCULOSKELETAL: no joint swelling no malalignment  SKIN: large wedge shaped laceration (about 5cm across) on R anterior lower extremity with surrounding erythema and edema. No significant purulence. no bleeding. Granulation tissue seen. Erythema noted in B/L LE.   NEUROLOGIC: CN grossly intact; sensation intact in LEs b/l to light touch; normal strength and tone  PSYCHIATRIC: A+O x 3; mood and affect appropriate; appropriate insight and judgment

## 2024-08-29 NOTE — ED PROVIDER NOTE - PROGRESS NOTE DETAILS
Cruz Montgomery MD PGY2: Patient reassessed, stable. Leukocytosis to ~25K without bandemia, abx ordered. Pending CT result, anticipate admission.

## 2024-08-29 NOTE — ED PROVIDER NOTE - PHYSICAL EXAMINATION
GEN: Patient awake and alert. No acute distress, non-toxic.   Head: Normocephalic, atraumatic.  Neck: Nontender, full ROM.   Eyes: PERRLA b/l. EOMI, no scleral icterus, no conjunctival injection. Moist mucous membranes.  CARDIAC: RRR. Normal S1, S2. No murmur, rubs, or gallops. No peripheral edema noted.  PULM: Speaking in full sentences. CTA B/L no wheeze, rales or rhonchi. No signs of respiratory distress, no accessory muscle usage or nasal flaring.  ABD: Soft, nontender, nondistended. No rebound, no involuntary guarding.  MSK: Moving all extremities spontaneously. Full ROM in extremities. RLE wound open healing by secondary intention with surrounding erythema and warmth to touch; no puruluence.   NEURO: A&Ox3, no focal neurological deficits  SKIN: Warm, dry, no rash, no lesions. No urticaria. No jaundice. See msk for wound description.

## 2024-08-29 NOTE — H&P ADULT - NSHPLABSRESULTS_GEN_ALL_CORE
140  |  99  |  15  ----------------------------<  138<H>  4.3   |  28  |  0.53    Ca    9.0      29 Aug 2024 10:33    TPro  7.2  /  Alb  3.4  /  TBili  0.8  /  DBili  x   /  AST  23  /  ALT  12  /  AlkPhos  80            PT/INR - ( 29 Aug 2024 10:33 )   PT: 29.1 sec;   INR: 2.72 ratio         PTT - ( 29 Aug 2024 10:33 )  PTT:34.6 sec              Urinalysis Basic - ( 29 Aug 2024 11:20 )    Color: Dark Yellow / Appearance: Turbid / S.025 / pH: x  Gluc: x / Ketone: Trace mg/dL  / Bili: Negative / Urobili: 0.2 mg/dL   Blood: x / Protein: 100 mg/dL / Nitrite: Negative   Leuk Esterase: Trace / RBC: 4 /HPF / WBC 7 /HPF   Sq Epi: x / Non Sq Epi: 8 /HPF / Bacteria: Negative /HPF                        10.2   25.59 )-----------( 271      ( 29 Aug 2024 10:33 )             31.3       Blood Gas Source Venous: Venous (24 @ 10:16)    IMAGING    < from: CT Abdomen and Pelvis w/ IV Cont (24 @ 12:36) >    FINDINGS:    LOWERCHEST: Cardiomegaly.  Small pleural effusions.    LIVER: Within normal limits.  BILE DUCTS: Normal caliber.  GALLBLADDER: Gallbladder wall edema.  SPLEEN: Within normal limits.  PANCREAS: Within normal limits.  ADRENALS: Within normal limits.  KIDNEYS/URETERS: Within normal limits.    BLADDER: Within normal limits.  REPRODUCTIVE ORGANS: Hysterectomy. No adnexal mass.    BOWEL: No bowel obstruction.  PERITONEUM/RETROPERITONEUM: Within normal limits.  VESSELS:  Within normal limits.  ABDOMINAL WALL: Within normal limits.  BONES: Within normal limits.    IMPRESSION: Gallbladder wall edema; consider ultrasound.    < end of copied text >    < from: US Abdomen Upper Quadrant Right (24 @ 16:34) >    IMPRESSION:  1.  The gallbladder is mildly distended and contains a small amount of   layering sludge. No gallstones are seen. There is diffuse gallbladder   wall thickening and edema and a small amount of pericholecystic fluid. A   sonographic Leonardo sign was not elicited. Gallbladder wall edema and   pericholecystic fluid are nonspecific and may be reactive in the setting   of liver and/or cardiac disease. Clinical correlation is recommended. If   acute cholecystitis remains of clinical concern, HIDA scan could be   performed for further assessment.  2. Distention of the hepatic veins and IVC and pulsatile flow in the   portal vein. These findings suggest passive congestion in the setting of   cardiac disease. Suggest clinical correlation.  3. Small right pleural effusion.    < end of copied text >     140  |  99  |  15  ----------------------------<  138<H>  4.3   |  28  |  0.53    Ca    9.0      29 Aug 2024 10:33    TPro  7.2  /  Alb  3.4  /  TBili  0.8  /  DBili  x   /  AST  23  /  ALT  12  /  AlkPhos  80        PT/INR - ( 29 Aug 2024 10:33 )   PT: 29.1 sec;   INR: 2.72 ratio         PTT - ( 29 Aug 2024 10:33 )  PTT:34.6 sec              Urinalysis Basic - ( 29 Aug 2024 11:20 )    Color: Dark Yellow / Appearance: Turbid / S.025 / pH: x  Gluc: x / Ketone: Trace mg/dL  / Bili: Negative / Urobili: 0.2 mg/dL   Blood: x / Protein: 100 mg/dL / Nitrite: Negative   Leuk Esterase: Trace / RBC: 4 /HPF / WBC 7 /HPF   Sq Epi: x / Non Sq Epi: 8 /HPF / Bacteria: Negative /HPF                        10.2   25.59 )-----------( 271      ( 29 Aug 2024 10:33 )             31.3       Blood Gas Source Venous: Venous (24 @ 10:16)    IMAGING    < from: CT Abdomen and Pelvis w/ IV Cont (24 @ 12:36) >    FINDINGS:    LOWERCHEST: Cardiomegaly.  Small pleural effusions.    LIVER: Within normal limits.  BILE DUCTS: Normal caliber.  GALLBLADDER: Gallbladder wall edema.  SPLEEN: Within normal limits.  PANCREAS: Within normal limits.  ADRENALS: Within normal limits.  KIDNEYS/URETERS: Within normal limits.    BLADDER: Within normal limits.  REPRODUCTIVE ORGANS: Hysterectomy. No adnexal mass.    BOWEL: No bowel obstruction.  PERITONEUM/RETROPERITONEUM: Within normal limits.  VESSELS:  Within normal limits.  ABDOMINAL WALL: Within normal limits.  BONES: Within normal limits.    IMPRESSION: Gallbladder wall edema; consider ultrasound.    < end of copied text >    < from: US Abdomen Upper Quadrant Right (24 @ 16:34) >    IMPRESSION:  1.  The gallbladder is mildly distended and contains a small amount of   layering sludge. No gallstones are seen. There is diffuse gallbladder   wall thickening and edema and a small amount of pericholecystic fluid. A   sonographic Leonardo sign was not elicited. Gallbladder wall edema and   pericholecystic fluid are nonspecific and may be reactive in the setting   of liver and/or cardiac disease. Clinical correlation is recommended. If   acute cholecystitis remains of clinical concern, HIDA scan could be   performed for further assessment.  2. Distention of the hepatic veins and IVC and pulsatile flow in the   portal vein. These findings suggest passive congestion in the setting of   cardiac disease. Suggest clinical correlation.  3. Small right pleural effusion.    < end of copied text >

## 2024-08-29 NOTE — ED ADULT NURSE NOTE - OBJECTIVE STATEMENT
89 y.o F PMH diverticulitis, HTN, CHF, uterine cancer presenting to the ED via EMS from the [facility] for nausea/vomiting and diarrhea that started this morning. Pt reports feeling nauseous this morning with multiple episodes of vomiting and 1-2 episodes of diarrhea.Of note, patient has a right lower extremity wound that is open healing but she reports has had wound care come daily.  Reports malaise but denies subjective fevers as far she knows. Pt is very warm to touch on exam. AOx4, MAEx4, pt slightly tachypneic at this time, abd soft nondistended. Denies chest pain, sob, dizziness, urinary symptoms, headache, visual changes, back pain, blood in stool or emesis. None available

## 2024-08-29 NOTE — H&P ADULT - PROBLEM SELECTOR PLAN 3
Large wound in RLE appears to be healing. No obvious signs of infection but there is surround erythema and edema. Pt has bilateral LE edema, suspect due to venous stasis and associated dermatitis.  - Wound care consult  - Topical bacitracin ordered  - Zosyn should provide coverage for skin gillian, low concern for MRSA infection

## 2024-08-29 NOTE — ED PROVIDER NOTE - OBJECTIVE STATEMENT
89-year-old female history of A-fib no longer on AC status post watchman, CHF, HTN presenting to the emergency department for acute onset nausea and vomiting that started this morning.  Patient reports symptoms started mostly last night.  Reports that she has had significant nausea with sensation when she needs to vomit, nonbloody diarrhea.  Of note patient has a right lower extremity wound that is open healing by secondary intention but she reports has had wound care come daily.  Reports malaise but denies subjective fevers as far she knows.  He was sent in by nursing facility for further evaluation.  Denies chest pain, shortness of breath, cough.  Denies rashes.  Denies urinary symptoms.  No known sick contacts.  Is unsure if she is recently on antibiotics. Has hx of diverticulitis that typically presents with isolated nausea and vomiting without pain.

## 2024-08-29 NOTE — H&P ADULT - ASSESSMENT
89 year old female with hx of A-fib, no longer on AC s/p watchman, Pulm HTN, HTN, recent admission from 7/17-7/25 for diverticulitis, presenting with nausea and vomiting, found to have distended gallbladder with sludge, no gallstones or cholecystitis. Also noted to be septic with leukocytosis and fever. Pt admitted for infectious workup and symptom management.

## 2024-08-29 NOTE — ED ADULT NURSE NOTE - NSFALLHARMRISKINTERV_ED_ALL_ED

## 2024-08-29 NOTE — H&P ADULT - HIV OFFER
Patient came to office to let us know that she will be following Dr. Wilian Chandler at her new practice.   Cancelled all appointments with [facility] Opt out

## 2024-08-29 NOTE — H&P ADULT - NSHPREVIEWOFSYSTEMS_GEN_ALL_CORE
Review of Systems:   CONSTITUTIONAL: No fever, weight loss  EYES: No eye pain, visual disturbances, or discharge  ENMT:  No difficulty hearing, tinnitus, vertigo; No sinus or throat pain  RESPIRATORY: No SOB. No cough, wheezing, chills or hemoptysis  CARDIOVASCULAR: No chest pain, palpitations, dizziness, or leg swelling  GASTROINTESTINAL: No abdominal or epigastric pain. +nausea, vomiting, no hematemesis; +diarrhea. No melena or hematochezia.  GENITOURINARY: No dysuria, frequency, hematuria, or incontinence  NEUROLOGICAL: No headaches, memory loss, loss of strength, numbness, or tremors  SKIN: wound in right leg  MUSCULOSKELETAL: No joint pain or swelling; No muscle, back pain  HEME/LYMPH: No easy bruising, or bleeding gums

## 2024-08-29 NOTE — H&P ADULT - PROBLEM SELECTOR PLAN 2
Gallbladder with sludge but no evidence of stones, no tenderness concerning for cholecystitis. Symptoms likely related to biliary stasis.   - Supportive management  - Symptom management with zofran and tylenol prn  - serial abd exams  - Will start diet  - Will hold lasix for now given recent vomiting and poor PO intake; resume when symptoms improve

## 2024-08-29 NOTE — H&P ADULT - PROBLEM SELECTOR PLAN 4
Hx of afib, previously on xarelto but was taken off following watchman procedure.  - Continue metoprolol succinate 50mg qd  - EKG reviewed showing Afib with HR 98

## 2024-08-29 NOTE — ED PROVIDER NOTE - CLINICAL SUMMARY MEDICAL DECISION MAKING FREE TEXT BOX
89-year-old female history of A-fib no longer on AC status post watchman, CHF, HTN presenting to ED for acute onset nausea and vomiting with associated watery diarrhea that started this morning.  Of note per patient report and review of previous clinical charts, patient typically presents with isolated nausea, vomiting and diarrhea without pain and diagnosed numerous times with diverticulitis.  Patient is warm to touch, noncore temp is afebrile with pending cord time.  Hemodynamically stable.  Abdomen is soft and nontender.  Right lower extremity with an open wound on the anterior tibial surface healing by secondary intention with surrounding erythema and warmth to touch concerning for possible concomitant cellulitis.  Will get sepsis bundle including screening labs, cultures, chest x-ray, urine, CT abdomen pelvis to rule out diverticulitis. Differential also includes gastroenteritis, viral syndrome, urinary etiology; lower suspicion for respiratory. Fluids, antinausea meds and antibiotics.  Dispo likely admission pending workup. 89-year-old female history of A-fib no longer on AC status post watchman, CHF, HTN presenting to ED for acute onset nausea and vomiting with associated watery diarrhea that started this morning.  Of note per patient report and review of previous clinical charts, patient typically presents with isolated nausea, vomiting and diarrhea without pain and diagnosed numerous times with diverticulitis.  Patient is warm to touch, noncore temp is afebrile with pending cord time.  Hemodynamically stable.  Abdomen is soft and nontender.  Right lower extremity with an open wound on the anterior tibial surface healing by secondary intention with surrounding erythema and warmth to touch concerning for possible concomitant cellulitis.  Will get sepsis bundle including screening labs, cultures, chest x-ray, urine, CT abdomen pelvis to rule out diverticulitis. Differential also includes gastroenteritis, viral syndrome, urinary etiology; lower suspicion for respiratory. Fluids, antinausea meds and antibiotics.  Dispo likely admission pending workup.    Sudeep: 89-year-old female with a history of A-fib and multiple episodes of diverticulitis congestive heart failure hypertension presents to the emergency department with nausea and vomiting.  Patient feels warm will get core temperature.  Patient not vomiting blood.  Will treat symptoms and CT abdomen. Lab studies ordered, independently reviewed and acted on as appropriate.

## 2024-08-30 LAB
ALBUMIN SERPL ELPH-MCNC: 2.8 G/DL — LOW (ref 3.3–5)
ALP SERPL-CCNC: 70 U/L — SIGNIFICANT CHANGE UP (ref 40–120)
ALT FLD-CCNC: 10 U/L — SIGNIFICANT CHANGE UP (ref 10–45)
ANION GAP SERPL CALC-SCNC: 15 MMOL/L — SIGNIFICANT CHANGE UP (ref 5–17)
ANISOCYTOSIS BLD QL: SLIGHT — SIGNIFICANT CHANGE UP
AST SERPL-CCNC: 22 U/L — SIGNIFICANT CHANGE UP (ref 10–40)
BASOPHILS # BLD AUTO: 0.15 K/UL — SIGNIFICANT CHANGE UP (ref 0–0.2)
BASOPHILS NFR BLD AUTO: 0.9 % — SIGNIFICANT CHANGE UP (ref 0–2)
BILIRUB SERPL-MCNC: 0.6 MG/DL — SIGNIFICANT CHANGE UP (ref 0.2–1.2)
BUN SERPL-MCNC: 18 MG/DL — SIGNIFICANT CHANGE UP (ref 7–23)
CALCIUM SERPL-MCNC: 8.8 MG/DL — SIGNIFICANT CHANGE UP (ref 8.4–10.5)
CHLORIDE SERPL-SCNC: 99 MMOL/L — SIGNIFICANT CHANGE UP (ref 96–108)
CO2 SERPL-SCNC: 24 MMOL/L — SIGNIFICANT CHANGE UP (ref 22–31)
CREAT SERPL-MCNC: 0.54 MG/DL — SIGNIFICANT CHANGE UP (ref 0.5–1.3)
CULTURE RESULTS: NO GROWTH — SIGNIFICANT CHANGE UP
DACRYOCYTES BLD QL SMEAR: SLIGHT — SIGNIFICANT CHANGE UP
EGFR: 87 ML/MIN/1.73M2 — SIGNIFICANT CHANGE UP
EOSINOPHIL # BLD AUTO: 0 K/UL — SIGNIFICANT CHANGE UP (ref 0–0.5)
EOSINOPHIL NFR BLD AUTO: 0 % — SIGNIFICANT CHANGE UP (ref 0–6)
GLUCOSE SERPL-MCNC: 80 MG/DL — SIGNIFICANT CHANGE UP (ref 70–99)
HCT VFR BLD CALC: 27.9 % — LOW (ref 34.5–45)
HGB BLD-MCNC: 8.8 G/DL — LOW (ref 11.5–15.5)
LYMPHOCYTES # BLD AUTO: 0.87 K/UL — LOW (ref 1–3.3)
LYMPHOCYTES # BLD AUTO: 5.2 % — LOW (ref 13–44)
MACROCYTES BLD QL: SLIGHT — SIGNIFICANT CHANGE UP
MAGNESIUM SERPL-MCNC: 2.1 MG/DL — SIGNIFICANT CHANGE UP (ref 1.6–2.6)
MANUAL SMEAR VERIFICATION: SIGNIFICANT CHANGE UP
MCHC RBC-ENTMCNC: 31 PG — SIGNIFICANT CHANGE UP (ref 27–34)
MCHC RBC-ENTMCNC: 31.5 GM/DL — LOW (ref 32–36)
MCV RBC AUTO: 98.2 FL — SIGNIFICANT CHANGE UP (ref 80–100)
MONOCYTES # BLD AUTO: 0.58 K/UL — SIGNIFICANT CHANGE UP (ref 0–0.9)
MONOCYTES NFR BLD AUTO: 3.5 % — SIGNIFICANT CHANGE UP (ref 2–14)
NEUTROPHILS # BLD AUTO: 15.09 K/UL — HIGH (ref 1.8–7.4)
NEUTROPHILS NFR BLD AUTO: 90.4 % — HIGH (ref 43–77)
PHOSPHATE SERPL-MCNC: 2.6 MG/DL — SIGNIFICANT CHANGE UP (ref 2.5–4.5)
PLAT MORPH BLD: NORMAL — SIGNIFICANT CHANGE UP
PLATELET # BLD AUTO: 235 K/UL — SIGNIFICANT CHANGE UP (ref 150–400)
POIKILOCYTOSIS BLD QL AUTO: SLIGHT — SIGNIFICANT CHANGE UP
POTASSIUM SERPL-MCNC: 3.7 MMOL/L — SIGNIFICANT CHANGE UP (ref 3.5–5.3)
POTASSIUM SERPL-SCNC: 3.7 MMOL/L — SIGNIFICANT CHANGE UP (ref 3.5–5.3)
PROT SERPL-MCNC: 6.2 G/DL — SIGNIFICANT CHANGE UP (ref 6–8.3)
RBC # BLD: 2.84 M/UL — LOW (ref 3.8–5.2)
RBC # FLD: 18.6 % — HIGH (ref 10.3–14.5)
RBC BLD AUTO: ABNORMAL
SODIUM SERPL-SCNC: 138 MMOL/L — SIGNIFICANT CHANGE UP (ref 135–145)
SPECIMEN SOURCE: SIGNIFICANT CHANGE UP
TARGETS BLD QL SMEAR: SIGNIFICANT CHANGE UP
WBC # BLD: 16.69 K/UL — HIGH (ref 3.8–10.5)
WBC # FLD AUTO: 16.69 K/UL — HIGH (ref 3.8–10.5)

## 2024-08-30 PROCEDURE — 99221 1ST HOSP IP/OBS SF/LOW 40: CPT

## 2024-08-30 PROCEDURE — 99232 SBSQ HOSP IP/OBS MODERATE 35: CPT | Mod: GC

## 2024-08-30 RX ADMIN — Medication 0.1 MILLIGRAM(S): at 17:03

## 2024-08-30 RX ADMIN — ENOXAPARIN SODIUM 40 MILLIGRAM(S): 100 INJECTION SUBCUTANEOUS at 11:36

## 2024-08-30 RX ADMIN — Medication 1 APPLICATION(S): at 13:59

## 2024-08-30 RX ADMIN — PIPERACILLIN SODIUM AND TAZOBACTAM SODIUM 25 GRAM(S): 3; .375 INJECTION, POWDER, FOR SOLUTION INTRAVENOUS at 22:49

## 2024-08-30 RX ADMIN — Medication 20 MILLIGRAM(S): at 22:49

## 2024-08-30 RX ADMIN — ENALAPRIL MALEATE 20 MILLIGRAM(S): 5 TABLET ORAL at 06:50

## 2024-08-30 RX ADMIN — Medication 0.1 MILLIGRAM(S): at 06:50

## 2024-08-30 RX ADMIN — PIPERACILLIN SODIUM AND TAZOBACTAM SODIUM 25 GRAM(S): 3; .375 INJECTION, POWDER, FOR SOLUTION INTRAVENOUS at 00:24

## 2024-08-30 RX ADMIN — METOPROLOL TARTRATE 50 MILLIGRAM(S): 100 TABLET ORAL at 06:50

## 2024-08-30 RX ADMIN — PIPERACILLIN SODIUM AND TAZOBACTAM SODIUM 25 GRAM(S): 3; .375 INJECTION, POWDER, FOR SOLUTION INTRAVENOUS at 08:48

## 2024-08-30 RX ADMIN — Medication 1 APPLICATION(S): at 22:49

## 2024-08-30 RX ADMIN — PIPERACILLIN SODIUM AND TAZOBACTAM SODIUM 25 GRAM(S): 3; .375 INJECTION, POWDER, FOR SOLUTION INTRAVENOUS at 13:59

## 2024-08-30 RX ADMIN — Medication 1 APPLICATION(S): at 06:50

## 2024-08-30 NOTE — CONSULT NOTE ADULT - SUBJECTIVE AND OBJECTIVE BOX
GENERAL SURGERY CONSULT NOTE  --------------------------------------------------------------------------------------------  HPI:  89-year-old female history of A-fib, no longer on AC s/p watchman, Pulm HTN, HTN, recent admission from 7/17-7/25 for diverticulitis, presenting to the emergency department for acute onset nausea and vomiting for 1 day, associated with non-bloody diarrhea. Denies abdominal pain, fever, chills. Currently has no acute complaints, stating she would like to drink cranberry juice.    ***      PAST MEDICAL & SURGICAL HISTORY:  Afib      HTN (hypertension)      H/O CHF      Uterine cancer      S/P hysterectomy        FAMILY HISTORY:  [] Family history not pertinent as reviewed with the patient and family    SOCIAL HISTORY:  ***    ALLERGIES: hydrALAZINE (Other; Flushing)  Lipitor (Other; Flushing)      HOME MEDICATIONS: ***    CURRENT MEDICATIONS  MEDICATIONS (STANDING): cloNIDine 0.1 milliGRAM(s) Oral two times a day  enalapril 20 milliGRAM(s) Oral daily  enoxaparin Injectable 40 milliGRAM(s) SubCutaneous every 24 hours  metoprolol succinate ER 50 milliGRAM(s) Oral daily  piperacillin/tazobactam IVPB.. 3.375 Gram(s) IV Intermittent every 8 hours  simvastatin 20 milliGRAM(s) Oral at bedtime    MEDICATIONS (PRN):acetaminophen     Tablet .. 975 milliGRAM(s) Oral every 6 hours PRN Temp greater or equal to 38C (100.4F), Moderate Pain (4 - 6)  ondansetron Injectable 4 milliGRAM(s) IV Push every 8 hours PRN Nausea and/or Vomiting    --------------------------------------------------------------------------------------------    Vitals:   T(C): 36.9 (08-30-24 @ 16:24), Max: 37.2 (08-29-24 @ 23:07)  HR: 99 (08-30-24 @ 16:24) (72 - 99)  BP: 135/68 (08-30-24 @ 16:24) (105/65 - 154/79)  RR: 18 (08-30-24 @ 16:24) (14 - 18)  SpO2: 98% (08-30-24 @ 16:24) (94% - 99%)  CAPILLARY BLOOD GLUCOSE          Height (cm): 162.6 (08-29 @ 09:10)  Weight (kg): 49.9 (08-29 @ 09:10)  BMI (kg/m2): 18.9 (08-29 @ 09:10)  BSA (m2): 1.52 (08-29 @ 09:10)      PHYSICAL EXAM:  General: NAD, Lying in bed comfortably  Neuro: Alert and answering questions appropriately   HEENT: Grossly normal  Resp: Good effort, no signs of respiratory distress  GI/Abd: Soft, nontender, nondistended  Vascular: All 4 extremities warm  Musculoskeletal: All 4 extremities moving spontaneously, no limitations  --------------------------------------------------------------------------------------------    LABS  CBC (08-30 @ 07:06)                              8.8<L>                         16.69<H>  )----------------(  235        90.4<H>% Neutrophils, 5.2<L>% Lymphocytes, ANC: 15.09<H>                              27.9<L>  CBC (08-29 @ 10:33)                              10.2<L>                         25.59<H>  )----------------(  271        91.7<H>% Neutrophils, 1.7<L>% Lymphocytes, ANC: 24.95<H>                              31.3<L>    BMP (08-30 @ 07:06)             138     |  99      |  18    		Ca++ --      Ca 8.8                ---------------------------------( 80    		Mg 2.1                3.7     |  24      |  0.54  			Ph 2.6     BMP (08-29 @ 10:33)             140     |  99      |  15    		Ca++ --      Ca 9.0                ---------------------------------( 138<H>		Mg --                 4.3     |  28      |  0.53  			Ph --        LFTs (08-30 @ 07:06)      TPro 6.2 / Alb 2.8<L> / TBili 0.6 / DBili -- / AST 22 / ALT 10 / AlkPhos 70  LFTs (08-29 @ 10:33)      TPro 7.2 / Alb 3.4 / TBili 0.8 / DBili -- / AST 23 / ALT 12 / AlkPhos 80    Coags (08-29 @ 10:33)  aPTT 34.6 / INR 2.72<H> / PT 29.1<H>      ABG (08-29 @ 16:41)      /  /  /  /  / %     Lactate:  1.9  ABG (08-29 @ 13:26)      /  /  /  /  / %     Lactate:  2.9<H>    VBG (08-29 @ 10:16)     7.39 / 49<H> / 17<L> / 30<H> / 4.0<H> / 35.3<L>%     Lactate: 2.9<H>    --------------------------------------------------------------------------------------------    MICROBIOLOGY  Urinalysis (08-30 @ 07:06):     Color:  / Appearance:  / SG:  / pH:  / Gluc: 80 / Ketones:  / Bili:  / Urobili:  / Protein : / Nitrites:  / Leuk.Est:  / RBC:  / WBC:  / Sq Epi:  / Non Sq Epi:  / Bacteria        -> Clean Catch Clean Catch (Midstream) Culture (08-29 @ 11:20)     NG    NG    No growth      --------------------------------------------------------------------------------------------    IMAGING  < from: US Abdomen Upper Quadrant Right (08.29.24 @ 16:34) >  IMPRESSION:  1.  The gallbladder is mildly distended and contains a small amount of   layering sludge. No gallstones are seen. There is diffuse gallbladder   wall thickening and edema and a small amount of pericholecystic fluid. A   sonographic Leonardo sign was not elicited. Gallbladder wall edema and   pericholecystic fluid are nonspecific and may be reactive in the setting   of liver and/or cardiac disease. Clinical correlation is recommended. If   acute cholecystitis remains of clinical concern, HIDA scan could be   performed for further assessment.  2. Distention of the hepatic veins and IVC and pulsatile flow in the   portal vein. These findings suggest passive congestion in the setting of   cardiac disease. Suggest clinical correlation.  3. Small right pleural effusion.    Findings were discussed with Dr. ASIF LIM [phone] 8/29/2024 5:51 PM   by Dr. Oglesby with read back confirmation.    --- End of Report ---    < end of copied text >  < from: CT Abdomen and Pelvis w/ IV Cont (08.29.24 @ 12:36) >  FINDINGS:    LOWERCHEST: Cardiomegaly.  Small pleural effusions.    LIVER: Within normal limits.  BILE DUCTS: Normal caliber.  GALLBLADDER: Gallbladder wall edema.  SPLEEN: Within normal limits.  PANCREAS: Within normal limits.  ADRENALS: Within normal limits.  KIDNEYS/URETERS: Within normal limits.    BLADDER: Within normal limits.  REPRODUCTIVE ORGANS: Hysterectomy. No adnexal mass.    BOWEL: No bowel obstruction.  PERITONEUM/RETROPERITONEUM: Within normal limits.  VESSELS:  Within normal limits.  ABDOMINAL WALL: Within normal limits.  BONES: Within normal limits.    IMPRESSION: Gallbladder wall edema; consider ultrasound.        --- End of Report ---    < end of copied text >      --------------------------------------------------------------------------------------------

## 2024-08-30 NOTE — CONSULT NOTE ADULT - SUBJECTIVE AND OBJECTIVE BOX
Wound SURGERY CONSULT NOTE    HPI:  89-year-old female history of A-fib, no longer on AC s/p watchman, Candy HTN, HTN, recent admission from 7/17-7/25 for diverticulitis, presenting to the emergency department for acute onset nausea and vomiting last night.  Reports that she has had significant nausea with sensation when she needs to vomit. She has also been experiencing nonbloody diarrhea.  About a week ago, patient suffered a laceration on her anterior RLE at her facility while being assisted into her wheelchair. She has been getting cefuroxime at the facility for surrounding cellulitis of the area. She denies any pain in the area. Reports malaise but denies subjective fevers as far she knows.  Denies chest pain, shortness of breath, cough.  Denies rashes.  Denies urinary symptoms.  No known sick contacts.     In the ED, patient was febrile with Tmax 102.1. CT A/P without evidence of diverticulitis or obvious infectious source however noted to have gallbladder wall edema. US obtained showed distended gallbladder with sludge without evidence of stones. No sonographic davidsno sign. Pt was given vancomycin and zosyn for empiric treatment. Admitted for further management.  (29 Aug 2024 19:14)      Wound consult requested by team to assist w/ management of RLE wound.   Pt w/o c/o pain, drainage, odor, color change,  or worsening swelling. Offloading and pericare initiated upon admission as pt Increasingly sedentary 2/2 to illness. Pt is Incontinent of urine & stool. (+)purewick.  All questions asked and answered to pt's and family's expressed understanding and satisfaction.    Current Diet: Diet, Regular:   DASH/TLC Sodium & Cholesterol Restricted (DASH) (08-29-24 @ 20:23)      PAST MEDICAL & SURGICAL HISTORY:  Afib      HTN (hypertension)      H/O CHF      Uterine cancer      S/P hysterectomy          REVIEW OF SYSTEMS:  General/ Breast/ Skin/Vasc/ Neuro/ MSK: see HPI  All other systems negative    MEDICATIONS  (STANDING):  bacitracin   Ointment 1 Application(s) Topical every 8 hours  cloNIDine 0.1 milliGRAM(s) Oral two times a day  enalapril 20 milliGRAM(s) Oral daily  enoxaparin Injectable 40 milliGRAM(s) SubCutaneous every 24 hours  metoprolol succinate ER 50 milliGRAM(s) Oral daily  piperacillin/tazobactam IVPB.. 3.375 Gram(s) IV Intermittent every 8 hours  simvastatin 20 milliGRAM(s) Oral at bedtime    MEDICATIONS  (PRN):  acetaminophen     Tablet .. 975 milliGRAM(s) Oral every 6 hours PRN Temp greater or equal to 38C (100.4F), Moderate Pain (4 - 6)  ondansetron Injectable 4 milliGRAM(s) IV Push every 8 hours PRN Nausea and/or Vomiting      Allergies    hydrALAZINE (Other; Flushing)  Lipitor (Other; Flushing)    Intolerances        SOCIAL HISTORY:       lives in SNF;, No hx of smoking, ETOH, drugs    FAMILY HISTORY:    No pertinent family hx among first degree relatives.    PHYSICAL EXAM:  Vital Signs Last 24 Hrs  T(C): 36.7 (30 Aug 2024 04:15), Max: 37.2 (29 Aug 2024 23:07)  T(F): 98.1 (30 Aug 2024 04:15), Max: 98.9 (29 Aug 2024 23:07)  HR: 93 (30 Aug 2024 04:15) (77 - 93)  BP: 138/71 (30 Aug 2024 04:15) (105/65 - 138/71)  BP(mean): 84 (29 Aug 2024 19:40) (71 - 84)  RR: 18 (30 Aug 2024 04:15) (14 - 20)  SpO2: 96% (30 Aug 2024 04:15) (94% - 99%)    Parameters below as of 30 Aug 2024 04:15  Patient On (Oxygen Delivery Method): room air        NAD, Guarded but stable,  A&Ox3/ Alert/ Confused  cachectic/ thin, MO/ Obese, frail,  WD/ WN/ WG,  Disheveled   Versa Care P500 / Envella Progressa bed     HEENT: sclera clear, mucosa moist, throat clear, trachea midline, neck supple  Respiratory: nonlabored w/ equal chest rise  Gastrointestinal: soft NT/ND   : (+) purewick  Neurology: verbal, follows commands  Psych: calm/ appropriate  Musculoskeletal:  FROM, no deformities/ contractures  Vascular: BLE equally warm  no cyanosis, clubbing, nor acute ischemia           BLE edema equal           BLE DP pulses not palpable          BLE  varicose veins        LLE scabs       RLE anterior wound 5.5cm x 2.5cm x 0.6cm some slough, hypergranulation tissue periwound mild erythema          moderate serosanguinous drainage, there is no increased warmth, tenderness, induration, fluctuance, nor crepitus  Skin:  moist w/ good turgor  sacral region moist blanchable erythema    LABS/ CULTURES/ RADIOLOGY:                        8.8    16.69 )-----------( 235      ( 30 Aug 2024 07:06 )             27.9       138  |  99  |  18  ----------------------------<  80      [08-30-24 @ 07:06]  3.7   |  24  |  0.54        Ca     8.8     [08-30-24 @ 07:06]      Mg     2.1     [08-30-24 @ 07:06]      Phos  2.6     [08-30-24 @ 07:06]    TPro  6.2  /  Alb  2.8  /  TBili  0.6  /  DBili  x   /  AST  22  /  ALT  10  /  AlkPhos  70  [08-30-24 @ 07:06]    PT/INR: PT 29.1 , INR 2.72       [08-29-24 @ 10:33]  PTT: 34.6       [08-29-24 @ 10:33]

## 2024-08-30 NOTE — PROGRESS NOTE ADULT - SUBJECTIVE AND OBJECTIVE BOX
Patient is a 89y old  Female who presents with a chief complaint of N/V (30 Aug 2024 16:39)      SUBJECTIVE / OVERNIGHT EVENTS:    Events noted.  CONSTITUTIONAL: Weakness  RESPIRATORY: No cough, wheezing,  No shortness of breath  CARDIOVASCULAR: No chest pain, palpitations, dizziness, or leg swelling  GASTROINTESTINAL: RUQ pain      MEDICATIONS  (STANDING):  bacitracin   Ointment 1 Application(s) Topical every 8 hours  cloNIDine 0.1 milliGRAM(s) Oral two times a day  enalapril 20 milliGRAM(s) Oral daily  enoxaparin Injectable 40 milliGRAM(s) SubCutaneous every 24 hours  metoprolol succinate ER 50 milliGRAM(s) Oral daily  piperacillin/tazobactam IVPB.. 3.375 Gram(s) IV Intermittent every 8 hours  simvastatin 20 milliGRAM(s) Oral at bedtime    MEDICATIONS  (PRN):  acetaminophen     Tablet .. 975 milliGRAM(s) Oral every 6 hours PRN Temp greater or equal to 38C (100.4F), Moderate Pain (4 - 6)  ondansetron Injectable 4 milliGRAM(s) IV Push every 8 hours PRN Nausea and/or Vomiting        CAPILLARY BLOOD GLUCOSE        I&O's Summary      T(C): 36.9 (08-30-24 @ 16:24), Max: 37.2 (08-29-24 @ 23:07)  HR: 99 (08-30-24 @ 16:24) (72 - 99)  BP: 135/68 (08-30-24 @ 16:24) (105/65 - 154/79)  RR: 18 (08-30-24 @ 16:24) (14 - 18)  SpO2: 98% (08-30-24 @ 16:24) (94% - 99%)    PHYSICAL EXAM:    NECK: Supple, No JVD  CHEST/LUNG: Clear to auscultation bilaterally; No wheezing.  HEART: Regular rate and rhythm; No murmurs, rubs, or gallops  ABDOMEN: Soft, Nontender, Nondistended; Bowel sounds present  EXTREMITIES:   No edema  NEUROLOGY: AAO X 3      LABS:                        8.8    16.69 )-----------( 235      ( 30 Aug 2024 07:06 )             27.9     08-30    138  |  99  |  18  ----------------------------<  80  3.7   |  24  |  0.54    Ca    8.8      30 Aug 2024 07:06  Phos  2.6     08-30  Mg     2.1     08-30    TPro  6.2  /  Alb  2.8<L>  /  TBili  0.6  /  DBili  x   /  AST  22  /  ALT  10  /  AlkPhos  70  08-30    PT/INR - ( 29 Aug 2024 10:33 )   PT: 29.1 sec;   INR: 2.72 ratio         PTT - ( 29 Aug 2024 10:33 )  PTT:34.6 sec      Urinalysis Basic - ( 30 Aug 2024 07:06 )    Color: x / Appearance: x / SG: x / pH: x  Gluc: 80 mg/dL / Ketone: x  / Bili: x / Urobili: x   Blood: x / Protein: x / Nitrite: x   Leuk Esterase: x / RBC: x / WBC x   Sq Epi: x / Non Sq Epi: x / Bacteria: x      CAPILLARY BLOOD GLUCOSE            RADIOLOGY & ADDITIONAL TESTS:    Imaging Personally Reviewed:    Consultant(s) Notes Reviewed:      Care Discussed with Consultants/Other Providers:    Juan Tony MD, CMD, FACP    [address] [place] [address]  Office Tel: [phone]  Cell: [phone]

## 2024-08-30 NOTE — PATIENT PROFILE ADULT - FUNCTIONAL SCREEN CURRENT LEVEL: SWALLOWING (IF SCORE 2 OR MORE FOR ANY ITEM, CONSULT REHAB SERVICES), MLM)
0 = swallows foods/liquids without difficulty

## 2024-08-30 NOTE — PROGRESS NOTE ADULT - PROBLEM SELECTOR PLAN 1
Fever and significant leukocytosis concern for infection though no clear identifiable source at this time.   - UA negative for UTI  - CT A/P without evidence of diverticulitis, no obvious infectious source. Noted to have distended gallbladder  - Will continue zosyn for empiric treatment  - R/o Acute stephanie- HIDA scan  Sx eval appreciated

## 2024-08-30 NOTE — PATIENT PROFILE ADULT - FALL HARM RISK - HARM RISK INTERVENTIONS

## 2024-08-30 NOTE — CONSULT NOTE ADULT - ASSESSMENT
A/P:89-year-old female history of A-fib, no longer on AC s/p watchman, Pulm HTN, HTN, recent admission from 7/17-7/25 for diverticulitis, presenting to the emergency department for acute onset nausea and vomiting last night.  Reports that she has had significant nausea with sensation when she needs to vomit. She has also been experiencing nonbloody diarrhea.  About a week ago, patient suffered a laceration on her anterior RLE at her facility while being assisted into her wheelchair. She has been getting cefuroxime at the facility for surrounding cellulitis of the area. She denies any pain in the area.     Wound Consult requested to assist w/ management of  RLE Wound  Dermatitis sacral/BL buttocks    Recommendations:   RLE medihoney. aquacel      BLE elevation  Dermatitis: George BID and PRN soiling     Consider CYNTHIA/PVR, Duplex if negative consider ACE  Abx per Medicine/ ID  Moisturize intact skin w/ SWEEN cream BID  Nutrition Consult for optimization in pt w/ Increased nutritional needs            encourage high quality protein, stefano/ prosource, MVI & Vit C to promote wound healing    Continue turning and positioning w/ offloading assistive devices as per protocol       Continue w/ attends under pads and Pericare w/  purewick care as per protocol  Waffle Cushion to chair when oob to chair  Continue w/ low air loss pressure redistribution bed surface   Care as per medicine, will follow w/ you  Upon discharge f/u as outpatient at [facility] [address] [place] [address] [phone]  Seen and D/w team & RN  Thank you for this consult,  TEODORA Akins-BC, [facility]  [phone]  Nights/ Weekends/ Holidays please call:  General Surgery Consult pager ([phone]) for emergencies  Wound PT for multilayer leg wrapping or VAC issues (x [phone]) 
89-year-old female history of A-fib, no longer on AC s/p watchman, Pulm HTN, HTN, recent admission from 7/17-7/25 for diverticulitis, presenting with 1 day history of nausea, vomiting, non-bloody diarrhea. WBC elevated to 16. Tbili normal at 0.6. RUQ US significant for mildly distended gallbladder, small amount of sludge, diffuse wall thickening, edema, small amount of pericholecystic fluid, no stones.    Surgery consulted for concern for acute cholecystitis.    RECS:  - No acute surgical intervention  - Pt states she is not interested in surgical intervention if it were indicated  - Remainder of care per primary    Discussed with Dr. Rosales    Trauma -[id number]
89F hx of AFib s/p watchman, Pulm HTN, HTN, recent admission from 7/17-7/25 for diverticulitis, presenting to the emergency department for acute onset nausea and vomiting last night admitted with sepsis possibly secondary to biliary pathology given GB wall edema and mild pericholecystic fluid on initial imaging    Recommendations:  - f/u infectious work up  - IV abx  - Serial abdominal exams  - CBD wnl on US and CT, low suspicion for choledocholithiasis/cholangitis. No lipase on admission but patient asymptomatic  - Can consider HIDA scan and surgery evaluation given lack of other sources for pt sepsis  - Will follow with you    Porter Lenz M.D.  [facility]  [phone]

## 2024-08-30 NOTE — CONSULT NOTE ADULT - ATTENDING COMMENTS
Patient examined by me. Remains non tender and tolerating diet. Low suspicion for acute cholecystitis at this time. No intervention recommended.

## 2024-08-30 NOTE — CONSULT NOTE ADULT - SUBJECTIVE AND OBJECTIVE BOX
[facility]    Luis Daniel Morena Huerta NP    [address] [place] [address] [phone]      Chief Complaint:  Patient is a 89y old  Female who presents with a chief complaint of N/V (29 Aug 2024 19:14)      HPI:  89-year-old female history of A-fib, no longer on AC s/p watchman, Pulm HTN, HTN, recent admission from 7/17-7/25 for diverticulitis, presenting to the emergency department for acute onset nausea and vomiting last night.  Reports that she has had significant nausea with sensation when she needs to vomit. She has also been experiencing nonbloody diarrhea.  About a week ago, patient suffered a laceration on her anterior RLE at her facility while being assisted into her wheelchair. She has been getting cefuroxime at the facility for surrounding cellulitis of the area. She denies any pain in the area. Reports malaise but denies subjective fevers as far she knows.  Denies chest pain, shortness of breath, cough.  Denies rashes.  Denies urinary symptoms.  No known sick contacts.     In the ED, patient was febrile with Tmax 102.1. CT A/P without evidence of diverticulitis or obvious infectious source however noted to have gallbladder wall edema. US obtained showed distended gallbladder with sludge without evidence of stones. No sonographic leonardo sign. Pt was given vancomycin and zosyn for empiric treatment. Admitted for further management.         Allergies:  hydrALAZINE (Other; Flushing)  Lipitor (Other; Flushing)      Medications:  acetaminophen     Tablet .. 975 milliGRAM(s) Oral every 6 hours PRN  bacitracin   Ointment 1 Application(s) Topical every 8 hours  cloNIDine 0.1 milliGRAM(s) Oral two times a day  enalapril 20 milliGRAM(s) Oral daily  enoxaparin Injectable 40 milliGRAM(s) SubCutaneous every 24 hours  metoprolol succinate ER 50 milliGRAM(s) Oral daily  ondansetron Injectable 4 milliGRAM(s) IV Push every 8 hours PRN  piperacillin/tazobactam IVPB.. 3.375 Gram(s) IV Intermittent every 8 hours  simvastatin 20 milliGRAM(s) Oral at bedtime      PMHX/PSHX:  Afib    HTN (hypertension)    H/O CHF    Uterine cancer    S/P hysterectomy        Family history:  No pertinent family history in first degree relatives        Social History:     ROS:     General:  No wt loss, fevers, chills, night sweats, fatigue,   Eyes:  Good vision, no reported pain  ENT:  No sore throat, pain, runny nose, dysphagia  CV:  No pain, palpitations, hypo/hypertension  Resp:  No dyspnea, cough, tachypnea, wheezing  GI:  No pain, No nausea, No vomiting, No diarrhea, No constipation, No weight loss, No fever, No pruritis, No rectal bleeding, No tarry stools, No dysphagia,  :  No pain, bleeding, incontinence, nocturia  Muscle:  No pain, weakness  Neuro:  No weakness, tingling, memory problems  Psych:  No fatigue, insomnia, mood problems, depression  Endocrine:  No polyuria, polydipsia, cold/heat intolerance  Heme:  No petechiae, ecchymosis, easy bruisability  Skin:  No rash, tattoos, scars, edema      PHYSICAL EXAM:   Vital Signs:  Vital Signs Last 24 Hrs  T(C): 36.7 (30 Aug 2024 04:15), Max: 38.9 (29 Aug 2024 10:20)  T(F): 98.1 (30 Aug 2024 04:15), Max: 102.1 (29 Aug 2024 10:20)  HR: 93 (30 Aug 2024 04:15) (77 - 127)  BP: 138/71 (30 Aug 2024 04:15) (105/65 - 162/85)  BP(mean): 84 (29 Aug 2024 19:40) (71 - 84)  RR: 18 (30 Aug 2024 04:15) (14 - 24)  SpO2: 96% (30 Aug 2024 04:15) (94% - 99%)    Parameters below as of 30 Aug 2024 04:15  Patient On (Oxygen Delivery Method): room air      Daily     Daily     GENERAL:  Appears stated age, well-groomed, well-nourished, no distress  HEENT:  NC/AT,  conjunctivae clear and pink, no thyromegaly, nodules, adenopathy, no JVD, sclera -anicteric  CHEST:  Full & symmetric excursion, no increased effort, breath sounds clear  HEART:  Regular rhythm, S1, S2, no murmur/rub/S3/S4, no abdominal bruit, no edema  ABDOMEN:  Soft, non-tender, non-distended, normoactive bowel sounds,  no masses ,no hepato-splenomegaly, no signs of chronic liver disease  EXTEREMITIES:  no cyanosis,clubbing or edema  SKIN:  No rash/erythema/ecchymoses/petechiae/wounds/abscess/warm/dry  NEURO:  Alert, oriented, no asterixis, no tremor, no encephalopathy    LABS:                        8.8    16.69 )-----------( 235      ( 30 Aug 2024 07:06 )             27.9     08-30    138  |  99  |  18  ----------------------------<  80  3.7   |  24  |  0.54    Ca    8.8      30 Aug 2024 07:06  Phos  2.6     08-30  Mg     2.1     08-30    TPro  6.2  /  Alb  2.8<L>  /  TBili  0.6  /  DBili  x   /  AST  22  /  ALT  10  /  AlkPhos  70  08-30    LIVER FUNCTIONS - ( 30 Aug 2024 07:06 )  Alb: 2.8 g/dL / Pro: 6.2 g/dL / ALK PHOS: 70 U/L / ALT: 10 U/L / AST: 22 U/L / GGT: x           PT/INR - ( 29 Aug 2024 10:33 )   PT: 29.1 sec;   INR: 2.72 ratio         PTT - ( 29 Aug 2024 10:33 )  PTT:34.6 sec  Urinalysis Basic - ( 30 Aug 2024 07:06 )    Color: x / Appearance: x / SG: x / pH: x  Gluc: 80 mg/dL / Ketone: x  / Bili: x / Urobili: x   Blood: x / Protein: x / Nitrite: x   Leuk Esterase: x / RBC: x / WBC x   Sq Epi: x / Non Sq Epi: x / Bacteria: x          Imaging:      < from: US Abdomen Upper Quadrant Right (08.29.24 @ 16:34) >  IMPRESSION:  1.  The gallbladder is mildly distended and contains a small amount of   layering sludge. No gallstones are seen. There is diffuse gallbladder   wall thickening and edema and a small amount of pericholecystic fluid. A   sonographic Leonardo sign was not elicited. Gallbladder wall edema and   pericholecystic fluid are nonspecific and may be reactive in the setting   of liver and/or cardiac disease. Clinical correlation is recommended. If   acute cholecystitis remains of clinical concern, HIDA scan could be   performed for further assessment.  2. Distention of the hepatic veins and IVC and pulsatile flow in the   portal vein. These findings suggest passive congestion in the setting of   cardiac disease. Suggest clinical correlation.  3. Small right pleural effusion.    Findings were discussed with Dr. ASIF LIM [phone] 8/29/2024 5:51 PM   by Dr. Oglesby with read back confirmation.    --- End of Report ---    < from: CT Abdomen and Pelvis w/ IV Cont (08.29.24 @ 12:36) >    IMPRESSION: Gallbladder wall edema; consider ultrasound.        --- End of Report ---        < end of copied text >        < end of copied text >

## 2024-08-30 NOTE — PATIENT PROFILE ADULT - NSPROPASSIVESMOKEEXPOSURE_GEN_A_NUR
RETURN TO SCHOOL/GYM/SPORTS LETTER           2/7/2024               Princess Jefferson  [address] [facility] [address]    To Whom It May Concern:    This is to certify Princess was evaluated with Pedro Adkins MD on 2/7/2024. She may return to school Thursday 2/8/2024.        Electronically signed:  Pedro Adkins MD  2/7/2024  [facility]  [address]  [facility] [address]  Dept Phone: [phone]   No

## 2024-08-31 LAB
ALBUMIN SERPL ELPH-MCNC: 2.9 G/DL — LOW (ref 3.3–5)
ALP SERPL-CCNC: 67 U/L — SIGNIFICANT CHANGE UP (ref 40–120)
ALT FLD-CCNC: 15 U/L — SIGNIFICANT CHANGE UP (ref 10–45)
ANION GAP SERPL CALC-SCNC: 9 MMOL/L — SIGNIFICANT CHANGE UP (ref 5–17)
AST SERPL-CCNC: 26 U/L — SIGNIFICANT CHANGE UP (ref 10–40)
BILIRUB SERPL-MCNC: 0.4 MG/DL — SIGNIFICANT CHANGE UP (ref 0.2–1.2)
BUN SERPL-MCNC: 18 MG/DL — SIGNIFICANT CHANGE UP (ref 7–23)
CALCIUM SERPL-MCNC: 8.7 MG/DL — SIGNIFICANT CHANGE UP (ref 8.4–10.5)
CHLORIDE SERPL-SCNC: 99 MMOL/L — SIGNIFICANT CHANGE UP (ref 96–108)
CO2 SERPL-SCNC: 28 MMOL/L — SIGNIFICANT CHANGE UP (ref 22–31)
CREAT SERPL-MCNC: 0.64 MG/DL — SIGNIFICANT CHANGE UP (ref 0.5–1.3)
EGFR: 84 ML/MIN/1.73M2 — SIGNIFICANT CHANGE UP
GLUCOSE SERPL-MCNC: 92 MG/DL — SIGNIFICANT CHANGE UP (ref 70–99)
HCT VFR BLD CALC: 29 % — LOW (ref 34.5–45)
HGB BLD-MCNC: 9.1 G/DL — LOW (ref 11.5–15.5)
MAGNESIUM SERPL-MCNC: 2.2 MG/DL — SIGNIFICANT CHANGE UP (ref 1.6–2.6)
MCHC RBC-ENTMCNC: 31.4 GM/DL — LOW (ref 32–36)
MCHC RBC-ENTMCNC: 32.2 PG — SIGNIFICANT CHANGE UP (ref 27–34)
MCV RBC AUTO: 102.5 FL — HIGH (ref 80–100)
NRBC # BLD: 0 /100 WBCS — SIGNIFICANT CHANGE UP (ref 0–0)
PHOSPHATE SERPL-MCNC: 3 MG/DL — SIGNIFICANT CHANGE UP (ref 2.5–4.5)
PLATELET # BLD AUTO: 243 K/UL — SIGNIFICANT CHANGE UP (ref 150–400)
POTASSIUM SERPL-MCNC: 3.7 MMOL/L — SIGNIFICANT CHANGE UP (ref 3.5–5.3)
POTASSIUM SERPL-SCNC: 3.7 MMOL/L — SIGNIFICANT CHANGE UP (ref 3.5–5.3)
PROT SERPL-MCNC: 6.4 G/DL — SIGNIFICANT CHANGE UP (ref 6–8.3)
RBC # BLD: 2.83 M/UL — LOW (ref 3.8–5.2)
RBC # FLD: 18.2 % — HIGH (ref 10.3–14.5)
SODIUM SERPL-SCNC: 136 MMOL/L — SIGNIFICANT CHANGE UP (ref 135–145)
WBC # BLD: 9.12 K/UL — SIGNIFICANT CHANGE UP (ref 3.8–10.5)
WBC # FLD AUTO: 9.12 K/UL — SIGNIFICANT CHANGE UP (ref 3.8–10.5)

## 2024-08-31 RX ADMIN — Medication 1 APPLICATION(S): at 05:15

## 2024-08-31 RX ADMIN — ENALAPRIL MALEATE 20 MILLIGRAM(S): 5 TABLET ORAL at 05:15

## 2024-08-31 RX ADMIN — METOPROLOL TARTRATE 50 MILLIGRAM(S): 100 TABLET ORAL at 05:15

## 2024-08-31 RX ADMIN — ENOXAPARIN SODIUM 40 MILLIGRAM(S): 100 INJECTION SUBCUTANEOUS at 11:45

## 2024-08-31 RX ADMIN — ONDANSETRON 4 MILLIGRAM(S): 2 INJECTION, SOLUTION INTRAMUSCULAR; INTRAVENOUS at 11:43

## 2024-08-31 RX ADMIN — PIPERACILLIN SODIUM AND TAZOBACTAM SODIUM 25 GRAM(S): 3; .375 INJECTION, POWDER, FOR SOLUTION INTRAVENOUS at 22:57

## 2024-08-31 RX ADMIN — PIPERACILLIN SODIUM AND TAZOBACTAM SODIUM 25 GRAM(S): 3; .375 INJECTION, POWDER, FOR SOLUTION INTRAVENOUS at 05:35

## 2024-08-31 RX ADMIN — Medication 0.1 MILLIGRAM(S): at 05:15

## 2024-08-31 RX ADMIN — Medication 0.1 MILLIGRAM(S): at 17:33

## 2024-08-31 RX ADMIN — Medication 20 MILLIGRAM(S): at 22:57

## 2024-08-31 RX ADMIN — PIPERACILLIN SODIUM AND TAZOBACTAM SODIUM 25 GRAM(S): 3; .375 INJECTION, POWDER, FOR SOLUTION INTRAVENOUS at 13:17

## 2024-08-31 NOTE — PROGRESS NOTE ADULT - SUBJECTIVE AND OBJECTIVE BOX
Patient is a 89y old  Female who presents with a chief complaint of N/V (30 Aug 2024 16:39)      SUBJECTIVE / OVERNIGHT EVENTS:    Events noted.  CONSTITUTIONAL: Weakness  RESPIRATORY: No cough, wheezing,  No shortness of breath  CARDIOVASCULAR: No chest pain, palpitations, dizziness, or leg swelling  GASTROINTESTINAL: RUQ pain      MEDICATIONS  (STANDING):  bacitracin   Ointment 1 Application(s) Topical every 8 hours  cloNIDine 0.1 milliGRAM(s) Oral two times a day  enalapril 20 milliGRAM(s) Oral daily  enoxaparin Injectable 40 milliGRAM(s) SubCutaneous every 24 hours  metoprolol succinate ER 50 milliGRAM(s) Oral daily  piperacillin/tazobactam IVPB.. 3.375 Gram(s) IV Intermittent every 8 hours  simvastatin 20 milliGRAM(s) Oral at bedtime    MEDICATIONS  (PRN):  acetaminophen     Tablet .. 975 milliGRAM(s) Oral every 6 hours PRN Temp greater or equal to 38C (100.4F), Moderate Pain (4 - 6)  ondansetron Injectable 4 milliGRAM(s) IV Push every 8 hours PRN Nausea and/or Vomiting        CAPILLARY BLOOD GLUCOSE        I&O's Summary      T(C): 36.9 (08-30-24 @ 16:24), Max: 37.2 (08-29-24 @ 23:07)  HR: 99 (08-30-24 @ 16:24) (72 - 99)  BP: 135/68 (08-30-24 @ 16:24) (105/65 - 154/79)  RR: 18 (08-30-24 @ 16:24) (14 - 18)  SpO2: 98% (08-30-24 @ 16:24) (94% - 99%)    PHYSICAL EXAM:    NECK: Supple, No JVD  CHEST/LUNG: Clear to auscultation bilaterally; No wheezing.  HEART: Regular rate and rhythm; No murmurs, rubs, or gallops  ABDOMEN: Soft, Nontender, Nondistended; Bowel sounds present  EXTREMITIES:   No edema  NEUROLOGY: AAO X 3      LABS:                        8.8    16.69 )-----------( 235      ( 30 Aug 2024 07:06 )             27.9     08-30    138  |  99  |  18  ----------------------------<  80  3.7   |  24  |  0.54    Ca    8.8      30 Aug 2024 07:06  Phos  2.6     08-30  Mg     2.1     08-30    TPro  6.2  /  Alb  2.8<L>  /  TBili  0.6  /  DBili  x   /  AST  22  /  ALT  10  /  AlkPhos  70  08-30    PT/INR - ( 29 Aug 2024 10:33 )   PT: 29.1 sec;   INR: 2.72 ratio         PTT - ( 29 Aug 2024 10:33 )  PTT:34.6 sec      Urinalysis Basic - ( 30 Aug 2024 07:06 )    Color: x / Appearance: x / SG: x / pH: x  Gluc: 80 mg/dL / Ketone: x  / Bili: x / Urobili: x   Blood: x / Protein: x / Nitrite: x   Leuk Esterase: x / RBC: x / WBC x   Sq Epi: x / Non Sq Epi: x / Bacteria: x      CAPILLARY BLOOD GLUCOSE            RADIOLOGY & ADDITIONAL TESTS:    Imaging Personally Reviewed:    Consultant(s) Notes Reviewed:      Care Discussed with Consultants/Other Providers:    Juan Tony MD, CMD, FACP    [address] [place] [address]  Office Tel: [phone]  Cell: [phone]

## 2024-09-01 LAB
ALBUMIN SERPL ELPH-MCNC: 2.9 G/DL — LOW (ref 3.3–5)
ALP SERPL-CCNC: 65 U/L — SIGNIFICANT CHANGE UP (ref 40–120)
ALT FLD-CCNC: 17 U/L — SIGNIFICANT CHANGE UP (ref 10–45)
ANION GAP SERPL CALC-SCNC: 12 MMOL/L — SIGNIFICANT CHANGE UP (ref 5–17)
AST SERPL-CCNC: 32 U/L — SIGNIFICANT CHANGE UP (ref 10–40)
BASOPHILS # BLD AUTO: 0.02 K/UL — SIGNIFICANT CHANGE UP (ref 0–0.2)
BASOPHILS NFR BLD AUTO: 0.3 % — SIGNIFICANT CHANGE UP (ref 0–2)
BILIRUB SERPL-MCNC: 0.4 MG/DL — SIGNIFICANT CHANGE UP (ref 0.2–1.2)
BUN SERPL-MCNC: 17 MG/DL — SIGNIFICANT CHANGE UP (ref 7–23)
CALCIUM SERPL-MCNC: 8.9 MG/DL — SIGNIFICANT CHANGE UP (ref 8.4–10.5)
CHLORIDE SERPL-SCNC: 99 MMOL/L — SIGNIFICANT CHANGE UP (ref 96–108)
CO2 SERPL-SCNC: 27 MMOL/L — SIGNIFICANT CHANGE UP (ref 22–31)
CREAT SERPL-MCNC: 0.66 MG/DL — SIGNIFICANT CHANGE UP (ref 0.5–1.3)
EGFR: 83 ML/MIN/1.73M2 — SIGNIFICANT CHANGE UP
EOSINOPHIL # BLD AUTO: 0.22 K/UL — SIGNIFICANT CHANGE UP (ref 0–0.5)
EOSINOPHIL NFR BLD AUTO: 3.5 % — SIGNIFICANT CHANGE UP (ref 0–6)
GLUCOSE SERPL-MCNC: 84 MG/DL — SIGNIFICANT CHANGE UP (ref 70–99)
HCT VFR BLD CALC: 30 % — LOW (ref 34.5–45)
HGB BLD-MCNC: 9.5 G/DL — LOW (ref 11.5–15.5)
IMM GRANULOCYTES NFR BLD AUTO: 0.5 % — SIGNIFICANT CHANGE UP (ref 0–0.9)
LYMPHOCYTES # BLD AUTO: 1.19 K/UL — SIGNIFICANT CHANGE UP (ref 1–3.3)
LYMPHOCYTES # BLD AUTO: 19.1 % — SIGNIFICANT CHANGE UP (ref 13–44)
MAGNESIUM SERPL-MCNC: 2.2 MG/DL — SIGNIFICANT CHANGE UP (ref 1.6–2.6)
MCHC RBC-ENTMCNC: 31.7 GM/DL — LOW (ref 32–36)
MCHC RBC-ENTMCNC: 31.8 PG — SIGNIFICANT CHANGE UP (ref 27–34)
MCV RBC AUTO: 100.3 FL — HIGH (ref 80–100)
MONOCYTES # BLD AUTO: 0.66 K/UL — SIGNIFICANT CHANGE UP (ref 0–0.9)
MONOCYTES NFR BLD AUTO: 10.6 % — SIGNIFICANT CHANGE UP (ref 2–14)
NEUTROPHILS # BLD AUTO: 4.12 K/UL — SIGNIFICANT CHANGE UP (ref 1.8–7.4)
NEUTROPHILS NFR BLD AUTO: 66 % — SIGNIFICANT CHANGE UP (ref 43–77)
NRBC # BLD: 0 /100 WBCS — SIGNIFICANT CHANGE UP (ref 0–0)
PHOSPHATE SERPL-MCNC: 3.2 MG/DL — SIGNIFICANT CHANGE UP (ref 2.5–4.5)
PLATELET # BLD AUTO: 268 K/UL — SIGNIFICANT CHANGE UP (ref 150–400)
POTASSIUM SERPL-MCNC: 4.5 MMOL/L — SIGNIFICANT CHANGE UP (ref 3.5–5.3)
POTASSIUM SERPL-SCNC: 4.5 MMOL/L — SIGNIFICANT CHANGE UP (ref 3.5–5.3)
PROT SERPL-MCNC: 6.4 G/DL — SIGNIFICANT CHANGE UP (ref 6–8.3)
RBC # BLD: 2.99 M/UL — LOW (ref 3.8–5.2)
RBC # FLD: 18 % — HIGH (ref 10.3–14.5)
SODIUM SERPL-SCNC: 138 MMOL/L — SIGNIFICANT CHANGE UP (ref 135–145)
WBC # BLD: 6.24 K/UL — SIGNIFICANT CHANGE UP (ref 3.8–10.5)
WBC # FLD AUTO: 6.24 K/UL — SIGNIFICANT CHANGE UP (ref 3.8–10.5)

## 2024-09-01 RX ADMIN — ENOXAPARIN SODIUM 40 MILLIGRAM(S): 100 INJECTION SUBCUTANEOUS at 09:18

## 2024-09-01 RX ADMIN — PIPERACILLIN SODIUM AND TAZOBACTAM SODIUM 25 GRAM(S): 3; .375 INJECTION, POWDER, FOR SOLUTION INTRAVENOUS at 06:15

## 2024-09-01 RX ADMIN — PIPERACILLIN SODIUM AND TAZOBACTAM SODIUM 25 GRAM(S): 3; .375 INJECTION, POWDER, FOR SOLUTION INTRAVENOUS at 22:09

## 2024-09-01 RX ADMIN — METOPROLOL TARTRATE 50 MILLIGRAM(S): 100 TABLET ORAL at 06:14

## 2024-09-01 RX ADMIN — PIPERACILLIN SODIUM AND TAZOBACTAM SODIUM 25 GRAM(S): 3; .375 INJECTION, POWDER, FOR SOLUTION INTRAVENOUS at 13:17

## 2024-09-01 RX ADMIN — Medication 0.1 MILLIGRAM(S): at 17:23

## 2024-09-01 RX ADMIN — Medication 20 MILLIGRAM(S): at 22:41

## 2024-09-01 RX ADMIN — Medication 0.1 MILLIGRAM(S): at 06:14

## 2024-09-01 RX ADMIN — ONDANSETRON 4 MILLIGRAM(S): 2 INJECTION, SOLUTION INTRAMUSCULAR; INTRAVENOUS at 09:17

## 2024-09-01 RX ADMIN — ENALAPRIL MALEATE 20 MILLIGRAM(S): 5 TABLET ORAL at 06:14

## 2024-09-01 NOTE — DIETITIAN INITIAL EVALUATION ADULT - PERTINENT LABORATORY DATA
09-01    138  |  99  |  17  ----------------------------<  84  4.5   |  27  |  0.66    Ca    8.9      01 Sep 2024 07:12  Phos  3.2     09-01  Mg     2.2     09-01    TPro  6.4  /  Alb  2.9<L>  /  TBili  0.4  /  DBili  x   /  AST  32  /  ALT  17  /  AlkPhos  65  09-01

## 2024-09-01 NOTE — DIETITIAN INITIAL EVALUATION ADULT - OTHER CALCULATIONS
Fluid needs deferred to team.  Estimated needs using most current bed scale wt in-house (158.2 lb on 9/01) with consideration for RLE wound.

## 2024-09-01 NOTE — DIETITIAN INITIAL EVALUATION ADULT - ADD RECOMMEND
1) Recommend liberalize to Regular diet free of therapeutic restrictions as tolerated.  2) Recommend Multivitamin and vitamin C daily to promote wound healing pending no medical contraindications.  3) Continue to monitor PO intake, weight, labs, skin, GI status, and diet.

## 2024-09-01 NOTE — DIETITIAN INITIAL EVALUATION ADULT - ORAL INTAKE PTA/DIET HISTORY
Pt reports having a good appetite and PO intake PTA; typically consuming 3 meals/day. Follows regular diet. Pt reports intolerance to lactose, RD to note in patient profile. Pt taking a Multivitamin at home. Denies any difficulty chewing/swallowing at this time.

## 2024-09-01 NOTE — DIETITIAN INITIAL EVALUATION ADULT - NSFNSGIIOFT_GEN_A_CORE
**Most current bed scale wt in-house used for above BMI calculation.     No current N/V/D/C reported. Per chart, last BM 9/01. No current bowel regimen in place. Ordered for zofran PRN.

## 2024-09-01 NOTE — DIETITIAN INITIAL EVALUATION ADULT - ENERGY INTAKE
Fair (50-75%) In house, pt reports good appetite and PO intake. Flowsheets indicate pt consuming >50% of meals.

## 2024-09-01 NOTE — DIETITIAN INITIAL EVALUATION ADULT - PROBLEM SELECTOR PLAN 1
Fever and significant leukocytosis concern for infection though no clear identifiable source at this time.   - UA negative for UTI  - CT A/P without evidence of diverticulitis, no obvious infectious source. Noted to have distended gallbladder  - Will continue zosyn for empiric treatment  - F/u blood cultures obtained in ED  - Monitor fevers

## 2024-09-01 NOTE — PROVIDER CONTACT NOTE (OTHER) - BACKGROUND
history of vertebral osteomyelitis--lumbar back pain--HLD--HTN--DM--endometrial cancer-- obesity
admitted w/sepsis on iv abx nausea and vomiting

## 2024-09-01 NOTE — DIETITIAN INITIAL EVALUATION ADULT - PERSON TAUGHT/METHOD
Encouraged adequate consumption of meals to optimize protein-energy intake. Encouraged prioritizing protein foods at meal time. Pt made aware RD remains available PRN./verbal instruction/patient instructed

## 2024-09-01 NOTE — PROGRESS NOTE ADULT - PROBLEM SELECTOR PLAN 5
- Continue enalapril 20mg qd  - Continue clonidine

## 2024-09-01 NOTE — DIETITIAN INITIAL EVALUATION ADULT - PERTINENT MEDS FT
MEDICATIONS  (STANDING):  cloNIDine 0.1 milliGRAM(s) Oral two times a day  enalapril 20 milliGRAM(s) Oral daily  enoxaparin Injectable 40 milliGRAM(s) SubCutaneous every 24 hours  metoprolol succinate ER 50 milliGRAM(s) Oral daily  piperacillin/tazobactam IVPB.. 3.375 Gram(s) IV Intermittent every 8 hours  simvastatin 20 milliGRAM(s) Oral at bedtime    MEDICATIONS  (PRN):  acetaminophen     Tablet .. 975 milliGRAM(s) Oral every 6 hours PRN Temp greater or equal to 38C (100.4F), Moderate Pain (4 - 6)  ondansetron Injectable 4 milliGRAM(s) IV Push every 8 hours PRN Nausea and/or Vomiting

## 2024-09-01 NOTE — DIETITIAN INITIAL EVALUATION ADULT - OTHER INFO
- UBW: previously reported ~135 pounds per previous RD note (7/25). Pt denies any weight changes.   - Dosing wt: 110 pounds (8/29) - ? accuracy of dosing weight versus in-house weights taken below.   - Wt hx per chart in pounds: 159.3 (8/31, bed scale), 158.2 (9/01, bed scale).   - Wt hx per [facility] HIE in pounds: 140 (7/16), 135 (5/07), 132 (3/30).  - RD to continue to monitor weight trends as able.   - Nutritionally Pertinent Meds in-house: Abx, simvastatin.   - GI: previous admission for diverticulitis in 7/2024; now following for biliary stasis.

## 2024-09-01 NOTE — PROGRESS NOTE ADULT - SUBJECTIVE AND OBJECTIVE BOX
Patient is a 89y old  Female who presents with a chief complaint of N/V (30 Aug 2024 16:39)      SUBJECTIVE / OVERNIGHT EVENTS:    Events noted.  CONSTITUTIONAL: Weakness  RESPIRATORY: No cough, wheezing,  No shortness of breath  CARDIOVASCULAR: No chest pain, palpitations, dizziness, or leg swelling  GASTROINTESTINAL: RUQ pain                          9.5    6.24  )-----------( 268      ( 01 Sep 2024 07:10 )             30.0           LIVER FUNCTIONS - ( 01 Sep 2024 07:12 )  Alb: 2.9 g/dL / Pro: 6.4 g/dL / ALK PHOS: 65 U/L / ALT: 17 U/L / AST: 32 U/L / GGT: x             138|99|17<84  4.5|27|0.66  8.9,2.2,3.2  09-01 @ 07:12            NECK: Supple, No JVD  CHEST/LUNG: Clear to auscultation bilaterally; No wheezing.  HEART: Regular rate and rhythm; No murmurs, rubs, or gallops  ABDOMEN: Soft, Nontender, Nondistended; Bowel sounds present  EXTREMITIES:   No edema  NEUROLOGY: AAO X 3      LABS:                        8.8    16.69 )-----------( 235      ( 30 Aug 2024 07:06 )             27.9     08-30    138  |  99  |  18  ----------------------------<  80  3.7   |  24  |  0.54    Ca    8.8      30 Aug 2024 07:06  Phos  2.6     08-30  Mg     2.1     08-30    TPro  6.2  /  Alb  2.8<L>  /  TBili  0.6  /  DBili  x   /  AST  22  /  ALT  10  /  AlkPhos  70  08-30    PT/INR - ( 29 Aug 2024 10:33 )   PT: 29.1 sec;   INR: 2.72 ratio         PTT - ( 29 Aug 2024 10:33 )  PTT:34.6 sec      Urinalysis Basic - ( 30 Aug 2024 07:06 )    Color: x / Appearance: x / SG: x / pH: x  Gluc: 80 mg/dL / Ketone: x  / Bili: x / Urobili: x   Blood: x / Protein: x / Nitrite: x   Leuk Esterase: x / RBC: x / WBC x   Sq Epi: x / Non Sq Epi: x / Bacteria: x      CAPILLARY BLOOD GLUCOSE            RADIOLOGY & ADDITIONAL TESTS:    Imaging Personally Reviewed:    Consultant(s) Notes Reviewed:      Care Discussed with Consultants/Other Providers:    Juan Tony MD, CMD, FACP    [address] [place] [address]  Office Tel: [phone]  Cell: [phone]

## 2024-09-02 LAB
ALBUMIN SERPL ELPH-MCNC: 2.9 G/DL — LOW (ref 3.3–5)
ALP SERPL-CCNC: 72 U/L — SIGNIFICANT CHANGE UP (ref 40–120)
ALT FLD-CCNC: 15 U/L — SIGNIFICANT CHANGE UP (ref 10–45)
ANION GAP SERPL CALC-SCNC: 11 MMOL/L — SIGNIFICANT CHANGE UP (ref 5–17)
AST SERPL-CCNC: 23 U/L — SIGNIFICANT CHANGE UP (ref 10–40)
BILIRUB SERPL-MCNC: 0.5 MG/DL — SIGNIFICANT CHANGE UP (ref 0.2–1.2)
BUN SERPL-MCNC: 12 MG/DL — SIGNIFICANT CHANGE UP (ref 7–23)
CALCIUM SERPL-MCNC: 9 MG/DL — SIGNIFICANT CHANGE UP (ref 8.4–10.5)
CHLORIDE SERPL-SCNC: 101 MMOL/L — SIGNIFICANT CHANGE UP (ref 96–108)
CO2 SERPL-SCNC: 26 MMOL/L — SIGNIFICANT CHANGE UP (ref 22–31)
CREAT SERPL-MCNC: 0.53 MG/DL — SIGNIFICANT CHANGE UP (ref 0.5–1.3)
EGFR: 88 ML/MIN/1.73M2 — SIGNIFICANT CHANGE UP
GLUCOSE SERPL-MCNC: 106 MG/DL — HIGH (ref 70–99)
HCT VFR BLD CALC: 33.5 % — LOW (ref 34.5–45)
HGB BLD-MCNC: 10.5 G/DL — LOW (ref 11.5–15.5)
MCHC RBC-ENTMCNC: 31 PG — SIGNIFICANT CHANGE UP (ref 27–34)
MCHC RBC-ENTMCNC: 31.3 GM/DL — LOW (ref 32–36)
MCV RBC AUTO: 98.8 FL — SIGNIFICANT CHANGE UP (ref 80–100)
NRBC # BLD: 0 /100 WBCS — SIGNIFICANT CHANGE UP (ref 0–0)
PLATELET # BLD AUTO: 275 K/UL — SIGNIFICANT CHANGE UP (ref 150–400)
POTASSIUM SERPL-MCNC: 4 MMOL/L — SIGNIFICANT CHANGE UP (ref 3.5–5.3)
POTASSIUM SERPL-SCNC: 4 MMOL/L — SIGNIFICANT CHANGE UP (ref 3.5–5.3)
PROT SERPL-MCNC: 6.7 G/DL — SIGNIFICANT CHANGE UP (ref 6–8.3)
RBC # BLD: 3.39 M/UL — LOW (ref 3.8–5.2)
RBC # FLD: 17.6 % — HIGH (ref 10.3–14.5)
SODIUM SERPL-SCNC: 138 MMOL/L — SIGNIFICANT CHANGE UP (ref 135–145)
WBC # BLD: 7.89 K/UL — SIGNIFICANT CHANGE UP (ref 3.8–10.5)
WBC # FLD AUTO: 7.89 K/UL — SIGNIFICANT CHANGE UP (ref 3.8–10.5)

## 2024-09-02 RX ADMIN — PIPERACILLIN SODIUM AND TAZOBACTAM SODIUM 25 GRAM(S): 3; .375 INJECTION, POWDER, FOR SOLUTION INTRAVENOUS at 05:39

## 2024-09-02 RX ADMIN — ONDANSETRON 4 MILLIGRAM(S): 2 INJECTION, SOLUTION INTRAMUSCULAR; INTRAVENOUS at 11:20

## 2024-09-02 RX ADMIN — METOPROLOL TARTRATE 50 MILLIGRAM(S): 100 TABLET ORAL at 05:38

## 2024-09-02 RX ADMIN — ENOXAPARIN SODIUM 40 MILLIGRAM(S): 100 INJECTION SUBCUTANEOUS at 11:20

## 2024-09-02 RX ADMIN — Medication 0.1 MILLIGRAM(S): at 05:46

## 2024-09-02 RX ADMIN — ENALAPRIL MALEATE 20 MILLIGRAM(S): 5 TABLET ORAL at 05:38

## 2024-09-02 RX ADMIN — PIPERACILLIN SODIUM AND TAZOBACTAM SODIUM 25 GRAM(S): 3; .375 INJECTION, POWDER, FOR SOLUTION INTRAVENOUS at 13:10

## 2024-09-02 RX ADMIN — PIPERACILLIN SODIUM AND TAZOBACTAM SODIUM 25 GRAM(S): 3; .375 INJECTION, POWDER, FOR SOLUTION INTRAVENOUS at 21:13

## 2024-09-02 RX ADMIN — Medication 20 MILLIGRAM(S): at 21:13

## 2024-09-02 RX ADMIN — Medication 0.1 MILLIGRAM(S): at 17:11

## 2024-09-02 NOTE — PROGRESS NOTE ADULT - PROBLEM SELECTOR PLAN 1
Fever and significant leukocytosis concern for infection though no clear identifiable source at this time.   - CT A/P without evidence of diverticulitis, no obvious infectious source. Noted to have distended gallbladder  - Will continue zosyn for empiric treatment  - R/o Acute stephanie- HIDA scan  Sx eval appreciated

## 2024-09-02 NOTE — PROGRESS NOTE ADULT - SUBJECTIVE AND OBJECTIVE BOX
Patient is a 89y old  Female who presents with a chief complaint of N/V (01 Sep 2024 17:46)      SUBJECTIVE / OVERNIGHT EVENTS:    Events noted.  CONSTITUTIONAL: No fever,  or fatigue  RESPIRATORY: No cough, wheezing,  No shortness of breath  CARDIOVASCULAR: No chest pain, palpitations, dizziness, or leg swelling  GASTROINTESTINAL: No abdominal or epigastric pain.       MEDICATIONS  (STANDING):  cloNIDine 0.1 milliGRAM(s) Oral two times a day  enalapril 20 milliGRAM(s) Oral daily  enoxaparin Injectable 40 milliGRAM(s) SubCutaneous every 24 hours  metoprolol succinate ER 50 milliGRAM(s) Oral daily  piperacillin/tazobactam IVPB.. 3.375 Gram(s) IV Intermittent every 8 hours  simvastatin 20 milliGRAM(s) Oral at bedtime    MEDICATIONS  (PRN):  acetaminophen     Tablet .. 975 milliGRAM(s) Oral every 6 hours PRN Temp greater or equal to 38C (100.4F), Moderate Pain (4 - 6)  ondansetron Injectable 4 milliGRAM(s) IV Push every 8 hours PRN Nausea and/or Vomiting        CAPILLARY BLOOD GLUCOSE        I&O's Summary    01 Sep 2024 07:01  -  02 Sep 2024 07:00  --------------------------------------------------------  IN: 780 mL / OUT: 700 mL / NET: 80 mL    02 Sep 2024 07:01  -  02 Sep 2024 19:57  --------------------------------------------------------  IN: 600 mL / OUT: 0 mL / NET: 600 mL        T(C): 36.3 (09-02-24 @ 19:31), Max: 36.8 (09-01-24 @ 20:50)  HR: 84 (09-02-24 @ 19:31) (78 - 91)  BP: 151/76 (09-02-24 @ 19:31) (138/74 - 184/89)  RR: 18 (09-02-24 @ 19:31) (18 - 18)  SpO2: 96% (09-02-24 @ 19:31) (94% - 96%)    PHYSICAL EXAM:    NECK: Supple, No JVD  CHEST/LUNG: Clear to auscultation bilaterally; No wheezing.  HEART: Regular rate and rhythm; No murmurs, rubs, or gallops  ABDOMEN: Soft, Nontender, Nondistended; Bowel sounds present  EXTREMITIES:   No edema  NEUROLOGY: AAO X 3      LABS:                        10.5   7.89  )-----------( 275      ( 02 Sep 2024 09:57 )             33.5     09-02    138  |  101  |  12  ----------------------------<  106<H>  4.0   |  26  |  0.53    Ca    9.0      02 Sep 2024 09:57  Phos  3.2     09-01  Mg     2.2     09-01    TPro  6.7  /  Alb  2.9<L>  /  TBili  0.5  /  DBili  x   /  AST  23  /  ALT  15  /  AlkPhos  72  09-02          Urinalysis Basic - ( 02 Sep 2024 09:57 )    Color: x / Appearance: x / SG: x / pH: x  Gluc: 106 mg/dL / Ketone: x  / Bili: x / Urobili: x   Blood: x / Protein: x / Nitrite: x   Leuk Esterase: x / RBC: x / WBC x   Sq Epi: x / Non Sq Epi: x / Bacteria: x      CAPILLARY BLOOD GLUCOSE            RADIOLOGY & ADDITIONAL TESTS:    Imaging Personally Reviewed:    Consultant(s) Notes Reviewed:      Care Discussed with Consultants/Other Providers:    Juan Tony MD, CMD, FACP    [address] [place] [address]  Office Tel: [phone]  Cell: [phone]

## 2024-09-03 LAB
ALBUMIN SERPL ELPH-MCNC: 3 G/DL — LOW (ref 3.3–5)
ALP SERPL-CCNC: 68 U/L — SIGNIFICANT CHANGE UP (ref 40–120)
ALT FLD-CCNC: 13 U/L — SIGNIFICANT CHANGE UP (ref 10–45)
ANION GAP SERPL CALC-SCNC: 12 MMOL/L — SIGNIFICANT CHANGE UP (ref 5–17)
AST SERPL-CCNC: 23 U/L — SIGNIFICANT CHANGE UP (ref 10–40)
BASOPHILS # BLD AUTO: 0.04 K/UL — SIGNIFICANT CHANGE UP (ref 0–0.2)
BASOPHILS NFR BLD AUTO: 0.5 % — SIGNIFICANT CHANGE UP (ref 0–2)
BILIRUB SERPL-MCNC: 0.4 MG/DL — SIGNIFICANT CHANGE UP (ref 0.2–1.2)
BUN SERPL-MCNC: 14 MG/DL — SIGNIFICANT CHANGE UP (ref 7–23)
CALCIUM SERPL-MCNC: 9.2 MG/DL — SIGNIFICANT CHANGE UP (ref 8.4–10.5)
CHLORIDE SERPL-SCNC: 100 MMOL/L — SIGNIFICANT CHANGE UP (ref 96–108)
CO2 SERPL-SCNC: 26 MMOL/L — SIGNIFICANT CHANGE UP (ref 22–31)
CREAT SERPL-MCNC: 0.73 MG/DL — SIGNIFICANT CHANGE UP (ref 0.5–1.3)
CULTURE RESULTS: SIGNIFICANT CHANGE UP
CULTURE RESULTS: SIGNIFICANT CHANGE UP
EGFR: 78 ML/MIN/1.73M2 — SIGNIFICANT CHANGE UP
EOSINOPHIL # BLD AUTO: 0.19 K/UL — SIGNIFICANT CHANGE UP (ref 0–0.5)
EOSINOPHIL NFR BLD AUTO: 2.3 % — SIGNIFICANT CHANGE UP (ref 0–6)
GLUCOSE SERPL-MCNC: 80 MG/DL — SIGNIFICANT CHANGE UP (ref 70–99)
HCT VFR BLD CALC: 34 % — LOW (ref 34.5–45)
HGB BLD-MCNC: 10.6 G/DL — LOW (ref 11.5–15.5)
IMM GRANULOCYTES NFR BLD AUTO: 0.7 % — SIGNIFICANT CHANGE UP (ref 0–0.9)
LYMPHOCYTES # BLD AUTO: 1.53 K/UL — SIGNIFICANT CHANGE UP (ref 1–3.3)
LYMPHOCYTES # BLD AUTO: 18.8 % — SIGNIFICANT CHANGE UP (ref 13–44)
MAGNESIUM SERPL-MCNC: 2.3 MG/DL — SIGNIFICANT CHANGE UP (ref 1.6–2.6)
MCHC RBC-ENTMCNC: 31.2 GM/DL — LOW (ref 32–36)
MCHC RBC-ENTMCNC: 31.5 PG — SIGNIFICANT CHANGE UP (ref 27–34)
MCV RBC AUTO: 100.9 FL — HIGH (ref 80–100)
MONOCYTES # BLD AUTO: 0.78 K/UL — SIGNIFICANT CHANGE UP (ref 0–0.9)
MONOCYTES NFR BLD AUTO: 9.6 % — SIGNIFICANT CHANGE UP (ref 2–14)
NEUTROPHILS # BLD AUTO: 5.53 K/UL — SIGNIFICANT CHANGE UP (ref 1.8–7.4)
NEUTROPHILS NFR BLD AUTO: 68.1 % — SIGNIFICANT CHANGE UP (ref 43–77)
NRBC # BLD: 0 /100 WBCS — SIGNIFICANT CHANGE UP (ref 0–0)
PHOSPHATE SERPL-MCNC: 2.9 MG/DL — SIGNIFICANT CHANGE UP (ref 2.5–4.5)
PLATELET # BLD AUTO: 281 K/UL — SIGNIFICANT CHANGE UP (ref 150–400)
POTASSIUM SERPL-MCNC: 4.8 MMOL/L — SIGNIFICANT CHANGE UP (ref 3.5–5.3)
POTASSIUM SERPL-SCNC: 4.8 MMOL/L — SIGNIFICANT CHANGE UP (ref 3.5–5.3)
PROT SERPL-MCNC: 6.8 G/DL — SIGNIFICANT CHANGE UP (ref 6–8.3)
RBC # BLD: 3.37 M/UL — LOW (ref 3.8–5.2)
RBC # FLD: 17.6 % — HIGH (ref 10.3–14.5)
SODIUM SERPL-SCNC: 138 MMOL/L — SIGNIFICANT CHANGE UP (ref 135–145)
SPECIMEN SOURCE: SIGNIFICANT CHANGE UP
SPECIMEN SOURCE: SIGNIFICANT CHANGE UP
WBC # BLD: 8.13 K/UL — SIGNIFICANT CHANGE UP (ref 3.8–10.5)
WBC # FLD AUTO: 8.13 K/UL — SIGNIFICANT CHANGE UP (ref 3.8–10.5)

## 2024-09-03 PROCEDURE — 78226 HEPATOBILIARY SYSTEM IMAGING: CPT | Mod: 26

## 2024-09-03 RX ADMIN — Medication 20 MILLIGRAM(S): at 21:21

## 2024-09-03 RX ADMIN — PIPERACILLIN SODIUM AND TAZOBACTAM SODIUM 25 GRAM(S): 3; .375 INJECTION, POWDER, FOR SOLUTION INTRAVENOUS at 05:02

## 2024-09-03 RX ADMIN — METOPROLOL TARTRATE 50 MILLIGRAM(S): 100 TABLET ORAL at 05:02

## 2024-09-03 RX ADMIN — ENALAPRIL MALEATE 20 MILLIGRAM(S): 5 TABLET ORAL at 05:02

## 2024-09-03 RX ADMIN — Medication 0.1 MILLIGRAM(S): at 05:02

## 2024-09-03 RX ADMIN — PIPERACILLIN SODIUM AND TAZOBACTAM SODIUM 25 GRAM(S): 3; .375 INJECTION, POWDER, FOR SOLUTION INTRAVENOUS at 21:22

## 2024-09-03 RX ADMIN — PIPERACILLIN SODIUM AND TAZOBACTAM SODIUM 25 GRAM(S): 3; .375 INJECTION, POWDER, FOR SOLUTION INTRAVENOUS at 13:19

## 2024-09-03 RX ADMIN — Medication 0.1 MILLIGRAM(S): at 18:17

## 2024-09-03 RX ADMIN — ENOXAPARIN SODIUM 40 MILLIGRAM(S): 100 INJECTION SUBCUTANEOUS at 12:00

## 2024-09-03 NOTE — PROGRESS NOTE ADULT - SUBJECTIVE AND OBJECTIVE BOX
Patient is a 89y old  Female who presents with a chief complaint of N/V (02 Sep 2024 19:55)      SUBJECTIVE / OVERNIGHT EVENTS:    Events noted.  CONSTITUTIONAL: No fever,  or fatigue  RESPIRATORY: No cough, wheezing,  No shortness of breath  CARDIOVASCULAR: No chest pain, palpitations, dizziness, or leg swelling  GASTROINTESTINAL: No abdominal or epigastric pain. No nausea, vomiting.  NEUROLOGICAL: No headache    MEDICATIONS  (STANDING):  cloNIDine 0.1 milliGRAM(s) Oral two times a day  enalapril 20 milliGRAM(s) Oral daily  enoxaparin Injectable 40 milliGRAM(s) SubCutaneous every 24 hours  metoprolol succinate ER 50 milliGRAM(s) Oral daily  piperacillin/tazobactam IVPB.. 3.375 Gram(s) IV Intermittent every 8 hours  simvastatin 20 milliGRAM(s) Oral at bedtime    MEDICATIONS  (PRN):  acetaminophen     Tablet .. 975 milliGRAM(s) Oral every 6 hours PRN Temp greater or equal to 38C (100.4F), Moderate Pain (4 - 6)  ondansetron Injectable 4 milliGRAM(s) IV Push every 8 hours PRN Nausea and/or Vomiting        CAPILLARY BLOOD GLUCOSE        I&O's Summary    02 Sep 2024 07:01  -  03 Sep 2024 07:00  --------------------------------------------------------  IN: 700 mL / OUT: 850 mL / NET: -150 mL        T(C): 36.6 (09-03-24 @ 05:10), Max: 36.6 (09-03-24 @ 05:10)  HR: 78 (09-03-24 @ 05:10) (78 - 84)  BP: 166/78 (09-03-24 @ 05:10) (151/76 - 166/78)  RR: 17 (09-03-24 @ 05:10) (17 - 18)  SpO2: 95% (09-03-24 @ 05:10) (95% - 96%)    PHYSICAL EXAM:  GENERAL: NAD  NECK: Supple, No JVD  CHEST/LUNG: Clear to auscultation bilaterally; No wheezing.  HEART: Regular rate and rhythm; No murmurs, rubs, or gallops  ABDOMEN: Soft, Nontender, Nondistended; Bowel sounds present  EXTREMITIES:   No edema  NEUROLOGY: AAO       LABS:                        10.6   8.13  )-----------( 281      ( 03 Sep 2024 07:19 )             34.0     09-03    138  |  100  |  14  ----------------------------<  80  4.8   |  26  |  0.73    Ca    9.2      03 Sep 2024 07:16  Phos  2.9     09-03  Mg     2.3     09-03    TPro  6.8  /  Alb  3.0<L>  /  TBili  0.4  /  DBili  x   /  AST  23  /  ALT  13  /  AlkPhos  68  09-03          Urinalysis Basic - ( 03 Sep 2024 07:16 )    Color: x / Appearance: x / SG: x / pH: x  Gluc: 80 mg/dL / Ketone: x  / Bili: x / Urobili: x   Blood: x / Protein: x / Nitrite: x   Leuk Esterase: x / RBC: x / WBC x   Sq Epi: x / Non Sq Epi: x / Bacteria: x      CAPILLARY BLOOD GLUCOSE            RADIOLOGY & ADDITIONAL TESTS:    Imaging Personally Reviewed:    Consultant(s) Notes Reviewed:      Care Discussed with Consultants/Other Providers:    Juan Tony MD, CMD, FACP    [address] [place] [address]  Office Tel: [phone]  Cell: [phone]

## 2024-09-04 ENCOUNTER — TRANSCRIPTION ENCOUNTER (OUTPATIENT)
Age: 89
End: 2024-09-04

## 2024-09-04 RX ORDER — ACETAMINOPHEN 325 MG/1
3 TABLET ORAL
Qty: 0 | Refills: 0 | DISCHARGE
Start: 2024-09-04

## 2024-09-04 RX ORDER — RIVAROXABAN 10 MG/1
1 TABLET, FILM COATED ORAL
Refills: 0 | DISCHARGE

## 2024-09-04 RX ORDER — NALOXONE HCL 1 MG/ML
1 VIAL (ML) INJECTION
Refills: 0 | DISCHARGE

## 2024-09-04 RX ORDER — TIZANIDINE 4 MG/1
1 TABLET ORAL
Refills: 0 | DISCHARGE

## 2024-09-04 RX ADMIN — ENOXAPARIN SODIUM 40 MILLIGRAM(S): 100 INJECTION SUBCUTANEOUS at 11:18

## 2024-09-04 RX ADMIN — Medication 20 MILLIGRAM(S): at 22:44

## 2024-09-04 RX ADMIN — PIPERACILLIN SODIUM AND TAZOBACTAM SODIUM 25 GRAM(S): 3; .375 INJECTION, POWDER, FOR SOLUTION INTRAVENOUS at 05:09

## 2024-09-04 RX ADMIN — ENALAPRIL MALEATE 20 MILLIGRAM(S): 5 TABLET ORAL at 05:05

## 2024-09-04 RX ADMIN — Medication 0.1 MILLIGRAM(S): at 17:07

## 2024-09-04 RX ADMIN — METOPROLOL TARTRATE 50 MILLIGRAM(S): 100 TABLET ORAL at 05:05

## 2024-09-04 RX ADMIN — Medication 0.1 MILLIGRAM(S): at 05:05

## 2024-09-04 NOTE — PHYSICAL THERAPY INITIAL EVALUATION ADULT - ADDITIONAL COMMENTS
Pt resides at the [facility] assisted living facility, states she was able to transfer to / prior to admission. Required assist with ADLs. Prior to last hospitalization pt states she was ambulating short distances with R/W

## 2024-09-04 NOTE — DISCHARGE NOTE PROVIDER - NSDCFUADDAPPT_GEN_ALL_CORE_FT
Follow up with PMD APPTS ARE READY TO BE MADE: [x] YES    Best Family or Patient Contact (if needed):    Additional Information about above appointments (if needed):    1:   2:   3:     Other comments or requests:

## 2024-09-04 NOTE — PROGRESS NOTE ADULT - PROBLEM SELECTOR PLAN 2
Hx of afib, previously on xarelto but was taken off following watchman procedure.  - Continue metoprolol succinate 50mg qd  - EKG reviewed showing Afib with HR 98
Gallbladder with sludge but no evidence of stones, no tenderness concerning for cholecystitis. Symptoms likely related to biliary stasis.   - Supportive management  - Symptom management with zofran and tylenol prn  - Will hold lasix for now given recent vomiting and poor PO intake; resume when symptoms improve
Hx of afib, previously on xarelto but was taken off following watchman procedure.  - Continue metoprolol succinate 50mg qd  - EKG reviewed showing Afib with HR 98
Hx of afib, previously on xarelto but was taken off following watchman procedure.  - Continue metoprolol succinate 50mg qd  - EKG reviewed showing Afib with HR 98
Gallbladder with sludge but no evidence of stones, no tenderness concerning for cholecystitis. Symptoms likely related to biliary stasis.   - Supportive management  - Symptom management with zofran and tylenol prn  - Will hold lasix for now given recent vomiting and poor PO intake; resume when symptoms improve
Gallbladder with sludge but no evidence of stones, no tenderness concerning for cholecystitis. Symptoms likely related to biliary stasis.   - Supportive management  - Symptom management with zofran and tylenol prn  - Will hold lasix for now given recent vomiting and poor PO intake; resume when symptoms improve

## 2024-09-04 NOTE — PROGRESS NOTE ADULT - PROBLEM SELECTOR PROBLEM 2
Biliary stasis
Biliary stasis
Chronic atrial fibrillation
Chronic atrial fibrillation
Biliary stasis
Chronic atrial fibrillation

## 2024-09-04 NOTE — PROGRESS NOTE ADULT - PROBLEM SELECTOR PLAN 3
Large wound in RLE appears to be healing. No obvious signs of infection but there is surround erythema and edema. Pt has bilateral LE edema, suspect due to venous stasis and associated dermatitis.  - Wound care consult appreciated  - Topical bacitracin
- Continue enalapril 20mg qd  - Continue clonidine
Large wound in RLE appears to be healing. No obvious signs of infection but there is surround erythema and edema. Pt has bilateral LE edema, suspect due to venous stasis and associated dermatitis.  - Wound care consult appreciated  - Topical bacitracin
Large wound in RLE appears to be healing. No obvious signs of infection but there is surround erythema and edema. Pt has bilateral LE edema, suspect due to venous stasis and associated dermatitis.  - Wound care consult appreciated  - Topical bacitracin

## 2024-09-04 NOTE — DISCHARGE NOTE PROVIDER - NSDCMRMEDTOKEN_GEN_ALL_CORE_FT
acetaminophen 325 mg oral tablet: 3 tab(s) orally every 6 hours As needed Temp greater or equal to 38C (100.4F), Moderate Pain (4 - 6)  George-Protect topical cream: Apply topically to affected area 2 times a day  cloNIDine 0.1 mg oral tablet: 1 tab(s) orally 2 times a day  Daily-Chanel 400mcg tablet: 1 tablet orally once a day  docusate sodium 100 mg oral capsule: 3 cap(s) orally once a day (at bedtime) as needed for  constipation  enalapril 20 mg oral tablet: 1 tab(s) orally once a day  furosemide 40 mg oral tablet: 1 tab(s) orally once a day  Klor-Con 10 mEq oral tablet, extended release: 1 tab(s) orally 2 times a day  metoprolol succinate 50 mg oral tablet, extended release: 1 tab(s) orally once a day (at bedtime)  ondansetron 4 mg oral tablet: 1 tab(s) orally every 8 hours as needed for  nausea  simvastatin 20 mg oral tablet: 1 tab(s) orally once a day (at bedtime)   cloNIDine 0.1 mg oral tablet: 1 tab(s) orally 2 times a day  Daily-Chanel 400mcg tablet: 1 tablet orally once a day  docusate sodium 100 mg oral capsule: 3 cap(s) orally once a day (at bedtime) as needed for  constipation  enalapril 20 mg oral tablet: 1 tab(s) orally once a day  furosemide 40 mg oral tablet: 1 tab(s) orally once a day  Klor-Con 10 mEq oral tablet, extended release: 1 tab(s) orally 2 times a day  metoprolol succinate 50 mg oral tablet, extended release: 1 tab(s) orally once a day (at bedtime)  ondansetron 4 mg oral tablet: 1 tab(s) orally every 8 hours as needed for  nausea  simvastatin 20 mg oral tablet: 1 tab(s) orally once a day (at bedtime)  Vitamin A and D topical cream: Apply topically to affected area

## 2024-09-04 NOTE — PROGRESS NOTE ADULT - PROBLEM SELECTOR PROBLEM 3
Laceration of skin of lower leg
Laceration of skin of lower leg
HTN (hypertension)
Laceration of skin of lower leg

## 2024-09-04 NOTE — PROGRESS NOTE ADULT - PROBLEM SELECTOR PLAN 1
Fever and significant leukocytosis concern for infection though no clear identifiable source at this time.   - CT A/P without evidence of diverticulitis, no obvious infectious source. Noted to have distended gallbladder  - Will continue zosyn for empiric treatment  - R/o Acute stephanie- HIDA scan  Sx eval appreciated Unknown source  Completed IV Zosyn  Dc planning

## 2024-09-04 NOTE — DISCHARGE NOTE PROVIDER - CARE PROVIDER_API CALL
Chavo Juan  Internal Medicine  [address] [place] [address], [address]  [place] [address]  Phone: [phone]  Fax: [phone]  Follow Up Time: 1 week

## 2024-09-04 NOTE — PHYSICAL THERAPY INITIAL EVALUATION ADULT - ACTIVE RANGE OF MOTION EXAMINATION, REHAB EVAL
B/L cezar flexion limited <90 deg/Left UE Active ROM was WFL (within functional limits)/Right UE Active ROM was WFL (within functional limits)/Left LE Active ROM was WFL (within functional limits)/Right LE Active ROM was WFL (within functional limits)/deficits as listed below

## 2024-09-04 NOTE — PHYSICAL THERAPY INITIAL EVALUATION ADULT - TRANSFER TRAINING, PT EVAL
GOAL: Pt will perform ALL transfers with (CG) w/use of appropriate assistive device as needed, in 4 weeks.

## 2024-09-04 NOTE — PROGRESS NOTE ADULT - SUBJECTIVE AND OBJECTIVE BOX
Patient is a 89y old  Female who presents with a chief complaint of N/V (02 Sep 2024 19:55)      SUBJECTIVE / OVERNIGHT EVENTS:    Events noted.  CONSTITUTIONAL: No fever,  or fatigue  RESPIRATORY: No cough, wheezing,  No shortness of breath  CARDIOVASCULAR: No chest pain, palpitations, dizziness, or leg swelling  GASTROINTESTINAL: No abdominal or epigastric pain. No nausea, vomiting.  NEUROLOGICAL: No headache    MEDICATIONS  (STANDING):  cloNIDine 0.1 milliGRAM(s) Oral two times a day  enalapril 20 milliGRAM(s) Oral daily  enoxaparin Injectable 40 milliGRAM(s) SubCutaneous every 24 hours  metoprolol succinate ER 50 milliGRAM(s) Oral daily  piperacillin/tazobactam IVPB.. 3.375 Gram(s) IV Intermittent every 8 hours  simvastatin 20 milliGRAM(s) Oral at bedtime    MEDICATIONS  (PRN):  acetaminophen     Tablet .. 975 milliGRAM(s) Oral every 6 hours PRN Temp greater or equal to 38C (100.4F), Moderate Pain (4 - 6)  ondansetron Injectable 4 milliGRAM(s) IV Push every 8 hours PRN Nausea and/or Vomiting        CAPILLARY BLOOD GLUCOSE        I&O's Summary    02 Sep 2024 07:01  -  03 Sep 2024 07:00  --------------------------------------------------------  IN: 700 mL / OUT: 850 mL / NET: -150 mL        T(C): 36.6 (09-03-24 @ 05:10), Max: 36.6 (09-03-24 @ 05:10)  HR: 78 (09-03-24 @ 05:10) (78 - 84)  BP: 166/78 (09-03-24 @ 05:10) (151/76 - 166/78)  RR: 17 (09-03-24 @ 05:10) (17 - 18)  SpO2: 95% (09-03-24 @ 05:10) (95% - 96%)    PHYSICAL EXAM:  GENERAL: NAD  NECK: Supple, No JVD  CHEST/LUNG: Clear to auscultation bilaterally; No wheezing.  HEART: Regular rate and rhythm; No murmurs, rubs, or gallops  ABDOMEN: Soft, Nontender, Nondistended; Bowel sounds present  EXTREMITIES:   No edema  NEUROLOGY: AAO       LABS:                        10.6   8.13  )-----------( 281      ( 03 Sep 2024 07:19 )             34.0     09-03    138  |  100  |  14  ----------------------------<  80  4.8   |  26  |  0.73    Ca    9.2      03 Sep 2024 07:16  Phos  2.9     09-03  Mg     2.3     09-03    TPro  6.8  /  Alb  3.0<L>  /  TBili  0.4  /  DBili  x   /  AST  23  /  ALT  13  /  AlkPhos  68  09-03          Urinalysis Basic - ( 03 Sep 2024 07:16 )    Color: x / Appearance: x / SG: x / pH: x  Gluc: 80 mg/dL / Ketone: x  / Bili: x / Urobili: x   Blood: x / Protein: x / Nitrite: x   Leuk Esterase: x / RBC: x / WBC x   Sq Epi: x / Non Sq Epi: x / Bacteria: x      CAPILLARY BLOOD GLUCOSE            RADIOLOGY & ADDITIONAL TESTS:    Imaging Personally Reviewed:    Consultant(s) Notes Reviewed:      Care Discussed with Consultants/Other Providers:    Juan Tony MD, CMD, FACP    [address] [place] [address]  Office Tel: [phone]  Cell: [phone]

## 2024-09-04 NOTE — DISCHARGE NOTE PROVIDER - HOSPITAL COURSE
HPI:  89-year-old female history of A-fib, no longer on AC s/p watchman, Pulm HTN, HTN, recent admission from 7/17-7/25 for diverticulitis, presenting to the emergency department for acute onset nausea and vomiting last night.  Reports that she has had significant nausea with sensation when she needs to vomit. She has also been experiencing nonbloody diarrhea.  About a week ago, patient suffered a laceration on her anterior RLE at her facility while being assisted into her wheelchair. She has been getting cefuroxime at the facility for surrounding cellulitis of the area. She denies any pain in the area. Reports malaise but denies subjective fevers as far she knows.  Denies chest pain, shortness of breath, cough.  Denies rashes.  Denies urinary symptoms.  No known sick contacts.     In the ED, patient was febrile with Tmax 102.1. CT A/P without evidence of diverticulitis or obvious infectious source however noted to have gallbladder wall edema. US obtained showed distended gallbladder with sludge without evidence of stones. No sonographic davidson sign. Pt was given vancomycin and zosyn for empiric treatment. Admitted for further management.  (29 Aug 2024 19:14)    Hospital Course:  Sepsis.   ·  Plan: Fever and significant leukocytosis concern for infection though no clear identifiable source at this time.   - CT A/P without evidence of diverticulitis, no obvious infectious source. Noted to have distended gallbladder  - Will continue zosyn for empiric treatment  - R/o Acute stephanie- HIDA scan negative  Sx eval appreciated.    Chronic atrial fibrillation.   ·  Plan: Hx of afib, previously on xarelto but was taken off following watchman procedure.  - Continue metoprolol succinate 50mg qd  - EKG reviewed showing Afib with HR 98.    HTN (hypertension).   ·  Plan: - Continue enalapril 20mg qd  - Continue clonidine.    Important Medication Changes and Reason:    Active or Pending Issues Requiring Follow-up:    Advanced Directives:   [ x] Full code  [ ] DNR  [ ] Hospice    Discharge Diagnoses:  sepsis  N/V       HPI:  89-year-old female history of A-fib, no longer on AC s/p watchman, Pulm HTN, HTN, recent admission from 7/17-7/25 for diverticulitis, presenting to the emergency department for acute onset nausea and vomiting last night.  Reports that she has had significant nausea with sensation when she needs to vomit. She has also been experiencing nonbloody diarrhea.  About a week ago, patient suffered a laceration on her anterior RLE at her facility while being assisted into her wheelchair. She has been getting cefuroxime at the facility for surrounding cellulitis of the area. She denies any pain in the area. Reports malaise but denies subjective fevers as far she knows.  Denies chest pain, shortness of breath, cough.  Denies rashes.  Denies urinary symptoms.  No known sick contacts.     In the ED, patient was febrile with Tmax 102.1. CT A/P without evidence of diverticulitis or obvious infectious source however noted to have gallbladder wall edema. US obtained showed distended gallbladder with sludge without evidence of stones. No sonographic davidson sign. Pt was given vancomycin and zosyn for empiric treatment. Admitted for further management.  (29 Aug 2024 19:14)    Hospital Course:  Sepsis.   ·  Plan: Fever and significant leukocytosis concern for infection though no clear identifiable source at this time.   - CT A/P without evidence of diverticulitis, no obvious infectious source. Noted to have distended gallbladder  - Will continue zosyn for empiric treatment  - R/o Acute stephanie- HIDA scan negative  Sx eval appreciated:  low clinical suspicion of cholecystitis at this time.   HIDA pending. If positive, patient prefers non-operative management.   No role for surgical intervention at this time.     Chronic atrial fibrillation.   ·  Plan: Hx of afib, previously on xarelto but was taken off following watchman procedure.  - Continue metoprolol succinate 50mg qd  - EKG reviewed showing Afib with HR 98.    HTN (hypertension).   ·  Plan: - Continue enalapril 20mg qd  - Continue clonidine.    Important Medication Changes and Reason: N/A    Active or Pending Issues Requiring Follow-up: Follow-up with primary care physician within 1 week.         Advanced Directives:   [ x] Full code  [ ] DNR  [ ] Hospice    Discharge Diagnoses:  sepsis  N/V

## 2024-09-04 NOTE — DISCHARGE NOTE PROVIDER - NSDCCPCAREPLAN_GEN_ALL_CORE_FT
PRINCIPAL DISCHARGE DIAGNOSIS  Diagnosis: Nausea vomiting and diarrhea  Assessment and Plan of Treatment: Resolved  Diet as tolerated      SECONDARY DISCHARGE DIAGNOSES  Diagnosis: Sepsis  Assessment and Plan of Treatment: No infection found. Completed course of empiric antibiotics.  Call you Health care provider upon arrival home to make a one week follow up appointment.  If you develop fever, chills, malaise, or change in mental status call your Health Care Provider or go to the Emergency Department.  Nutrition is important, eat small frequent meals to help ensure you get adequate calories.  Do not stay in bed all day!  Increase your activity daily as tolerated.    Diagnosis: Laceration of skin of lower leg  Assessment and Plan of Treatment: R leg wound care: cleanse with NS pat dry, apply medihoney, adaptic touch, aquacel daily     PRINCIPAL DISCHARGE DIAGNOSIS  Diagnosis: Nausea vomiting and diarrhea  Assessment and Plan of Treatment: Resolved  Diet as tolerated      SECONDARY DISCHARGE DIAGNOSES  Diagnosis: Sepsis  Assessment and Plan of Treatment: No infection found. Completed course of empiric antibiotics.  Call you Health care provider upon arrival home to make a one week follow up appointment.  If you develop fever, chills, malaise, or change in mental status call your Health Care Provider or go to the Emergency Department.  Nutrition is important, eat small frequent meals to help ensure you get adequate calories.  Do not stay in bed all day!  Increase your activity daily as tolerated.    Diagnosis: Laceration of skin of lower leg  Assessment and Plan of Treatment: Right leg wound care: cleanse with Normal Saline, pat dry, apply medihoney, adaptic touch, aquacel daily

## 2024-09-05 LAB
ANION GAP SERPL CALC-SCNC: 12 MMOL/L — SIGNIFICANT CHANGE UP (ref 5–17)
BUN SERPL-MCNC: 10 MG/DL — SIGNIFICANT CHANGE UP (ref 7–23)
CALCIUM SERPL-MCNC: 9.3 MG/DL — SIGNIFICANT CHANGE UP (ref 8.4–10.5)
CHLORIDE SERPL-SCNC: 99 MMOL/L — SIGNIFICANT CHANGE UP (ref 96–108)
CO2 SERPL-SCNC: 26 MMOL/L — SIGNIFICANT CHANGE UP (ref 22–31)
CREAT SERPL-MCNC: 0.55 MG/DL — SIGNIFICANT CHANGE UP (ref 0.5–1.3)
EGFR: 87 ML/MIN/1.73M2 — SIGNIFICANT CHANGE UP
GLUCOSE SERPL-MCNC: 150 MG/DL — HIGH (ref 70–99)
HCT VFR BLD CALC: 33.5 % — LOW (ref 34.5–45)
HGB BLD-MCNC: 10.7 G/DL — LOW (ref 11.5–15.5)
MCHC RBC-ENTMCNC: 31.8 PG — SIGNIFICANT CHANGE UP (ref 27–34)
MCHC RBC-ENTMCNC: 31.9 GM/DL — LOW (ref 32–36)
MCV RBC AUTO: 99.7 FL — SIGNIFICANT CHANGE UP (ref 80–100)
NRBC # BLD: 0 /100 WBCS — SIGNIFICANT CHANGE UP (ref 0–0)
PLATELET # BLD AUTO: 298 K/UL — SIGNIFICANT CHANGE UP (ref 150–400)
POTASSIUM SERPL-MCNC: 4.3 MMOL/L — SIGNIFICANT CHANGE UP (ref 3.5–5.3)
POTASSIUM SERPL-SCNC: 4.3 MMOL/L — SIGNIFICANT CHANGE UP (ref 3.5–5.3)
RBC # BLD: 3.36 M/UL — LOW (ref 3.8–5.2)
RBC # FLD: 17.5 % — HIGH (ref 10.3–14.5)
SODIUM SERPL-SCNC: 137 MMOL/L — SIGNIFICANT CHANGE UP (ref 135–145)
WBC # BLD: 8.3 K/UL — SIGNIFICANT CHANGE UP (ref 3.8–10.5)
WBC # FLD AUTO: 8.3 K/UL — SIGNIFICANT CHANGE UP (ref 3.8–10.5)

## 2024-09-05 PROCEDURE — 99232 SBSQ HOSP IP/OBS MODERATE 35: CPT

## 2024-09-05 PROCEDURE — 74018 RADEX ABDOMEN 1 VIEW: CPT | Mod: 26

## 2024-09-05 RX ORDER — METOCLOPRAMIDE HCL 5 MG
10 TABLET ORAL ONCE
Refills: 0 | Status: COMPLETED | OUTPATIENT
Start: 2024-09-05 | End: 2024-09-05

## 2024-09-05 RX ORDER — DOCUSATE CALCIUM 240 MG/1
3 CAPSULE ORAL
Refills: 0 | DISCHARGE

## 2024-09-05 RX ORDER — POLYETHYLENE GLYCOL 3350 17 G/17G
17 POWDER, FOR SOLUTION ORAL DAILY
Refills: 0 | Status: DISCONTINUED | OUTPATIENT
Start: 2024-09-05 | End: 2024-09-08

## 2024-09-05 RX ORDER — ONDANSETRON 2 MG/ML
1 INJECTION, SOLUTION INTRAMUSCULAR; INTRAVENOUS
Refills: 0 | DISCHARGE

## 2024-09-05 RX ORDER — PETROLATUM 93.5 G/100G
0 OINTMENT TOPICAL
Refills: 0 | DISCHARGE

## 2024-09-05 RX ADMIN — ENOXAPARIN SODIUM 40 MILLIGRAM(S): 100 INJECTION SUBCUTANEOUS at 11:08

## 2024-09-05 RX ADMIN — Medication 30 MILLILITER(S): at 17:02

## 2024-09-05 RX ADMIN — ENALAPRIL MALEATE 20 MILLIGRAM(S): 5 TABLET ORAL at 05:04

## 2024-09-05 RX ADMIN — Medication 0.1 MILLIGRAM(S): at 05:03

## 2024-09-05 RX ADMIN — POLYETHYLENE GLYCOL 3350 17 GRAM(S): 17 POWDER, FOR SOLUTION ORAL at 17:03

## 2024-09-05 RX ADMIN — Medication 10 MILLIGRAM(S): at 12:49

## 2024-09-05 RX ADMIN — ONDANSETRON 4 MILLIGRAM(S): 2 INJECTION, SOLUTION INTRAMUSCULAR; INTRAVENOUS at 07:38

## 2024-09-05 RX ADMIN — Medication 20 MILLIGRAM(S): at 22:43

## 2024-09-05 RX ADMIN — METOPROLOL TARTRATE 50 MILLIGRAM(S): 100 TABLET ORAL at 05:03

## 2024-09-05 RX ADMIN — Medication 0.1 MILLIGRAM(S): at 17:03

## 2024-09-05 NOTE — PROGRESS NOTE ADULT - ASSESSMENT
89 year old female with hx of A-fib, no longer on AC s/p watchman, Pulm HTN, HTN, recent admission from 7/17-7/25 for diverticulitis, presenting with nausea and vomiting, found to have distended gallbladder with sludge, no gallstones or cholecystitis. Also noted to be septic with leukocytosis and fever. Pt admitted for infectious workup and symptom management.    Wound Consult requested to assist w/ management of  RLE Wound  Moisture Dermatitis of sacrum/ Buttocks    Recommendations:   RLE medihoney + adaptic QD  BLE elevation  Consider CYNTHIA/PVR, Duplex if negative consider ACE  Abx per Medicine/ ID  Moisturize intact skin w/ SWEEN cream BID  Nutrition Consult for optimization in pt w/ Increased nutritional needs            encourage high quality protein, stefano/ prosource, MVI & Vit C to promote wound healing  Continue turning and positioning w/ offloading assistive devices as per protocol  Buttocks/ Sacrum George BID and prn soiling        Continue w/ attends under pads and Pericare w/ purewick care as per protocol  Waffle Cushion to chair when oob to chair  Continue w/ low air loss pressure redistribution bed surface   Care as per medicine, will follow w/ you  Upon discharge f/u as outpatient at [facility] [address] [place] [address] [phone]  D/w team & RN  Thank you for this consult  Nights/ Weekends/ Holidays please call:  General Surgery Consult pager ([phone]) for emergencies  Wound PT for multilayer leg wrapping or VAC issues ([phone])   I spent 35minutes face to face w/ this pt of which more than 50% of the time was spent counseling & coordinating care of this pt.

## 2024-09-05 NOTE — PROGRESS NOTE ADULT - SUBJECTIVE AND OBJECTIVE BOX
[facility]-- WOUND TEAM -- FOLLOW UP NOTE  --------------------------------------------------------------------------------    24 hour events/subjective:          Diet:  Diet, Regular:   DASH/TLC Sodium & Cholesterol Restricted (DASH) (08-29-24 @ 20:23)      ROS: General/ SKIN/ MSK/Vasc see HPI  all other systems negative      ALLERGIES & MEDICATIONS  --------------------------------------------------------------------------------  Allergies  hydrALAZINE (Other; Flushing)  Lipitor (Other; Flushing)    Intolerances  lactose (Unknown)        STANDING INPATIENT MEDICATIONS  cloNIDine 0.1 milliGRAM(s) Oral two times a day  enalapril 20 milliGRAM(s) Oral daily  enoxaparin Injectable 40 milliGRAM(s) SubCutaneous every 24 hours  metoclopramide Injectable 10 milliGRAM(s) IV Push once  metoprolol succinate ER 50 milliGRAM(s) Oral daily  simvastatin 20 milliGRAM(s) Oral at bedtime      PRN INPATIENT MEDICATION  acetaminophen Tablet 975 milliGRAM(s) Oral every 6 hours PRN  ondansetron Injectable 4 milliGRAM(s) IV Push every 8 hours PRN        VITALS/PHYSICAL EXAM  --------------------------------------------------------------------------------  T(C): 36.5 (09-05-24 @ 04:25), Max: 36.7 (09-04-24 @ 20:23)  HR: 88 (09-05-24 @ 04:25) (78 - 89)  BP: 185/91 (09-05-24 @ 04:25) (159/83 - 185/91)  RR: 18 (09-05-24 @ 04:25) (18 - 18)  SpO2: 96% (09-04-24 @ 20:23) (96% - 96%)  Wt(kg): --                             [facility]-- WOUND TEAM -- FOLLOW UP NOTE  --------------------------------------------------------------------------------    24 hour events/subjective:    alert  afebrile  tolerating po w/o n/v  incontinent  tolerating dressing - no odor or excess drainage      Diet:  Diet, Regular:   DASH/TLC Sodium & Cholesterol Restricted (DASH) (08-29-24 @ 20:23)      ROS: General/ SKIN/ MSK/Vasc see HPI  all other systems negative      ALLERGIES & MEDICATIONS  --------------------------------------------------------------------------------  Allergies  hydrALAZINE (Other; Flushing)  Lipitor (Other; Flushing)    Intolerances  lactose (Unknown)      STANDING INPATIENT MEDICATIONS  cloNIDine 0.1 milliGRAM(s) Oral two times a day  enalapril 20 milliGRAM(s) Oral daily  enoxaparin Injectable 40 milliGRAM(s) SubCutaneous every 24 hours  metoclopramide Injectable 10 milliGRAM(s) IV Push once  metoprolol succinate ER 50 milliGRAM(s) Oral daily  simvastatin 20 milliGRAM(s) Oral at bedtime      PRN INPATIENT MEDICATION  acetaminophen Tablet 975 milliGRAM(s) Oral every 6 hours PRN  ondansetron Injectable 4 milliGRAM(s) IV Push every 8 hours PRN        VITALS/PHYSICAL EXAM  --------------------------------------------------------------------------------  T(C): 36.5 (09-05-24 @ 04:25), Max: 36.7 (09-04-24 @ 20:23)  HR: 88 (09-05-24 @ 04:25) (78 - 89)  BP: 185/91 (09-05-24 @ 04:25) (159/83 - 185/91)  RR: 18 (09-05-24 @ 04:25) (18 - 18)  SpO2: 96% (09-04-24 @ 20:23) (96% - 96%)  Wt(kg): --        NAD, Alert, frail,  WD/ WN/ WG  Versa Care [id number]   HEENT: NC/AT, EOMI, sclera clear, mucosa moist, throat clear, trachea midline, neck supple  Respiratory: nonlabored w/ equal chest rise  Gastrointestinal: soft NT/ND   : (+) purewick  Neurology: verbal, follows commands  Psych: calm/ appropriate  Musculoskeletal:  FROM, no deformities/ contractures  Vascular: BLE equally warm  no cyanosis, clubbing, edema, nor acute ischemia          BLE DP pulses not palpable          BLE  varicose veins          LLE scabs     RLE anterior wound 5.5cm x 2.5cm x 0.4cm   granular w/ scant slough, hypergranulation tissue periwound mild erythema       scant serosanguinous drainage  no odor, increased warmth, tenderness, induration, fluctuance, nor crepitus  Skin:  moist w/ good turgor  sacrum/ buttocks hyperpigmented skin w/o blistering open skin or drainage  no odor, increased warmth, tenderness, induration, fluctuance, nor crepitus

## 2024-09-05 NOTE — PROGRESS NOTE ADULT - SUBJECTIVE AND OBJECTIVE BOX
Patient is a 89y old  Female who presents with a chief complaint of N/V (02 Sep 2024 19:55)      SUBJECTIVE / OVERNIGHT EVENTS:    Events noted.  CONSTITUTIONAL: No fever,  or fatigue  RESPIRATORY: No cough, wheezing,  No shortness of breath  CARDIOVASCULAR: No chest pain, palpitations, dizziness, or leg swelling  GASTROINTESTINAL: Nausea  NEUROLOGICAL: No headache    MEDICATIONS  (STANDING):  cloNIDine 0.1 milliGRAM(s) Oral two times a day  enalapril 20 milliGRAM(s) Oral daily  enoxaparin Injectable 40 milliGRAM(s) SubCutaneous every 24 hours  metoprolol succinate ER 50 milliGRAM(s) Oral daily  piperacillin/tazobactam IVPB.. 3.375 Gram(s) IV Intermittent every 8 hours  simvastatin 20 milliGRAM(s) Oral at bedtime    MEDICATIONS  (PRN):  acetaminophen     Tablet .. 975 milliGRAM(s) Oral every 6 hours PRN Temp greater or equal to 38C (100.4F), Moderate Pain (4 - 6)  ondansetron Injectable 4 milliGRAM(s) IV Push every 8 hours PRN Nausea and/or Vomiting        CAPILLARY BLOOD GLUCOSE        I&O's Summary    02 Sep 2024 07:01  -  03 Sep 2024 07:00  --------------------------------------------------------  IN: 700 mL / OUT: 850 mL / NET: -150 mL        T(C): 36.6 (09-03-24 @ 05:10), Max: 36.6 (09-03-24 @ 05:10)  HR: 78 (09-03-24 @ 05:10) (78 - 84)  BP: 166/78 (09-03-24 @ 05:10) (151/76 - 166/78)  RR: 17 (09-03-24 @ 05:10) (17 - 18)  SpO2: 95% (09-03-24 @ 05:10) (95% - 96%)    PHYSICAL EXAM:  GENERAL: NAD  NECK: Supple, No JVD  CHEST/LUNG: Clear to auscultation bilaterally; No wheezing.  HEART: Regular rate and rhythm; No murmurs, rubs, or gallops  ABDOMEN: Soft, Nontender, Nondistended; Bowel sounds present  EXTREMITIES:   No edema  NEUROLOGY: AAO       LABS:                        10.6   8.13  )-----------( 281      ( 03 Sep 2024 07:19 )             34.0     09-03    138  |  100  |  14  ----------------------------<  80  4.8   |  26  |  0.73    Ca    9.2      03 Sep 2024 07:16  Phos  2.9     09-03  Mg     2.3     09-03    TPro  6.8  /  Alb  3.0<L>  /  TBili  0.4  /  DBili  x   /  AST  23  /  ALT  13  /  AlkPhos  68  09-03          Urinalysis Basic - ( 03 Sep 2024 07:16 )    Color: x / Appearance: x / SG: x / pH: x  Gluc: 80 mg/dL / Ketone: x  / Bili: x / Urobili: x   Blood: x / Protein: x / Nitrite: x   Leuk Esterase: x / RBC: x / WBC x   Sq Epi: x / Non Sq Epi: x / Bacteria: x      CAPILLARY BLOOD GLUCOSE            RADIOLOGY & ADDITIONAL TESTS:    Imaging Personally Reviewed:    Consultant(s) Notes Reviewed:      Care Discussed with Consultants/Other Providers:    Juan Tony MD, CMD, FACP    [address] [place] [address]  Office Tel: [phone]  Cell: [phone]

## 2024-09-05 NOTE — PROGRESS NOTE ADULT - ASSESSMENT
89 year old female with hx of A-fib, no longer on AC s/p watchman, Pulm HTN, HTN, recent admission from 7/17-7/25 for diverticulitis, presenting with nausea and vomiting, found to have distended gallbladder with sludge, no gallstones or cholecystitis. Also noted to be septic with leukocytosis and fever. Pt admitted for infectious workup and symptom management.    Nausea:    AXR: constipation  Bowel regimen

## 2024-09-06 LAB
ALBUMIN SERPL ELPH-MCNC: 2.9 G/DL — LOW (ref 3.3–5)
ALP SERPL-CCNC: 67 U/L — SIGNIFICANT CHANGE UP (ref 40–120)
ALT FLD-CCNC: 10 U/L — SIGNIFICANT CHANGE UP (ref 10–45)
ANION GAP SERPL CALC-SCNC: 9 MMOL/L — SIGNIFICANT CHANGE UP (ref 5–17)
AST SERPL-CCNC: 19 U/L — SIGNIFICANT CHANGE UP (ref 10–40)
BILIRUB SERPL-MCNC: 0.5 MG/DL — SIGNIFICANT CHANGE UP (ref 0.2–1.2)
BUN SERPL-MCNC: 9 MG/DL — SIGNIFICANT CHANGE UP (ref 7–23)
CALCIUM SERPL-MCNC: 9 MG/DL — SIGNIFICANT CHANGE UP (ref 8.4–10.5)
CHLORIDE SERPL-SCNC: 98 MMOL/L — SIGNIFICANT CHANGE UP (ref 96–108)
CO2 SERPL-SCNC: 29 MMOL/L — SIGNIFICANT CHANGE UP (ref 22–31)
CREAT SERPL-MCNC: 0.55 MG/DL — SIGNIFICANT CHANGE UP (ref 0.5–1.3)
EGFR: 87 ML/MIN/1.73M2 — SIGNIFICANT CHANGE UP
GLUCOSE SERPL-MCNC: 79 MG/DL — SIGNIFICANT CHANGE UP (ref 70–99)
HCT VFR BLD CALC: 31.4 % — LOW (ref 34.5–45)
HGB BLD-MCNC: 10 G/DL — LOW (ref 11.5–15.5)
MAGNESIUM SERPL-MCNC: 2.2 MG/DL — SIGNIFICANT CHANGE UP (ref 1.6–2.6)
MCHC RBC-ENTMCNC: 31.8 GM/DL — LOW (ref 32–36)
MCHC RBC-ENTMCNC: 31.8 PG — SIGNIFICANT CHANGE UP (ref 27–34)
MCV RBC AUTO: 100 FL — SIGNIFICANT CHANGE UP (ref 80–100)
NRBC # BLD: 0 /100 WBCS — SIGNIFICANT CHANGE UP (ref 0–0)
PHOSPHATE SERPL-MCNC: 2.8 MG/DL — SIGNIFICANT CHANGE UP (ref 2.5–4.5)
PLATELET # BLD AUTO: 284 K/UL — SIGNIFICANT CHANGE UP (ref 150–400)
POTASSIUM SERPL-MCNC: 4 MMOL/L — SIGNIFICANT CHANGE UP (ref 3.5–5.3)
POTASSIUM SERPL-SCNC: 4 MMOL/L — SIGNIFICANT CHANGE UP (ref 3.5–5.3)
PROT SERPL-MCNC: 6.4 G/DL — SIGNIFICANT CHANGE UP (ref 6–8.3)
RBC # BLD: 3.14 M/UL — LOW (ref 3.8–5.2)
RBC # FLD: 17.6 % — HIGH (ref 10.3–14.5)
SODIUM SERPL-SCNC: 136 MMOL/L — SIGNIFICANT CHANGE UP (ref 135–145)
WBC # BLD: 7.16 K/UL — SIGNIFICANT CHANGE UP (ref 3.8–10.5)
WBC # FLD AUTO: 7.16 K/UL — SIGNIFICANT CHANGE UP (ref 3.8–10.5)

## 2024-09-06 RX ADMIN — Medication 0.1 MILLIGRAM(S): at 17:28

## 2024-09-06 RX ADMIN — ENOXAPARIN SODIUM 40 MILLIGRAM(S): 100 INJECTION SUBCUTANEOUS at 11:42

## 2024-09-06 RX ADMIN — ENALAPRIL MALEATE 20 MILLIGRAM(S): 5 TABLET ORAL at 06:25

## 2024-09-06 RX ADMIN — POLYETHYLENE GLYCOL 3350 17 GRAM(S): 17 POWDER, FOR SOLUTION ORAL at 11:42

## 2024-09-06 RX ADMIN — Medication 0.1 MILLIGRAM(S): at 06:26

## 2024-09-06 RX ADMIN — METOPROLOL TARTRATE 50 MILLIGRAM(S): 100 TABLET ORAL at 06:25

## 2024-09-06 RX ADMIN — Medication 20 MILLIGRAM(S): at 21:36

## 2024-09-06 NOTE — PROGRESS NOTE ADULT - SUBJECTIVE AND OBJECTIVE BOX
Patient is a 89y old  Female who presents with a chief complaint of N/V (02 Sep 2024 19:55)      SUBJECTIVE / OVERNIGHT EVENTS:    Events noted.  CONSTITUTIONAL: No fever,  or fatigue  RESPIRATORY: No cough, wheezing,  No shortness of breath  CARDIOVASCULAR: No chest pain, palpitations, dizziness, or leg swelling  GASTROINTESTINAL: Nausea  NEUROLOGICAL: No headache          T(C): 36.3 (09-06-24 @ 12:15), Max: 36.3 (09-06-24 @ 04:30)  HR: 93 (09-06-24 @ 12:15) (87 - 93)  BP: 153/77 (09-06-24 @ 12:15) (153/77 - 167/81)  RR: 18 (09-06-24 @ 12:15) (18 - 18)  SpO2: 97% (09-06-24 @ 12:15) (95% - 97%)      MEDICATIONS  (STANDING):  cloNIDine 0.1 milliGRAM(s) Oral two times a day  enalapril 20 milliGRAM(s) Oral daily  enoxaparin Injectable 40 milliGRAM(s) SubCutaneous every 24 hours  metoprolol succinate ER 50 milliGRAM(s) Oral daily  polyethylene glycol 3350 17 Gram(s) Oral daily  simvastatin 20 milliGRAM(s) Oral at bedtime    MEDICATIONS  (PRN):  acetaminophen     Tablet .. 975 milliGRAM(s) Oral every 6 hours PRN Temp greater or equal to 38C (100.4F), Moderate Pain (4 - 6)  ondansetron Injectable 4 milliGRAM(s) IV Push every 8 hours PRN Nausea and/or Vomiting      PHYSICAL EXAM:  GENERAL: NAD  NECK: Supple, No JVD  CHEST/LUNG: Clear to auscultation bilaterally; No wheezing.  HEART: Regular rate and rhythm; No murmurs, rubs, or gallops  ABDOMEN: Soft, Nontender, Nondistended; Bowel sounds present  EXTREMITIES:   No edema  NEUROLOGY: AAO                             10.0   7.16  )-----------( 284      ( 06 Sep 2024 07:06 )             31.4           LIVER FUNCTIONS - ( 06 Sep 2024 07:07 )  Alb: 2.9 g/dL / Pro: 6.4 g/dL / ALK PHOS: 67 U/L / ALT: 10 U/L / AST: 19 U/L / GGT: x             136|98|9<79  4.0|29|0.55  9.0,2.2,2.8  09-06 @ 07:07  137|99|10<150  4.3|26|0.55  9.3,--,--  09-05 @ 15:21        RADIOLOGY & ADDITIONAL TESTS:    Imaging Personally Reviewed:    Consultant(s) Notes Reviewed:      Care Discussed with Consultants/Other Providers:

## 2024-09-07 LAB
ANION GAP SERPL CALC-SCNC: 12 MMOL/L — SIGNIFICANT CHANGE UP (ref 5–17)
BUN SERPL-MCNC: 8 MG/DL — SIGNIFICANT CHANGE UP (ref 7–23)
CALCIUM SERPL-MCNC: 8.8 MG/DL — SIGNIFICANT CHANGE UP (ref 8.4–10.5)
CHLORIDE SERPL-SCNC: 100 MMOL/L — SIGNIFICANT CHANGE UP (ref 96–108)
CO2 SERPL-SCNC: 27 MMOL/L — SIGNIFICANT CHANGE UP (ref 22–31)
CREAT SERPL-MCNC: 0.54 MG/DL — SIGNIFICANT CHANGE UP (ref 0.5–1.3)
EGFR: 87 ML/MIN/1.73M2 — SIGNIFICANT CHANGE UP
GLUCOSE SERPL-MCNC: 84 MG/DL — SIGNIFICANT CHANGE UP (ref 70–99)
HCT VFR BLD CALC: 32.3 % — LOW (ref 34.5–45)
HGB BLD-MCNC: 10.3 G/DL — LOW (ref 11.5–15.5)
MCHC RBC-ENTMCNC: 31.9 GM/DL — LOW (ref 32–36)
MCHC RBC-ENTMCNC: 32.2 PG — SIGNIFICANT CHANGE UP (ref 27–34)
MCV RBC AUTO: 100.9 FL — HIGH (ref 80–100)
NRBC # BLD: 0 /100 WBCS — SIGNIFICANT CHANGE UP (ref 0–0)
PLATELET # BLD AUTO: 288 K/UL — SIGNIFICANT CHANGE UP (ref 150–400)
POTASSIUM SERPL-MCNC: 4.2 MMOL/L — SIGNIFICANT CHANGE UP (ref 3.5–5.3)
POTASSIUM SERPL-SCNC: 4.2 MMOL/L — SIGNIFICANT CHANGE UP (ref 3.5–5.3)
RBC # BLD: 3.2 M/UL — LOW (ref 3.8–5.2)
RBC # FLD: 17.2 % — HIGH (ref 10.3–14.5)
SODIUM SERPL-SCNC: 139 MMOL/L — SIGNIFICANT CHANGE UP (ref 135–145)
WBC # BLD: 6.92 K/UL — SIGNIFICANT CHANGE UP (ref 3.8–10.5)
WBC # FLD AUTO: 6.92 K/UL — SIGNIFICANT CHANGE UP (ref 3.8–10.5)

## 2024-09-07 RX ORDER — RIVAROXABAN 10 MG/1
1 TABLET, FILM COATED ORAL
Refills: 0 | DISCHARGE

## 2024-09-07 RX ORDER — CEFUROXIME SODIUM 1.5 G
1 VIAL (EA) INJECTION
Refills: 0 | DISCHARGE

## 2024-09-07 RX ORDER — TIZANIDINE 4 MG/1
1 TABLET ORAL
Refills: 0 | DISCHARGE

## 2024-09-07 RX ORDER — ACETAMINOPHEN 325 MG/1
2 TABLET ORAL
Refills: 0 | DISCHARGE

## 2024-09-07 RX ADMIN — ENALAPRIL MALEATE 20 MILLIGRAM(S): 5 TABLET ORAL at 05:30

## 2024-09-07 RX ADMIN — Medication 0.1 MILLIGRAM(S): at 17:15

## 2024-09-07 RX ADMIN — POLYETHYLENE GLYCOL 3350 17 GRAM(S): 17 POWDER, FOR SOLUTION ORAL at 11:03

## 2024-09-07 RX ADMIN — Medication 20 MILLIGRAM(S): at 21:20

## 2024-09-07 RX ADMIN — METOPROLOL TARTRATE 50 MILLIGRAM(S): 100 TABLET ORAL at 05:30

## 2024-09-07 RX ADMIN — ENOXAPARIN SODIUM 40 MILLIGRAM(S): 100 INJECTION SUBCUTANEOUS at 11:03

## 2024-09-07 RX ADMIN — Medication 0.1 MILLIGRAM(S): at 05:30

## 2024-09-07 NOTE — PROGRESS NOTE ADULT - SUBJECTIVE AND OBJECTIVE BOX
Patient is a 89y old  Female who presents with a chief complaint of N/V (02 Sep 2024 19:55)      SUBJECTIVE / OVERNIGHT EVENTS:    Events noted.  CONSTITUTIONAL: No fever,  or fatigue  RESPIRATORY: No cough, wheezing,  No shortness of breath  CARDIOVASCULAR: No chest pain, palpitations, dizziness, or leg swelling  GASTROINTESTINAL: Nausea  NEUROLOGICAL: No headache    T(C): 36.6 (09-07-24 @ 20:00), Max: 36.6 (09-07-24 @ 20:00)  HR: 88 (09-07-24 @ 20:00) (80 - 88)  BP: 124/65 (09-07-24 @ 20:00) (124/65 - 153/79)  RR: 18 (09-07-24 @ 20:00) (18 - 18)  SpO2: 93% (09-07-24 @ 20:00) (93% - 97%)      MEDICATIONS  (STANDING):  cloNIDine 0.1 milliGRAM(s) Oral two times a day  enalapril 20 milliGRAM(s) Oral daily  enoxaparin Injectable 40 milliGRAM(s) SubCutaneous every 24 hours  metoprolol succinate ER 50 milliGRAM(s) Oral daily  polyethylene glycol 3350 17 Gram(s) Oral daily  simvastatin 20 milliGRAM(s) Oral at bedtime    MEDICATIONS  (PRN):  acetaminophen     Tablet .. 975 milliGRAM(s) Oral every 6 hours PRN Temp greater or equal to 38C (100.4F), Moderate Pain (4 - 6)  ondansetron Injectable 4 milliGRAM(s) IV Push every 8 hours PRN Nausea and/or Vomiting      PHYSICAL EXAM:  GENERAL: NAD  NECK: Supple, No JVD  CHEST/LUNG: Clear to auscultation bilaterally; No wheezing.  HEART: Regular rate and rhythm; No murmurs, rubs, or gallops  ABDOMEN: Soft, Nontender, Nondistended; Bowel sounds present  EXTREMITIES:   No edema  NEUROLOGY: AAO                             10.0   7.16  )-----------( 284      ( 06 Sep 2024 07:06 )             31.4           LIVER FUNCTIONS - ( 06 Sep 2024 07:07 )  Alb: 2.9 g/dL / Pro: 6.4 g/dL / ALK PHOS: 67 U/L / ALT: 10 U/L / AST: 19 U/L / GGT: x             136|98|9<79  4.0|29|0.55  9.0,2.2,2.8  09-06 @ 07:07  137|99|10<150  4.3|26|0.55  9.3,--,--  09-05 @ 15:21        RADIOLOGY & ADDITIONAL TESTS:    Imaging Personally Reviewed:    Consultant(s) Notes Reviewed:      Care Discussed with Consultants/Other Providers:

## 2024-09-08 VITALS — DIASTOLIC BLOOD PRESSURE: 88 MMHG | HEART RATE: 71 BPM | SYSTOLIC BLOOD PRESSURE: 174 MMHG

## 2024-09-08 RX ADMIN — ENALAPRIL MALEATE 20 MILLIGRAM(S): 5 TABLET ORAL at 05:02

## 2024-09-08 RX ADMIN — METOPROLOL TARTRATE 50 MILLIGRAM(S): 100 TABLET ORAL at 05:02

## 2024-09-08 RX ADMIN — POLYETHYLENE GLYCOL 3350 17 GRAM(S): 17 POWDER, FOR SOLUTION ORAL at 11:51

## 2024-09-08 RX ADMIN — ENOXAPARIN SODIUM 40 MILLIGRAM(S): 100 INJECTION SUBCUTANEOUS at 11:51

## 2024-09-08 RX ADMIN — Medication 0.1 MILLIGRAM(S): at 05:02

## 2024-09-08 NOTE — DISCHARGE NOTE NURSING/CASE MANAGEMENT/SOCIAL WORK - PATIENT PORTAL LINK FT
You can access the FollowMyHealth Patient Portal offered by [facility] by registering at the following website: [email or web address]. By joining [employer]’s FollowMyHealth portal, you will also be able to view your health information using other applications (apps) compatible with our system.

## 2024-09-08 NOTE — PROGRESS NOTE ADULT - PROVIDER SPECIALTY LIST ADULT
Internal Medicine
Internal Medicine
Wound Care
Internal Medicine

## 2024-09-08 NOTE — DISCHARGE NOTE NURSING/CASE MANAGEMENT/SOCIAL WORK - NSFLUVACAGEDISCH_IMM_ALL_CORE
Patient:   BAQIR, MAHBOUBEH            MRN: [id number]            FIN: [id number]              Age:   68 years     Sex:  FEMALE     :  50   Associated Diagnoses:   None   Author:   JOSE ANGEL SANCHEZ     Progress Note:   Patient seen and examined.   Went into afib rvr ovenight requiring cardizem with improvement and then had pauses once again requiring dopamine. Patient to go for pacemaker today.   Home Medications (9) Active  aspirin 81 mg oral tablet 81 mg = 1 tab, Oral, Daily  Eliquis oral 5 mg tablet 5 mg = 1 tab, Oral, BID  Fish Oil 1000 mg oral capsule 1,000 mg = 1 cap, Oral, Daily  flecainide oral 50 mg tablet (Tambocor) 50 mg = 1 tab, Oral, Q12H  magnesium oxide 500 mg oral tablet 500 mg = 1 tab, Oral, Daily  Metoprolol Tartrate 25 mg oral tablet 12.5 mg = 0.5 tab, Oral, BID  Oysco 500 (1250 mg calcium carbonate) oral tablet 1,250 mg = 1 tab, Oral, BID  raNITIdine 150 mg oral tablet 150 mg = 1 tab, Oral, BID  Vitamin D3 5000 intl units oral tablet 5,000 unit = 1 tab, Oral, Daily  Medications (11) Active  Scheduled: (6)  *Potassium Protocol Communication  1 each, N/A, Daily  ApixaBAN 5 mg tab  5 mg 1 tab, Oral, BID  Aspirin 81 mg DR tab  81 mg 1 tab, Oral, Daily  ceFAZolin  2,000 mg 15 mL, Slow IV Push, On Call  Famotidine 20 mg tab  20 mg 1 tab, Oral, Q12H  Sodium chloride PF 0.9% flush inj 10 mL  20 mL, Flush, Q12H  Continuous: (2)  dilTIAZem 125 mg [5 mg/hr] + premixed in Sodium Chloride 0.9% 125 mL  125 mL, IV, 5 mL/hr  DOPamine 800 mg [5 mcg/kg/min] + premixed in Dextrose 5% 250 mL  250 mL, IV, 5.85 mL/hr  PRN: (3)  Ondansetron 4 mg/2 mL inj SDV  4 mg 2 mL, Slow IV Push, Q6H  Sodium chloride PF 0.9% flush inj 10 mL  10 mL, Flush, As Directed PRN  Sodium chloride PF 0.9% flush inj 10 mL  20 mL, Flush, As Directed PRN  Allergies (1) Active Reaction  NKA None Documented  Vitals between:   2019 14:51:53   TO   2019 14:51:53                   LAST RESULT MINIMUM  Adult MAXIMUM  Temperature 36.6 35.9 36.6  Heart Rate 76 72 154  Respiratory Rate 18 11 23  NISBP           135 87 138  NIDBP           69 50 115  NIMBP           83 65 123  SpO2                    99 95 100  Physical exam:  Exam:  General: The patient is alert and oriented ×3, in no acute distress.  She is well-nourished and appears euvolemic.    Head:  Normocephalic  Eyes: Pupils are reactive and bilaterally symmetric.  No scleral icterus was seen.  EOMI    Mouth:  Oral mucosa is moist.  There were no oral ulcers or pharyngeal exudates seen.  Neck: Supple.  No increased jugular venous distention.  No cervical or supraclavicular lymphadenopathy was noted.  Good carotid upstroke   Cardiac: Heart is regular rate and rhythm without murmur, normal S3, S4  Lungs: Lungs are clear to auscultation bilaterally.  Abdomen: Soft, nontender, bowel sounds are normoactive.  No masses or organomegaly was observed  Musculoskeletal: Calves are soft and symmetric.  Negative Homans sign   Neuro: Brief neurologic exam assessing strength, coordination, and sensation is grossly intact  Vasc: Dorsalis pedis pulses are palpable bilaterally.  Capillary refill is less than 2 seconds in the toes.  Psych: Normal affect  Skin: No pathologic skin changes were seen   Labs:     Labs between:  21-JUL-2019 14:49 to 22-JUL-2019 14:49  CBC:                 WBC  HgB  Hct  Plt  MCV  RDW   22-JUL-2019 5.9  12.7  37.6  224  87.0  14.3   BMP:                 Na  Cl  BUN  Glu   22-JUL-2019 138  105  9  98                              K  CO2  Cr  Ca                              3.9  29  0.76  9.2   Other Chem:             Mg  Phos  Triglycerides  GGTP  DirectBili                           (H) 2.6  4.4                        Impression/plan:  Syncope secondary to sinus node dysfunction  Acute hypoxemic resp failure POA - resolved   Patient noted to be bradycardic with junctional rhythm.  Had pauses once again.  Improvement with dopamine.  Pacemaker planned for  today.   Echocardiogram shows trivial pericardial effusion and grade II diastolic dysfunction.  -Cardiology on board  Elevated troponins  -Cardiology feels is secondary to recent ablation.  -Troponins downtrending.  Patient with no chest pain at this time.  Proximal atrial fibrillation status post ablation last week with aib rvr overnight  -Eliquis on hold  -Received cardizem with resolution of afib.   -Cardiology on board  Transaminitis- Improving   -Liver ultrasound normal.  Hep panel  negative. Likely secondary to hypotension. Cont to trend . CMP in the AM.  SCDs  Physician Name:  JADYN CROCKER  Specialty :  INTERNAL MEDICINE  Primary Care Physician    Physician Name:  JADYN CROCKER  Specialty :  INTERNAL MEDICINE  Consulting Physicians   Physician Name:  PATI ISAAC Speciality:  CARDIOLOGY Consult Reason:  needs ppm insertion  Physician Name:  TRIPP BARRY Speciality:  CARDIOLOGY Consult Reason:  junctional bradycardia with resultant vtach

## 2024-09-08 NOTE — PROGRESS NOTE ADULT - SUBJECTIVE AND OBJECTIVE BOX
Patient is a 89y old  Female who presents with a chief complaint of N/V (02 Sep 2024 19:55)      SUBJECTIVE / OVERNIGHT EVENTS:    Events noted.  CONSTITUTIONAL: No fever,  or fatigue  RESPIRATORY: No cough, wheezing,  No shortness of breath  CARDIOVASCULAR: No chest pain, palpitations, dizziness, or leg swelling  GASTROINTESTINAL: Nausea  NEUROLOGICAL: No headache    T(C): 36.3 (09-08-24 @ 04:51), Max: 36.3 (09-08-24 @ 04:51)  HR: 71 (09-08-24 @ 12:15) (71 - 86)  BP: 174/88 (09-08-24 @ 12:15) (170/80 - 174/88)  RR: 18 (09-08-24 @ 04:51) (18 - 18)  SpO2: 96% (09-08-24 @ 04:51) (96% - 96%)      MEDICATIONS  (STANDING):  cloNIDine 0.1 milliGRAM(s) Oral two times a day  enalapril 20 milliGRAM(s) Oral daily  enoxaparin Injectable 40 milliGRAM(s) SubCutaneous every 24 hours  metoprolol succinate ER 50 milliGRAM(s) Oral daily  polyethylene glycol 3350 17 Gram(s) Oral daily  simvastatin 20 milliGRAM(s) Oral at bedtime    MEDICATIONS  (PRN):  acetaminophen     Tablet .. 975 milliGRAM(s) Oral every 6 hours PRN Temp greater or equal to 38C (100.4F), Moderate Pain (4 - 6)  ondansetron Injectable 4 milliGRAM(s) IV Push every 8 hours PRN Nausea and/or Vomiting    PHYSICAL EXAM:  GENERAL: NAD  NECK: Supple, No JVD  CHEST/LUNG: Clear to auscultation bilaterally; No wheezing.  HEART: Regular rate and rhythm; No murmurs, rubs, or gallops  ABDOMEN: Soft, Nontender, Nondistended; Bowel sounds present  EXTREMITIES:   No edema  NEUROLOGY: AAO                                    10.3   6.92  )-----------( 288      ( 07 Sep 2024 07:00 )             32.3                   RADIOLOGY & ADDITIONAL TESTS:    Imaging Personally Reviewed:    Consultant(s) Notes Reviewed:      Care Discussed with Consultants/Other Providers:

## 2024-09-08 NOTE — CHART NOTE - NSCHARTNOTEFT_GEN_A_CORE
Patient requires a patient lift for transfers between bed and chair/wheelchair/commode.  Without the use of a lift, the patient would be bed confined.
Patient will require a half rail semi-electric hospital bed due to weakness secondary to Afib, HTN, pulmonary HTN.  Patient requires the head of the bed to be elevated more than 30 degrees.  Pillows and wedges are not effective.  Patient requires positioning of the body in ways not feasible with an ordinary bed.  Patient requires frequent repositioning to alleviate pain.  The member can independently affect the adjustment by operating the control.
Patient is endorsing no pain and has no abdominal tenderness.   Low clinical suspicion of cholecystitis at this time.   HIDA pending. If positive, patient prefers non-operative management.   No role for surgical intervention at this time.   Please reconsult as needed    Jeimy Rivas MD  Trauma/ACS  [id number]
Request from Dr. Hernandez to facilitate patient discharge. Medication reconciliation reviewed, revised, and resolved with Dr. Hernandez who had medically cleared patient for discharge with follow-up as advised. Please refer to discharge note for detailed hospital course. Patient is currently stable for discharge to [facility] at this time.    Zulma Estes PA-C  Dept of Medicine   [id number]
The patient has limited mobility - she cannot independently make changes in body position significant enough to alleviate pressure.

## 2024-09-08 NOTE — CHART NOTE - NSCHARTNOTESELECT_GEN_ALL_CORE
ACS Sign Off/Event Note
Discharge/Event Note
Need for Gel Overlay
Need for Joao Lift
Need for half rail semi-electric [facility] bed

## 2024-09-08 NOTE — PROGRESS NOTE ADULT - ASSESSMENT
89 year old female with hx of A-fib, no longer on AC s/p watchman, Pulm HTN, HTN, recent admission from 7/17-7/25 for diverticulitis, presenting with nausea and vomiting, found to have distended gallbladder with sludge, no gallstones or cholecystitis. Also noted to be septic with leukocytosis and fever. Pt admitted for infectious workup and symptom management.    Nausea:    AXR: constipation  Bowel regimen    HTN   Pulm HTN     Afib s/p watchman Continue with current management  89 year old female with hx of A-fib, no longer on AC s/p watchman, Pulm HTN, HTN, recent admission from 7/17-7/25 for diverticulitis, presenting with nausea and vomiting, found to have distended gallbladder with sludge, no gallstones or cholecystitis. Also noted to be septic with leukocytosis and fever. Pt admitted for infectious workup and symptom management.    Nausea:    AXR: constipation  Bowel regimen      SIRS Choledocholithiasis   HTN   Pulm HTN     Afib s/p watchman Continue with current management

## 2024-09-14 NOTE — PATIENT PROFILE ADULT - STATED REASON FOR ADMISSION
Mild confusion noted on admission.   Nephrology following:  Initiated dialysis on 09/13/24  Improving     shortness of breath

## 2024-09-29 PROCEDURE — 85025 COMPLETE CBC W/AUTO DIFF WBC: CPT

## 2024-09-29 PROCEDURE — 85610 PROTHROMBIN TIME: CPT

## 2024-09-29 PROCEDURE — 80053 COMPREHEN METABOLIC PANEL: CPT

## 2024-09-29 PROCEDURE — 87637 SARSCOV2&INF A&B&RSV AMP PRB: CPT

## 2024-09-29 PROCEDURE — 82803 BLOOD GASES ANY COMBINATION: CPT

## 2024-09-29 PROCEDURE — 82330 ASSAY OF CALCIUM: CPT

## 2024-09-29 PROCEDURE — 99285 EMERGENCY DEPT VISIT HI MDM: CPT

## 2024-09-29 PROCEDURE — 82435 ASSAY OF BLOOD CHLORIDE: CPT

## 2024-09-29 PROCEDURE — 74018 RADEX ABDOMEN 1 VIEW: CPT

## 2024-09-29 PROCEDURE — 36415 COLL VENOUS BLD VENIPUNCTURE: CPT

## 2024-09-29 PROCEDURE — 96374 THER/PROPH/DIAG INJ IV PUSH: CPT

## 2024-09-29 PROCEDURE — 96375 TX/PRO/DX INJ NEW DRUG ADDON: CPT

## 2024-09-29 PROCEDURE — 87040 BLOOD CULTURE FOR BACTERIA: CPT

## 2024-09-29 PROCEDURE — 76705 ECHO EXAM OF ABDOMEN: CPT

## 2024-09-29 PROCEDURE — 97161 PT EVAL LOW COMPLEX 20 MIN: CPT

## 2024-09-29 PROCEDURE — 83735 ASSAY OF MAGNESIUM: CPT

## 2024-09-29 PROCEDURE — 82947 ASSAY GLUCOSE BLOOD QUANT: CPT

## 2024-09-29 PROCEDURE — 85730 THROMBOPLASTIN TIME PARTIAL: CPT

## 2024-09-29 PROCEDURE — 81001 URINALYSIS AUTO W/SCOPE: CPT

## 2024-09-29 PROCEDURE — 97530 THERAPEUTIC ACTIVITIES: CPT

## 2024-09-29 PROCEDURE — 83605 ASSAY OF LACTIC ACID: CPT

## 2024-09-29 PROCEDURE — 85014 HEMATOCRIT: CPT

## 2024-09-29 PROCEDURE — 84100 ASSAY OF PHOSPHORUS: CPT

## 2024-09-29 PROCEDURE — 80048 BASIC METABOLIC PNL TOTAL CA: CPT

## 2024-09-29 PROCEDURE — 74177 CT ABD & PELVIS W/CONTRAST: CPT | Mod: MC

## 2024-09-29 PROCEDURE — 84132 ASSAY OF SERUM POTASSIUM: CPT

## 2024-09-29 PROCEDURE — 71045 X-RAY EXAM CHEST 1 VIEW: CPT

## 2024-09-29 PROCEDURE — 78226 HEPATOBILIARY SYSTEM IMAGING: CPT | Mod: MC

## 2024-09-29 PROCEDURE — 87086 URINE CULTURE/COLONY COUNT: CPT

## 2024-09-29 PROCEDURE — 97110 THERAPEUTIC EXERCISES: CPT

## 2024-09-29 PROCEDURE — 85018 HEMOGLOBIN: CPT

## 2024-09-29 PROCEDURE — A9537: CPT

## 2024-09-29 PROCEDURE — 85027 COMPLETE CBC AUTOMATED: CPT

## 2024-09-29 PROCEDURE — 84295 ASSAY OF SERUM SODIUM: CPT

## 2024-11-21 NOTE — DIETITIAN INITIAL EVALUATION ADULT - MALNUTRITION
Wound Care Progress Note    Subjective:       Patient ID: João Ham is a 43 y.o. male.    Chief Complaint: No chief complaint on file.    HPI  Pt seen in clinic for a FU visit for an open surgical wound of the abdomen. Wound continues to improve, 100% granulated. No new complaints today  Review of Systems   Skin:  Positive for wound.        Open abdomen wound   All other systems reviewed and are negative.      Objective:        Physical Exam  Vitals and nursing note reviewed.   Constitutional:       General: He is not in acute distress.     Appearance: Normal appearance.   Skin:     General: Skin is warm and dry.      Findings: Lesion present.      Comments: Open abdomen surgical wound, see wound care assessment documentation in chart review scanned under there media tab   Neurological:      General: No focal deficit present.      Mental Status: He is alert.   Psychiatric:         Mood and Affect: Mood normal.         Judgment: Judgment normal.       There were no vitals filed for this visit.    Assessment:           ICD-10-CM ICD-9-CM   1. Post-operative wound abscess  T81.49XA 998.59   2. Surgical wound dehiscence, subsequent encounter  T81.31XD V58.89     998.32   3. Open wound of abdomen, subsequent encounter  S31.109D V58.89     879.2                Plan:                  Diagnoses and all orders for this visit:    Post-operative wound abscess    Surgical wound dehiscence, subsequent encounter    Open wound of abdomen, subsequent encounter      See wound care instructions provided separately             Severe (chronic)

## 2024-12-15 ENCOUNTER — INPATIENT (INPATIENT)
Facility: HOSPITAL | Age: 88
LOS: 8 days | Discharge: NURSING, REHAB OR LONG-TERM CARE | DRG: 392 | End: 2024-12-24
Attending: INTERNAL MEDICINE | Admitting: INTERNAL MEDICINE
Payer: MEDICARE

## 2024-12-15 VITALS
SYSTOLIC BLOOD PRESSURE: 125 MMHG | OXYGEN SATURATION: 98 % | DIASTOLIC BLOOD PRESSURE: 74 MMHG | HEART RATE: 82 BPM | WEIGHT: 149.91 LBS | RESPIRATION RATE: 16 BRPM | TEMPERATURE: 98 F | HEIGHT: 66 IN

## 2024-12-15 DIAGNOSIS — Z90.710 ACQUIRED ABSENCE OF BOTH CERVIX AND UTERUS: Chronic | ICD-10-CM

## 2024-12-15 LAB
ALBUMIN SERPL ELPH-MCNC: 4 G/DL — SIGNIFICANT CHANGE UP (ref 3.3–5)
ALP SERPL-CCNC: 72 U/L — SIGNIFICANT CHANGE UP (ref 40–120)
ALT FLD-CCNC: 10 U/L — SIGNIFICANT CHANGE UP (ref 10–45)
ANION GAP SERPL CALC-SCNC: 15 MMOL/L — SIGNIFICANT CHANGE UP (ref 5–17)
AST SERPL-CCNC: 21 U/L — SIGNIFICANT CHANGE UP (ref 10–40)
BASOPHILS # BLD AUTO: 0.05 K/UL — SIGNIFICANT CHANGE UP (ref 0–0.2)
BASOPHILS NFR BLD AUTO: 0.7 % — SIGNIFICANT CHANGE UP (ref 0–2)
BILIRUB SERPL-MCNC: 0.4 MG/DL — SIGNIFICANT CHANGE UP (ref 0.2–1.2)
BUN SERPL-MCNC: 22 MG/DL — SIGNIFICANT CHANGE UP (ref 7–23)
CALCIUM SERPL-MCNC: 9.9 MG/DL — SIGNIFICANT CHANGE UP (ref 8.4–10.5)
CHLORIDE SERPL-SCNC: 97 MMOL/L — SIGNIFICANT CHANGE UP (ref 96–108)
CO2 SERPL-SCNC: 26 MMOL/L — SIGNIFICANT CHANGE UP (ref 22–31)
CREAT SERPL-MCNC: 0.61 MG/DL — SIGNIFICANT CHANGE UP (ref 0.5–1.3)
EGFR: 84 ML/MIN/1.73M2 — SIGNIFICANT CHANGE UP
EOSINOPHIL # BLD AUTO: 0.12 K/UL — SIGNIFICANT CHANGE UP (ref 0–0.5)
EOSINOPHIL NFR BLD AUTO: 1.7 % — SIGNIFICANT CHANGE UP (ref 0–6)
GAS PNL BLDV: SIGNIFICANT CHANGE UP
GLUCOSE SERPL-MCNC: 94 MG/DL — SIGNIFICANT CHANGE UP (ref 70–99)
HCT VFR BLD CALC: 40.2 % — SIGNIFICANT CHANGE UP (ref 34.5–45)
HGB BLD-MCNC: 13 G/DL — SIGNIFICANT CHANGE UP (ref 11.5–15.5)
IMM GRANULOCYTES NFR BLD AUTO: 0.1 % — SIGNIFICANT CHANGE UP (ref 0–0.9)
LIDOCAIN IGE QN: 22 U/L — SIGNIFICANT CHANGE UP (ref 7–60)
LYMPHOCYTES # BLD AUTO: 1.39 K/UL — SIGNIFICANT CHANGE UP (ref 1–3.3)
LYMPHOCYTES # BLD AUTO: 20.1 % — SIGNIFICANT CHANGE UP (ref 13–44)
MCHC RBC-ENTMCNC: 31.1 PG — SIGNIFICANT CHANGE UP (ref 27–34)
MCHC RBC-ENTMCNC: 32.3 G/DL — SIGNIFICANT CHANGE UP (ref 32–36)
MCV RBC AUTO: 96.2 FL — SIGNIFICANT CHANGE UP (ref 80–100)
MONOCYTES # BLD AUTO: 0.76 K/UL — SIGNIFICANT CHANGE UP (ref 0–0.9)
MONOCYTES NFR BLD AUTO: 11 % — SIGNIFICANT CHANGE UP (ref 2–14)
NEUTROPHILS # BLD AUTO: 4.6 K/UL — SIGNIFICANT CHANGE UP (ref 1.8–7.4)
NEUTROPHILS NFR BLD AUTO: 66.4 % — SIGNIFICANT CHANGE UP (ref 43–77)
NRBC # BLD: 0 /100 WBCS — SIGNIFICANT CHANGE UP (ref 0–0)
PLATELET # BLD AUTO: 213 K/UL — SIGNIFICANT CHANGE UP (ref 150–400)
POTASSIUM SERPL-MCNC: 5 MMOL/L — SIGNIFICANT CHANGE UP (ref 3.5–5.3)
POTASSIUM SERPL-SCNC: 5 MMOL/L — SIGNIFICANT CHANGE UP (ref 3.5–5.3)
PROT SERPL-MCNC: 7.8 G/DL — SIGNIFICANT CHANGE UP (ref 6–8.3)
RBC # BLD: 4.18 M/UL — SIGNIFICANT CHANGE UP (ref 3.8–5.2)
RBC # FLD: 15.2 % — HIGH (ref 10.3–14.5)
SODIUM SERPL-SCNC: 138 MMOL/L — SIGNIFICANT CHANGE UP (ref 135–145)
WBC # BLD: 6.93 K/UL — SIGNIFICANT CHANGE UP (ref 3.8–10.5)
WBC # FLD AUTO: 6.93 K/UL — SIGNIFICANT CHANGE UP (ref 3.8–10.5)

## 2024-12-15 PROCEDURE — 99285 EMERGENCY DEPT VISIT HI MDM: CPT | Mod: GC

## 2024-12-15 PROCEDURE — 74177 CT ABD & PELVIS W/CONTRAST: CPT | Mod: 26,MC

## 2024-12-15 RX ORDER — SODIUM CHLORIDE 9 MG/ML
1000 INJECTION, SOLUTION INTRAMUSCULAR; INTRAVENOUS; SUBCUTANEOUS ONCE
Refills: 0 | Status: DISCONTINUED | OUTPATIENT
Start: 2024-12-15 | End: 2024-12-15

## 2024-12-15 RX ORDER — ASPIRIN 81 MG
81 TABLET, DELAYED RELEASE (ENTERIC COATED) ORAL DAILY
Refills: 0 | Status: DISCONTINUED | OUTPATIENT
Start: 2024-12-15 | End: 2024-12-24

## 2024-12-15 RX ORDER — CLONIDINE HYDROCHLORIDE 0.2 MG/1
0.1 TABLET ORAL
Refills: 0 | Status: DISCONTINUED | OUTPATIENT
Start: 2024-12-15 | End: 2024-12-24

## 2024-12-15 RX ORDER — METOPROLOL TARTRATE 50 MG
50 TABLET ORAL DAILY
Refills: 0 | Status: DISCONTINUED | OUTPATIENT
Start: 2024-12-15 | End: 2024-12-24

## 2024-12-15 RX ORDER — ONDANSETRON 4 MG/1
4 TABLET ORAL ONCE
Refills: 0 | Status: COMPLETED | OUTPATIENT
Start: 2024-12-15 | End: 2024-12-16

## 2024-12-15 RX ORDER — ENALAPRIL MALEATE 10 MG/1
20 TABLET ORAL DAILY
Refills: 0 | Status: DISCONTINUED | OUTPATIENT
Start: 2024-12-15 | End: 2024-12-24

## 2024-12-15 RX ORDER — ONDANSETRON 4 MG/1
4 TABLET ORAL ONCE
Refills: 0 | Status: COMPLETED | OUTPATIENT
Start: 2024-12-15 | End: 2024-12-15

## 2024-12-15 RX ORDER — AMPICILLIN SODIUM AND SULBACTAM SODIUM 100; 50 MG/ML; MG/ML
3 INJECTION, POWDER, FOR SOLUTION INTRAVENOUS DAILY
Refills: 0 | Status: DISCONTINUED | OUTPATIENT
Start: 2024-12-15 | End: 2024-12-16

## 2024-12-15 RX ADMIN — AMPICILLIN SODIUM AND SULBACTAM SODIUM 200 GRAM(S): 100; 50 INJECTION, POWDER, FOR SOLUTION INTRAVENOUS at 17:58

## 2024-12-15 RX ADMIN — ONDANSETRON 4 MILLIGRAM(S): 4 TABLET ORAL at 15:34

## 2024-12-15 NOTE — ED ADULT NURSE REASSESSMENT NOTE - NS ED NURSE REASSESS COMMENT FT1
Patient found in stretcher breathing spontaneously and unlabored on Room Air. No signs of respiratory distress @ this time. The patient is alert to name only, very drowsy but responds to verbal stimuli. The patient presents with no IV at this time, Right 20G placed to forearm that is C/D/I with blood return present. Patient at this time is a poor  of her demands and complaints. Safety and comfort measures provided, bed locked and in lowest position, side rails up for safety. VS to order and Awaiting nonemergent discharge. Patient found in stretcher breathing spontaneously and unlabored on Room Air. No signs of respiratory distress @ this time. The patient is alert and orientated x4 and responds appropriately. The patient presents with a Left 20G PIV that is C/D/I and saline locked @ this time. Pt denies chest pain, palpitations, shortness of breath, headache, visual disturbances, numbness/tingling, fever, chills, diaphoresis,  nausea, vomiting, constipation, diarrhea, or urinary symptoms. Safety and comfort measures provided, bed locked and in lowest position, side rails up for safety. Patient was PO Challenged by previous RN and noted active nausea when eating & drinking. Patient only tolerating ice chips at this time, ED MD Chaves aware of results. VS to order and Awaiting update on the ongoing plan of care.

## 2024-12-15 NOTE — ED ADULT NURSE NOTE - NSFALLUNIVINTERV_ED_ALL_ED
Bed/Stretcher in lowest position, wheels locked, appropriate side rails in place/Call bell, personal items and telephone in reach/Instruct patient to call for assistance before getting out of bed/chair/stretcher/Non-slip footwear applied when patient is off stretcher/[place] to call system/Physically safe environment - no spills, clutter or unnecessary equipment/Purposeful proactive rounding/Room/bathroom lighting operational, light cord in reach

## 2024-12-15 NOTE — ED ADULT NURSE REASSESSMENT NOTE - NS ED NURSE REASSESS COMMENT FT1
RN at bedside to reassess patient. Patient found to be incontinent and saturated in urine but insist the soiled pads will hold her off until placed in admitting bed. RN notified patient that she can provided a full bed linen change and new diaper in place. Patient adamantly refusing change at this time. Patient noted to be alert and orientatedx4 and responding appropriately. EVELIO Lundberg contacted at [phone] and made aware at this time, no intervention via PA at this time. MARIA ISABEL Tolbert contacted @ [phone] and update on patients status. Repeat labs collected and sent at this time.

## 2024-12-15 NOTE — CONSULT NOTE ADULT - ATTENDING COMMENTS
PAD  ASymp. No tissue loss  Rec:   Med mgmt  Foot care/protection/podiatry fu for prevention  CAn fu outpt for surveillance

## 2024-12-15 NOTE — ED PROVIDER NOTE - ATTENDING CONTRIBUTION TO CARE
89 F w/ history of A-fib, no longer on AC s/p watchman, Pulm HTN, HTN, recent admission from 7/17-7/25 for diverticulitis, comes to ED for persistent watery nonbloody diarrhea X24hrs. Denies vomiting, no fevers, no abdominal pain, no chest pain, no sob. Pt states she currently is on abxs for toe cellulitis does not remember the name and it caused her diarrhea. pt states that after recieivng antibiotic she felt nausous, she received a nauseapill, and states that that caused her to have diarrhea, she receive antidiarrhea and that caused her to fell nauseous so she requested to come to the ER, pt reorts she had a vascular ultrasound o the leg yesterday and was normal.   Const: no acute distress, Well-developed, Eyes: no conjunctival injection and no scleral icterus ENMT: Moist mucus membranes, CVS: +S1/S2, radial/DP pulse 2+ bilaterally RESP: Unlabored respiratory effort, Clear to auscultation bilaterally GI: mild lower tenderhess no CVA tenderness MSK: Extremities w/o deformity or ttp, Psych: Awake, Alert, & Orientedx3;  Appropriate mood and affect, cooperative  Pt w/ b/l cold feet but reports that this is her baseline,   will need to discuss w/ PCP regarding findings

## 2024-12-15 NOTE — ED ADULT TRIAGE NOTE - CHIEF COMPLAINT QUOTE
nausea, vomiting and diarrhea since this morning   recently started abx yesterday for toe cellulitis

## 2024-12-15 NOTE — H&P ADULT - HISTORY OF PRESENT ILLNESS
Date of Service, 12-15-24 @ 20:52  CHIEF COMPLAINT:Patient is a 89y old  Female who presents with a chief complaint of     HPI:  89-year-old female history of A-fib, no longer on AC s/p watchman, Pulm HTN, HTN, recent admission from 7/17-7/25 for diverticulitis, comes to ED for persistent watery nonbloody diarrhea X24hrs. Denies vomiting, no fevers, no abdominal pain, no chest pain, no sob. Pt states she currently is on abxs for toe cellulitis does not remember the name and it caused her diarrhea.    PAST MEDICAL & SURGICAL HISTORY:  Afib  HTN (hypertension)  H/O CHF  Uterine cancer  S/P hysterectomy      MEDICATIONS  (STANDING):  ampicillin/sulbactam  IVPB 3 Gram(s) IV Intermittent daily  Daily-Chanel 400mcg tablet: 1 tablet orally once a day  · 	Vitamin A and D topical cream: Apply topically to affected area  · 	docusate sodium 100 mg oral capsule: 3 cap(s) orally once a day (at bedtime) as needed for  constipation  · 	ondansetron 4 mg oral tablet: 1 tab(s) orally every 8 hours as needed for  nausea  · 	simvastatin 20 mg oral tablet: 1 tab(s) orally once a day (at bedtime)  · 	Klor-Con 10 mEq oral tablet, extended release: 1 tab(s) orally 2 times a day  · 	metoprolol succinate 50 mg oral tablet, extended release: 1 tab(s) orally once a day (at bedtime)  · 	furosemide 40 mg oral tablet: 1 tab(s) orally once a day  · 	cloNIDine 0.1 mg oral tablet: 1 tab(s) orally 2 times a day  · 	enalapril 20 mg oral tablet: 1 tab(s) orally once a day    MEDICATIONS  (PRN):      FAMILY HISTORY:      SOCIAL HISTORY:    [x ] Non-smoker  [ ] Smoker  [ ] Alcohol    Allergies    Lipitor (Other; [place]ushing)  hydrALAZINE (Other; Flushing)    Intolerances    lactose (Unknown)  	    REVIEW OF SYSTEMS:  CONSTITUTIONAL: No fever, weight loss, or fatigue  EYES: No eye pain, visual disturbances, or discharge  ENT:  No difficulty hearing, tinnitus, vertigo; No sinus or throat pain  NECK: No pain or stiffness  RESPIRATORY: No cough, wheezing, chills or hemoptysis; No Shortness of Breath  CARDIOVASCULAR: No chest pain, palpitations, passing out, dizziness, or leg swelling  GASTROINTESTINAL: No abdominal or epigastric pain. No nausea, vomiting, or hematemesis; No diarrhea or constipation. No melena or hematochezia.  GENITOURINARY: No dysuria, frequency, hematuria, or incontinence  NEUROLOGICAL: No headaches, memory loss, loss of strength, numbness, or tremors  SKIN: No itching, burning, rashes, or lesions   LYMPH Nodes: No enlarged glands  ENDOCRINE: No heat or cold intolerance; No hair loss  MUSCULOSKELETAL: No joint pain or swelling; No muscle, back, + foot pain, R toe pain  PSYCHIATRIC: No depression, anxiety, mood swings, or difficulty sleeping  HEME/LYMPH: No easy bruising, or bleeding gums  ALLERGY AND IMMUNOLOGIC: No hives or eczema	    [x ] All others negative	  [ ] Unable to obtain    PHYSICAL EXAM:  T(C): 36.6 (12-15-24 @ 19:43), Max: 36.7 (12-15-24 @ 14:46)  HR: 79 (12-15-24 @ 19:43) (79 - 82)  BP: 177/96 (12-15-24 @ 19:43) (125/74 - 177/96)  RR: 17 (12-15-24 @ 19:43) (16 - 17)  SpO2: 99% (12-15-24 @ 19:43) (98% - 99%)  Wt(kg): --  I&O's Summary      Appearance: Normal	  HEENT:   Normal oral mucosa, PERRL, EOMI	  Lymphatic: No lymphadenopathy  Cardiovascular: Normal S1 S2, No JVD, +murmurs, No edema  Respiratory: Lungs clear to auscultation	  Psychiatry: A & O x 3, Mood & affect appropriate  Gastrointestinal:  Soft, Non-tender, + BS	  Skin: No rashes, No ecchymoses, No cyanosis	  Neurologic: Non-focal  Extremities: Normal range of motion, both feet are cold to touch distally and small discoloration on the R toe  Vascular+ pvd    TELEMETRY: 	    ECG:  	  RADIOLOGY:  OTHER: 	  	  LABS:	 	    CARDIAC MARKERS:                              13.0   6.93  )-----------( 213      ( 15 Dec 2024 15:49 )             40.2     12-15    138  |  97  |  22  ----------------------------<  94  5.0   |  26  |  0.61    Ca    9.9      15 Dec 2024 15:49    TPro  7.8  /  Alb  4.0  /  TBili  0.4  /  DBili  x   /  AST  21  /  ALT  10  /  AlkPhos  72  12-15    proBNP:   Lipid Profile:   HgA1c:   TSH:       PREVIOUS DIAGNOSTIC TESTING:    < from: 12 Lead ECG (08.29.24 @ 10:20) >  Diagnosis Line ATRIAL FIBRILLATION  RIGHTWARD AXIS  CANNOT RULE OUT ANTERIOR INFARCT , AGE UNDETERMINED  PROLONGED QT  ABNORMAL ECG  WHEN COMPARED WITH ECG OF 17-JUL-2024 07:59,  SIGNIFICANT CHANGES HAVE OCCURRED    < from: TTE Echo Complete w/ Contrast w/ Doppler (07.09.23 @ 10:34) >   1. Normal left ventricular internal cavity size.   2. Normal global left ventricular systolic function.   3. Left ventricular ejection fraction, by visual estimation, is 55 to   60%.   4. Moderate to severe left atrial enlargement.   5. Moderate to severe right atrial enlargement.   6. The mitral in-flow pattern reveals no discernable A-wave, therefore   no comment on diastolic function can be made.   7. Mild mitral annular calcification.   8. Mild thickening of the anterior and posterior mitral valve leaflets.   9. Moderate mitral valve regurgitation.  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  11. Mild-moderate tricuspid regurgitation.  12. Sclerotic aortic valve with normal opening.  13. Mild to moderate aortic regurgitation.  14. Mild to moderate pulmonic valve regurgitation.  15. Estimated pulmonary artery systolic pressure is 51.6 mmHg assuming a   right atrial pressure of 8 mmHg, which is consistent with moderate   pulmonary hypertension.  16. There is no evidence of pericardial effusion.

## 2024-12-15 NOTE — ED PROVIDER NOTE - CLINICAL SUMMARY MEDICAL DECISION MAKING FREE TEXT BOX
Pt is afebrile, well appearing, HD stable. Will get basic labs, cdiff, UA, CT abdomen/pelvis. Concern for diverticulitis flare up vs. viral diarrhea illness vs. cdiff.

## 2024-12-15 NOTE — H&P ADULT - ASSESSMENT
89-year-old female history of A-fib, no longer on AC s/p watchman, Pulm HTN, HTN, recent admission from 7/17-7/25 for diverticulitis, comes to ED for persistent watery nonbloody diarrhea X24hrs. Denies vomiting, no fevers, no abdominal pain, no chest pain, no sob. Pt states she currently is on abxs for toe cellulitis does not remember the name and it caused her diarrhea.  pt with sig medical and cardiac hx with hx of a.fib on no AC sec to s/p watchman  with c/o diarrhea ?nausea an bl cold feet and discoloration on the R Toe.  r/o pvd/ emboli  start iv heparin if vascular agreed  check hr  sec to a.fib  repeat ecg  check arterial Doppler  vascular consult called awaiting plan    lipid panel  continue all bp meds

## 2024-12-15 NOTE — ED PROVIDER NOTE - PHYSICAL EXAMINATION
GENERAL: NAD  HEENT:  Atraumatic  CHEST/LUNG: Chest rise equal bilaterally, clear breath sounds b/l  HEART: Regular rate and rhythm  ABDOMEN: Soft, Nontender, Nondistended  EXTREMITIES:  Extremities warm  PSYCH: A&Ox3  SKIN: No obvious rashes or lesions GENERAL: NAD  HEENT:  Atraumatic  CHEST/LUNG: Chest rise equal bilaterally, clear breath sounds b/l  HEART: Regular rate and rhythm  ABDOMEN: Soft, Nontender, Nondistended  EXTREMITIES:  Extremities warm  PSYCH: A&Ox3  SKIN: mild erythema at left great toe. DP pulses intact.

## 2024-12-15 NOTE — CONSULT NOTE ADULT - ASSESSMENT
89-year-old female history of A-fib, no longer on AC s/p watchman, Pulm HTN, HTN, recent admission from 7/17-7/25 for diverticulitis, comes to ED for persistent watery nonbloody diarrhea X24hrs. Denies vomiting, no fevers, no abdominal pain, no chest pain, no sob. Pt bumped toe last week and currently is on abxs for first toe cellulitis (does not remember the name) and it caused her diarrhea. Patient received vascular ultrasound this past week and was told it was normal. Vascular surgery consulted due to feet feeling cold.     Plan:   - Patient with DP/PT signals b/l with motor and sensory intact in foot, no pain  - Low concern for acute limb ischemia   - No indications for admission from vascular perspective   - Patient can continue outpatient workup for PAD and if needed follow up with vascular clinic     INCOMPLETE, pending discussion with fellow on call     Vascular Surgery, [id number]    89-year-old female history of A-fib, no longer on AC s/p watchman, Pulm HTN, HTN, recent admission from 7/17-7/25 for diverticulitis, comes to ED for persistent watery nonbloody diarrhea X24hrs. Denies vomiting, no fevers, no abdominal pain, no chest pain, no sob. Pt bumped toe last week and currently is on abxs for first toe cellulitis (does not remember the name) and it caused her diarrhea. Patient received vascular ultrasound this past week and was told it was normal. Vascular surgery consulted due to feet feeling cold.     Plan:   - Patient with DP/PT signals b/l with motor and sensory intact in foot, no pain  - Low concern for acute limb ischemia   - Can obtain arterial US lower extremity while inpatient   - Vascular to follow     Discussed with fellow on call, Dr. Izaguirre     Vascular Surgery, [id number]

## 2024-12-15 NOTE — ED PROVIDER NOTE - OBJECTIVE STATEMENT
89-year-old female history of A-fib, no longer on AC s/p watchman, Pulm HTN, HTN, recent admission from 7/17-7/25 for diverticulitis, comes to ED for persistent watery nonbloody diarrhea X24hrs. Denies vomiting, no fevers, no abdominal pain, no chest pain, no sob. Pt states she currently is on abxs for toe cellulitis does not remember the name and it caused her diarrhea.

## 2024-12-15 NOTE — ED ADULT NURSE NOTE - OBJECTIVE STATEMENT
89 y/o  female with PMH of CHF, AFIB, HTN, Uterine CA arrives to the ER by ambulance from [facility] complaining of diarrhea. Pt reports having R toe cellulitis, started taking abx  (Cefuroxime)  developing  nausea, vomiting and diarrhea since this morning. yesterday for toe cellulitis. Pt denies SOB, chest pain, dizziness, N/V/D, urinary symptoms, fevers, chills.  On assessment pt is well appearing, A&Ox4, speaking coherently, airway is patent, breathing spontaneously and unlabored. Skin is dry, warm. Abdomen is soft, no distended, no tender. Full ROM in all extremities. Safety and comfort measures provided to keep patient safe. Bed placed in lowest position and side rails raised. 89 y/o  female with PMH of CHF, AFIB, HTN, Uterine CA arrives to the ER by ambulance from [facility] complaining of diarrhea. Pt reports having R toe cellulitis, started taking abx  (Cefuroxime)  developing  nausea, vomiting and diarrhea since this morning. yesterday for R toe cellulitis. Pt reports an recent admission  for diverticulitis. Pt denies SOB, chest pain, dizziness, N/V/D, urinary symptoms, fevers, chills.  On assessment pt is well appearing, A&Ox4, speaking coherently, airway is patent, breathing spontaneously and unlabored. Skin is dry, warm. Abdomen is soft, no distended, no tender. Full ROM in all extremities. Safety and comfort measures provided to keep patient safe. Bed placed in lowest position and side rails raised.

## 2024-12-15 NOTE — ED PROVIDER NOTE - PROGRESS NOTE DETAILS
lum do pgy-2: called [facility], confirmed that the abx pt is taking for toe cellulitis is cefuroxime Patient per Dr. Everett  was sent in for vascular issue patient with bilateral cold feet.  Patient states that this is her baseline.  At facility patient received a vascular ultrasound of the legs she states that she was unaware of any abnormality. Leticia Chaves PGY3 MD  I spoke with Dr. Gonsalez and who is a primary care physician of the patient.  Requesting the vascular come and see the patient.  Vascular at bedside evaluating right lower extremity.  Pending vascular recommendations for further disposition.

## 2024-12-16 LAB
ALBUMIN SERPL ELPH-MCNC: 3.8 G/DL — SIGNIFICANT CHANGE UP (ref 3.3–5)
ALP SERPL-CCNC: 68 U/L — SIGNIFICANT CHANGE UP (ref 40–120)
ALT FLD-CCNC: 11 U/L — SIGNIFICANT CHANGE UP (ref 10–45)
ANION GAP SERPL CALC-SCNC: 14 MMOL/L — SIGNIFICANT CHANGE UP (ref 5–17)
APPEARANCE UR: CLEAR — SIGNIFICANT CHANGE UP
APTT BLD: 38.1 SEC — HIGH (ref 24.5–35.6)
AST SERPL-CCNC: 21 U/L — SIGNIFICANT CHANGE UP (ref 10–40)
BILIRUB SERPL-MCNC: 0.5 MG/DL — SIGNIFICANT CHANGE UP (ref 0.2–1.2)
BILIRUB UR-MCNC: NEGATIVE — SIGNIFICANT CHANGE UP
BLD GP AB SCN SERPL QL: NEGATIVE — SIGNIFICANT CHANGE UP
BUN SERPL-MCNC: 18 MG/DL — SIGNIFICANT CHANGE UP (ref 7–23)
CALCIUM SERPL-MCNC: 10 MG/DL — SIGNIFICANT CHANGE UP (ref 8.4–10.5)
CHLORIDE SERPL-SCNC: 96 MMOL/L — SIGNIFICANT CHANGE UP (ref 96–108)
CO2 SERPL-SCNC: 29 MMOL/L — SIGNIFICANT CHANGE UP (ref 22–31)
COLOR SPEC: YELLOW — SIGNIFICANT CHANGE UP
CREAT SERPL-MCNC: 0.62 MG/DL — SIGNIFICANT CHANGE UP (ref 0.5–1.3)
DIFF PNL FLD: NEGATIVE — SIGNIFICANT CHANGE UP
EGFR: 84 ML/MIN/1.73M2 — SIGNIFICANT CHANGE UP
GLUCOSE SERPL-MCNC: 69 MG/DL — LOW (ref 70–99)
GLUCOSE UR QL: NEGATIVE MG/DL — SIGNIFICANT CHANGE UP
HCT VFR BLD CALC: 41.7 % — SIGNIFICANT CHANGE UP (ref 34.5–45)
HGB BLD-MCNC: 13.2 G/DL — SIGNIFICANT CHANGE UP (ref 11.5–15.5)
INR BLD: 2.02 RATIO — HIGH (ref 0.85–1.16)
KETONES UR-MCNC: ABNORMAL MG/DL
LEUKOCYTE ESTERASE UR-ACNC: NEGATIVE — SIGNIFICANT CHANGE UP
MCHC RBC-ENTMCNC: 30.9 PG — SIGNIFICANT CHANGE UP (ref 27–34)
MCHC RBC-ENTMCNC: 31.7 G/DL — LOW (ref 32–36)
MCV RBC AUTO: 97.7 FL — SIGNIFICANT CHANGE UP (ref 80–100)
NITRITE UR-MCNC: NEGATIVE — SIGNIFICANT CHANGE UP
NRBC # BLD: 0 /100 WBCS — SIGNIFICANT CHANGE UP (ref 0–0)
PH UR: 5.5 — SIGNIFICANT CHANGE UP (ref 5–8)
PLATELET # BLD AUTO: 199 K/UL — SIGNIFICANT CHANGE UP (ref 150–400)
POTASSIUM SERPL-MCNC: 4.4 MMOL/L — SIGNIFICANT CHANGE UP (ref 3.5–5.3)
POTASSIUM SERPL-SCNC: 4.4 MMOL/L — SIGNIFICANT CHANGE UP (ref 3.5–5.3)
PROT SERPL-MCNC: 7.5 G/DL — SIGNIFICANT CHANGE UP (ref 6–8.3)
PROT UR-MCNC: NEGATIVE MG/DL — SIGNIFICANT CHANGE UP
PROTHROM AB SERPL-ACNC: 23.1 SEC — HIGH (ref 9.9–13.4)
RBC # BLD: 4.27 M/UL — SIGNIFICANT CHANGE UP (ref 3.8–5.2)
RBC # FLD: 15.3 % — HIGH (ref 10.3–14.5)
RH IG SCN BLD-IMP: POSITIVE — SIGNIFICANT CHANGE UP
SODIUM SERPL-SCNC: 139 MMOL/L — SIGNIFICANT CHANGE UP (ref 135–145)
SP GR SPEC: 1.02 — SIGNIFICANT CHANGE UP (ref 1–1.03)
UROBILINOGEN FLD QL: 0.2 MG/DL — SIGNIFICANT CHANGE UP (ref 0.2–1)
WBC # BLD: 6.25 K/UL — SIGNIFICANT CHANGE UP (ref 3.8–10.5)
WBC # FLD AUTO: 6.25 K/UL — SIGNIFICANT CHANGE UP (ref 3.8–10.5)

## 2024-12-16 PROCEDURE — 99222 1ST HOSP IP/OBS MODERATE 55: CPT | Mod: GC

## 2024-12-16 PROCEDURE — 93923 UPR/LXTR ART STDY 3+ LVLS: CPT | Mod: 26

## 2024-12-16 PROCEDURE — 99222 1ST HOSP IP/OBS MODERATE 55: CPT

## 2024-12-16 PROCEDURE — 93925 LOWER EXTREMITY STUDY: CPT | Mod: 26

## 2024-12-16 RX ORDER — INFLUENZA A VIRUS A/WISCONSIN/588/2019 (H1N1) RECOMBINANT HEMAGGLUTININ ANTIGEN, INFLUENZA A VIRUS A/DARWIN/6/2021 (H3N2) RECOMBINANT HEMAGGLUTININ ANTIGEN, INFLUENZA B VIRUS B/AUSTRIA/1359417/2021 RECOMBINANT HEMAGGLUTININ ANTIGEN, AND INFLUENZA B VIRUS B/PHUKET/3073/2013 RECOMBINANT HEMAGGLUTININ ANTIGEN 45; 45; 45; 45 UG/.5ML; UG/.5ML; UG/.5ML; UG/.5ML
0.5 INJECTION INTRAMUSCULAR ONCE
Refills: 0 | Status: DISCONTINUED | OUTPATIENT
Start: 2024-12-16 | End: 2024-12-24

## 2024-12-16 RX ORDER — HEPARIN SODIUM 1000 [USP'U]/ML
5000 INJECTION, SOLUTION INTRAVENOUS; SUBCUTANEOUS EVERY 8 HOURS
Refills: 0 | Status: DISCONTINUED | OUTPATIENT
Start: 2024-12-16 | End: 2024-12-24

## 2024-12-16 RX ADMIN — Medication 50 MILLIGRAM(S): at 05:06

## 2024-12-16 RX ADMIN — ENALAPRIL MALEATE 20 MILLIGRAM(S): 10 TABLET ORAL at 05:06

## 2024-12-16 RX ADMIN — CLONIDINE HYDROCHLORIDE 0.1 MILLIGRAM(S): 0.2 TABLET ORAL at 00:08

## 2024-12-16 RX ADMIN — ONDANSETRON 4 MILLIGRAM(S): 4 TABLET ORAL at 09:20

## 2024-12-16 RX ADMIN — AMPICILLIN SODIUM AND SULBACTAM SODIUM 200 GRAM(S): 100; 50 INJECTION, POWDER, FOR SOLUTION INTRAVENOUS at 12:04

## 2024-12-16 RX ADMIN — CLONIDINE HYDROCHLORIDE 0.1 MILLIGRAM(S): 0.2 TABLET ORAL at 16:57

## 2024-12-16 NOTE — CONSULT NOTE ADULT - ASSESSMENT
This is a 89 y/o F w/ PMHx of AF, s/p watchman, no longer on AC, pulmonary HTN, h/o diverticulitis, who presentes to ER for watery, nonbloody diarrhea.   Pt was on antibiotics outpatient for toe cellulitis.     In the ER, pt was afebrile, vitals wnl.   Labs w/o leukocytosis, lactate wnl, No transaminitis.   CT A/P w/ possible ileus, no diverticulitis, with extensive diverticulosis. Gallballder distension     #Diarrhea   #H/o diverticulitis   #Distended GB  #R 1st toe erythema      Overall,  89 y/o F w/ PMHx of AF, s/p watchman, no longer on AC, pulmonary HTN, h/o diverticulitis presenting w/ diarrhea, found to have "cold feet", seen by vascular with doppler DP/PT signals pending arterial US.   Pt afebrile, w/o leukocytosis, diarrhea now resolved. Was on unknown antibiotic for about a week for possible R toe cellulitis. On exam, very mild erythema on tip of 1st toe, not warm to touch, does not appear consistent with cellulitis. Would monitor off antibiotics at this time.    Recommend:   1. D/c Unasyn, low c/f for R 1st toe cellulitis, CT A/P w/o diverticulitis   2. Consider RUQ US given distended GB, however afebrile, no leukocytosis, no transaminitis and no RUQ pain on exam   3. If has diarrhea, can send GI PCR. Given no leukocytosis and low amounts of diarrhea (3 times a day), low concern for C diff    Thank you for this consult. Inpatient ID team will follow.    Mason Jaime M.D.  Attending Physician  Division of Infectious Diseases  Department of Medicine    Please contact through [employer].  Office: [phone] (after 5 PM or weekend)

## 2024-12-16 NOTE — CONSULT NOTE ADULT - ASSESSMENT
Watery diarrhea  Distended GB    Recommendations:  - LFTs and prior normal HIDA reassuring, will f/u RUQ U/S   - Trend LFTs   - GI PCR if has ongoing diarrhea  - Serial abdominal exams  - IVF  - Advance diet as tolerated  - Will follow with you    Porter Lenz M.D.  [facility]  [phone]

## 2024-12-16 NOTE — PATIENT PROFILE ADULT - FUNCTIONAL ASSESSMENT - DAILY ACTIVITY 1.
Keep a detailed log on the [facility] on the foods consumed, grams of carbohydrate next to each food item and units of insulin taken.     2. Measure the milk. 3/4 cup = 9 grams of carb    3. Reduce to 2 oatmeal packets & add protein ( 2 sausage links)    4. Yes. We will count the jar of gravy (24 grams) in the recipe.      Carb count list:   -2/3 cup of corn & peas is 23 grams of carbohydrate  -The cake cut in 8 pieces if 44 grams of carbohydrate  -The cake cut in 6 pieces is 58 grams of carbohydrate  - 1 cup of a dense soup, cream soup or chili is 30 grams  -1 cup of a broth-based soup is 15 grams  -Burrito 45 grams  -instant mashed potatoes 1 cup = 38 grams  -oatmeal packet: 23 grams  -3 packets of oatmeal = 69 grams of carb  -2 packets of oatmeal = 46 grams         
1 = Total assistance

## 2024-12-16 NOTE — PROGRESS NOTE ADULT - SUBJECTIVE AND OBJECTIVE BOX
Date of Service: 12-16-24 @ 10:19           CARDIOLOGY     PROGRESS  NOTE   ________________________________________________    CHIEF COMPLAINT:Patient is a 89y old  Female who presents with a chief complaint of ischemic foot (15 Dec 2024 20:51)  no complain  	  REVIEW OF SYSTEMS:  CONSTITUTIONAL: No fever, weight loss, or fatigue  EYES: No eye pain, visual disturbances, or discharge  ENT:  No difficulty hearing, tinnitus, vertigo; No sinus or throat pain  NECK: No pain or stiffness  RESPIRATORY: No cough, wheezing, chills or hemoptysis; noShortness of Breath  CARDIOVASCULAR: No chest pain, palpitations, passing out, dizziness, or leg swelling  GASTROINTESTINAL: No abdominal or epigastric pain. No nausea, vomiting, or hematemesis; No diarrhea or constipation. No melena or hematochezia.  GENITOURINARY: No dysuria, frequency, hematuria, or incontinence  NEUROLOGICAL: No headaches, memory loss, loss of strength, numbness, or tremors  SKIN: No itching, burning, rashes, or lesions   LYMPH Nodes: No enlarged glands  ENDOCRINE: No heat or cold intolerance; No hair loss  MUSCULOSKELETAL: No joint pain or swelling; No muscle, back, or extremity pain  PSYCHIATRIC: No depression, anxiety, mood swings, or difficulty sleeping  HEME/LYMPH: No easy bruising, or bleeding gums  ALLERGY AND IMMUNOLOGIC: No hives or eczema	    [ x] All others negative	  [ ] Unable to obtain    PHYSICAL EXAM:  T(C): 36.7 (12-16-24 @ 04:53), Max: 36.7 (12-15-24 @ 14:46)  HR: 81 (12-16-24 @ 04:53) (79 - 100)  BP: 172/82 (12-16-24 @ 04:53) (125/74 - 198/93)  RR: 18 (12-16-24 @ 04:53) (16 - 18)  SpO2: 95% (12-16-24 @ 04:53) (94% - 99%)  Wt(kg): --  I&O's Summary      Appearance: Normal	  HEENT:   Normal oral mucosa, PERRL, EOMI	  Lymphatic: No lymphadenopathy  Cardiovascular: Normal S1 S2, No JVD, No murmurs, No edema  Respiratory: Lungs clear to auscultation	  Psychiatry: A & O x 3, Mood & affect appropriate  Gastrointestinal:  Soft, Non-tender, + BS	  Skin: No rashes, No ecchymoses, No cyanosis	  Neurologic: Non-focal  Extremities: Normal range of motion, No clubbing, cyanosis or edema  Vascular: + pvd    MEDICATIONS  (STANDING):  ampicillin/sulbactam  IVPB 3 Gram(s) IV Intermittent daily  aspirin enteric coated 81 milliGRAM(s) Oral daily  cloNIDine 0.1 milliGRAM(s) Oral two times a day  enalapril 20 milliGRAM(s) Oral daily  metoprolol succinate ER 50 milliGRAM(s) Oral daily      TELEMETRY: 	    ECG:  	  RADIOLOGY:  OTHER: 	  	  LABS:	 	    CARDIAC MARKERS:                                13.2   6.25  )-----------( 199      ( 16 Dec 2024 06:52 )             41.7     12-16    139  |  96  |  18  ----------------------------<  69[L]  4.4   |  29  |  0.62    Ca    10.0      16 Dec 2024 06:52    TPro  7.5  /  Alb  3.8  /  TBili  0.5  /  DBili  x   /  AST  21  /  ALT  11  /  AlkPhos  68  12-16    proBNP:   Lipid Profile:   HgA1c:   TSH:   PT/INR - ( 15 Dec 2024 23:45 )   PT: 23.1 sec;   INR: 2.02 ratio         PTT - ( 15 Dec 2024 23:45 )  PTT:38.1 sec    Plan:   - Patient with DP/PT signals b/l with motor and sensory intact in foot, no pain  - Low concern for acute limb ischemia   - Can obtain arterial US lower extremity while inpatient   - Vascular to follow     Assessment and plan  ---------------------------  89-year-old female history of A-fib, no longer on AC s/p watchman, Pulm HTN, HTN, recent admission from 7/17-7/25 for diverticulitis, comes to ED for persistent watery nonbloody diarrhea X24hrs. Denies vomiting, no fevers, no abdominal pain, no chest pain, no sob. Pt states she currently is on abxs for toe cellulitis does not remember the name and it caused her diarrhea.  pt with sig medical and cardiac hx with hx of chris on no AC sec to s/p watchman  with c/o diarrhea ?nausea an bl cold feet and discoloration on the R Toe.  r/o pvd/ emboli  start iv heparin if vascular agreed  check hr  sec to vanna.fib  repeat ecg  check arterial Doppler  vascular consult appreciated awaiting CYNTHIA  lipid panel  continue all bp meds  ?IV heparin if clear by vascular

## 2024-12-17 ENCOUNTER — RESULT REVIEW (OUTPATIENT)
Age: 88
End: 2024-12-17

## 2024-12-17 LAB
ANION GAP SERPL CALC-SCNC: 20 MMOL/L — HIGH (ref 5–17)
BUN SERPL-MCNC: 34 MG/DL — HIGH (ref 7–23)
CALCIUM SERPL-MCNC: 10 MG/DL — SIGNIFICANT CHANGE UP (ref 8.4–10.5)
CHLORIDE SERPL-SCNC: 93 MMOL/L — LOW (ref 96–108)
CHOLEST SERPL-MCNC: 180 MG/DL — SIGNIFICANT CHANGE UP
CO2 SERPL-SCNC: 26 MMOL/L — SIGNIFICANT CHANGE UP (ref 22–31)
CREAT SERPL-MCNC: 0.82 MG/DL — SIGNIFICANT CHANGE UP (ref 0.5–1.3)
EGFR: 67 ML/MIN/1.73M2 — SIGNIFICANT CHANGE UP
GLUCOSE BLDC GLUCOMTR-MCNC: 112 MG/DL — HIGH (ref 70–99)
GLUCOSE BLDC GLUCOMTR-MCNC: 56 MG/DL — LOW (ref 70–99)
GLUCOSE BLDC GLUCOMTR-MCNC: 69 MG/DL — LOW (ref 70–99)
GLUCOSE BLDC GLUCOMTR-MCNC: 87 MG/DL — SIGNIFICANT CHANGE UP (ref 70–99)
GLUCOSE SERPL-MCNC: 54 MG/DL — CRITICAL LOW (ref 70–99)
HCT VFR BLD CALC: 42.9 % — SIGNIFICANT CHANGE UP (ref 34.5–45)
HDLC SERPL-MCNC: 45 MG/DL — LOW
HGB BLD-MCNC: 13.5 G/DL — SIGNIFICANT CHANGE UP (ref 11.5–15.5)
LIPID PNL WITH DIRECT LDL SERPL: 115 MG/DL — HIGH
MCHC RBC-ENTMCNC: 30.8 PG — SIGNIFICANT CHANGE UP (ref 27–34)
MCHC RBC-ENTMCNC: 31.5 G/DL — LOW (ref 32–36)
MCV RBC AUTO: 97.7 FL — SIGNIFICANT CHANGE UP (ref 80–100)
NON HDL CHOLESTEROL: 135 MG/DL — HIGH
NRBC # BLD: 0 /100 WBCS — SIGNIFICANT CHANGE UP (ref 0–0)
PLATELET # BLD AUTO: 222 K/UL — SIGNIFICANT CHANGE UP (ref 150–400)
POTASSIUM SERPL-MCNC: 4.6 MMOL/L — SIGNIFICANT CHANGE UP (ref 3.5–5.3)
POTASSIUM SERPL-SCNC: 4.6 MMOL/L — SIGNIFICANT CHANGE UP (ref 3.5–5.3)
RBC # BLD: 4.39 M/UL — SIGNIFICANT CHANGE UP (ref 3.8–5.2)
RBC # FLD: 15 % — HIGH (ref 10.3–14.5)
SODIUM SERPL-SCNC: 139 MMOL/L — SIGNIFICANT CHANGE UP (ref 135–145)
TRIGL SERPL-MCNC: 109 MG/DL — SIGNIFICANT CHANGE UP
WBC # BLD: 7.44 K/UL — SIGNIFICANT CHANGE UP (ref 3.8–10.5)
WBC # FLD AUTO: 7.44 K/UL — SIGNIFICANT CHANGE UP (ref 3.8–10.5)

## 2024-12-17 PROCEDURE — 93306 TTE W/DOPPLER COMPLETE: CPT | Mod: 26

## 2024-12-17 PROCEDURE — 76705 ECHO EXAM OF ABDOMEN: CPT | Mod: 26

## 2024-12-17 RX ORDER — ONDANSETRON 4 MG/1
4 TABLET ORAL ONCE
Refills: 0 | Status: COMPLETED | OUTPATIENT
Start: 2024-12-17 | End: 2024-12-17

## 2024-12-17 RX ORDER — SODIUM CHLORIDE 9 MG/ML
500 INJECTION, SOLUTION INTRAVENOUS
Refills: 0 | Status: DISCONTINUED | OUTPATIENT
Start: 2024-12-17 | End: 2024-12-24

## 2024-12-17 RX ORDER — DEXTROSE MONOHYDRATE 25 G/50ML
25 INJECTION, SOLUTION INTRAVENOUS ONCE
Refills: 0 | Status: COMPLETED | OUTPATIENT
Start: 2024-12-17 | End: 2024-12-17

## 2024-12-17 RX ADMIN — Medication 81 MILLIGRAM(S): at 11:02

## 2024-12-17 RX ADMIN — CLONIDINE HYDROCHLORIDE 0.1 MILLIGRAM(S): 0.2 TABLET ORAL at 17:05

## 2024-12-17 RX ADMIN — ONDANSETRON 4 MILLIGRAM(S): 4 TABLET ORAL at 18:02

## 2024-12-17 RX ADMIN — DEXTROSE MONOHYDRATE 25 GRAM(S): 25 INJECTION, SOLUTION INTRAVENOUS at 14:28

## 2024-12-17 RX ADMIN — ENALAPRIL MALEATE 20 MILLIGRAM(S): 10 TABLET ORAL at 05:49

## 2024-12-17 RX ADMIN — Medication 50 MILLIGRAM(S): at 05:48

## 2024-12-17 RX ADMIN — CLONIDINE HYDROCHLORIDE 0.1 MILLIGRAM(S): 0.2 TABLET ORAL at 05:48

## 2024-12-17 RX ADMIN — SODIUM CHLORIDE 50 MILLILITER(S): 9 INJECTION, SOLUTION INTRAVENOUS at 08:39

## 2024-12-17 NOTE — PROGRESS NOTE ADULT - ASSESSMENT
Watery diarrhea  Distended GB    Recommendations:  - LFTs and prior normal HIDA reassuring, will f/u RUQ U/S   - Trend LFTs   - GI PCR if has ongoing diarrhea  - Serial abdominal exams  - IVF  - Advance diet as tolerated  - Will follow with you    Porter Lenz M.D.  [facility]  [phone]

## 2024-12-17 NOTE — PROGRESS NOTE ADULT - SUBJECTIVE AND OBJECTIVE BOX
---___---___---___---___---___---___ ---___---___---___---___---___---___---___---___---                  M E D I C A L   A T T E N D I N G   P R O G R E S S   N O T E  ---___---___---___---___---___---___ ---___---___---___---___---___---___---___---___---        ================================================    ++CHIEF COMPLAINT:   Patient is a 89y old  Female who presents with a chief complaint of ischemic foot (17 Dec 2024 09:11)      Abdominal pain      ---___---___---___---___---___---  PAST MEDICAL / Surgical  HISTORY:  PAST MEDICAL & SURGICAL HISTORY:  Afib      HTN (hypertension)      H/O CHF      Uterine cancer      S/P hysterectomy          ---___---___---___---___---___---  FAMILY HISTORY:   FAMILY HISTORY:        ---___---___---___---___---___---  ALLERGIES:   Allergies    Lipitor (Other; Flushing)  hydrALAZINE (Other; Flushing)    Intolerances    lactose (Unknown)      ---___---___---___---___---___---  MEDICATIONS:  MEDICATIONS  (STANDING):  aspirin enteric coated 81 milliGRAM(s) Oral daily  cloNIDine 0.1 milliGRAM(s) Oral two times a day  dextrose 5% + sodium chloride 0.45%. 500 milliLiter(s) (50 mL/Hr) IV Continuous <Continuous>  enalapril 20 milliGRAM(s) Oral daily  heparin   Injectable 5000 Unit(s) SubCutaneous every 8 hours  influenza  Vaccine (HIGH DOSE) 0.5 milliLiter(s) IntraMuscular once  metoprolol succinate ER 50 milliGRAM(s) Oral daily    MEDICATIONS  (PRN):      ---___---___---___---___---___---  REVIEW OF SYSTEM:    GEN: no fever, no chills, no pain  RESP: no SOB, no cough, no sputum  CVS: no chest pain, no palpitations, no edema  GI: no abdominal pain, no nausea, no vomiting, no constipation, no diarrhea  : no dysurea, no frequency, no hematurea  Neuro: no headache, no dizziness  PSYCH: no anxiety, no depression  Derm : no itching, no rash    ---___---___---___---___---___---  VITAL SIGNS:  89y , CAPILLARY BLOOD GLUCOSE      POCT Blood Glucose.: 112 mg/dL (17 Dec 2024 17:14)    T(C): 36.8 (24 @ 20:01), Max: 36.8 (24 @ 20:01)  HR: 63 (24 @ 20:01) (60 - 96)  BP: 135/60 (24 @ 20:01) (132/81 - 145/80)  RR: 18 (24 @ 20:01) (18 - 18)  SpO2: 99% (24 @ 20:01) (99% - 100%)  ---___---___---___---___---___---  PHYSICAL EXAM:    GEN: A&O X 3 , NAD , comfortable  HEENT: NCAT, PERRL, MMM, hearing intact  Neck: supple , no JVD  CVS: S1S2 , regular , No M/R/G appreciated  PULM: CTA B/L,  no W/R/R appreciated  ABD.: soft. non tender, non distended,  bowel sounds present  Extrem: intact pulses , no edema   Derm: No rash , no ecchymoses  PSYCH : normal mood,  no delusion not anxious     ---___---___---___---___---___---            LAB AND IMAGIN.5   7.44  )-----------( 222      ( 17 Dec 2024 07:16 )             42.9                   139  |  93[L]  |  34[H]  ----------------------------<  54[LL]  4.6   |  26  |  0.82    Ca    10.0      17 Dec 2024 07:13    TPro  7.5  /  Alb  3.8  /  TBili  0.5  /  DBili  x   /  AST  21  /  ALT  11  /  AlkPhos  68  12-16    PT/INR - ( 15 Dec 2024 23:45 )   PT: 23.1 sec;   INR: 2.02 ratio         PTT - ( 15 Dec 2024 23:45 )  PTT:38.1 sec                        Urinalysis Basic - ( 17 Dec 2024 07:13 )    Color: x / Appearance: x / SG: x / pH: x  Gluc: 54 mg/dL / Ketone: x  / Bili: x / Urobili: x   Blood: x / Protein: x / Nitrite: x   Leuk Esterase: x / RBC: x / WBC x   Sq Epi: x / Non Sq Epi: x / Bacteria: x        [All pertinent / recent Imaging reviewed]         ---___---___---___---___---___---___ ---___---___---___---___---                         A S S E S S M E N T   A N D   P L A N :      HEALTH ISSUES - PROBLEM Dx:  89-year-old female history of A-fib, no longer on AC s/p watchman, Pulm HTN, HTN, recent admission from - for diverticulitis, comes to ED for persistent watery nonbloody diarrhea X24hrs. Denies vomiting, no fevers, no abdominal pain, no chest pain, no sob. Pt states she currently is on abxs for toe cellulitis does not remember the name and it caused her diarrhea.  pt with sig medical and cardiac hx with hx of a.fib on no AC sec to s/p watchman  with c/o diarrhea ?nausea an bl cold feet and discoloration on the R Toe.  r/o pvd/ emboli  start iv heparin if vascular agreed  check hr  sec to a.fib, no ac s/p watchman procedure  check arterial Doppler of LE noted, need vascular follow up and recommendation  lipid panel  continue all bp meds  awaiting gall bladder ultrasound, abx dc ed by ID  [email or web address]    ACC: [id number] EXAM:  CT ABDOMEN AND PELVIS IC   ORDERED BY:  INGRIS LINDSAY     PROCEDURE DATE:  12/15/2024          INTERPRETATION:  CLINICAL INFORMATION: Diarrhea.    COMPARISON: Most recent CT scan of abdomen and pelvis from 2024 and   additional prior imaging studies dating back to 2014.    CONTRAST/COMPLICATIONS:  IV Contrast: Omnipaque 350  75 cc administered   25 cc discarded  Oral Contrast: NONE  .    PROCEDURE:  CT of the Abdomen and Pelvis was performed.  Sagittal and coronal reformats were performed.    FINDINGS:  LOWER CHEST: Implantable loop recorder left anterior chest wall.   Cardiomegaly, with biatrial enlargement. Left atrial appendage occlusion   device is partially visualized. Severe coronary artery calcification.   Small right pleural effusion again present. Passive atelectasis of a   small portion of the right lower lobe adjacent to the pleural effusion.   Linear atelectasis right middle lobe and lingula.    LIVER: Within normal limits.  BILE DUCTS: Normal caliber.  GALLBLADDER: Distended. No gallstones are seen.  SPLEEN: Within normal limits.  PANCREAS: Within normal limits.  ADRENALS: Within normal limits.  KIDNEYS/URETERS: Within normal limits.    BLADDER: Within normal limits.  REPRODUCTIVE ORGANS: Hysterectomy. Multiple surgical clips in the pelvis   bilaterally.    BOWEL: The stomach is not distended, limiting evaluation. A few small   bowel loops are mildly dilated, but taper to normal caliber, probably   representing an ileus. No bowel obstruction. Multiple colonic   diverticula. No diverticulitis. No bowel wall thickening is seen.  PERITONEUM/RETROPERITONEUM: Within normal limits.  VESSELS: Advanced atherosclerotic disease, with large amount of calcified   plaque aorta and its branch vessels in the abdomen and pelvis.  LYMPH NODES: No lymphadenopathy.  ABDOMINAL WALL: Multiple surgical clips in the right groin.  BONES: Degenerative changes.    IMPRESSION:  1. Since 2024, there are a few mildly dilated small bowel loops,   probably representing an ileus. No definite cause for diarrhea is seen.    2. Extensive colonic diverticulosis.    3. Advanced atherosclerotic disease.    4. The gallbladder is distended. This is of uncertain significance given   the lack of gallstones on prior ultrasound from 2024 and normal HIDA   scan on 9/3/2024.        --- End of Report ---            VIRGILIO CROCKETT MD; Attending Radiologist  This document has been electronically signed. Dec 15 2024  7:00PM    ACC: [id number] EXAM:  ART DUPLEX LOWER EXT BILATERAL   ORDERED BY:   RAJ LOFTON     PROCEDURE DATE:  2024          INTERPRETATION:  Clinical information: Suspected lower extremity arterial   vascular disease    Technique: Grayscale, color and spectral Doppler imaging was performed at   the arteries of the right and left lower extremities    Findings: There is edema within the superficial soft tissues of both   lower extremities.    There is an irregular heart rate.    Atheromatous plaque of varying severity is present along the course of   the arteries supplying the right and left lower extremities.    The peak systolic velocities of the Right lower extremity are as follows:    Distal external iliac artery: 104 cm/s  Common femoral artery: 103 cm/s  Deep femoral artery: Tardus parvus, 50 cm/s  Superficial femoral artery: 77 cm/s proximal,  152 cm/s mid, 169 cm/s   distal  Popliteal artery: 107 cm/s  Tibioperoneal trunk: 73 cm/s  Posterior tibial artery: 82 cm/s proximal,  82 cm/s mid, 77 cm/s distal  Peroneal artery: Not visualized  Anterior tibial artery: 72 cm/s proximal,  78 cm/s mid, 131 cm/s distal  Dorsalis pedis artery: 104 cm/s    The peak systolic velocities of the Left lower extremity are as follows:    Distalexternal iliac artery: 180 cm/s  Common femoral artery: 119 cm/s  Deep femoral artery: 67 cm/s  Superficial femoral artery: 109 cm/s proximal,  129 cm/s mid, 157 cm/s   distal  Popliteal artery: 96 cm/s  Tibioperoneal trunk: 87 cm/s  Posterior tibialartery: 71 cm/s proximal,  78 cm/s mid, 84 cm/s distal  Peroneal artery: Not visualized  Anterior tibial artery: 61 cm/s proximal,  84 cm/s mid, 96 cm/s distal  Dorsalis pedis artery: 74 cm/s      Impression:    There is a flow-limiting stenosis approaching 50% affecting the right   superficial femoral artery in the mid thigh.    A tardus parvus pattern of flow through the right deep femoral artery   suggests a flow-limiting stenosis of this artery as well.    No flow-limiting stenoses are identified on the left.    --- End of Report ---            ELLEN DIALLO MD; Attending Interventional Radiologist  This document has been electronically signed. Dec 16 2024  2:56PM      ___________________________  Thank you,  Porter Keller  [phone]

## 2024-12-17 NOTE — PROGRESS NOTE ADULT - SUBJECTIVE AND OBJECTIVE BOX
[facility]  Kal Gamez PA-C  [address] [place] [address]  [phone]      INTERVAL HPI/OVERNIGHT EVENTS: no acute events     MEDICATIONS  (STANDING):  aspirin enteric coated 81 milliGRAM(s) Oral daily  cloNIDine 0.1 milliGRAM(s) Oral two times a day  dextrose 5% + sodium chloride 0.45%. 500 milliLiter(s) (50 mL/Hr) IV Continuous <Continuous>  enalapril 20 milliGRAM(s) Oral daily  heparin   Injectable 5000 Unit(s) SubCutaneous every 8 hours  influenza  Vaccine (HIGH DOSE) 0.5 milliLiter(s) IntraMuscular once  metoprolol succinate ER 50 milliGRAM(s) Oral daily    MEDICATIONS  (PRN):      Allergies    Lipitor (Other; Flushing)  hydrALAZINE (Other; Flushing)    Intolerances    lactose (Unknown)      ROS:   General:  No  fevers, chills, night sweats, fatigue,   Eyes:  Good vision, no reported pain  ENT:  No sore throat, pain, runny nose, dysphagia  CV:  No pain, palpitations, hypo/hypertension  Resp:  No dyspnea, cough, tachypnea, wheezing  GI:  No pain, No nausea, No vomiting, No diarrhea, No constipation, No weight loss, No fever, No pruritis, No rectal bleeding, No tarry stools, No dysphagia,  :  No pain, bleeding, incontinence, nocturia  Muscle:  No pain, weakness  Neuro:  No weakness, tingling, memory problems  Psych:  No fatigue, insomnia, mood problems, depression  Endocrine:  No polyuria, polydipsia, cold/heat intolerance  Heme:  No petechiae, ecchymosis, easy bruisability  Skin:  No rash, tattoos, scars, edema      PHYSICAL EXAM:   Vital Signs:  Vital Signs Last 24 Hrs  T(C): 36.6 (17 Dec 2024 04:52), Max: 36.6 (17 Dec 2024 04:52)  T(F): 97.9 (17 Dec 2024 04:52), Max: 97.9 (17 Dec 2024 04:52)  HR: 60 (17 Dec 2024 04:52) (60 - 88)  BP: 145/80 (17 Dec 2024 04:52) (121/61 - 145/80)  BP(mean): --  RR: 18 (17 Dec 2024 04:52) (18 - 18)  SpO2: 100% (17 Dec 2024 04:52) (94% - 100%)    Parameters below as of 17 Dec 2024 04:52  Patient On (Oxygen Delivery Method): room air      Daily     Daily     GENERAL:  Appears stated age,   HEENT:  NC/AT,    CHEST:  Full & symmetric excursion,   HEART:  Regular rhythm,  ABDOMEN:  Soft, non-tender, non-distended,  EXTEREMITIES:  no cyanosis  SKIN:  No rash  NEURO:  Alert,       LABS:                        13.5   7.44  )-----------( 222      ( 17 Dec 2024 07:16 )             42.9     12-17    139  |  93[L]  |  34[H]  ----------------------------<  54[LL]  4.6   |  26  |  0.82    Ca    10.0      17 Dec 2024 07:13    TPro  7.5  /  Alb  3.8  /  TBili  0.5  /  DBili  x   /  AST  21  /  ALT  11  /  AlkPhos  68  12-16    PT/INR - ( 15 Dec 2024 23:45 )   PT: 23.1 sec;   INR: 2.02 ratio         PTT - ( 15 Dec 2024 23:45 )  PTT:38.1 sec  Urinalysis Basic - ( 17 Dec 2024 07:13 )    Color: x / Appearance: x / SG: x / pH: x  Gluc: 54 mg/dL / Ketone: x  / Bili: x / Urobili: x   Blood: x / Protein: x / Nitrite: x   Leuk Esterase: x / RBC: x / WBC x   Sq Epi: x / Non Sq Epi: x / Bacteria: x        RADIOLOGY & ADDITIONAL TESTS:

## 2024-12-17 NOTE — PROGRESS NOTE ADULT - SUBJECTIVE AND OBJECTIVE BOX
Date of Service: 12-17-24 @ 06:20           CARDIOLOGY     PROGRESS  NOTE   ________________________________________________    CHIEF COMPLAINT:Patient is a 89y old  Female who presents with a chief complaint of ischemic foot (16 Dec 2024 23:04)  no complain  	  REVIEW OF SYSTEMS:  CONSTITUTIONAL: No fever, weight loss, or fatigue  EYES: No eye pain, visual disturbances, or discharge  ENT:  No difficulty hearing, tinnitus, vertigo; No sinus or throat pain  NECK: No pain or stiffness  RESPIRATORY: No cough, wheezing, chills or hemoptysis; No Shortness of Breath  CARDIOVASCULAR: No chest pain, palpitations, passing out, dizziness, or leg swelling  GASTROINTESTINAL: No abdominal or epigastric pain. No nausea, vomiting, or hematemesis; No diarrhea or constipation. No melena or hematochezia.  GENITOURINARY: No dysuria, frequency, hematuria, or incontinence  NEUROLOGICAL: No headaches, memory loss, loss of strength, numbness, or tremors  SKIN: No itching, burning, rashes, or lesions   LYMPH Nodes: No enlarged glands  ENDOCRINE: No heat or cold intolerance; No hair loss  MUSCULOSKELETAL: No joint pain or swelling; No muscle, back, or extremity pain  PSYCHIATRIC: No depression, anxiety, mood swings, or difficulty sleeping  HEME/LYMPH: No easy bruising, or bleeding gums  ALLERGY AND IMMUNOLOGIC: No hives or eczema	    [x ] All others negative	  [ ] Unable to obtain    PHYSICAL EXAM:  T(C): 36.6 (12-17-24 @ 04:52), Max: 36.6 (12-17-24 @ 04:52)  HR: 60 (12-17-24 @ 04:52) (60 - 88)  BP: 145/80 (12-17-24 @ 04:52) (121/61 - 145/80)  RR: 18 (12-17-24 @ 04:52) (18 - 18)  SpO2: 100% (12-17-24 @ 04:52) (94% - 100%)  Wt(kg): --  I&O's Summary      Appearance: Normal	  HEENT:   Normal oral mucosa, PERRL, EOMI	  Lymphatic: No lymphadenopathy  Cardiovascular: Normal S1 S2, No JVD,+murmurs, No edema  Respiratory:rhonchi  Psychiatry: A & O x 3, Mood & affect appropriate  Gastrointestinal:  Soft, Non-tender, + BS	  Skin: No rashes, No ecchymoses, No cyanosis	  Neurologic: Non-focal  Extremities: distal foot cold to touch  Vascular: + pvd    MEDICATIONS  (STANDING):  aspirin enteric coated 81 milliGRAM(s) Oral daily  cloNIDine 0.1 milliGRAM(s) Oral two times a day  enalapril 20 milliGRAM(s) Oral daily  heparin   Injectable 5000 Unit(s) SubCutaneous every 8 hours  influenza  Vaccine (HIGH DOSE) 0.5 milliLiter(s) IntraMuscular once  metoprolol succinate ER 50 milliGRAM(s) Oral daily      TELEMETRY: 	    ECG:  	  RADIOLOGY:  OTHER: 	  	  LABS:	 	    CARDIAC MARKERS:                                13.2   6.25  )-----------( 199      ( 16 Dec 2024 06:52 )             41.7     12-16    139  |  96  |  18  ----------------------------<  69[L]  4.4   |  29  |  0.62    Ca    10.0      16 Dec 2024 06:52    TPro  7.5  /  Alb  3.8  /  TBili  0.5  /  DBili  x   /  AST  21  /  ALT  11  /  AlkPhos  68  12-16    proBNP:   Lipid Profile:   HgA1c:   TSH:   PT/INR - ( 15 Dec 2024 23:45 )   PT: 23.1 sec;   INR: 2.02 ratio         PTT - ( 15 Dec 2024 23:45 )  PTT:38.1 sec  #Diarrhea   #H/o diverticulitis   #Distended GB  #R 1st toe erythema      Overall,  89 y/o F w/ PMHx of AF, s/p watchman, no longer on AC, pulmonary HTN, h/o diverticulitis presenting w/ diarrhea, found to have "cold feet", seen by vascular with doppler DP/PT signals pending arterial US.   Pt afebrile, w/o leukocytosis, diarrhea now resolved. Was on unknown antibiotic for about a week for possible R toe cellulitis. On exam, very mild erythema on tip of 1st toe, not warm to touch, does not appear consistent with cellulitis. Would monitor off antibiotics at this time.    Recommend:   1. D/c Unasyn, low c/f for R 1st toe cellulitis, CT A/P w/o diverticulitis   2. Consider RUQ US given distended GB, however afebrile, no leukocytosis, no transaminitis and no RUQ pain on exam   3. If has diarrhea, can send GI PCR. Given no leukocytosis and low amounts of diarrhea (3 times a day), low concern for C diff    < from: VA Duplex Lower Extrem Arterial, Bilat (12.16.24 @ 12:05) >  There is a flow-limiting stenosis approaching 50% affecting the right   superficial femoral artery in the mid thigh.    A tardus parvus pattern of flow through the right deep femoral artery   suggests a flow-limiting stenosis of this artery as well.    No flow-limiting stenoses are identified on the left.      < from: 12 Lead ECG (08.29.24 @ 10:20) >  Diagnosis Line ATRIAL FIBRILLATION  RIGHTWARD AXIS  CANNOT RULE OUT ANTERIOR INFARCT , AGE UNDETERMINED  PROLONGED QT  ABNORMAL ECG  WHEN COMPARED WITH ECG OF 17-JUL-2024 07:59,  SIGNIFICANT CHANGES HAVE OCCURRED    < from: US Abdomen Upper Quadrant Right (08.29.24 @ 16:34) >  1.  The gallbladder is mildly distended and contains a small amount of   layering sludge. No gallstones are seen. There is diffuse gallbladder   wall thickening and edema and a small amount of pericholecystic fluid. A   sonographic Leonardo sign was not elicited. Gallbladder wall edema and   pericholecystic fluid are nonspecific and may be reactive in the setting   of liver and/or cardiac disease. Clinical correlation is recommended. If   acute cholecystitis remains of clinical concern, HIDA scan could be   performed for further assessment.  2. Distention of the hepatic veins and IVC and pulsatile flow in the   portal vein. These findings suggest passive congestion in the setting of   cardiac disease. Suggest clinical correlation.  3. Small right pleural effusion.    < from: TTE Echo Complete w/ Contrast w/ Doppler (07.09.23 @ 10:34) >   1. Normal left ventricular internal cavity size.   2. Normal global left ventricular systolic function.   3. Left ventricular ejection fraction, by visual estimation, is 55 to   60%.   4. Moderate to severe left atrial enlargement.   5. Moderate to severe right atrial enlargement.   6. The mitral in-flow pattern reveals no discernable A-wave, therefore   no comment on diastolic function can be made.   7. Mild mitral annular calcification.   8. Mild thickening of the anterior and posterior mitral valve leaflets.   9. Moderate mitral valve regurgitation.  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  11. Mild-moderate tricuspid regurgitation.  12. Sclerotic aortic valve with normal opening.  13. Mild to moderate aortic regurgitation.  14. Mild to moderate pulmonic valve regurgitation.  15. Estimated pulmonary artery systolic pressure is 51.6 mmHg assuming a   right atrial pressure of 8 mmHg, which is consistent with moderate   pulmonary hypertension.  16. There is no evidence of pericardial effusion.        Assessment and plan  ---------------------------  89-year-old female history of A-fib, no longer on AC s/p watchman, Pulm HTN, HTN, recent admission from 7/17-7/25 for diverticulitis, comes to ED for persistent watery nonbloody diarrhea X24hrs. Denies vomiting, no fevers, no abdominal pain, no chest pain, no sob. Pt states she currently is on abxs for toe cellulitis does not remember the name and it caused her diarrhea.  pt with sig medical and cardiac hx with hx of a.fib on no AC sec to s/p watchman  with c/o diarrhea ?nausea an bl cold feet and discoloration on the R Toe.  r/o pvd/ emboli  start iv heparin if vascular agreed  check hr  sec to a.fib, no ac s/p watchman procedure  check arterial Doppler of LE noted, need vascular follow up and recommendation  lipid panel  continue all bp meds  awaiting gall bladder ultrasound, abx dc ed by ID  TTE

## 2024-12-18 DIAGNOSIS — I07.1 RHEUMATIC TRICUSPID INSUFFICIENCY: ICD-10-CM

## 2024-12-18 LAB
A1C WITH ESTIMATED AVERAGE GLUCOSE RESULT: 6.1 % — HIGH (ref 4–5.6)
ANION GAP SERPL CALC-SCNC: 12 MMOL/L — SIGNIFICANT CHANGE UP (ref 5–17)
BUN SERPL-MCNC: 25 MG/DL — HIGH (ref 7–23)
CALCIUM SERPL-MCNC: 9.6 MG/DL — SIGNIFICANT CHANGE UP (ref 8.4–10.5)
CHLORIDE SERPL-SCNC: 97 MMOL/L — SIGNIFICANT CHANGE UP (ref 96–108)
CO2 SERPL-SCNC: 29 MMOL/L — SIGNIFICANT CHANGE UP (ref 22–31)
CREAT SERPL-MCNC: 0.61 MG/DL — SIGNIFICANT CHANGE UP (ref 0.5–1.3)
EGFR: 84 ML/MIN/1.73M2 — SIGNIFICANT CHANGE UP
ESTIMATED AVERAGE GLUCOSE: 128 MG/DL — HIGH (ref 68–114)
GLUCOSE BLDC GLUCOMTR-MCNC: 124 MG/DL — HIGH (ref 70–99)
GLUCOSE BLDC GLUCOMTR-MCNC: 128 MG/DL — HIGH (ref 70–99)
GLUCOSE BLDC GLUCOMTR-MCNC: 70 MG/DL — SIGNIFICANT CHANGE UP (ref 70–99)
GLUCOSE BLDC GLUCOMTR-MCNC: 74 MG/DL — SIGNIFICANT CHANGE UP (ref 70–99)
GLUCOSE SERPL-MCNC: 70 MG/DL — SIGNIFICANT CHANGE UP (ref 70–99)
POTASSIUM SERPL-MCNC: 4.6 MMOL/L — SIGNIFICANT CHANGE UP (ref 3.5–5.3)
POTASSIUM SERPL-SCNC: 4.6 MMOL/L — SIGNIFICANT CHANGE UP (ref 3.5–5.3)
SODIUM SERPL-SCNC: 138 MMOL/L — SIGNIFICANT CHANGE UP (ref 135–145)

## 2024-12-18 PROCEDURE — 99232 SBSQ HOSP IP/OBS MODERATE 35: CPT

## 2024-12-18 PROCEDURE — 99223 1ST HOSP IP/OBS HIGH 75: CPT

## 2024-12-18 PROCEDURE — 71045 X-RAY EXAM CHEST 1 VIEW: CPT | Mod: 26

## 2024-12-18 PROCEDURE — 78582 LUNG VENTILAT&PERFUS IMAGING: CPT | Mod: 26

## 2024-12-18 RX ORDER — ONDANSETRON 4 MG/1
4 TABLET ORAL EVERY 6 HOURS
Refills: 0 | Status: DISCONTINUED | OUTPATIENT
Start: 2024-12-18 | End: 2024-12-24

## 2024-12-18 RX ADMIN — Medication 5 MILLIGRAM(S): at 12:02

## 2024-12-18 RX ADMIN — CLONIDINE HYDROCHLORIDE 0.1 MILLIGRAM(S): 0.2 TABLET ORAL at 06:25

## 2024-12-18 RX ADMIN — ENALAPRIL MALEATE 20 MILLIGRAM(S): 10 TABLET ORAL at 06:17

## 2024-12-18 RX ADMIN — Medication 50 MILLIGRAM(S): at 06:17

## 2024-12-18 RX ADMIN — ONDANSETRON 4 MILLIGRAM(S): 4 TABLET ORAL at 09:05

## 2024-12-18 RX ADMIN — HEPARIN SODIUM 5000 UNIT(S): 1000 INJECTION, SOLUTION INTRAVENOUS; SUBCUTANEOUS at 13:45

## 2024-12-18 RX ADMIN — CLONIDINE HYDROCHLORIDE 0.1 MILLIGRAM(S): 0.2 TABLET ORAL at 17:51

## 2024-12-18 RX ADMIN — HEPARIN SODIUM 5000 UNIT(S): 1000 INJECTION, SOLUTION INTRAVENOUS; SUBCUTANEOUS at 21:08

## 2024-12-18 RX ADMIN — Medication 81 MILLIGRAM(S): at 12:01

## 2024-12-18 NOTE — PROGRESS NOTE ADULT - SUBJECTIVE AND OBJECTIVE BOX
INTERVAL EVENTS: No acute events overnight.  SUBJECTIVE: Patient seen and examined at bedside with surgical team, patient without complaints. Denies fever, chills, CP, SOB nausea, vomiting, abdominal pain.    OBJECTIVE:    Vital Signs Last 24 Hrs  T(C): 36.3 (18 Dec 2024 14:54), Max: 36.8 (17 Dec 2024 20:01)  T(F): 97.4 (18 Dec 2024 14:54), Max: 98.2 (17 Dec 2024 20:01)  HR: 95 (18 Dec 2024 14:54) (60 - 95)  BP: 154/82 (18 Dec 2024 14:54) (135/60 - 173/81)  BP(mean): --  RR: 18 (18 Dec 2024 14:54) (18 - 18)  SpO2: 100% (18 Dec 2024 14:54) (95% - 100%)    Parameters below as of 18 Dec 2024 00:38  Patient On (Oxygen Delivery Method): room air    I&O's Detail    17 Dec 2024 07:01  -  18 Dec 2024 07:00  --------------------------------------------------------  IN:    Oral Fluid: 300 mL  Total IN: 300 mL    OUT:    Voided (mL): 400 mL    Voided (mL): 500 mL  Total OUT: 900 mL    Total NET: -600 mL      18 Dec 2024 07:01  -  18 Dec 2024 18:50  --------------------------------------------------------  IN:    Oral Fluid: 460 mL  Total IN: 460 mL    OUT:    Voided (mL): 850 mL  Total OUT: 850 mL    Total NET: -390 mL      MEDICATIONS  (STANDING):  amLODIPine   Tablet 5 milliGRAM(s) Oral daily  aspirin enteric coated 81 milliGRAM(s) Oral daily  cloNIDine 0.1 milliGRAM(s) Oral two times a day  dextrose 5% + sodium chloride 0.45%. 500 milliLiter(s) (50 mL/Hr) IV Continuous <Continuous>  enalapril 20 milliGRAM(s) Oral daily  heparin   Injectable 5000 Unit(s) SubCutaneous every 8 hours  influenza  Vaccine (HIGH DOSE) 0.5 milliLiter(s) IntraMuscular once  metoprolol succinate ER 50 milliGRAM(s) Oral daily    MEDICATIONS  (PRN):  ondansetron Injectable 4 milliGRAM(s) IV Push every 6 hours PRN Nausea and/or Vomiting      PHYSICAL EXAM:  Constitutional: A&Ox3, NAD  Respiratory: Unlabored breathing  Abdomen: Soft, nondistended, NTTP. No rebound or guarding.  Extremities:  feet WWP b/l w/ capillary refill <2 sec, chronic venous stasis changes with some skin breakdown under bandage on R shin. R first toe with erythema at tip. Motor and sensory intact b/l. Pulses difficult to assess due to swelling. DP/PT signals present b/l. No pain with movement.   LABS:                        13.5   7.44  )-----------( 222      ( 17 Dec 2024 07:16 )             42.9     12-18    138  |  97  |  25[H]  ----------------------------<  70  4.6   |  29  |  0.61    Ca    9.6      18 Dec 2024 07:42          Urinalysis Basic - ( 18 Dec 2024 07:42 )    Color: x / Appearance: x / SG: x / pH: x  Gluc: 70 mg/dL / Ketone: x  / Bili: x / Urobili: x   Blood: x / Protein: x / Nitrite: x   Leuk Esterase: x / RBC: x / WBC x   Sq Epi: x / Non Sq Epi: x / Bacteria: x

## 2024-12-18 NOTE — CONSULT NOTE ADULT - PROBLEM SELECTOR RECOMMENDATION 9
Patient with TTE revealing severe TR, however she is asymptomatic. She should continue her current GDMT, and will follow up with us in 1 month post discharge.

## 2024-12-18 NOTE — PROGRESS NOTE ADULT - ASSESSMENT
89-year-old female history of A-fib, no longer on AC s/p watchman, Pulm HTN, HTN, recent admission from 7/17-7/25 for diverticulitis, comes to ED for persistent watery nonbloody diarrhea X24hrs. Denies vomiting, no fevers, no abdominal pain, no chest pain, no sob. Pt bumped toe last week and currently is on abxs for first toe cellulitis (does not remember the name) and it caused her diarrhea. Patient received vascular ultrasound this past week and was told it was normal. Vascular surgery consulted due to feet feeling cold.     Plan:   - Patient with DP/PT signals b/l with motor and sensory intact in foot, no pain  - Low concern for acute limb ischemia   - arterial US lower extremity reviewed   - patient has asymptomatic PAD- can follow up as outpatient.   - will sign off at this time, please reconsult when needed.         Vascular Surgery, [id number]

## 2024-12-18 NOTE — PROGRESS NOTE ADULT - SUBJECTIVE AND OBJECTIVE BOX
Infectious Diseases Follow Up:    Patient is a 89y old  Female who presents with a chief complaint of ischemic foot (18 Dec 2024 09:47)      Interval History/ROS:    Allergies  Lipitor (Other; Flushing)  hydrALAZINE (Other; Flushing)        ANTIMICROBIALS:      Current Abx:     Previous Abx     OTHER MEDS:  MEDICATIONS  (STANDING):  amLODIPine   Tablet 5 daily  aspirin enteric coated 81 daily  cloNIDine 0.1 two times a day  enalapril 20 daily  heparin   Injectable 5000 every 8 hours  influenza  Vaccine (HIGH DOSE) 0.5 once  metoprolol succinate ER 50 daily  ondansetron Injectable 4 every 6 hours PRN      Vital Signs Last 24 Hrs  T(C): 36.4 (18 Dec 2024 05:59), Max: 36.8 (17 Dec 2024 20:01)  T(F): 97.6 (18 Dec 2024 05:59), Max: 98.2 (17 Dec 2024 20:01)  HR: 60 (18 Dec 2024 05:59) (60 - 96)  BP: 173/81 (18 Dec 2024 05:59) (135/60 - 173/81)  BP(mean): --  RR: 18 (18 Dec 2024 05:59) (18 - 18)  SpO2: 100% (18 Dec 2024 05:59) (95% - 100%)    Parameters below as of 18 Dec 2024 00:38  Patient On (Oxygen Delivery Method): room air        PHYSICAL EXAM:  GENERAL: NAD, well-developed  HEAD:  Atraumatic, Normocephalic  EYES: EOMI, PERRLA, conjunctiva and sclera clear  NECK: Supple, No JVD  CHEST/LUNG: Clear to auscultation bilaterally; No wheeze  HEART: Regular rate and rhythm; No murmurs, rubs, or gallops  ABDOMEN: Soft, Nontender, Nondistended; Bowel sounds present  EXTREMITIES:  2+ Peripheral Pulses, No clubbing, cyanosis, or edema  PSYCH: AAOx3  NEUROLOGY: non-focal  SKIN: No rashes or lesions                          13.5   7.44  )-----------( 222      ( 17 Dec 2024 07:16 )             42.9       12-18    138  |  97  |  25[H]  ----------------------------<  70  4.6   |  29  |  0.61    Ca    9.6      18 Dec 2024 07:42        Urinalysis Basic - ( 18 Dec 2024 07:42 )    Color: x / Appearance: x / SG: x / pH: x  Gluc: 70 mg/dL / Ketone: x  / Bili: x / Urobili: x   Blood: x / Protein: x / Nitrite: x   Leuk Esterase: x / RBC: x / WBC x   Sq Epi: x / Non Sq Epi: x / Bacteria: x        MICROBIOLOGY:  v            RADIOLOGY:  < from: US Abdomen Upper Quadrant Right (12.17.24 @ 09:44) >    IMPRESSION:  *  A few non-shadowing echogenic foci within the gallbladder measuring up   to 8 mm, possibly sludge or polyps, with gallstones also considered.  *  The gallbladder is mildly distended. Otherwise, no definite   sonographic evidence of acute cholecystitis. Consider HIDA scan as   warranted.  *  Right pleural effusion.    < end of copied text >       Infectious Diseases Follow Up:    Patient is a 89y old  Female who presents with a chief complaint of ischemic foot (18 Dec 2024 09:47)      Interval History/ROS:  Feeling well, nausea improved, No abdominal pain     Allergies  Lipitor (Other; Flushing)  hydrALAZINE (Other; Flushing)        ANTIMICROBIALS:      Current Abx:     Previous Abx     OTHER MEDS:  MEDICATIONS  (STANDING):  amLODIPine   Tablet 5 daily  aspirin enteric coated 81 daily  cloNIDine 0.1 two times a day  enalapril 20 daily  heparin   Injectable 5000 every 8 hours  influenza  Vaccine (HIGH DOSE) 0.5 once  metoprolol succinate ER 50 daily  ondansetron Injectable 4 every 6 hours PRN      Vital Signs Last 24 Hrs  T(C): 36.4 (18 Dec 2024 05:59), Max: 36.8 (17 Dec 2024 20:01)  T(F): 97.6 (18 Dec 2024 05:59), Max: 98.2 (17 Dec 2024 20:01)  HR: 60 (18 Dec 2024 05:59) (60 - 96)  BP: 173/81 (18 Dec 2024 05:59) (135/60 - 173/81)  BP(mean): --  RR: 18 (18 Dec 2024 05:59) (18 - 18)  SpO2: 100% (18 Dec 2024 05:59) (95% - 100%)    Parameters below as of 18 Dec 2024 00:38  Patient On (Oxygen Delivery Method): room air        PHYSICAL EXAM:  GENERAL: NAD, well-developed  HEAD:  Atraumatic, Normocephalic  EYES: EOMI,  conjunctiva and sclera clear  CHEST/LUNG: Clear to auscultation bilaterally; No wheeze  HEART: Regular rate and rhythm; No murmurs, rubs, or gallops  ABDOMEN: Soft, Nontender, Nondistended; Bowel sounds present  PSYCH: AAOx3                            13.5   7.44  )-----------( 222      ( 17 Dec 2024 07:16 )             42.9       12-18    138  |  97  |  25[H]  ----------------------------<  70  4.6   |  29  |  0.61    Ca    9.6      18 Dec 2024 07:42        Urinalysis Basic - ( 18 Dec 2024 07:42 )    Color: x / Appearance: x / SG: x / pH: x  Gluc: 70 mg/dL / Ketone: x  / Bili: x / Urobili: x   Blood: x / Protein: x / Nitrite: x   Leuk Esterase: x / RBC: x / WBC x   Sq Epi: x / Non Sq Epi: x / Bacteria: x        MICROBIOLOGY:  v            RADIOLOGY:  < from: US Abdomen Upper Quadrant Right (12.17.24 @ 09:44) >    IMPRESSION:  *  A few non-shadowing echogenic foci within the gallbladder measuring up   to 8 mm, possibly sludge or polyps, with gallstones also considered.  *  The gallbladder is mildly distended. Otherwise, no definite   sonographic evidence of acute cholecystitis. Consider HIDA scan as   warranted.  *  Right pleural effusion.    < end of copied text >

## 2024-12-18 NOTE — PROGRESS NOTE ADULT - SUBJECTIVE AND OBJECTIVE BOX
[facility]  Kal Gamez PA-C  [address] [id number] [phone]      INTERVAL HPI/OVERNIGHT EVENTS: no acute events, RUQ U/S with distended GB, likely shadowing stones    MEDICATIONS  (STANDING):  aspirin enteric coated 81 milliGRAM(s) Oral daily  cloNIDine 0.1 milliGRAM(s) Oral two times a day  dextrose 5% + sodium chloride 0.45%. 500 milliLiter(s) (50 mL/Hr) IV Continuous <Continuous>  enalapril 20 milliGRAM(s) Oral daily  heparin   Injectable 5000 Unit(s) SubCutaneous every 8 hours  influenza  Vaccine (HIGH DOSE) 0.5 milliLiter(s) IntraMuscular once  metoprolol succinate ER 50 milliGRAM(s) Oral daily    MEDICATIONS  (PRN):      Allergies    Lipitor (Other; Flushing)  hydrALAZINE (Other; Flushing)    Intolerances    lactose (Unknown)      ROS:   General:  No  fevers, chills, night sweats, fatigue,   Eyes:  Good vision, no reported pain  ENT:  No sore throat, pain, runny nose, dysphagia  CV:  No pain, palpitations, hypo/hypertension  Resp:  No dyspnea, cough, tachypnea, wheezing  GI:  No pain, No nausea, No vomiting, No diarrhea, No constipation, No weight loss, No fever, No pruritis, No rectal bleeding, No tarry stools, No dysphagia,  :  No pain, bleeding, incontinence, nocturia  Muscle:  No pain, weakness  Neuro:  No weakness, tingling, memory problems  Psych:  No fatigue, insomnia, mood problems, depression  Endocrine:  No polyuria, polydipsia, cold/heat intolerance  Heme:  No petechiae, ecchymosis, easy bruisability  Skin:  No rash, tattoos, scars, edema      PHYSICAL EXAM:   Vital Signs:  Vital Signs Last 24 Hrs  T(C): 36.6 (17 Dec 2024 04:52), Max: 36.6 (17 Dec 2024 04:52)  T(F): 97.9 (17 Dec 2024 04:52), Max: 97.9 (17 Dec 2024 04:52)  HR: 60 (17 Dec 2024 04:52) (60 - 88)  BP: 145/80 (17 Dec 2024 04:52) (121/61 - 145/80)  BP(mean): --  RR: 18 (17 Dec 2024 04:52) (18 - 18)  SpO2: 100% (17 Dec 2024 04:52) (94% - 100%)    Parameters below as of 17 Dec 2024 04:52  Patient On (Oxygen Delivery Method): room air      Daily     Daily     GENERAL:  Appears stated age,   HEENT:  NC/AT,    CHEST:  Full & symmetric excursion,   HEART:  Regular rhythm,  ABDOMEN:  Soft, non-tender, non-distended,  EXTEREMITIES:  no cyanosis  SKIN:  No rash  NEURO:  Alert,       LABS:                        13.5   7.44  )-----------( 222      ( 17 Dec 2024 07:16 )             42.9     12-17    139  |  93[L]  |  34[H]  ----------------------------<  54[LL]  4.6   |  26  |  0.82    Ca    10.0      17 Dec 2024 07:13    TPro  7.5  /  Alb  3.8  /  TBili  0.5  /  DBili  x   /  AST  21  /  ALT  11  /  AlkPhos  68  12-16    PT/INR - ( 15 Dec 2024 23:45 )   PT: 23.1 sec;   INR: 2.02 ratio         PTT - ( 15 Dec 2024 23:45 )  PTT:38.1 sec  Urinalysis Basic - ( 17 Dec 2024 07:13 )    Color: x / Appearance: x / SG: x / pH: x  Gluc: 54 mg/dL / Ketone: x  / Bili: x / Urobili: x   Blood: x / Protein: x / Nitrite: x   Leuk Esterase: x / RBC: x / WBC x   Sq Epi: x / Non Sq Epi: x / Bacteria: x        RADIOLOGY & ADDITIONAL TESTS:

## 2024-12-18 NOTE — PHYSICAL THERAPY INITIAL EVALUATION ADULT - NSPTDISCHREC_GEN_A_CORE
Pt is dependent at baseline. Pt is bedbound/ W/C bound at baseline. Mechanical lift device is used for bed to chair transfer. Pt/No skilled PT needs

## 2024-12-18 NOTE — PROGRESS NOTE ADULT - ASSESSMENT
This is a 89 y/o F w/ PMHx of AF, s/p watchman, no longer on AC, pulmonary HTN, h/o diverticulitis, who presentes to ER for watery, nonbloody diarrhea.   Pt was on antibiotics outpatient for toe cellulitis.     In the ER, pt was afebrile, vitals wnl.   Labs w/o leukocytosis, lactate wnl, No transaminitis.   CT A/P w/ possible ileus, no diverticulitis, with extensive diverticulosis. Gallbladder distension     #Diarrhea   #H/o diverticulitis   #Distended GB  #R 1st toe erythema      Overall,  89 y/o F w/ PMHx of AF, s/p watchman, no longer on AC, pulmonary HTN, h/o diverticulitis presenting w/ diarrhea, found to have "cold feet", seen by vascular with doppler DP/PT signals pending arterial US.   Pt afebrile, w/o leukocytosis, diarrhea now resolved. Was on unknown antibiotic for about a week for possible R toe cellulitis. On exam, very mild erythema on tip of 1st toe, not warm to touch, does not appear consistent with cellulitis. Would monitor off antibiotics at this time.    Recommend:   1. Low c/f for R 1st toe cellulitis, CT A/P w/o diverticulitis   2. RUQ with mildly distended GB, HIDA pending   3. If has diarrhea, can send GI PCR. Given no leukocytosis and low amounts of diarrhea (3 times a day), low concern for C diff    NOTE IS INCOMPLETE      This is a 89 y/o F w/ PMHx of AF, s/p watchman, no longer on AC, pulmonary HTN, h/o diverticulitis, who presentes to ER for watery, nonbloody diarrhea.   Pt was on antibiotics outpatient for toe cellulitis.     In the ER, pt was afebrile, vitals wnl.   Labs w/o leukocytosis, lactate wnl, No transaminitis.   CT A/P w/ possible ileus, no diverticulitis, with extensive diverticulosis. Gallbladder distension     #Diarrhea   #H/o diverticulitis   #Distended GB  #R 1st toe erythema      Overall,  89 y/o F w/ PMHx of AF, s/p watchman, no longer on AC, pulmonary HTN, h/o diverticulitis presenting w/ diarrhea, found to have "cold feet", seen by vascular with doppler DP/PT signals pending arterial US.   Pt afebrile, w/o leukocytosis, diarrhea now resolved. Was on unknown antibiotic for about a week for possible R toe cellulitis. On exam, very mild erythema on tip of 1st toe, not warm to touch, does not appear consistent with cellulitis. Would monitor off antibiotics at this time.  V/Q scan low probability for PE    Recommend:   1. Low c/f for R 1st toe cellulitis, CT A/P w/o diverticulitis. Monitor off abx  2. RUQ with mildly distended GB, HIDA pending   3. If has diarrhea, can send GI PCR. Given no leukocytosis and low amounts of diarrhea (3 times a day), low concern for C diff    Thank you for this consult. Inpatient ID team will follow.    Mason Jaime M.D.  Attending Physician  Division of Infectious Diseases  Department of Medicine    Please contact through [facility].  Office: [phone] (after 5 PM or weekend)

## 2024-12-18 NOTE — CONSULT NOTE ADULT - SUBJECTIVE AND OBJECTIVE BOX
Surgery Consult Note  Attending: MD Gregory  Service: Vascular Surgery    HPI: 89-year-old female history of A-fib, no longer on AC s/p watchman, Pulm HTN, HTN, recent admission from 7/17-7/25 for diverticulitis, comes to ED for persistent watery nonbloody diarrhea X24hrs. Denies vomiting, no fevers, no abdominal pain, no chest pain, no sob. Pt bumped toe last week and currently is on abxs for toe cellulitis (does not remember the name) and it caused her diarrhea. Patient received vascular ultrasound this past week and was told it was normal. Surgery consulted due to feet feeling cold.     PAST MEDICAL HISTORY:  PAST MEDICAL & SURGICAL HISTORY:  Afib      HTN (hypertension)      H/O CHF      Uterine cancer      S/P hysterectomy          ALLERGIES:  Allergies    Lipitor (Other; Flushing)  hydrALAZINE (Other; Flushing)    Intolerances    lactose (Unknown)      SOCIAL HISTORY: Negative for tobacco, etoh, or drug use    FAMILY HISTORY:  FAMILY HISTORY:      PHYSICAL EXAM:  General: NAD, resting comfortably   HEENT: NC/AT  Pulmonary: normal resp effort  Cardiovascular: NSR  Abdominal: soft, ND/NT  Extremities: feet cool b/l. chronic venous stasis changes with some skin breakdown under bandage on R shin. R first toe with erythema at tip. Motor and sensory intact b/l. Pulses difficult to assess due to swelling. DP/PT signals present b/l. No pain with movement.   Neuro: A/O x 3    Palpable    VITAL SIGNS:  Vital Signs Last 24 Hrs  T(C): 36.7 (15 Dec 2024 14:46), Max: 36.7 (15 Dec 2024 14:46)  T(F): 98 (15 Dec 2024 14:46), Max: 98 (15 Dec 2024 14:46)  HR: 82 (15 Dec 2024 14:46) (82 - 82)  BP: 125/74 (15 Dec 2024 14:46) (125/74 - 125/74)  BP(mean): --  RR: 16 (15 Dec 2024 14:46) (16 - 16)  SpO2: 98% (15 Dec 2024 14:46) (98% - 98%)    Parameters below as of 15 Dec 2024 14:46  Patient On (Oxygen Delivery Method): room air        I&O's Summary      LABS:                        13.0   6.93  )-----------( 213      ( 15 Dec 2024 15:49 )             40.2     12-15    138  |  97  |  22  ----------------------------<  94  5.0   |  26  |  0.61    Ca    9.9      15 Dec 2024 15:49    TPro  7.8  /  Alb  4.0  /  TBili  0.4  /  DBili  x   /  AST  21  /  ALT  10  /  AlkPhos  72  12-15      Urinalysis Basic - ( 15 Dec 2024 15:49 )    Color: x / Appearance: x / SG: x / pH: x  Gluc: 94 mg/dL / Ketone: x  / Bili: x / Urobili: x   Blood: x / Protein: x / Nitrite: x   Leuk Esterase: x / RBC: x / WBC x   Sq Epi: x / Non Sq Epi: x / Bacteria: x      CAPILLARY BLOOD GLUCOSE        LIVER FUNCTIONS - ( 15 Dec 2024 15:49 )  Alb: 4.0 g/dL / Pro: 7.8 g/dL / ALK PHOS: 72 U/L / ALT: 10 U/L / AST: 21 U/L / GGT: x             CULTURES:      RADIOLOGY & ADDITIONAL STUDIES:                
      ---___---___---___---___---___---___ ---___---___---___---___---___---___---___---___---                  M E D I C A L   A T T E N D I N G    C O N S U L T A T I O N   N O T E  ---___---___---___---___---___---___ ---___---___---___---___---___---___---___---___---      ================================================    ++CHIEF COMPLAINT:   Patient is a 89y old  Female who presents with a chief complaint of ischemic foot (16 Dec 2024 15:52)      ++HPI:  HPI:  Date of Service, 12-15-24 @ 20:52  CHIEF COMPLAINT:Patient is a 89y old  Female who presents with a chief complaint of     HPI:  89-year-old female history of A-fib, no longer on AC s/p watchman, Pulm HTN, HTN, recent admission from - for diverticulitis, comes to ED for persistent watery nonbloody diarrhea X24hrs. Denies vomiting, no fevers, no abdominal pain, no chest pain, no sob. Pt states she currently is on abxs for toe cellulitis does not remember the name and it caused her diarrhea.    PAST MEDICAL & SURGICAL HISTORY:  Afib  HTN (hypertension)  H/O CHF  Uterine cancer  S/P hysterectomy      MEDICATIONS  (STANDING):  ampicillin/sulbactam  IVPB 3 Gram(s) IV Intermittent daily  Daily-Chanel 400mcg tablet: 1 tablet orally once a day  · 	Vitamin A and D topical cream: Apply topically to affected area  · 	docusate sodium 100 mg oral capsule: 3 cap(s) orally once a day (at bedtime) as needed for  constipation  · 	ondansetron 4 mg oral tablet: 1 tab(s) orally every 8 hours as needed for  nausea  · 	simvastatin 20 mg oral tablet: 1 tab(s) orally once a day (at bedtime)  · 	Klor-Con 10 mEq oral tablet, extended release: 1 tab(s) orally 2 times a day  · 	metoprolol succinate 50 mg oral tablet, extended release: 1 tab(s) orally once a day (at bedtime)  · 	furosemide 40 mg oral tablet: 1 tab(s) orally once a day  · 	cloNIDine 0.1 mg oral tablet: 1 tab(s) orally 2 times a day  · 	enalapril 20 mg oral tablet: 1 tab(s) orally once a day    MEDICATIONS  (PRN):      FAMILY HISTORY:      SOCIAL HISTORY:    [x ] Non-smoker  [ ] Smoker  [ ] Alcohol    Allergies    Lipitor (Other; Flushing)  hydrALAZINE (Other; Flushing)    Intolerances    lactose (Unknown)  	    REVIEW OF SYSTEMS:  CONSTITUTIONAL: No fever, weight loss, or fatigue  EYES: No eye pain, visual disturbances, or discharge  ENT:  No difficulty hearing, tinnitus, vertigo; No sinus or throat pain  NECK: No pain or stiffness  RESPIRATORY: No cough, wheezing, chills or hemoptysis; No Shortness of Breath  CARDIOVASCULAR: No chest pain, palpitations, passing out, dizziness, or leg swelling  GASTROINTESTINAL: No abdominal or epigastric pain. No nausea, vomiting, or hematemesis; No diarrhea or constipation. No melena or hematochezia.  GENITOURINARY: No dysuria, frequency, hematuria, or incontinence  NEUROLOGICAL: No headaches, memory loss, loss of strength, numbness, or tremors  SKIN: No itching, burning, rashes, or lesions   LYMPH Nodes: No enlarged glands  ENDOCRINE: No heat or cold intolerance; No hair loss  MUSCULOSKELETAL: No joint pain or swelling; No muscle, back, + foot pain, R toe pain  PSYCHIATRIC: No depression, anxiety, mood swings, or difficulty sleeping  HEME/LYMPH: No easy bruising, or bleeding gums  ALLERGY AND IMMUNOLOGIC: No hives or eczema	    [x ] All others negative	  [ ] Unable to obtain    PHYSICAL EXAM:  T(C): 36.6 (12-15-24 @ 19:43), Max: 36.7 (12-15-24 @ 14:46)  HR: 79 (12-15-24 @ 19:43) (79 - 82)  BP: 177/96 (12-15-24 @ 19:43) (125/74 - 177/96)  RR: 17 (12-15-24 @ 19:43) (16 - 17)  SpO2: 99% (12-15-24 @ 19:43) (98% - 99%)  Wt(kg): --  I&O's Summary      Appearance: Normal	  HEENT:   Normal oral mucosa, PERRL, EOMI	  Lymphatic: No lymphadenopathy  Cardiovascular: Normal S1 S2, No JVD, +murmurs, No edema  Respiratory: Lungs clear to auscultation	  Psychiatry: A & O x 3, Mood & affect appropriate  Gastrointestinal:  Soft, Non-tender, + BS	  Skin: No rashes, No ecchymoses, No cyanosis	  Neurologic: Non-focal  Extremities: Normal range of motion, both feet are cold to touch distally and small discoloration on the R toe  Vascular+ pvd    TELEMETRY: 	    ECG:  	  RADIOLOGY:  OTHER: 	  	  LABS:	 	    CARDIAC MARKERS:                              13.0   6.93  )-----------( 213      ( 15 Dec 2024 15:49 )             40.2     12-15    138  |  97  |  22  ----------------------------<  94  5.0   |  26  |  0.61    Ca    9.9      15 Dec 2024 15:49    TPro  7.8  /  Alb  4.0  /  TBili  0.4  /  DBili  x   /  AST  21  /  ALT  10  /  AlkPhos  72  12-15    proBNP:   Lipid Profile:   HgA1c:   TSH:       PREVIOUS DIAGNOSTIC TESTING:    < from: 12 Lead ECG (24 @ 10:20) >  Diagnosis Line ATRIAL FIBRILLATION  RIGHTWARD AXIS  CANNOT RULE OUT ANTERIOR INFARCT , AGE UNDETERMINED  PROLONGED QT  ABNORMAL ECG  WHEN COMPARED WITH ECG OF 2024 07:59,  SIGNIFICANT CHANGES HAVE OCCURRED    < from: TTE Echo Complete w/ Contrast w/ Doppler (23 @ 10:34) >   1. Normal left ventricular internal cavity size.   2. Normal global left ventricular systolic function.   3. Left ventricular ejection fraction, by visual estimation, is 55 to   60%.   4. Moderate to severe left atrial enlargement.   5. Moderate to severe right atrial enlargement.   6. The mitral in-flow pattern reveals no discernable A-wave, therefore   no comment on diastolic function can be made.   7. Mild mitral annular calcification.   8. Mild thickening of the anterior and posterior mitral valve leaflets.   9. Moderate mitral valve regurgitation.  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  11. Mild-moderate tricuspid regurgitation.  12. Sclerotic aortic valve with normal opening.  13. Mild to moderate aortic regurgitation.  14. Mild to moderate pulmonic valve regurgitation.  15. Estimated pulmonary artery systolic pressure is 51.6 mmHg assuming a   right atrial pressure of 8 mmHg, which is consistent with moderate   pulmonary hypertension.  16. There is no evidence of pericardial effusion.               (15 Dec 2024 20:51)      ---___---___---___---___---___---  PAST MEDICAL / Surgical  HISTORY:  PAST MEDICAL & SURGICAL HISTORY:  Afib      HTN (hypertension)      H/O CHF      Uterine cancer      S/P hysterectomy          ---___---___---___---___---___---  FAMILY HISTORY:   FAMILY HISTORY:      ---___---___---___---___---___---  SOCIAL HISTORY:  Alcohol: None  Smoking: None    ---___---___---___---___---___---  ALLERGIES:   Allergies    Lipitor (Other; Flushing)  hydrALAZINE (Other; Flushing)    Intolerances    lactose (Unknown)      ---___---___---___---___---___---  MEDICATIONS:  MEDICATIONS  (STANDING):  aspirin enteric coated 81 milliGRAM(s) Oral daily  cloNIDine 0.1 milliGRAM(s) Oral two times a day  enalapril 20 milliGRAM(s) Oral daily  heparin   Injectable 5000 Unit(s) SubCutaneous every 8 hours  influenza  Vaccine (HIGH DOSE) 0.5 milliLiter(s) IntraMuscular once  metoprolol succinate ER 50 milliGRAM(s) Oral daily    MEDICATIONS  (PRN):      ---___---___---___---___---___---  REVIEW OF SYSTEM:    GEN: no fever, no chills, no pain  RESP: no SOB, no cough, no sputum  CVS: no chest pain, no palpitations, no edema  GI: no abdominal pain, no nausea, no vomiting, no constipation, no diarrhea  : no dysurea, no frequency, no hematurea  Neuro: no headache, no dizziness  PSYCH: no anxiety, no depression  Derm : no itching, no rash    ---___---___---___---___---___---  VITAL SIGNS:  89y , CAPILLARY BLOOD GLUCOSE          ---___---___---___---___---___---  PHYSICAL EXAM:    GEN: A&O X 3 , NAD , comfortable  HEENT: NCAT, PERRL, MMM, hearing intact  Neck: supple , no JVD  CVS: S1S2 , regular , No M/R/G appreciated  PULM: CTA B/L,  no W/R/R appreciated  ABD.: soft. non tender, non distended,  bowel sounds present  Extrem: intact pulses , no edema   Derm: No rash , no ecchymoses  PSYCH : normal mood,  no delusion not anxious     ---___---___---___---___---___---            LAB AND IMAGIN.2   6.25  )-----------( 199      ( 16 Dec 2024 06:52 )             41.7               12-    139  |  96  |  18  ----------------------------<  69[L]  4.4   |  29  |  0.62    Ca    10.0      16 Dec 2024 06:52    TPro  7.5  /  Alb  3.8  /  TBili  0.5  /  DBili  x   /  AST  21  /  ALT  11  /  AlkPhos  68  12-16    PT/INR - ( 15 Dec 2024 23:45 )   PT: 23.1 sec;   INR: 2.02 ratio         PTT - ( 15 Dec 2024 23:45 )  PTT:38.1 sec                        Urinalysis Basic - ( 16 Dec 2024 14:09 )    Color: Yellow / Appearance: Clear / S.021 / pH: x  Gluc: x / Ketone: Trace mg/dL  / Bili: Negative / Urobili: 0.2 mg/dL   Blood: x / Protein: Negative mg/dL / Nitrite: Negative   Leuk Esterase: Negative / RBC: x / WBC x   Sq Epi: x / Non Sq Epi: x / Bacteria: x        [All pertinent / recent Imaging reviewed]         ---___---___---___---___---___---___ ---___---___---___---___---                         A S S E S S M E N T   A N D   P L A N :      HEALTH ISSUES - PROBLEM Dx:  diarrhea  monitor clinically iv fluids   stool for cdiff results   -GI/DVT Prophylaxis. as ordered    --------------------------------------------    ___________________________  Will follow with you.  Thank you,  Porter Keller  [phone]
ID INITIAL CONSULT NOTE     Patient is a 89y old  Female who presents with a chief complaint of ischemic foot (16 Dec 2024 10:19)      HPI:  Date of Service, 12-15-24 @ 20:52  CHIEF COMPLAINT:Patient is a 89y old  Female who presents with a chief complaint of     HPI:  89-year-old female history of A-fib, no longer on AC s/p watchman, Pulm HTN, HTN, recent admission from 7/17-7/25 for diverticulitis, comes to ED for persistent watery nonbloody diarrhea X24hrs. Denies vomiting, no fevers, no abdominal pain, no chest pain, no sob. Pt states she currently is on abxs for toe cellulitis does not remember the name and it caused her diarrhea.    prior hospital charts reviewed [ X ]  primary team notes reviewed [ X ]  other consultant notes reviewed [ X ]    PAST MEDICAL & SURGICAL HISTORY:  Afib      HTN (hypertension)      H/O CHF      Uterine cancer      S/P hysterectomy          Allergies  Lipitor (Other; Flushing)  hydrALAZINE (Other; Flushing)        ANTIMICROBIALS:  ampicillin/sulbactam  IVPB 3 daily      Current Abx:     Past Abx:       OTHER MEDS: MEDICATIONS  (STANDING):  aspirin enteric coated 81 daily  cloNIDine 0.1 two times a day  enalapril 20 daily  heparin   Injectable 5000 every 8 hours  metoprolol succinate ER 50 daily      SOCIAL HISTORY: [ ] etoh [ ] tobacco [ ] former smoker [ ] IVDU  Lives in [facility]     FAMILY HISTORY:      REVIEW OF SYSTEMS:    CONSTITUTIONAL: No weakness, fevers or chills  EYES/ENT: No visual changes;  No vertigo or throat pain   NECK: No pain or stiffness  RESPIRATORY: No cough, wheezing, hemoptysis; No shortness of breath  CARDIOVASCULAR: No chest pain or palpitations  GASTROINTESTINAL: +nausea, diarrhea, now resolved    GENITOURINARY: No dysuria, frequency or hematuria  NEUROLOGICAL: No numbness or weakness  SKIN: No itching, burning, rashes, or lesions   All other review of systems is negative unless indicated above.    Vital Signs Last 24 Hrs  T(F): 97.3 (12-16-24 @ 12:15), Max: 98.1 (12-15-24 @ 22:57)    Vital Signs Last 24 Hrs  HR: 71 (12-16-24 @ 12:15) (71 - 100)  BP: 122/76 (12-16-24 @ 12:15) (122/76 - 198/93)  RR: 18 (12-16-24 @ 12:15)  SpO2: 96% (12-16-24 @ 12:15) (94% - 99%)  Wt(kg): --    PHYSICAL EXAM:  GENERAL: NAD, well-developed  HEAD:  Atraumatic, Normocephalic  EYES: EOMI, conjunctiva and sclera clear  CHEST/LUNG: Clear to auscultation bilaterally; No wheeze  HEART: Irregular   ABDOMEN: Soft, Nondistended; RLQ mild tenderness to palpation   EXTREMITIES:  RLE ulcer, R 1st toe with mild erythema on tip, not warm to touch, some pain. Not consistent w/ cellulitis. L foot w/o erythema   PSYCH: AAOx3        Labs:                          13.2   6.25  )-----------( 199      ( 16 Dec 2024 06:52 )             41.7       12-16    139  |  96  |  18  ----------------------------<  69[L]  4.4   |  29  |  0.62    Ca    10.0      16 Dec 2024 06:52    TPro  7.5  /  Alb  3.8  /  TBili  0.5  /  DBili  x   /  AST  21  /  ALT  11  /  AlkPhos  68  12-16      Urinalysis Basic - ( 16 Dec 2024 06:52 )    Color: x / Appearance: x / SG: x / pH: x  Gluc: 69 mg/dL / Ketone: x  / Bili: x / Urobili: x   Blood: x / Protein: x / Nitrite: x   Leuk Esterase: x / RBC: x / WBC x   Sq Epi: x / Non Sq Epi: x / Bacteria: x          MICROBIOLOGY:          v              RADIOLOGY:  < from: CT Abdomen and Pelvis w/ IV Cont (12.15.24 @ 18:42) >    IMPRESSION:  1. Since 8/29/2024, there are a few mildly dilated small bowel loops,   probably representing an ileus. No definite cause for diarrhea is seen.    2. Extensive colonic diverticulosis.    3. Advanced atherosclerotic disease.    4. The gallbladder is distended. This is of uncertain significance given   the lack of gallstones on prior ultrasound from 8/29/2024 and normal HIDA   scan on 9/3/2024.      < end of copied text >  
[facility]    Luis Daniel Morena Huerta NP    [address] [place] [address] [phone]      Chief Complaint:  Patient is a 89y old  Female who presents with a chief complaint of ischemic foot (16 Dec 2024 12:43)      HPI:  89F hx of Afib s/p watchman, Pulm HTN, HTN, recent admission from - for diverticulitis, comes to ED for persistent watery nonbloody diarrhea X24hrs. Denies vomiting, no fevers, no abdominal pain, no chest pain, no sob. Pt states she currently is on abxs for toe cellulitis does not remember the name and it caused her diarrhea.    Allergies:  Lipitor (Other; Flushing)  hydrALAZINE (Other; Flushing)  lactose (Unknown)      Medications:  aspirin enteric coated 81 milliGRAM(s) Oral daily  cloNIDine 0.1 milliGRAM(s) Oral two times a day  enalapril 20 milliGRAM(s) Oral daily  heparin   Injectable 5000 Unit(s) SubCutaneous every 8 hours  metoprolol succinate ER 50 milliGRAM(s) Oral daily      PMHX/PSHX:  Afib    HTN (hypertension)    H/O CHF    Uterine cancer    S/P hysterectomy        Family history:  No pertinent family history in first degree relatives        Social History:     ROS:     General:  No wt loss, fevers, chills, night sweats, fatigue,   Eyes:  Good vision, no reported pain  ENT:  No sore throat, pain, runny nose, dysphagia  CV:  No pain, palpitations, hypo/hypertension  Resp:  No dyspnea, cough, tachypnea, wheezing  GI:  No pain, No nausea, No vomiting, No diarrhea, No constipation, No weight loss, No fever, No pruritis, No rectal bleeding, No tarry stools, No dysphagia,  :  No pain, bleeding, incontinence, nocturia  Muscle:  No pain, weakness  Neuro:  No weakness, tingling, memory problems  Psych:  No fatigue, insomnia, mood problems, depression  Endocrine:  No polyuria, polydipsia, cold/heat intolerance  Heme:  No petechiae, ecchymosis, easy bruisability  Skin:  No rash, tattoos, scars, edema      PHYSICAL EXAM:   Vital Signs:  Vital Signs Last 24 Hrs  T(C): 36.5 (16 Dec 2024 14:54), Max: 36.7 (15 Dec 2024 22:57)  T(F): 97.7 (16 Dec 2024 14:54), Max: 98.1 (15 Dec 2024 22:57)  HR: 78 (16 Dec 2024 14:54) (71 - 100)  BP: 144/72 (16 Dec 2024 14:54) (122/76 - 198/93)  BP(mean): 123 (15 Dec 2024 19:43) (123 - 123)  RR: 18 (16 Dec 2024 14:54) (17 - 18)  SpO2: 96% (16 Dec 2024 14:54) (94% - 99%)    Parameters below as of 16 Dec 2024 14:54  Patient On (Oxygen Delivery Method): room air      Daily     Daily     GENERAL: NAD, well-developed  EYES: EOMI, conjunctiva and sclera clear  CHEST/LUNG: Clear to auscultation bilaterally; No wheeze  HEART: Irregular   ABDOMEN: Soft, Nondistended; RLQ mild tenderness to palpation   EXTREMITIES:  RLE ulcer, R 1st toe with mild erythema on tip, not warm to touch, some pain. Not consistent w/ cellulitis. L foot w/o erythema   PSYCH: AAOx3    LABS:                        13.2   6.25  )-----------( 199      ( 16 Dec 2024 06:52 )             41.7     12-16    139  |  96  |  18  ----------------------------<  69[L]  4.4   |  29  |  0.62    Ca    10.0      16 Dec 2024 06:52    TPro  7.5  /  Alb  3.8  /  TBili  0.5  /  DBili  x   /  AST  21  /  ALT  11  /  AlkPhos  68  12-16    LIVER FUNCTIONS - ( 16 Dec 2024 06:52 )  Alb: 3.8 g/dL / Pro: 7.5 g/dL / ALK PHOS: 68 U/L / ALT: 11 U/L / AST: 21 U/L / GGT: x           PT/INR - ( 15 Dec 2024 23:45 )   PT: 23.1 sec;   INR: 2.02 ratio         PTT - ( 15 Dec 2024 23:45 )  PTT:38.1 sec  Urinalysis Basic - ( 16 Dec 2024 14:09 )    Color: Yellow / Appearance: Clear / S.021 / pH: x  Gluc: x / Ketone: Trace mg/dL  / Bili: Negative / Urobili: 0.2 mg/dL   Blood: x / Protein: Negative mg/dL / Nitrite: Negative   Leuk Esterase: Negative / RBC: x / WBC x   Sq Epi: x / Non Sq Epi: x / Bacteria: x          Imaging:    < from: CT Abdomen and Pelvis w/ IV Cont (12.15.24 @ 18:42) >  IMPRESSION:  1. Since 2024, there are a few mildly dilated small bowel loops,   probably representing an ileus. No definite cause for diarrhea is seen.    2. Extensive colonic diverticulosis.    3. Advanced atherosclerotic disease.    4. The gallbladder is distended. This is of uncertain significance given   the lack of gallstones on prior ultrasound from 2024 and normal HIDA   scan on 9/3/2024.    < end of copied text >            
Structural Heart Team    HPI:    89-year-old female history of A-fib, no longer on AC s/p watchman, Pulm HTN, HTN, recent admission from 7/17-7/25 for diverticulitis, comes to ED for persistent watery nonbloody diarrhea X24hrs. Denies vomiting, no fevers, no abdominal pain, no chest pain, no sob. Pt states she currently is on abxs for toe cellulitis does not remember the name and it caused her diarrhea.    Patient is resting comfortably in bed today, offering no complaints. She denies any SOB, Pedal edema or chest pain prior to admission, though she does note some fatigue.          12-17 @ 07:01  -  12-18 @ 07:00  --------------------------------------------------------  IN: 300 mL / OUT: 900 mL / NET: -600 mL    12-18 @ 07:01  -  12-18 @ 15:38  --------------------------------------------------------  IN: 400 mL / OUT: 200 mL / NET: 200 mL        PAST MEDICAL & SURGICAL HISTORY:  Afib      HTN (hypertension)      H/O CHF      Uterine cancer      S/P hysterectomy          FAMILY HISTORY:            Lipitor (Other; Flushing)  hydrALAZINE (Other; Flushing)  lactose (Unknown)    amLODIPine   Tablet 5 milliGRAM(s) Oral daily  aspirin enteric coated 81 milliGRAM(s) Oral daily  cloNIDine 0.1 milliGRAM(s) Oral two times a day  dextrose 5% + sodium chloride 0.45%. 500 milliLiter(s) IV Continuous <Continuous>  enalapril 20 milliGRAM(s) Oral daily  heparin   Injectable 5000 Unit(s) SubCutaneous every 8 hours  influenza  Vaccine (HIGH DOSE) 0.5 milliLiter(s) IntraMuscular once  metoprolol succinate ER 50 milliGRAM(s) Oral daily      T(C): 36.3 (12-18-24 @ 14:54), Max: 36.8 (12-17-24 @ 20:01)  HR: 95 (12-18-24 @ 14:54) (60 - 96)  BP: 173/81 (12-18-24 @ 05:59) (135/60 - 173/81)  RR: 18 (12-18-24 @ 14:54) (18 - 18)  SpO2: 100% (12-18-24 @ 14:54) (95% - 100%)  Wt(kg): --      Review of Symptoms:  General: Alert, Follows commands  Respiratory:  - SOB, - MILLS, - Cough  Cardiac: Denies CP, Denies Palpitations  Gastrointestinal: Denies Pain, Denies N/V  Extremities: Denies Pedal Edema, No Joint pain  Vascular: Negative  Neurological: Negative  Endocrine: negative      Physical Exam:  Gen: A/Ox3, NAD  HEENT: No JVD, Neck Supple, Trachea midline, No masses  Pulmonary: CTAB,  No accessory muscle use for respiration  Cardiac: S1S2, RRR, I/VI TRISH   No Gallops/Rubs  Gastrointestinal: Soft, NT/ND, + Bowel Sounds  Extremities:  Edema,  No joint pain or swelling +PMSx4  Vascular: 1+ pulses B/L, No Bruits  Neurological: Non Focal, = motor and sensory      Laboratory:                        13.5   7.44  )-----------( 222      ( 17 Dec 2024 07:16 )             42.9    12-18    138  |  97  |  25[H]  ----------------------------<  70  4.6   |  29  |  0.61    Ca    9.6      18 Dec 2024 07:42         Pro-BNP:        Transthoracic Echo:    < from: TTE W or WO Ultrasound Enhancing Agent (12.17.24 @ 15:32) >  CONCLUSIONS:      1. Left ventricular cavity is normal in size. Left ventricular wall thickness is normal. Left ventricular systolic function is normal with an ejection fraction visually estimated at 65 %. There are no regional wall motion abnormalities seen.   2. Mildly enlarged right ventricular cavity size and normal right ventricular systolic function.   3. Severe tricuspid regurgitation.   4. Moderate pulmonary hypertension.    ________________________________________________________________________________________  FINDINGS:     Left Ventricle:  The left ventricular cavity is normal in size. Left ventricular wall thickness is normal. Left ventricular systolic function isnormal with an ejection fraction visually estimated at 65%. There are no regional wall motion abnormalities seen. Analysis of left ventricular diastolic function and filling pressure is made challenging by the presence of atrial fibrillation.     Right Ventricle:  The right ventricular cavity is mildly enlarged in size and right ventricular systolic function is normal. Tricuspid annular plane systolic excursion (TAPSE) is 1.8 cm (normal >=1.7 cm).     Left Atrium:  The left atrium is mildly dilated.     Right Atrium:  The right atrium is severely dilated.     Interatrial Septum:  The interatrial septum appears intact.     Aortic Valve:  There is fibrocalcific aortic valve sclerosis without stenosis. There is mild aortic regurgitation.     Mitral Valve:  There is trace mitral regurgitation.     Tricuspid Valve:  There is severe tricuspid regurgitation. Estimated pulmonary artery systolic pressure is 63 mmHg, consistent with moderate pulmonary hypertension.     Pulmonic Valve:  There is mild to moderate pulmonic regurgitation.     Aorta:  The aortic root appears normal in size.     Pericardium:  There is a small pericardial effusion with no evidence of hemodynamic compromise (or echocardiographic evidence of cardiac tamponade).     Systemic Veins:  The inferior vena cava is dilated (dilated >2.1cm) with normal inspiratory collapse (normal >50%) consistent with mildly elevated right atrial pressure (~8, range 5-10mmHg).  ____________________________________________________________________  QUANTITATIVE DATA:  Left Ventricle Measurements: (Indexed to BSA)     IVSd (2D):   1.0 cm  LVPWd (2D):  0.9 cm  LVIDd (2D):  4.3 cm  LVIDs (2D):  2.8 cm  LV Mass:     133 g  75.8 g/m²  Visualized LV EF%: 65%     MV E Vmax:    1.24 m/s  e' lateral:   15.90 cm/s  e' medial:    9.02 cm/s  E/e' lateral: 7.80  E/e' medial:  13.75  E/e' Average: 9.95    Aorta Measurements: (Normal range) (Indexed to BSA)     Ao Root d    3.20 cm (2.7 - 3.3 cm) 1.83 cm/m²  Ao ST Junct: 3.4 cm       Left Atrium Measurements: (Indexed to BSA)  LA Diam 2D:        4.10 cm  LA Vol s, MOD A4C: 92.05 ml.  LA Vol s, MOD A2C: 63.65 ml.         Right Ventricle Measurements: Right Atrial Measurements:     TAPSE: 1.8 cm                 RA Vol s, MOD A4C  94.5 ml                       RA Area s, MOD A4C 29.1 cm²       LVOT / RVOT/ Qp/Qs Data: (Indexed to BSA)  LVOT Diameter: 1.80 cm  LVOT Area:     2.54 cm²    Mitral Valve Measurements:     MV E Vmax: 1.2 m/s       Tricuspid Valve Measurements:     TR Vmax:       3.7 m/s  TR Peak Gradient:  55.1 mmHg  RA Pressure:       8 mmHg  PASP:              63 mmHg  TR PISA Radius:    0.50 cm  TR Aliasing Ravinder:   0.38 m/s  TR Inst Flow Rate: 60.5 ml/s  TR Eff ERLINDA:        0.16 cm²      < end of copied text >

## 2024-12-18 NOTE — PROGRESS NOTE ADULT - ASSESSMENT
Watery diarrhea  Distended GB    Recommendations:  - LFTs and prior normal HIDA reassuring, obtain HIDA scan to r/o acute cholecystitis   - Trend LFTs daily   - GI PCR if has ongoing diarrhea  - Serial abdominal exams  - IVF  - Advance diet as tolerated  - Will follow with you    Porter Lenz M.D.  [facility]  [phone]

## 2024-12-18 NOTE — PROGRESS NOTE ADULT - SUBJECTIVE AND OBJECTIVE BOX
---___---___---___---___---___---___ ---___---___---___---___---___---___---___---___---                  M E D I C A L   A T T E N D I N G   P R O G R E S S   N O T E  ---___---___---___---___---___---___ ---___---___---___---___---___---___---___---___---        ================================================    ++CHIEF COMPLAINT:   Patient is a 89y old  Female who presents with a chief complaint of ischemic foot (18 Dec 2024 18:49)      Abdominal pain      ---___---___---___---___---___---  PAST MEDICAL / Surgical  HISTORY:  PAST MEDICAL & SURGICAL HISTORY:  Afib      HTN (hypertension)      H/O CHF      Uterine cancer      S/P hysterectomy          ---___---___---___---___---___---  FAMILY HISTORY:   FAMILY HISTORY:        ---___---___---___---___---___---  ALLERGIES:   Allergies    Lipitor (Other; Flushing)  hydrALAZINE (Other; Flushing)    Intolerances    lactose (Unknown)      ---___---___---___---___---___---  MEDICATIONS:  MEDICATIONS  (STANDING):  amLODIPine   Tablet 5 milliGRAM(s) Oral daily  aspirin enteric coated 81 milliGRAM(s) Oral daily  cloNIDine 0.1 milliGRAM(s) Oral two times a day  dextrose 5% + sodium chloride 0.45%. 500 milliLiter(s) (50 mL/Hr) IV Continuous <Continuous>  enalapril 20 milliGRAM(s) Oral daily  heparin   Injectable 5000 Unit(s) SubCutaneous every 8 hours  influenza  Vaccine (HIGH DOSE) 0.5 milliLiter(s) IntraMuscular once  metoprolol succinate ER 50 milliGRAM(s) Oral daily    MEDICATIONS  (PRN):  ondansetron Injectable 4 milliGRAM(s) IV Push every 6 hours PRN Nausea and/or Vomiting      ---___---___---___---___---___---  REVIEW OF SYSTEM:    GEN: no fever, no chills, no pain  RESP: no SOB, no cough, no sputum  CVS: no chest pain, no palpitations, no edema  GI: no abdominal pain, no nausea, no vomiting, no constipation, no diarrhea  : no dysurea, no frequency, no hematurea  Neuro: no headache, no dizziness  PSYCH: no anxiety, no depression  Derm : no itching, no rash    ---___---___---___---___---___---  VITAL SIGNS:  89y , CAPILLARY BLOOD GLUCOSE      POCT Blood Glucose.: 128 mg/dL (18 Dec 2024 17:19)    T(C): 36.2 (24 @ 20:00), Max: 36.4 (24 @ 05:59)  HR: 91 (24 @ 20:00) (60 - 95)  BP: 120/74 (24 @ 20:00) (120/74 - 173/81)  RR: 18 (24 @ 20:00) (18 - 18)  SpO2: 94% (24 @ 20:00) (94% - 100%)  ---___---___---___---___---___---  PHYSICAL EXAM:    GEN: A&O X 3 , NAD , comfortable  HEENT: NCAT, PERRL, MMM, hearing intact  Neck: supple , no JVD  CVS: S1S2 , regular , No M/R/G appreciated  PULM: CTA B/L,  no W/R/R appreciated  ABD.: soft. non tender, non distended,  bowel sounds present  Extrem: intact pulses , no edema   Derm: No rash , no ecchymoses  PSYCH : normal mood,  no delusion not anxious     ---___---___---___---___---___---            LAB AND IMAGIN.5   7.44  )-----------( 222      ( 17 Dec 2024 07:16 )             42.9                   138  |  97  |  25[H]  ----------------------------<  70  4.6   |  29  |  0.61    Ca    9.6      18 Dec 2024 07:42                              Urinalysis Basic - ( 18 Dec 2024 07:42 )    Color: x / Appearance: x / SG: x / pH: x  Gluc: 70 mg/dL / Ketone: x  / Bili: x / Urobili: x   Blood: x / Protein: x / Nitrite: x   Leuk Esterase: x / RBC: x / WBC x   Sq Epi: x / Non Sq Epi: x / Bacteria: x        [All pertinent / recent Imaging reviewed]    ACC: [id number] EXAM:  NM PULM VENTILATION PERFUS IMG   ORDERED BY:   RAJ EVERETT     PROCEDURE DATE:  2024          INTERPRETATION:  CLINICAL INFORMATION: Pulmonary hypertension evaluate   for CTEPH.    RADIOPHARMACEUTICAL: 1 mCi Tc-99m-DTPA via aerosol;  6.3 mCi Tc-99m-MAA,   I.V.    TECHNIQUE:  Ventilation and perfusion images of the lungs were obtained   following administration of Tc-99m-DTPA and Tc-99m-MAA. Images were   obtained in the anterior, posterior, both lateral, and all 4 oblique   views. The study was interpreted in conjunction with chest radiograph of   2024..    COMPARISON: None    OTHER STUDIES USED FOR CORRELATION: None    FINDINGS: There is heterogeneous distribution of radiopharmaceutical in   both lungs on the ventilation and perfusion images. There are no   segmental perfusion defects in either lung. There is cardiomegaly.    IMPRESSION:    Very low probability of pulmonary embolus.  Findings are not typical of CTEPH.      TRANSTHORACIC ECHOCARDIOGRAM REPORT  ________________________________________________________________________________                                      _______       Pt. Name:       NAN GAYLE       Study Date:    2024  MRN:            [id number]          YOB: 1933  Accession #:    [id number]           Age:           89 years  Account#:       [id number]        Gender:        F  Heart Rate:     90 bpm              Height:        65.00 in (165.10 cm)  Rhythm:         atrial fibrillation Weight:        150.00 lb (68.04 kg)  Blood Pressure: 145/80 mmHg         BSA/BMI:       1.75 m² / 24.96 kg/m²  ________________________________________________________________________________________  Referring Physician:    [id number] Raj Everett  Interpreting Physician: Deshawn Mustafa MD  Primary Sonographer:    Nisha Montiel    CPT:               ECHO TTE WO CON COMP W DOPP - 74745.m  Indication(s):     Unspecified atrial fibrillation - I48.91  Procedure:         Transthoracic echocardiogram with 2-D, M-mode and complete                     spectral and color flow Doppler.  Ordering Location: [facility]  Admission Status:  Inpatient  Study Information: Image quality for this study is adequate.    _______________________________________________________________________________________     CONCLUSIONS:      1. Left ventricular cavity is normal in size. Left ventricular wall thickness is normal. Left ventricular systolic function is normal with an ejection fraction visually estimated at 65 %. There are no regional wall motion abnormalities seen.   2. Mildly enlarged right ventricular cavity size and normal right ventricular systolic function.   3. Severe tricuspid regurgitation.   4. Moderate pulmonary hypertension.    ________________________________________________________________________________________  FINDINGS:     Left Ventricle:  The left ventricular cavity is normal in size. Left ventricular wall thickness is normal. Left ventricular systolic function isnormal with an ejection fraction visually estimated at 65%. There are no regional wall motion abnormalities seen. Analysis of left ventricular diastolic function and filling pressure is made challenging by the presence of atrial fibrillation.     Right Ventricle:  The right ventricular cavity is mildly enlarged in size and right ventricular systolic function is normal. Tricuspid annular plane systolic excursion (TAPSE) is 1.8 cm (normal >=1.7 cm).     Left Atrium:  The left atrium is mildly dilated.     Right Atrium:  The right atrium is severely dilated.     Interatrial Septum:  The interatrial septum appears intact.     Aortic Valve:  There is fibrocalcific aortic valve sclerosis without stenosis. There is mild aortic regurgitation.     Mitral Valve:  There is trace mitral regurgitation.     Tricuspid Valve:  There is severe tricuspid regurgitation. Estimated pulmonary artery systolic pressure is 63 mmHg, consistent with moderate pulmonary hypertension.     Pulmonic Valve:  There is mild to moderate pulmonic regurgitation.     Aorta:  The aortic root appears normal in size.     Pericardium:  There is a small pericardial effusion with no evidence of hemodynamic compromise (or echocardiographic evidence of cardiac tamponade).     Systemic Veins:  The inferior vena cava is dilated (dilated >2.1cm) with normal inspiratory collapse (normal >50%) consistent with mildly elevated right atrial pressure (~8, range 5-10mmHg).  ____________________________________________________________________  QUANTITATIVE DATA:  Left Ventricle Measurements: (Indexed to BSA)     IVSd (2D):   1.0 cm  LVPWd (2D):  0.9 cm  LVIDd (2D):  4.3 cm  LVIDs (2D):  2.8 cm  LV Mass:     133 g  75.8 g/m²  Visualized LV EF%: 65%     MV E Vmax:    1.24 m/s  e' lateral:   15.90 cm/s  e' medial:    9.02 cm/s  E/e' lateral: 7.80  E/e' medial:  13.75  E/e' Average: 9.95    Aorta Measurements: (Normal range) (Indexed to BSA)     Ao Root d    3.20 cm (2.7 - 3.3 cm) 1.83 cm/m²  Ao ST Junct: 3.4 cm       Left Atrium Measurements: (Indexed to BSA)  LA Diam 2D:        4.10 cm  LA Vol s, MOD A4C: 92.05 ml.  LA Vol s, MOD A2C: 63.65 ml.         Right Ventricle Measurements: Right Atrial Measurements:     TAPSE: 1.8 cm                 RA Vol s, MOD A4C  94.5 ml                       RA Area s, MOD A4C 29.1 cm²       LVOT / RVOT/ Qp/Qs Data: (Indexed to BSA)  LVOT Diameter: 1.80 cm  LVOT Area:     2.54 cm²    Mitral Valve Measurements:     MV E Vmax: 1.2 m/s       Tricuspid Valve Measurements:     TR Vmax:       3.7 m/s  TR Peak Gradient:  55.1 mmHg  RA Pressure:       8 mmHg  PASP:              63 mmHg  TR PISA Radius:    0.50 cm  TR Aliasing Ravinder:   0.38 m/s  TR Inst Flow Rate: 60.5 ml/s  TR Eff ERLINDA:        0.16 cm²    ________________________________________________________________________________________  Electronically signed on 2024 at 5:32:45 PM by Deshawn Mustafa MD         *** Final ***        --- End of Report ---          HERMAN ANDERSEN DO; Resident Radiologist  This document has been electronically signed.  ÁNGEL SILVER MD; Attending Nuclear Medicine  This document has been electronically signed. Dec 18 2024 11:42AM         ---___---___---___---___---___---___ ---___---___---___---___---                         A S S E S S M E N T   A N D   P L A N :      HEALTH ISSUES - PROBLEM Dx:  Severe tricuspid regurgitation    89-year-old female history of A-fib, no longer on AC s/p watchman, Pulm HTN, HTN, recent admission from - for diverticulitis, comes to ED for persistent watery nonbloody diarrhea X24hrs. Denies vomiting, no fevers, no abdominal pain, no chest pain, no sob. Pt states she currently is on abxs for toe cellulitis does not remember the name and it caused her diarrhea.  pt with sig medical and cardiac hx with hx of a.fib on no AC sec to s/p watchman  with c/o diarrhea ?nausea an bl cold feet and discoloration on the R Toe.  r/o pvd/ emboli  start iv heparin if vascular agreed  check hr  sec to a.fib, no ac s/p watchman procedure  check arterial Doppler of LE noted, need vascular follow up and recommendation  lipid panel  continue all bp meds  awaiting gall bladder ultrasound, abx dc ed by ID  TTE reviwed   sever tricuspid regurgitation cardiology following       -GI/DVT Prophylaxis.      vascula r input    Patient with DP/PT signals b/l with motor and sensory intact in foot, no pain  - Low concern for acute limb ischemia   - arterial US lower extremity reviewed   - patient has asymptomatic PAD- can follow up as outpatient.   - will sign off at this time, please reconsult when needed.     ___________________________  Thank you,  Porter Keller  [phone]

## 2024-12-18 NOTE — PROGRESS NOTE ADULT - SUBJECTIVE AND OBJECTIVE BOX
Date of Service: 12-18-24 @ 08:30           CARDIOLOGY     PROGRESS  NOTE   ________________________________________________    CHIEF COMPLAINT:Patient is a 89y old  Female who presents with a chief complaint of ischemic foot (17 Dec 2024 20:03)  no complain  	  REVIEW OF SYSTEMS:  CONSTITUTIONAL: No fever, weight loss, or fatigue  EYES: No eye pain, visual disturbances, or discharge  ENT:  No difficulty hearing, tinnitus, vertigo; No sinus or throat pain  NECK: No pain or stiffness  RESPIRATORY: No cough, wheezing, chills or hemoptysis; No Shortness of Breath  CARDIOVASCULAR: No chest pain, palpitations, passing out, dizziness, or leg swelling  GASTROINTESTINAL: No abdominal or epigastric pain. No nausea, vomiting, or hematemesis; No diarrhea or constipation. No melena or hematochezia.  GENITOURINARY: No dysuria, frequency, hematuria, or incontinence  NEUROLOGICAL: No headaches, memory loss, loss of strength, numbness, or tremors  SKIN: No itching, burning, rashes, or lesions   LYMPH Nodes: No enlarged glands  ENDOCRINE: No heat or cold intolerance; No hair loss  MUSCULOSKELETAL: No joint pain or swelling; No muscle, back, or extremity pain  PSYCHIATRIC: No depression, anxiety, mood swings, or difficulty sleeping  HEME/LYMPH: No easy bruising, or bleeding gums  ALLERGY AND IMMUNOLOGIC: No hives or eczema	    [x ] All others negative	  [ ] Unable to obtain    PHYSICAL EXAM:  T(C): 36.4 (12-18-24 @ 05:59), Max: 36.8 (12-17-24 @ 20:01)  HR: 60 (12-18-24 @ 05:59) (60 - 96)  BP: 173/81 (12-18-24 @ 05:59) (135/60 - 173/81)  RR: 18 (12-18-24 @ 05:59) (18 - 18)  SpO2: 100% (12-18-24 @ 05:59) (95% - 100%)  Wt(kg): --  I&O's Summary    17 Dec 2024 07:01  -  18 Dec 2024 07:00  --------------------------------------------------------  IN: 300 mL / OUT: 900 mL / NET: -600 mL        Appearance: Normal	  HEENT:   Normal oral mucosa, PERRL, EOMI	  Lymphatic: No lymphadenopathy  Cardiovascular: Normal S1 S2, No JVD, No murmurs, No edema  Respiratory: Lungs clear to auscultation	  Psychiatry: A & O x 3, Mood & affect appropriate  Gastrointestinal:  Soft, Non-tender, + BS	  Skin: No rashes, No ecchymoses, No cyanosis	  Neurologic: Non-focal  Extremities:  No clubbing, cyanosis or edema  Vascular: + pvd    MEDICATIONS  (STANDING):  aspirin enteric coated 81 milliGRAM(s) Oral daily  cloNIDine 0.1 milliGRAM(s) Oral two times a day  dextrose 5% + sodium chloride 0.45%. 500 milliLiter(s) (50 mL/Hr) IV Continuous <Continuous>  enalapril 20 milliGRAM(s) Oral daily  heparin   Injectable 5000 Unit(s) SubCutaneous every 8 hours  influenza  Vaccine (HIGH DOSE) 0.5 milliLiter(s) IntraMuscular once  metoprolol succinate ER 50 milliGRAM(s) Oral daily      TELEMETRY: 	    ECG:  	  RADIOLOGY:  OTHER: 	  	  LABS:	 	    CARDIAC MARKERS:                                13.5   7.44  )-----------( 222      ( 17 Dec 2024 07:16 )             42.9     12-18    138  |  97  |  25[H]  ----------------------------<  70  4.6   |  29  |  0.61    Ca    9.6      18 Dec 2024 07:42      proBNP:   Lipid Profile: Cholesterol 180  LDL --  HDL 45      HgA1c:   TSH:       < from: VA Duplex Lower Extrem Arterial, Bilat (12.16.24 @ 12:05) >  There is a flow-limiting stenosis approaching 50% affecting the right   superficial femoral artery in the mid thigh.    A tardus parvus pattern of flow through the right deep femoral artery   suggests a flow-limiting stenosis of this artery as well.    No flow-limiting stenoses are identified on the left.      < from: TTE W or WO Ultrasound Enhancing Agent (12.17.24 @ 15:32) >   1. Left ventricular cavity is normal in size. Left ventricular wall thickness is normal. Left ventricular systolic function is normal with an ejection fraction visually estimated at 65 %. There are no regional wall motion abnormalities seen.   2. Mildly enlarged right ventricular cavity size and normal right ventricular systolic function.   3. Severe tricuspid regurgitation.   4. Moderate pulmonary hypertension.    Assessment and plan  ---------------------------  89-year-old female history of A-fib, no longer on AC s/p watchman, Pulm HTN, HTN, recent admission from 7/17-7/25 for diverticulitis, comes to ED for persistent watery nonbloody diarrhea X24hrs. Denies vomiting, no fevers, no abdominal pain, no chest pain, no sob. Pt states she currently is on abxs for toe cellulitis does not remember the name and it caused her diarrhea.  pt with sig medical and cardiac hx with hx of a.fib on no AC sec to s/p watchman  with c/o diarrhea ?nausea an bl cold feet and discoloration on the R Toe.  r/o pvd/ emboli  start iv heparin if vascular agreed  check hr  sec to a.fib, no ac s/p watchman procedure  check arterial Doppler of LE noted, need vascular follow up and recommendation  lipid panel  continue all bp meds  awaiting gall bladder ultrasound, abx dc ed by ID  TTE noted with severe PHTN r/o chronic pulmonary emboli, check vq scan  Dr Aleman to see pt vascular surgery  add Norvasc 5 mg daily for elevated bp

## 2024-12-18 NOTE — CONSULT NOTE ADULT - NS ATTEND AMEND GEN_ALL_CORE FT
Events that transpired leading to the patient's current hospitalization along with the patient's hospital course was gone over.  The patient's past medical history history/surgical history/family history/social history was gone over.  Agree with 14 point review of systems and physical examination noted above.    Patient found on TTE to have severe tricuspid valve regurgitation which has acutely worsened from her TTE on 7/9/2023 at which time her degree of tricuspid valve regurgitation was mild–moderate..  The patient is not having any symptoms from her tricuspid valve regurgitation.  Denies any shortness of breath, dyspnea upon exertion, fatigue, abdominal bloating/discomfort or lower extremity edema.  Explained to the patient what is tricuspid valve regurgitation.  The potential associated signs and symptoms were discussed.  The various treatment options for severe symptomatic tricuspid valve regurgitation was reviewed.  Due to the patient's lack of symptoms it is not recommended that she be further assessed at this time for any percutaneous catheter-based intervention for her tricuspid valve regurgitation.    Recommend patient receive close follow-up by her general cardiologist.  Once she is medically optimized a repeat TTE be performed.      TTE from 7/9/2023 demonstrated moderate mitral valve regurgitation, mild–moderate tricuspid valve regurgitation, mild to moderate aortic regurgitation and mild to moderate pulmonic regurgitation.  PASP 56 mmHg.    All questions and concerns of the patient were addressed.    Findings and plan were discussed with cardiology/Dr. Everett.    90 minutes were spent on this encounter for extensive review of medical record details including labs and/or imaging studies and/or adjacent care team and consultant records, as well as review and reconciliation of current medications. Time was spent on obtaining a history, performing physical examination of patient, and answering patient and/or family questions regarding plan of care. Time was also spent discussing plan of care with patient’s other care team members including primary and consulting teams. Time also was spent on documentation of this encounter into the EHR.

## 2024-12-18 NOTE — PHYSICAL THERAPY INITIAL EVALUATION ADULT - PERTINENT HX OF CURRENT PROBLEM, REHAB EVAL
89-year-old female history of A-fib, no longer on AC s/p watchman, Pulm HTN, HTN, recent admission from 7/17-7/25 for diverticulitis, comes to ED for persistent watery nonbloody diarrhea X24hrs. Denies vomiting, no fevers, no abdominal pain, no chest pain, no sob. Pt states she currently is on abxs for toe cellulitis does not remember the name and it caused her diarrhea. CTA ABDOMEN/PELVIS 12/15:  Since 8/29/2024, there are a few mildly dilated small bowel loops, probably representing an ileus. No definite cause for diarrhea is seen. Extensive colonic diverticulosis. Advanced atherosclerotic disease. The gallbladder is distended. This is of uncertain significance given the lack of gallstones on prior ultrasound from 8/29/2024 and normal HIDA scan on 9/3/2024. PHYSIOL LE B/L 12/16: the right TBI of 0.73 and the left TBI of 0.95 are normal. The amplitude of the pulse volume recordings are normal. DUPLEX LE ARTERIAL 12/16: There is a flow-limiting stenosis approaching 50% affecting the right superficial femoral artery in the mid thigh. A tardus parvus pattern of flow through the right deep femoral artery suggests a flow-limiting stenosis of this artery as well. No flow-limiting stenoses are identified on the left. US ABDOMEN RUQ 12/17: A few non-shadowing echogenic foci within the gallbladder measuring up   to 8 mm, possibly sludge or polyps, with gallstones also considered. The gallbladder is mildly distended. Otherwise, no definite sonographic evidence of acute cholecystitis. Right pleural effusion.

## 2024-12-19 LAB
ALBUMIN SERPL ELPH-MCNC: 3.6 G/DL — SIGNIFICANT CHANGE UP (ref 3.3–5)
ALP SERPL-CCNC: 72 U/L — SIGNIFICANT CHANGE UP (ref 40–120)
ALT FLD-CCNC: 12 U/L — SIGNIFICANT CHANGE UP (ref 10–45)
ANION GAP SERPL CALC-SCNC: 13 MMOL/L — SIGNIFICANT CHANGE UP (ref 5–17)
AST SERPL-CCNC: 34 U/L — SIGNIFICANT CHANGE UP (ref 10–40)
BILIRUB SERPL-MCNC: 0.5 MG/DL — SIGNIFICANT CHANGE UP (ref 0.2–1.2)
BUN SERPL-MCNC: 19 MG/DL — SIGNIFICANT CHANGE UP (ref 7–23)
CALCIUM SERPL-MCNC: 9.9 MG/DL — SIGNIFICANT CHANGE UP (ref 8.4–10.5)
CHLORIDE SERPL-SCNC: 97 MMOL/L — SIGNIFICANT CHANGE UP (ref 96–108)
CO2 SERPL-SCNC: 28 MMOL/L — SIGNIFICANT CHANGE UP (ref 22–31)
CREAT SERPL-MCNC: 0.56 MG/DL — SIGNIFICANT CHANGE UP (ref 0.5–1.3)
EGFR: 86 ML/MIN/1.73M2 — SIGNIFICANT CHANGE UP
GLUCOSE BLDC GLUCOMTR-MCNC: 148 MG/DL — HIGH (ref 70–99)
GLUCOSE BLDC GLUCOMTR-MCNC: 177 MG/DL — HIGH (ref 70–99)
GLUCOSE BLDC GLUCOMTR-MCNC: 73 MG/DL — SIGNIFICANT CHANGE UP (ref 70–99)
GLUCOSE SERPL-MCNC: 80 MG/DL — SIGNIFICANT CHANGE UP (ref 70–99)
POTASSIUM SERPL-MCNC: 4.4 MMOL/L — SIGNIFICANT CHANGE UP (ref 3.5–5.3)
POTASSIUM SERPL-SCNC: 4.4 MMOL/L — SIGNIFICANT CHANGE UP (ref 3.5–5.3)
PROT SERPL-MCNC: 7.3 G/DL — SIGNIFICANT CHANGE UP (ref 6–8.3)
SODIUM SERPL-SCNC: 138 MMOL/L — SIGNIFICANT CHANGE UP (ref 135–145)

## 2024-12-19 PROCEDURE — 99232 SBSQ HOSP IP/OBS MODERATE 35: CPT

## 2024-12-19 RX ADMIN — CLONIDINE HYDROCHLORIDE 0.1 MILLIGRAM(S): 0.2 TABLET ORAL at 05:13

## 2024-12-19 RX ADMIN — CLONIDINE HYDROCHLORIDE 0.1 MILLIGRAM(S): 0.2 TABLET ORAL at 18:05

## 2024-12-19 RX ADMIN — HEPARIN SODIUM 5000 UNIT(S): 1000 INJECTION, SOLUTION INTRAVENOUS; SUBCUTANEOUS at 21:01

## 2024-12-19 RX ADMIN — HEPARIN SODIUM 5000 UNIT(S): 1000 INJECTION, SOLUTION INTRAVENOUS; SUBCUTANEOUS at 05:14

## 2024-12-19 RX ADMIN — HEPARIN SODIUM 5000 UNIT(S): 1000 INJECTION, SOLUTION INTRAVENOUS; SUBCUTANEOUS at 14:13

## 2024-12-19 RX ADMIN — Medication 50 MILLIGRAM(S): at 05:13

## 2024-12-19 RX ADMIN — ENALAPRIL MALEATE 20 MILLIGRAM(S): 10 TABLET ORAL at 05:13

## 2024-12-19 RX ADMIN — Medication 5 MILLIGRAM(S): at 05:13

## 2024-12-19 NOTE — PROGRESS NOTE ADULT - SUBJECTIVE AND OBJECTIVE BOX
Date of Service: 12-19-24 @ 05:49           CARDIOLOGY     PROGRESS  NOTE   ________________________________________________    CHIEF COMPLAINT:Patient is a 89y old  Female who presents with a chief complaint of ischemic foot (18 Dec 2024 20:40)  no complain  	  REVIEW OF SYSTEMS:  CONSTITUTIONAL: No fever, weight loss, or fatigue  EYES: No eye pain, visual disturbances, or discharge  ENT:  No difficulty hearing, tinnitus, vertigo; No sinus or throat pain  NECK: No pain or stiffness  RESPIRATORY: No cough, wheezing, chills or hemoptysis; No Shortness of Breath  CARDIOVASCULAR: No chest pain, palpitations, passing out, dizziness, or leg swelling  GASTROINTESTINAL: No abdominal or epigastric pain. No nausea, vomiting, or hematemesis; No diarrhea or constipation. No melena or hematochezia.  GENITOURINARY: No dysuria, frequency, hematuria, or incontinence  NEUROLOGICAL: No headaches, memory loss, loss of strength, numbness, or tremors  SKIN: No itching, burning, rashes, or lesions   LYMPH Nodes: No enlarged glands  ENDOCRINE: No heat or cold intolerance; No hair loss  MUSCULOSKELETAL: No joint pain or swelling; No muscle, back, or extremity pain  PSYCHIATRIC: No depression, anxiety, mood swings, or difficulty sleeping  HEME/LYMPH: No easy bruising, or bleeding gums  ALLERGY AND IMMUNOLOGIC: No hives or eczema	    [x ] All others negative	  [ ] Unable to obtain    PHYSICAL EXAM:  T(C): 36.4 (12-19-24 @ 04:25), Max: 36.6 (12-19-24 @ 00:49)  HR: 79 (12-19-24 @ 04:25) (60 - 95)  BP: 177/77 (12-19-24 @ 04:25) (120/74 - 177/77)  RR: 18 (12-19-24 @ 04:25) (18 - 18)  SpO2: 93% (12-19-24 @ 04:25) (93% - 100%)  Wt(kg): --  I&O's Summary    17 Dec 2024 07:01  -  18 Dec 2024 07:00  --------------------------------------------------------  IN: 300 mL / OUT: 900 mL / NET: -600 mL    18 Dec 2024 07:01  -  19 Dec 2024 05:49  --------------------------------------------------------  IN: 460 mL / OUT: 850 mL / NET: -390 mL        Appearance: Normal	  HEENT:   Normal oral mucosa, PERRL, EOMI	  Lymphatic: No lymphadenopathy  Cardiovascular: Normal S1 S2, No JVD, + murmurs, No edema  Respiratory rhonchi  Psychiatry: A & O x 3, Mood & affect appropriate  Gastrointestinal:  Soft, Non-tender, + BS	  Skin: No rashes, No ecchymoses, No cyanosis	  Neurologic: Non-focal  Extremities: Normal range of motion, No clubbing, cyanosis or edema  Vascular: + pvd    MEDICATIONS  (STANDING):  amLODIPine   Tablet 5 milliGRAM(s) Oral daily  aspirin enteric coated 81 milliGRAM(s) Oral daily  cloNIDine 0.1 milliGRAM(s) Oral two times a day  dextrose 5% + sodium chloride 0.45%. 500 milliLiter(s) (50 mL/Hr) IV Continuous <Continuous>  enalapril 20 milliGRAM(s) Oral daily  heparin   Injectable 5000 Unit(s) SubCutaneous every 8 hours  influenza  Vaccine (HIGH DOSE) 0.5 milliLiter(s) IntraMuscular once  metoprolol succinate ER 50 milliGRAM(s) Oral daily      TELEMETRY: 	    ECG:  	  RADIOLOGY:  OTHER: 	  	  LABS:	 	    CARDIAC MARKERS:                                13.5   7.44  )-----------( 222      ( 17 Dec 2024 07:16 )             42.9     12-18    138  |  97  |  25[H]  ----------------------------<  70  4.6   |  29  |  0.61    Ca    9.6      18 Dec 2024 07:42      proBNP:   Lipid Profile: Cholesterol 180  LDL --  HDL 45      HgA1c:   TSH:   < from: TTE W or WO Ultrasound Enhancing Agent (12.17.24 @ 15:32) >   1. Left ventricular cavity is normal in size. Left ventricular wall thickness is normal. Left ventricular systolic function is normal with an ejection fraction visually estimated at 65 %. There are no regional wall motion abnormalities seen.   2. Mildly enlarged right ventricular cavity size and normal right ventricular systolic function.   3. Severe tricuspid regurgitation.   4. Moderate pulmonary hypertension.      < from: NM Pulmonary Ventilation/Perfusion Scan (12.18.24 @ 10:41) >  FINDINGS: There is heterogeneous distribution of radiopharmaceutical in   both lungs on the ventilation and perfusion images. There are no   segmental perfusion defects in either lung. There is cardiomegaly.    IMPRESSION:    Very low probability of pulmonary embolus.  Findings are not typical of CTEPH.        < from: NM Hepatobiliary Imaging (09.03.24 @ 11:17) >    IMPRESSION: Normal hepatobiliary scan. No evidence of acute cholecystitis   or biliary obstruction.    - Patient with DP/PT signals b/l with motor and sensory intact in foot, no pain  - Low concern for acute limb ischemia   - arterial US lower extremity reviewed   - patient has asymptomatic PAD- can follow up as outpatient.   - will sign off at this time, please reconsult when needed.         Assessment and plan  ---------------------------  89-year-old female history of A-fib, no longer on AC s/p watchman, Pulm HTN, HTN, recent admission from 7/17-7/25 for diverticulitis, comes to ED for persistent watery nonbloody diarrhea X24hrs. Denies vomiting, no fevers, no abdominal pain, no chest pain, no sob. Pt states she currently is on abxs for toe cellulitis does not remember the name and it caused her diarrhea.  pt with sig medical and cardiac hx with hx of a.fib on no AC sec to s/p watchman  with c/o diarrhea ?nausea an bl cold feet and discoloration on the R Toe.  r/o pvd/ emboli  start iv heparin if vascular agreed  check hr  sec to a.fib, no ac s/p watchman procedure  check arterial Doppler of LE noted, need vascular follow up and recommendation  lipid panel  continue all bp meds  awaiting gall bladder ultrasound, abx dc ed by ID  TTE noted with severe PHTN r/o chronic pulmonary emboli, check vq scan  Dr Aleman to see pt vascular surgery  add Norvasc 5 mg daily for elevated bp, will fu bp closely  add lasix 20 mg daily sec to sig pulmonary htn. VQ low probability  awaiting HIDA scan/ GI appreciated  discussed with vascular, fu as out pt with Dr Aleman

## 2024-12-19 NOTE — DIETITIAN INITIAL EVALUATION ADULT - PERTINENT MEDS FT
MEDICATIONS  (STANDING):  amLODIPine   Tablet 5 milliGRAM(s) Oral daily  aspirin enteric coated 81 milliGRAM(s) Oral daily  cloNIDine 0.1 milliGRAM(s) Oral two times a day  dextrose 5% + sodium chloride 0.45%. 500 milliLiter(s) (50 mL/Hr) IV Continuous <Continuous>  enalapril 20 milliGRAM(s) Oral daily  heparin   Injectable 5000 Unit(s) SubCutaneous every 8 hours  influenza  Vaccine (HIGH DOSE) 0.5 milliLiter(s) IntraMuscular once  metoprolol succinate ER 50 milliGRAM(s) Oral daily    MEDICATIONS  (PRN):  ondansetron Injectable 4 milliGRAM(s) IV Push every 6 hours PRN Nausea and/or Vomiting

## 2024-12-19 NOTE — DIETITIAN INITIAL EVALUATION ADULT - EDUCATION DIETARY MODIFICATIONS
adequate protein/energy intake of small frequent meals to ease tolerance/(1) partially meets; needs review/practice/verbalization

## 2024-12-19 NOTE — DIETITIAN INITIAL EVALUATION ADULT - OTHER INFO
dosing wt: 68 kg (12/15) *question accuracy, may be elevated 2/2 edema  bed wt obtained by RD: 65.9 kg (12/19)  wt hx per Regino SALTER: 59.9 kg (6/14), 60.2 kg (3/30) *Pt noted with wt gain x 9 months  reported UBW: 61.4-63.6 kg

## 2024-12-19 NOTE — PROGRESS NOTE ADULT - SUBJECTIVE AND OBJECTIVE BOX
[facility]  Kal Gamez PA-C  [address] [id number] [phone]      INTERVAL HPI/OVERNIGHT EVENTS: no acute events, RUQ U/S with distended GB, likely shadowing stones    MEDICATIONS  (STANDING):  aspirin enteric coated 81 milliGRAM(s) Oral daily  cloNIDine 0.1 milliGRAM(s) Oral two times a day  dextrose 5% + sodium chloride 0.45%. 500 milliLiter(s) (50 mL/Hr) IV Continuous <Continuous>  enalapril 20 milliGRAM(s) Oral daily  heparin   Injectable 5000 Unit(s) SubCutaneous every 8 hours  influenza  Vaccine (HIGH DOSE) 0.5 milliLiter(s) IntraMuscular once  metoprolol succinate ER 50 milliGRAM(s) Oral daily    MEDICATIONS  (PRN):      Allergies    Lipitor (Other; Flushing)  hydrALAZINE (Other; Flushing)    Intolerances    lactose (Unknown)      ROS:   General:  No  fevers, chills, night sweats, fatigue,   Eyes:  Good vision, no reported pain  ENT:  No sore throat, pain, runny nose, dysphagia  CV:  No pain, palpitations, hypo/hypertension  Resp:  No dyspnea, cough, tachypnea, wheezing  GI:  No pain, No nausea, No vomiting, No diarrhea, No constipation, No weight loss, No fever, No pruritis, No rectal bleeding, No tarry stools, No dysphagia,  :  No pain, bleeding, incontinence, nocturia  Muscle:  No pain, weakness  Neuro:  No weakness, tingling, memory problems  Psych:  No fatigue, insomnia, mood problems, depression  Endocrine:  No polyuria, polydipsia, cold/heat intolerance  Heme:  No petechiae, ecchymosis, easy bruisability  Skin:  No rash, tattoos, scars, edema      PHYSICAL EXAM:   Vital Signs:  Vital Signs Last 24 Hrs  T(C): 36.6 (17 Dec 2024 04:52), Max: 36.6 (17 Dec 2024 04:52)  T(F): 97.9 (17 Dec 2024 04:52), Max: 97.9 (17 Dec 2024 04:52)  HR: 60 (17 Dec 2024 04:52) (60 - 88)  BP: 145/80 (17 Dec 2024 04:52) (121/61 - 145/80)  BP(mean): --  RR: 18 (17 Dec 2024 04:52) (18 - 18)  SpO2: 100% (17 Dec 2024 04:52) (94% - 100%)    Parameters below as of 17 Dec 2024 04:52  Patient On (Oxygen Delivery Method): room air      Daily     Daily     GENERAL:  Appears stated age,   HEENT:  NC/AT,    CHEST:  Full & symmetric excursion,   HEART:  Regular rhythm,  ABDOMEN:  Soft, non-tender, non-distended,  EXTEREMITIES:  no cyanosis  SKIN:  No rash  NEURO:  Alert,       LABS:                        13.5   7.44  )-----------( 222      ( 17 Dec 2024 07:16 )             42.9     12-17    139  |  93[L]  |  34[H]  ----------------------------<  54[LL]  4.6   |  26  |  0.82    Ca    10.0      17 Dec 2024 07:13    TPro  7.5  /  Alb  3.8  /  TBili  0.5  /  DBili  x   /  AST  21  /  ALT  11  /  AlkPhos  68  12-16    PT/INR - ( 15 Dec 2024 23:45 )   PT: 23.1 sec;   INR: 2.02 ratio         PTT - ( 15 Dec 2024 23:45 )  PTT:38.1 sec  Urinalysis Basic - ( 17 Dec 2024 07:13 )    Color: x / Appearance: x / SG: x / pH: x  Gluc: 54 mg/dL / Ketone: x  / Bili: x / Urobili: x   Blood: x / Protein: x / Nitrite: x   Leuk Esterase: x / RBC: x / WBC x   Sq Epi: x / Non Sq Epi: x / Bacteria: x        RADIOLOGY & ADDITIONAL TESTS:

## 2024-12-19 NOTE — DIETITIAN INITIAL EVALUATION ADULT - REASON FOR ADMISSION
Per chart, Pt is a 89 y/o F with PMH: "A-fib, no longer on AC s/p watchman, Pulm HTN, HTN, recent admission from 7/17-7/25 for diverticulitis, comes to ED for persistent watery nonbloody diarrhea X24hrs. Pt states she currently is on abxs for toe cellulitis does not remember the name and it caused her diarrhea.Vascular surgery consulted due to feet feeling cold." Low suspicion of ischemic limb. RUQ U/S with distended GB, likely shadowing stones. Pending HIDA scan to r/o acute cholecystitis per GI.

## 2024-12-19 NOTE — PROGRESS NOTE ADULT - ASSESSMENT
This is a 89 y/o F w/ PMHx of AF, s/p watchman, no longer on AC, pulmonary HTN, h/o diverticulitis, who presentes to ER for watery, nonbloody diarrhea.   Pt was on antibiotics outpatient for toe cellulitis.     In the ER, pt was afebrile, vitals wnl.   Labs w/o leukocytosis, lactate wnl, No transaminitis.   CT A/P w/ possible ileus, no diverticulitis, with extensive diverticulosis. Gallbladder distension     #Diarrhea   #H/o diverticulitis   #Distended GB  #R 1st toe erythema      Overall,  89 y/o F w/ PMHx of AF, s/p watchman, no longer on AC, pulmonary HTN, h/o diverticulitis presenting w/ diarrhea, found to have "cold feet", seen by vascular with doppler DP/PT signals pending arterial US.   Pt afebrile, w/o leukocytosis, diarrhea now resolved. Was on unknown antibiotic for about a week for possible R toe cellulitis. On exam, very mild erythema on tip of 1st toe, not warm to touch, does not appear consistent with cellulitis. Would monitor off antibiotics at this time.  V/Q scan low probability for PE    Recommend:   1. Low c/f for R 1st toe cellulitis, CT A/P w/o diverticulitis. Monitor off abx  2. RUQ with mildly distended GB, HIDA pending   3. If has diarrhea, can send GI PCR. Given no leukocytosis and low amounts of diarrhea (3 times a day), low concern for C diff    NOTE IS INCOMPLETE     This is a 89 y/o F w/ PMHx of AF, s/p watchman, no longer on AC, pulmonary HTN, h/o diverticulitis, who presentes to ER for watery, nonbloody diarrhea.   Pt was on antibiotics outpatient for toe cellulitis.     In the ER, pt was afebrile, vitals wnl.   Labs w/o leukocytosis, lactate wnl, No transaminitis.   CT A/P w/ possible ileus, no diverticulitis, with extensive diverticulosis. Gallbladder distension     #Diarrhea   #H/o diverticulitis   #Distended GB  #R 1st toe erythema      Overall,  89 y/o F w/ PMHx of AF, s/p watchman, no longer on AC, pulmonary HTN, h/o diverticulitis presenting w/ diarrhea, found to have "cold feet", seen by vascular with doppler DP/PT signals pending arterial US.   Pt afebrile, w/o leukocytosis, diarrhea now resolved. Was on unknown antibiotic for about a week for possible R toe cellulitis. On exam, very mild erythema on tip of 1st toe, not warm to touch, does not appear consistent with cellulitis. Would monitor off antibiotics at this time.  V/Q scan low probability for PE    Recommend:   1. Low c/f for R 1st toe cellulitis, CT A/P w/o diverticulitis. Monitor off abx  2. RUQ with mildly distended GB, HIDA pending   3. If has diarrhea, can send GI PCR. Given no leukocytosis and low amounts of diarrhea (3 times a day), low concern for C diff    Thank you for this consult. Inpatient ID team will follow.    Mason Jaime M.D.  Attending Physician  Division of Infectious Diseases  Department of Medicine    Please contact through [employer].  Office: [phone] (after 5 PM or weekend)

## 2024-12-19 NOTE — DIETITIAN INITIAL EVALUATION ADULT - NSFNSGIIOFT_GEN_A_CORE
Pt reporting vomiting, however not documented in MD notes or RN flowsheets. Pt also c/o nausea which has been documented - ordered for IV zofran PRN. Pt states diarrhea has resolved. Last BM 12/15 per RN flowsheets. Pt not on bowel regimen.

## 2024-12-19 NOTE — PROGRESS NOTE ADULT - ASSESSMENT
Watery diarrhea  Distended GB    Recommendations:  - LFTs and prior normal HIDA reassuring, obtain HIDA scan to r/o acute cholecystitis   - Trend LFTs daily   - GI PCR if has ongoing diarrhea  - Serial abdominal exams  - IVF  - Advance diet as tolerated  - Will follow with you    Porter Lenz M.D.  [facility]  [phone]

## 2024-12-19 NOTE — DIETITIAN INITIAL EVALUATION ADULT - PERTINENT LABORATORY DATA
12-19    138  |  97  |  19  ----------------------------<  80  4.4   |  28  |  0.56    Ca    9.9      19 Dec 2024 07:12    TPro  7.3  /  Alb  3.6  /  TBili  0.5  /  DBili  x   /  AST  34  /  ALT  12  /  AlkPhos  72  12-19  POCT Blood Glucose.: 148 mg/dL (12-19-24 @ 11:58)  A1C with Estimated Average Glucose Result: 6.1 % (12-18-24 @ 07:42)

## 2024-12-19 NOTE — DIETITIAN INITIAL EVALUATION ADULT - ORAL INTAKE PTA/DIET HISTORY
Chart reviewed, events noted. Pt reports decreased PO intake for a couple of days PTA 2/2 nausea. Reports diarrhea x 1 day, was on Abx. Eats scrambled eggs and toast for breakfast, unable to recall any other preferred foods. Pt denies any chewing/swallowing problems and state she was eating regular consistency food at home. Pt confirmed allergy to lactose - avoids all dairy products.

## 2024-12-19 NOTE — DIETITIAN INITIAL EVALUATION ADULT - ENERGY INTAKE
Pt reports poor PO intake since admission. States she has eaten nothing but ice chips and saltine crackers. Drinking orange juice and eating saltines during interview which she states she was able to tolerate. RN flowsheets document Pt consuming 0-50% of meals. No SLP eval per chart review, unclear need for minced and moist consistency diet. Pt adamantly refusing any oral nutrition supplements at this time. Labs reviewed, fingersticks controlled. Poor (<50%)

## 2024-12-19 NOTE — DIETITIAN INITIAL EVALUATION ADULT - NS FNS DIET ORDER
Diet, NPO after Midnight:      NPO Start Date: 19-Dec-2024,   NPO Start Time: 23:59 (12-19-24 @ 09:26)  12/16: minced and moist diet

## 2024-12-19 NOTE — DIETITIAN INITIAL EVALUATION ADULT - ADD RECOMMEND
1) recommend advance diet to easy to chew, lactose-restricted unless s/sx dysphagia and then recommend SLP Eval  2) Malnutrition sticker placed, RD to follow-up as per protocol   3) consider MVI daily to optimize nutrition status  4) continue anti-emetic  5) Monitor PO intake, weight, labs, skin, GI status, diet

## 2024-12-19 NOTE — PROGRESS NOTE ADULT - SUBJECTIVE AND OBJECTIVE BOX
---___---___---___---___---___---___ ---___---___---___---___---___---___---___---___---                  M E D I C A L   A T T E N D I N G   P R O G R E S S   N O T E  ---___---___---___---___---___---___ ---___---___---___---___---___---___---___---___---        ================================================    ++CHIEF COMPLAINT:   Patient is a 89y old  Female who presents with a chief complaint of ischemic foot (19 Dec 2024 14:06)      Abdominal pain      ---___---___---___---___---___---  PAST MEDICAL / Surgical  HISTORY:  PAST MEDICAL & SURGICAL HISTORY:  Afib      HTN (hypertension)      H/O CHF      Uterine cancer      S/P hysterectomy          ---___---___---___---___---___---  FAMILY HISTORY:   FAMILY HISTORY:        ---___---___---___---___---___---  ALLERGIES:   Allergies    Lipitor (Other; Flushing)  hydrALAZINE (Other; Flushing)    Intolerances    lactose (Unknown)      ---___---___---___---___---___---  MEDICATIONS:  MEDICATIONS  (STANDING):  amLODIPine   Tablet 5 milliGRAM(s) Oral daily  aspirin enteric coated 81 milliGRAM(s) Oral daily  cloNIDine 0.1 milliGRAM(s) Oral two times a day  dextrose 5% + sodium chloride 0.45%. 500 milliLiter(s) (50 mL/Hr) IV Continuous <Continuous>  enalapril 20 milliGRAM(s) Oral daily  heparin   Injectable 5000 Unit(s) SubCutaneous every 8 hours  influenza  Vaccine (HIGH DOSE) 0.5 milliLiter(s) IntraMuscular once  metoprolol succinate ER 50 milliGRAM(s) Oral daily    MEDICATIONS  (PRN):  ondansetron Injectable 4 milliGRAM(s) IV Push every 6 hours PRN Nausea and/or Vomiting      ---___---___---___---___---___---  REVIEW OF SYSTEM:    GEN: no fever, no chills, no pain  RESP: no SOB, no cough, no sputum  CVS: no chest pain, no palpitations, no edema  GI: no abdominal pain, no nausea, no vomiting, no constipation, no diarrhea  : no dysurea, no frequency, no hematurea  Neuro: no headache, no dizziness  PSYCH: no anxiety, no depression  Derm : no itching, no rash    ---___---___---___---___---___---  VITAL SIGNS:  89y , CAPILLARY BLOOD GLUCOSE      POCT Blood Glucose.: 177 mg/dL (19 Dec 2024 17:03)    T(C): 36.3 (24 @ 16:11), Max: 36.6 (24 @ 00:49)  HR: 74 (24 @ 16:11) (74 - 91)  BP: 152/82 (24 @ 16:11) (120/74 - 177/77)  RR: 18 (24 @ 16:11) (18 - 18)  SpO2: 96% (24 @ 16:11) (93% - 96%)  ---___---___---___---___---___---  PHYSICAL EXAM:    GEN: A&O X 3 , NAD , comfortable  HEENT: NCAT, PERRL, MMM, hearing intact  Neck: supple , no JVD  CVS: S1S2 , regular , No M/R/G appreciated  PULM: CTA B/L,  no W/R/R appreciated  ABD.: soft. non tender, non distended,  bowel sounds present  Extrem: intact pulses , no edema   Derm: No rash , no ecchymoses  PSYCH : normal mood,  no delusion not anxious     ---___---___---___---___---___---            LAB AND IMAGIN-19    138  |  97  |  19  ----------------------------<  80  4.4   |  28  |  0.56    Ca    9.9      19 Dec 2024 07:12    TPro  7.3  /  Alb  3.6  /  TBili  0.5  /  DBili  x   /  AST  34  /  ALT  12  /  AlkPhos  72                              Urinalysis Basic - ( 19 Dec 2024 07:12 )    Color: x / Appearance: x / SG: x / pH: x  Gluc: 80 mg/dL / Ketone: x  / Bili: x / Urobili: x   Blood: x / Protein: x / Nitrite: x   Leuk Esterase: x / RBC: x / WBC x   Sq Epi: x / Non Sq Epi: x / Bacteria: x        [All pertinent / recent Imaging reviewed]         ---___---___---___---___---___---___ ---___---___---___---___---                         A S S E S S M E N T   A N D   P L A N :      HEALTH ISSUES - PROBLEM Dx:  89-year-old female history of A-fib, no longer on AC s/p watchman, Pulm HTN, HTN, recent admission from - for diverticulitis, comes to ED for persistent watery nonbloody diarrhea X24hrs. Denies vomiting, no fevers, no abdominal pain, no chest pain, no sob. Pt states she currently is on abxs for toe cellulitis does not remember the name and it caused her diarrhea.  pt with sig medical and cardiac hx with hx of a.fib on no AC sec to s/p watchman  with c/o diarrhea ?nausea an bl cold feet and discoloration on the R Toe.  r/o pvd/ emboli  start iv heparin if vascular agreed  check hr  sec to a.fib, no ac s/p watchman procedure  check arterial Doppler of LE noted, need vascular follow up and recommendation  lipid panel  continue all bp meds  awaiting gall bladder ultrasound, abx dc ed by ID  TTE reviwed   sever tricuspid regurgitation cardiology following      vascula r input    Patient with DP/PT signals b/l with motor and sensory intact in foot, no pain  - Low concern for acute limb ischemia   - arterial US lower extremity reviewed   - patient has asymptomatic PAD- can follow up as outpatient.   - will sign off at this time, please reconsult when needed.           had abdominal pain and nause are doing imaging to evaluate gallbladder            -GI/DVT Prophylaxis.    --------------------------------------------  Case discussed with   Education given on   ___________________________  Thank you,  Porter Keller  [phone]

## 2024-12-20 LAB
ALBUMIN SERPL ELPH-MCNC: 3.4 G/DL — SIGNIFICANT CHANGE UP (ref 3.3–5)
ALP SERPL-CCNC: 68 U/L — SIGNIFICANT CHANGE UP (ref 40–120)
ALT FLD-CCNC: 11 U/L — SIGNIFICANT CHANGE UP (ref 10–45)
ANION GAP SERPL CALC-SCNC: 11 MMOL/L — SIGNIFICANT CHANGE UP (ref 5–17)
AST SERPL-CCNC: 24 U/L — SIGNIFICANT CHANGE UP (ref 10–40)
BILIRUB SERPL-MCNC: 0.5 MG/DL — SIGNIFICANT CHANGE UP (ref 0.2–1.2)
BUN SERPL-MCNC: 14 MG/DL — SIGNIFICANT CHANGE UP (ref 7–23)
CALCIUM SERPL-MCNC: 9.2 MG/DL — SIGNIFICANT CHANGE UP (ref 8.4–10.5)
CHLORIDE SERPL-SCNC: 98 MMOL/L — SIGNIFICANT CHANGE UP (ref 96–108)
CO2 SERPL-SCNC: 29 MMOL/L — SIGNIFICANT CHANGE UP (ref 22–31)
CREAT SERPL-MCNC: 0.53 MG/DL — SIGNIFICANT CHANGE UP (ref 0.5–1.3)
EGFR: 87 ML/MIN/1.73M2 — SIGNIFICANT CHANGE UP
GLUCOSE BLDC GLUCOMTR-MCNC: 106 MG/DL — HIGH (ref 70–99)
GLUCOSE BLDC GLUCOMTR-MCNC: 118 MG/DL — HIGH (ref 70–99)
GLUCOSE BLDC GLUCOMTR-MCNC: 118 MG/DL — HIGH (ref 70–99)
GLUCOSE BLDC GLUCOMTR-MCNC: 69 MG/DL — LOW (ref 70–99)
GLUCOSE BLDC GLUCOMTR-MCNC: 78 MG/DL — SIGNIFICANT CHANGE UP (ref 70–99)
GLUCOSE BLDC GLUCOMTR-MCNC: 86 MG/DL — SIGNIFICANT CHANGE UP (ref 70–99)
GLUCOSE SERPL-MCNC: 63 MG/DL — LOW (ref 70–99)
HCT VFR BLD CALC: 41.5 % — SIGNIFICANT CHANGE UP (ref 34.5–45)
HGB BLD-MCNC: 13.2 G/DL — SIGNIFICANT CHANGE UP (ref 11.5–15.5)
MCHC RBC-ENTMCNC: 30.7 PG — SIGNIFICANT CHANGE UP (ref 27–34)
MCHC RBC-ENTMCNC: 31.8 G/DL — LOW (ref 32–36)
MCV RBC AUTO: 96.5 FL — SIGNIFICANT CHANGE UP (ref 80–100)
NRBC # BLD: 0 /100 WBCS — SIGNIFICANT CHANGE UP (ref 0–0)
PLATELET # BLD AUTO: 183 K/UL — SIGNIFICANT CHANGE UP (ref 150–400)
POTASSIUM SERPL-MCNC: 3.9 MMOL/L — SIGNIFICANT CHANGE UP (ref 3.5–5.3)
POTASSIUM SERPL-SCNC: 3.9 MMOL/L — SIGNIFICANT CHANGE UP (ref 3.5–5.3)
PROT SERPL-MCNC: 6.9 G/DL — SIGNIFICANT CHANGE UP (ref 6–8.3)
RBC # BLD: 4.3 M/UL — SIGNIFICANT CHANGE UP (ref 3.8–5.2)
RBC # FLD: 15.1 % — HIGH (ref 10.3–14.5)
SODIUM SERPL-SCNC: 138 MMOL/L — SIGNIFICANT CHANGE UP (ref 135–145)
WBC # BLD: 6.39 K/UL — SIGNIFICANT CHANGE UP (ref 3.8–10.5)
WBC # FLD AUTO: 6.39 K/UL — SIGNIFICANT CHANGE UP (ref 3.8–10.5)

## 2024-12-20 PROCEDURE — 78226 HEPATOBILIARY SYSTEM IMAGING: CPT | Mod: 26

## 2024-12-20 PROCEDURE — 99232 SBSQ HOSP IP/OBS MODERATE 35: CPT

## 2024-12-20 RX ORDER — DEXTROSE MONOHYDRATE 25 G/50ML
25 INJECTION, SOLUTION INTRAVENOUS ONCE
Refills: 0 | Status: DISCONTINUED | OUTPATIENT
Start: 2024-12-20 | End: 2024-12-24

## 2024-12-20 RX ORDER — SODIUM CHLORIDE 9 MG/ML
1000 INJECTION, SOLUTION INTRAVENOUS
Refills: 0 | Status: DISCONTINUED | OUTPATIENT
Start: 2024-12-20 | End: 2024-12-24

## 2024-12-20 RX ORDER — GLUCAGON INJECTION, SOLUTION 0.5 MG/.1ML
1 INJECTION, SOLUTION SUBCUTANEOUS ONCE
Refills: 0 | Status: DISCONTINUED | OUTPATIENT
Start: 2024-12-20 | End: 2024-12-24

## 2024-12-20 RX ORDER — DEXTROSE MONOHYDRATE 25 G/50ML
12.5 INJECTION, SOLUTION INTRAVENOUS ONCE
Refills: 0 | Status: COMPLETED | OUTPATIENT
Start: 2024-12-20 | End: 2024-12-20

## 2024-12-20 RX ORDER — DEXTROSE MONOHYDRATE 25 G/50ML
12.5 INJECTION, SOLUTION INTRAVENOUS ONCE
Refills: 0 | Status: DISCONTINUED | OUTPATIENT
Start: 2024-12-20 | End: 2024-12-24

## 2024-12-20 RX ORDER — DEXTROSE MONOHYDRATE 25 G/50ML
15 INJECTION, SOLUTION INTRAVENOUS ONCE
Refills: 0 | Status: DISCONTINUED | OUTPATIENT
Start: 2024-12-20 | End: 2024-12-24

## 2024-12-20 RX ADMIN — Medication 50 MILLIGRAM(S): at 05:17

## 2024-12-20 RX ADMIN — CLONIDINE HYDROCHLORIDE 0.1 MILLIGRAM(S): 0.2 TABLET ORAL at 05:17

## 2024-12-20 RX ADMIN — Medication 5 MILLIGRAM(S): at 12:43

## 2024-12-20 RX ADMIN — HEPARIN SODIUM 5000 UNIT(S): 1000 INJECTION, SOLUTION INTRAVENOUS; SUBCUTANEOUS at 05:17

## 2024-12-20 RX ADMIN — DEXTROSE MONOHYDRATE 12.5 GRAM(S): 25 INJECTION, SOLUTION INTRAVENOUS at 06:29

## 2024-12-20 RX ADMIN — ONDANSETRON 4 MILLIGRAM(S): 4 TABLET ORAL at 12:28

## 2024-12-20 RX ADMIN — HEPARIN SODIUM 5000 UNIT(S): 1000 INJECTION, SOLUTION INTRAVENOUS; SUBCUTANEOUS at 16:33

## 2024-12-20 RX ADMIN — CLONIDINE HYDROCHLORIDE 0.1 MILLIGRAM(S): 0.2 TABLET ORAL at 17:04

## 2024-12-20 RX ADMIN — Medication 5 MILLIGRAM(S): at 05:16

## 2024-12-20 RX ADMIN — HEPARIN SODIUM 5000 UNIT(S): 1000 INJECTION, SOLUTION INTRAVENOUS; SUBCUTANEOUS at 22:50

## 2024-12-20 RX ADMIN — ENALAPRIL MALEATE 20 MILLIGRAM(S): 10 TABLET ORAL at 05:17

## 2024-12-20 NOTE — PROGRESS NOTE ADULT - SUBJECTIVE AND OBJECTIVE BOX
---___---___---___---___---___---___ ---___---___---___---___---___---___---___---___---                  M E D I C A L   A T T E N D I N G   P R O G R E S S   N O T E  ---___---___---___---___---___---___ ---___---___---___---___---___---___---___---___---        ================================================    ++CHIEF COMPLAINT:   Patient is a 89y old  Female who presents with a chief complaint of ischemic foot (20 Dec 2024 12:29)      Abdominal pain      ---___---___---___---___---___---  PAST MEDICAL / Surgical  HISTORY:  PAST MEDICAL & SURGICAL HISTORY:  Afib      HTN (hypertension)      H/O CHF      Uterine cancer      S/P hysterectomy          ---___---___---___---___---___---  FAMILY HISTORY:   FAMILY HISTORY:        ---___---___---___---___---___---  ALLERGIES:   Allergies    Lipitor (Other; Flushing)  hydrALAZINE (Other; Flushing)    Intolerances    lactose (Unknown)      ---___---___---___---___---___---  MEDICATIONS:  MEDICATIONS  (STANDING):  aspirin enteric coated 81 milliGRAM(s) Oral daily  cloNIDine 0.1 milliGRAM(s) Oral two times a day  dextrose 5% + sodium chloride 0.45%. 500 milliLiter(s) (50 mL/Hr) IV Continuous <Continuous>  dextrose 5%. 1000 milliLiter(s) (50 mL/Hr) IV Continuous <Continuous>  dextrose 5%. 1000 milliLiter(s) (100 mL/Hr) IV Continuous <Continuous>  dextrose 50% Injectable 25 Gram(s) IV Push once  dextrose 50% Injectable 12.5 Gram(s) IV Push once  dextrose 50% Injectable 25 Gram(s) IV Push once  dextrose Oral Gel 15 Gram(s) Oral once  enalapril 20 milliGRAM(s) Oral daily  glucagon  Injectable 1 milliGRAM(s) IntraMuscular once  heparin   Injectable 5000 Unit(s) SubCutaneous every 8 hours  influenza  Vaccine (HIGH DOSE) 0.5 milliLiter(s) IntraMuscular once  metoprolol succinate ER 50 milliGRAM(s) Oral daily    MEDICATIONS  (PRN):  ondansetron Injectable 4 milliGRAM(s) IV Push every 6 hours PRN Nausea and/or Vomiting      ---___---___---___---___---___---  REVIEW OF SYSTEM:    GEN: no fever, no chills, no pain  RESP: no SOB, no cough, no sputum  CVS: no chest pain, no palpitations, no edema  GI: no abdominal pain, no nausea, no vomiting, no constipation, no diarrhea  : no dysurea, no frequency, no hematurea  Neuro: no headache, no dizziness  PSYCH: no anxiety, no depression  Derm : no itching, no rash    ---___---___---___---___---___---  VITAL SIGNS:  89y , CAPILLARY BLOOD GLUCOSE      POCT Blood Glucose.: 78 mg/dL (20 Dec 2024 12:03)    T(C): 36.4 (24 @ 11:22), Max: 36.6 (24 @ 20:07)  HR: 85 (24 @ 11:22) (73 - 92)  BP: 169/89 (24 @ 11:22) (146/71 - 174/83)  RR: 18 (24 @ 11:22) (18 - 18)  SpO2: 95% (12-20-24 @ 11:22) (94% - 97%)  ---___---___---___---___---___---  PHYSICAL EXAM:    GEN: A&O X 3 , NAD , comfortable  HEENT: NCAT, PERRL, MMM, hearing intact  Neck: supple , no JVD  CVS: S1S2 , regular , No M/R/G appreciated  PULM: CTA B/L,  no W/R/R appreciated  ABD.: soft. non tender, non distended,  bowel sounds present  Extrem: intact pulses , no edema   Derm: No rash , no ecchymoses  PSYCH : normal mood,  no delusion not anxious     ---___---___---___---___---___---            LAB AND IMAGIN.2   6.39  )-----------( 183      ( 20 Dec 2024 07:07 )             41.5               12-20    138  |  98  |  14  ----------------------------<  63[L]  3.9   |  29  |  0.53    Ca    9.2      20 Dec 2024 07:07    TPro  6.9  /  Alb  3.4  /  TBili  0.5  /  DBili  x   /  AST  24  /  ALT  11  /  AlkPhos  68  12-20                            Urinalysis Basic - ( 20 Dec 2024 07:07 )    Color: x / Appearance: x / SG: x / pH: x  Gluc: 63 mg/dL / Ketone: x  / Bili: x / Urobili: x   Blood: x / Protein: x / Nitrite: x   Leuk Esterase: x / RBC: x / WBC x   Sq Epi: x / Non Sq Epi: x / Bacteria: x        [All pertinent / recent Imaging reviewed]         ---___---___---___---___---___---___ ---___---___---___---___---                         A S S E S S M E N T   A N D   P L A N :      HEALTH ISSUES - PROBLEM Dx:  89-year-old female history of A-fib, no longer on AC s/p watchman, Pulm HTN, HTN, recent admission from - for diverticulitis, comes to ED for persistent watery nonbloody diarrhea X24hrs. Denies vomiting, no fevers, no abdominal pain, no chest pain, no sob. Pt states she currently is on abxs for toe cellulitis does not remember the name and it caused her diarrhea.  pt with sig medical and cardiac hx with hx of a.fib on no AC sec to s/p watchman  with c/o diarrhea ?nausea an bl cold feet and discoloration on the R Toe.  r/o pvd/ emboli  start iv heparin if vascular agreed  check hr  sec to a.fib, no ac s/p watchman procedure  check arterial Doppler of LE noted, need vascular follow up and recommendation  lipid panel  continue all bp meds  awaiting gall bladder ultrasound, abx dc ed by ID  TTE reviwed   sever tricuspid regurgitation cardiology following      vascula r input    Patient with DP/PT signals b/l with motor and sensory intact in foot, no pain    gi following has normal hida continue to monitor start dc planning       ___________________________  Thank you,  Porter Keller  [phone]

## 2024-12-20 NOTE — PROGRESS NOTE ADULT - ASSESSMENT
Watery diarrhea  Distended GB    Recommendations:  - LFTs and prior normal HIDA reassuring, obtain HIDA scan to r/o acute cholecystitis   - Trend LFTs daily   - GI PCR if has ongoing diarrhea  - Serial abdominal exams  - IVF  - Advance diet as tolerated  - Will follow with you    Porter Lezn M.D.  [facility]  [phone]

## 2024-12-20 NOTE — PROGRESS NOTE ADULT - SUBJECTIVE AND OBJECTIVE BOX
Date of Service: 12-20-24 @ 12:29           CARDIOLOGY     PROGRESS  NOTE   ________________________________________________    CHIEF COMPLAINT:Patient is a 89y old  Female who presents with a chief complaint of ischemic foot (20 Dec 2024 11:43)  comfortable  	  REVIEW OF SYSTEMS:  CONSTITUTIONAL: No fever, weight loss, or fatigue  EYES: No eye pain, visual disturbances, or discharge  ENT:  No difficulty hearing, tinnitus, vertigo; No sinus or throat pain  NECK: No pain or stiffness  RESPIRATORY: No cough, wheezing, chills or hemoptysis; No Shortness of Breath  CARDIOVASCULAR: No chest pain, palpitations, passing out, dizziness, or leg swelling  GASTROINTESTINAL: No abdominal or epigastric pain. No nausea, vomiting, or hematemesis; No diarrhea or constipation. No melena or hematochezia.  GENITOURINARY: No dysuria, frequency, hematuria, or incontinence  NEUROLOGICAL: No headaches, memory loss, loss of strength, numbness, or tremors  SKIN: No itching, burning, rashes, or lesions   LYMPH Nodes: No enlarged glands  ENDOCRINE: No heat or cold intolerance; No hair loss  MUSCULOSKELETAL: No joint pain or swelling; No muscle, back, or extremity pain  PSYCHIATRIC: No depression, anxiety, mood swings, or difficulty sleeping  HEME/LYMPH: No easy bruising, or bleeding gums  ALLERGY AND IMMUNOLOGIC: No hives or eczema	    [x ] All others negative	  [ ] Unable to obtain    PHYSICAL EXAM:  T(C): 36.4 (12-20-24 @ 11:22), Max: 36.6 (12-19-24 @ 12:45)  HR: 85 (12-20-24 @ 11:22) (73 - 92)  BP: 169/89 (12-20-24 @ 11:22) (146/71 - 174/83)  RR: 18 (12-20-24 @ 11:22) (18 - 18)  SpO2: 95% (12-20-24 @ 11:22) (94% - 97%)  Wt(kg): --  I&O's Summary    19 Dec 2024 07:01  -  20 Dec 2024 07:00  --------------------------------------------------------  IN: 601 mL / OUT: 390 mL / NET: 211 mL        Appearance: Normal	  HEENT:   Normal oral mucosa, PERRL, EOMI	  Lymphatic: No lymphadenopathy  Cardiovascular: Normal S1 S2, No JVD, + murmurs, No edema  Respiratory:rhonchi  Psychiatry: A & O x 3, Mood & affect appropriate  Gastrointestinal:  Soft, Non-tender, + BS	  Skin: No rashes, No ecchymoses, No cyanosis	  Neurologic: Non-focal  Extremities: Normal range of motion, No clubbing, cyanosis or edema  Vascular:+ pvd    MEDICATIONS  (STANDING):  amLODIPine   Tablet 5 milliGRAM(s) Oral daily  aspirin enteric coated 81 milliGRAM(s) Oral daily  cloNIDine 0.1 milliGRAM(s) Oral two times a day  dextrose 5% + sodium chloride 0.45%. 500 milliLiter(s) (50 mL/Hr) IV Continuous <Continuous>  dextrose 5%. 1000 milliLiter(s) (50 mL/Hr) IV Continuous <Continuous>  dextrose 5%. 1000 milliLiter(s) (100 mL/Hr) IV Continuous <Continuous>  dextrose 50% Injectable 25 Gram(s) IV Push once  dextrose 50% Injectable 12.5 Gram(s) IV Push once  dextrose 50% Injectable 25 Gram(s) IV Push once  dextrose Oral Gel 15 Gram(s) Oral once  enalapril 20 milliGRAM(s) Oral daily  glucagon  Injectable 1 milliGRAM(s) IntraMuscular once  heparin   Injectable 5000 Unit(s) SubCutaneous every 8 hours  influenza  Vaccine (HIGH DOSE) 0.5 milliLiter(s) IntraMuscular once  metoprolol succinate ER 50 milliGRAM(s) Oral daily      TELEMETRY: 	    ECG:  	  RADIOLOGY:  OTHER: 	  	  LABS:	 	    CARDIAC MARKERS:                                13.2   6.39  )-----------( 183      ( 20 Dec 2024 07:07 )             41.5     12-20    138  |  98  |  14  ----------------------------<  63[L]  3.9   |  29  |  0.53    Ca    9.2      20 Dec 2024 07:07    TPro  6.9  /  Alb  3.4  /  TBili  0.5  /  DBili  x   /  AST  24  /  ALT  11  /  AlkPhos  68  12-20    proBNP:   Lipid Profile: Cholesterol 180  LDL --  HDL 45      HgA1c:   TSH:         Assessment and plan  ---------------------------  89-year-old female history of A-fib, no longer on AC s/p watchman, Pulm HTN, HTN, recent admission from 7/17-7/25 for diverticulitis, comes to ED for persistent watery nonbloody diarrhea X24hrs. Denies vomiting, no fevers, no abdominal pain, no chest pain, no sob. Pt states she currently is on abxs for toe cellulitis does not remember the name and it caused her diarrhea.  pt with sig medical and cardiac hx with hx of a.fib on no AC sec to s/p watchman  with c/o diarrhea ?nausea an bl cold feet and discoloration on the R Toe.  r/o pvd/ emboli  start iv heparin if vascular agreed  check hr  sec to a.fib, no ac s/p watchman procedure  check arterial Doppler of LE noted, need vascular follow up and recommendation  lipid panel  continue all bp meds  awaiting gall bladder ultrasound, abx dc ed by ID  TTE noted with severe PHTN r/o chronic pulmonary emboli, check vq scan  Dr Aleman to see pt vascular surgery  add Norvasc 5 mg daily for elevated bp, will fu bp closely  add lasix 20 mg daily sec to sig pulmonary htn. VQ low probability  awaiting HIDA scan/ GI appreciated  discussed with vascular, fu as out pt with Dr Aleman  increase Norvasc to 10 mg daily, awaiting HIDA scan results

## 2024-12-20 NOTE — PROGRESS NOTE ADULT - SUBJECTIVE AND OBJECTIVE BOX
Infectious Diseases Follow Up:    Patient is a 89y old  Female who presents with a chief complaint of ischemic foot (20 Dec 2024 10:16)      Interval History/ROS:  No acute events feeling, denies abdominal pain, nausea improved     Allergies  Lipitor (Other; Flushing)  hydrALAZINE (Other; Flushing)        ANTIMICROBIALS:      Current Abx:     Previous Abx     OTHER MEDS:  MEDICATIONS  (STANDING):  amLODIPine   Tablet 5 daily  aspirin enteric coated 81 daily  cloNIDine 0.1 two times a day  dextrose 50% Injectable 25 once  dextrose 50% Injectable 12.5 once  dextrose 50% Injectable 25 once  dextrose Oral Gel 15 once  enalapril 20 daily  glucagon  Injectable 1 once  heparin   Injectable 5000 every 8 hours  influenza  Vaccine (HIGH DOSE) 0.5 once  metoprolol succinate ER 50 daily  ondansetron Injectable 4 every 6 hours PRN      Vital Signs Last 24 Hrs  T(C): 36.4 (20 Dec 2024 11:22), Max: 36.6 (19 Dec 2024 12:45)  T(F): 97.5 (20 Dec 2024 11:22), Max: 97.9 (19 Dec 2024 12:45)  HR: 85 (20 Dec 2024 11:22) (73 - 92)  BP: 169/89 (20 Dec 2024 11:22) (146/71 - 174/83)  BP(mean): --  RR: 18 (20 Dec 2024 11:22) (18 - 18)  SpO2: 95% (20 Dec 2024 11:22) (94% - 97%)    Parameters below as of 20 Dec 2024 11:22  Patient On (Oxygen Delivery Method): room air      PHYSICAL EXAM:  GENERAL: NAD, well-developed  HEAD:  Atraumatic, Normocephalic  EYES: EOMI,  conjunctiva and sclera clear  CHEST/LUNG: On RA, not in respiratory distress   HEART: Regular rate and rhythm; No murmurs, rubs, or gallops  ABDOMEN: Soft, Nontender, Nondistended; Bowel sounds present  PSYCH: AAOx3                        13.2   6.39  )-----------( 183      ( 20 Dec 2024 07:07 )             41.5       12-20    138  |  98  |  14  ----------------------------<  63[L]  3.9   |  29  |  0.53    Ca    9.2      20 Dec 2024 07:07    TPro  6.9  /  Alb  3.4  /  TBili  0.5  /  DBili  x   /  AST  24  /  ALT  11  /  AlkPhos  68  12-20      Urinalysis Basic - ( 20 Dec 2024 07:07 )    Color: x / Appearance: x / SG: x / pH: x  Gluc: 63 mg/dL / Ketone: x  / Bili: x / Urobili: x   Blood: x / Protein: x / Nitrite: x   Leuk Esterase: x / RBC: x / WBC x   Sq Epi: x / Non Sq Epi: x / Bacteria: x        MICROBIOLOGY:  v            RADIOLOGY:  < from: [facility] Hepatobiliary Imaging (12.20.24 @ 10:35) >  FINDINGS: There is prompt, homogeneous uptake of radiopharmaceutical by   the hepatocytes. Activity is seen in the gallbladder at approximately 50   minutes and in the bowel at approximately 45 minutes. There is good   clearance of activity from the liver by the end of the study.    IMPRESSION: Normal hepatobiliary scan. No evidence of acute cholecystitis   or biliary obstruction.

## 2024-12-20 NOTE — PROGRESS NOTE ADULT - SUBJECTIVE AND OBJECTIVE BOX
[facility]  Kal Gamez PA-C  [address] [place] [address]  [phone]      INTERVAL HPI/OVERNIGHT EVENTS: no acute events, mild abdominal pain HIDA ordered     MEDICATIONS  (STANDING):  aspirin enteric coated 81 milliGRAM(s) Oral daily  cloNIDine 0.1 milliGRAM(s) Oral two times a day  dextrose 5% + sodium chloride 0.45%. 500 milliLiter(s) (50 mL/Hr) IV Continuous <Continuous>  enalapril 20 milliGRAM(s) Oral daily  heparin   Injectable 5000 Unit(s) SubCutaneous every 8 hours  influenza  Vaccine (HIGH DOSE) 0.5 milliLiter(s) IntraMuscular once  metoprolol succinate ER 50 milliGRAM(s) Oral daily    MEDICATIONS  (PRN):      Allergies    Lipitor (Other; Flushing)  hydrALAZINE (Other; Flushing)    Intolerances    lactose (Unknown)      ROS:   General:  No  fevers, chills, night sweats, fatigue,   Eyes:  Good vision, no reported pain  ENT:  No sore throat, pain, runny nose, dysphagia  CV:  No pain, palpitations, hypo/hypertension  Resp:  No dyspnea, cough, tachypnea, wheezing  GI:  No pain, No nausea, No vomiting, No diarrhea, No constipation, No weight loss, No fever, No pruritis, No rectal bleeding, No tarry stools, No dysphagia,  :  No pain, bleeding, incontinence, nocturia  Muscle:  No pain, weakness  Neuro:  No weakness, tingling, memory problems  Psych:  No fatigue, insomnia, mood problems, depression  Endocrine:  No polyuria, polydipsia, cold/heat intolerance  Heme:  No petechiae, ecchymosis, easy bruisability  Skin:  No rash, tattoos, scars, edema      PHYSICAL EXAM:   Vital Signs:  Vital Signs Last 24 Hrs  T(C): 36.6 (17 Dec 2024 04:52), Max: 36.6 (17 Dec 2024 04:52)  T(F): 97.9 (17 Dec 2024 04:52), Max: 97.9 (17 Dec 2024 04:52)  HR: 60 (17 Dec 2024 04:52) (60 - 88)  BP: 145/80 (17 Dec 2024 04:52) (121/61 - 145/80)  BP(mean): --  RR: 18 (17 Dec 2024 04:52) (18 - 18)  SpO2: 100% (17 Dec 2024 04:52) (94% - 100%)    Parameters below as of 17 Dec 2024 04:52  Patient On (Oxygen Delivery Method): room air      Daily     Daily     GENERAL:  Appears stated age,   HEENT:  NC/AT,    CHEST:  Full & symmetric excursion,   HEART:  Regular rhythm,  ABDOMEN:  Soft, non-tender, non-distended,  EXTEREMITIES:  no cyanosis  SKIN:  No rash  NEURO:  Alert,       LABS:                        13.5   7.44  )-----------( 222      ( 17 Dec 2024 07:16 )             42.9     12-17    139  |  93[L]  |  34[H]  ----------------------------<  54[LL]  4.6   |  26  |  0.82    Ca    10.0      17 Dec 2024 07:13    TPro  7.5  /  Alb  3.8  /  TBili  0.5  /  DBili  x   /  AST  21  /  ALT  11  /  AlkPhos  68  12-16    PT/INR - ( 15 Dec 2024 23:45 )   PT: 23.1 sec;   INR: 2.02 ratio         PTT - ( 15 Dec 2024 23:45 )  PTT:38.1 sec  Urinalysis Basic - ( 17 Dec 2024 07:13 )    Color: x / Appearance: x / SG: x / pH: x  Gluc: 54 mg/dL / Ketone: x  / Bili: x / Urobili: x   Blood: x / Protein: x / Nitrite: x   Leuk Esterase: x / RBC: x / WBC x   Sq Epi: x / Non Sq Epi: x / Bacteria: x        RADIOLOGY & ADDITIONAL TESTS:

## 2024-12-20 NOTE — PROVIDER CONTACT NOTE (HYPOGLYCEMIA EVENT) - NS PROVIDER CONTACT BACKGROUND-HYPO
Age: 89y    Gender: Female    POCT Blood Glucose:  118 mg/dL (12-20-24 @ 06:46)  69 mg/dL (12-20-24 @ 06:01)  106 mg/dL (12-20-24 @ 00:14)  177 mg/dL (12-19-24 @ 17:03)  148 mg/dL (12-19-24 @ 11:58)      eMAR:dextrose 50% Injectable   12.5 Gram(s) IV Push (12-20-24 @ 06:29)

## 2024-12-21 LAB
GLUCOSE BLDC GLUCOMTR-MCNC: 164 MG/DL — HIGH (ref 70–99)
GLUCOSE BLDC GLUCOMTR-MCNC: 83 MG/DL — SIGNIFICANT CHANGE UP (ref 70–99)
GLUCOSE BLDC GLUCOMTR-MCNC: 90 MG/DL — SIGNIFICANT CHANGE UP (ref 70–99)
GLUCOSE BLDC GLUCOMTR-MCNC: 99 MG/DL — SIGNIFICANT CHANGE UP (ref 70–99)

## 2024-12-21 RX ORDER — DEXTROSE MONOHYDRATE 25 G/50ML
15 INJECTION, SOLUTION INTRAVENOUS ONCE
Refills: 0 | Status: COMPLETED | OUTPATIENT
Start: 2024-12-21 | End: 2024-12-21

## 2024-12-21 RX ORDER — POLYETHYLENE GLYCOL 3350 17 G/DOSE
17 POWDER (GRAM) ORAL ONCE
Refills: 0 | Status: COMPLETED | OUTPATIENT
Start: 2024-12-21 | End: 2024-12-21

## 2024-12-21 RX ORDER — SODIUM PHOSPHATE, DIBASIC, UNSPECIFIED FORM AND SODIUM PHOSPHATE, MONOBASIC, UNSPECIFIED FORM 7; 19 G/133ML; G/133ML
1 ENEMA RECTAL ONCE
Refills: 0 | Status: DISCONTINUED | OUTPATIENT
Start: 2024-12-21 | End: 2024-12-23

## 2024-12-21 RX ORDER — BISACODYL 5 MG
10 TABLET, DELAYED RELEASE (ENTERIC COATED) ORAL ONCE
Refills: 0 | Status: COMPLETED | OUTPATIENT
Start: 2024-12-21 | End: 2024-12-21

## 2024-12-21 RX ADMIN — Medication 10 MILLIGRAM(S): at 21:38

## 2024-12-21 RX ADMIN — Medication 10 MILLIGRAM(S): at 06:52

## 2024-12-21 RX ADMIN — HEPARIN SODIUM 5000 UNIT(S): 1000 INJECTION, SOLUTION INTRAVENOUS; SUBCUTANEOUS at 13:59

## 2024-12-21 RX ADMIN — HEPARIN SODIUM 5000 UNIT(S): 1000 INJECTION, SOLUTION INTRAVENOUS; SUBCUTANEOUS at 21:38

## 2024-12-21 RX ADMIN — ONDANSETRON 4 MILLIGRAM(S): 4 TABLET ORAL at 21:38

## 2024-12-21 RX ADMIN — Medication 17 GRAM(S): at 09:33

## 2024-12-21 RX ADMIN — Medication 50 MILLIGRAM(S): at 06:52

## 2024-12-21 RX ADMIN — CLONIDINE HYDROCHLORIDE 0.1 MILLIGRAM(S): 0.2 TABLET ORAL at 17:01

## 2024-12-21 RX ADMIN — HEPARIN SODIUM 5000 UNIT(S): 1000 INJECTION, SOLUTION INTRAVENOUS; SUBCUTANEOUS at 06:53

## 2024-12-21 RX ADMIN — CLONIDINE HYDROCHLORIDE 0.1 MILLIGRAM(S): 0.2 TABLET ORAL at 06:52

## 2024-12-21 RX ADMIN — ONDANSETRON 4 MILLIGRAM(S): 4 TABLET ORAL at 08:24

## 2024-12-21 RX ADMIN — ENALAPRIL MALEATE 20 MILLIGRAM(S): 10 TABLET ORAL at 06:52

## 2024-12-21 NOTE — PROGRESS NOTE ADULT - SUBJECTIVE AND OBJECTIVE BOX
Date of Service: 12-21-24 @ 08:00           CARDIOLOGY     PROGRESS  NOTE   ________________________________________________    CHIEF COMPLAINT:Patient is a 89y old  Female who presents with a chief complaint of ischemic foot (20 Dec 2024 14:19)  no complain  	  REVIEW OF SYSTEMS:  CONSTITUTIONAL: No fever, weight loss, or fatigue  EYES: No eye pain, visual disturbances, or discharge  ENT:  No difficulty hearing, tinnitus, vertigo; No sinus or throat pain  NECK: No pain or stiffness  RESPIRATORY: No cough, wheezing, chills or hemoptysis; No Shortness of Breath  CARDIOVASCULAR: No chest pain, palpitations, passing out, dizziness, or leg swelling  GASTROINTESTINAL: No abdominal or epigastric pain. No nausea, vomiting, or hematemesis; No diarrhea or constipation. No melena or hematochezia.  GENITOURINARY: No dysuria, frequency, hematuria, or incontinence  NEUROLOGICAL: No headaches, memory loss, loss of strength, numbness, or tremors  SKIN: No itching, burning, rashes, or lesions   LYMPH Nodes: No enlarged glands  ENDOCRINE: No heat or cold intolerance; No hair loss  MUSCULOSKELETAL: No joint pain or swelling; No muscle, back, or extremity pain  PSYCHIATRIC: No depression, anxiety, mood swings, or difficulty sleeping  HEME/LYMPH: No easy bruising, or bleeding gums  ALLERGY AND IMMUNOLOGIC: No hives or eczema	    [x ] All others negative	  [ ] Unable to obtain    PHYSICAL EXAM:  T(C): 36.8 (12-21-24 @ 07:37), Max: 36.8 (12-21-24 @ 07:37)  HR: 89 (12-21-24 @ 07:37) (69 - 89)  BP: 145/81 (12-21-24 @ 07:37) (144/72 - 169/89)  RR: 18 (12-21-24 @ 07:37) (18 - 18)  SpO2: 94% (12-21-24 @ 07:37) (92% - 96%)  Wt(kg): --  I&O's Summary    20 Dec 2024 07:01  -  21 Dec 2024 07:00  --------------------------------------------------------  IN: 480 mL / OUT: 800 mL / NET: -320 mL        Appearance: Normal	  HEENT:   Normal oral mucosa, PERRL, EOMI	  Lymphatic: No lymphadenopathy  Cardiovascular: Normal S1 S2, No JVD, + murmurs, No edema  Respiratory: Lungs clear to auscultation	  Psychiatry: A & O x 3, Mood & affect appropriate  Gastrointestinal:  Soft, Non-tender, + BS	  Skin: No rashes, No ecchymoses, No cyanosis	  Neurologic: Non-focal  Extremities: Normal range of motion, No  change  Vascular: + pvd    MEDICATIONS  (STANDING):  amLODIPine   Tablet 10 milliGRAM(s) Oral daily  aspirin enteric coated 81 milliGRAM(s) Oral daily  cloNIDine 0.1 milliGRAM(s) Oral two times a day  dextrose 5% + sodium chloride 0.45%. 500 milliLiter(s) (50 mL/Hr) IV Continuous <Continuous>  dextrose 5%. 1000 milliLiter(s) (50 mL/Hr) IV Continuous <Continuous>  dextrose 5%. 1000 milliLiter(s) (100 mL/Hr) IV Continuous <Continuous>  dextrose 50% Injectable 25 Gram(s) IV Push once  dextrose 50% Injectable 12.5 Gram(s) IV Push once  dextrose 50% Injectable 25 Gram(s) IV Push once  dextrose Oral Gel 15 Gram(s) Oral once  enalapril 20 milliGRAM(s) Oral daily  glucagon  Injectable 1 milliGRAM(s) IntraMuscular once  heparin   Injectable 5000 Unit(s) SubCutaneous every 8 hours  influenza  Vaccine (HIGH DOSE) 0.5 milliLiter(s) IntraMuscular once  metoprolol succinate ER 50 milliGRAM(s) Oral daily      TELEMETRY: 	    ECG:  	  RADIOLOGY:  OTHER: 	  	  LABS:	 	    CARDIAC MARKERS:                          13.2   6.39  )-----------( 183      ( 20 Dec 2024 07:07 )             41.5     12-20    138  |  98  |  14  ----------------------------<  63[L]  3.9   |  29  |  0.53    Ca    9.2      20 Dec 2024 07:07    TPro  6.9  /  Alb  3.4  /  TBili  0.5  /  DBili  x   /  AST  24  /  ALT  11  /  AlkPhos  68  12-20    proBNP:   Lipid Profile: Cholesterol 180  LDL --  HDL 45      HgA1c:   TSH:     Overall,  89 y/o F w/ PMHx of AF, s/p watchman, no longer on AC, pulmonary HTN, h/o diverticulitis presenting w/ diarrhea, found to have "cold feet", seen by vascular with doppler DP/PT signals pending arterial US.   Pt afebrile, w/o leukocytosis, diarrhea now resolved. Was on unknown antibiotic for about a week for possible R toe cellulitis. On exam, very mild erythema on tip of 1st toe, not warm to touch, does not appear consistent with cellulitis. Would monitor off antibiotics at this time.  V/Q scan low probability for PE    Recommend:   1. Low c/f for R 1st toe cellulitis, CT A/P w/o diverticulitis. Monitor off abx  2. RUQ with mildly distended GB, HIDA negative no c/f for cholecystitis.       Assessment and plan  ---------------------------  89-year-old female history of A-fib, no longer on AC s/p watchman, Pulm HTN, HTN, recent admission from 7/17-7/25 for diverticulitis, comes to ED for persistent watery nonbloody diarrhea X24hrs. Denies vomiting, no fevers, no abdominal pain, no chest pain, no sob. Pt states she currently is on abxs for toe cellulitis does not remember the name and it caused her diarrhea.  pt with sig medical and cardiac hx with hx of a.fib on no AC sec to s/p watchman  with c/o diarrhea ?nausea an bl cold feet and discoloration on the R Toe.  r/o pvd/ emboli  start iv heparin if vascular agreed  check hr  sec to a.fib, no ac s/p watchman procedure  check arterial Doppler of LE noted, need vascular follow up and recommendation  lipid panel  continue all bp meds  awaiting gall bladder ultrasound, abx dc ed by ID  TTE noted with severe PHTN r/o chronic pulmonary emboli, check vq scan  Dr Aleman to see pt vascular surgery  add Norvasc 5 mg daily for elevated bp, will fu bp closely  add lasix 20 mg daily sec to sig pulmonary htn. VQ low probability  awaiting HIDA scan/ GI appreciated  discussed with vascular, fu as out pt with Dr Aleman  increase Norvasc to 10 mg daily, bp is better controlled  ID appreciated  dc to [facility]

## 2024-12-21 NOTE — PROGRESS NOTE ADULT - SUBJECTIVE AND OBJECTIVE BOX
[facility]  Kal Moreno PA-C  [address] [place] [id number] [phone]    INTERVAL HPI/OVERNIGHT EVENTS:  Pt resting comfortably. c/o mild nausea.  On mince and moist diet.   +flatus/BM.     MEDICATIONS  (STANDING):  amLODIPine   Tablet 10 milliGRAM(s) Oral daily  aspirin enteric coated 81 milliGRAM(s) Oral daily  bisacodyl Suppository 10 milliGRAM(s) Rectal once  cloNIDine 0.1 milliGRAM(s) Oral two times a day  dextrose 5% + sodium chloride 0.45%. 500 milliLiter(s) (50 mL/Hr) IV Continuous <Continuous>  dextrose 5%. 1000 milliLiter(s) (50 mL/Hr) IV Continuous <Continuous>  dextrose 5%. 1000 milliLiter(s) (100 mL/Hr) IV Continuous <Continuous>  dextrose 50% Injectable 25 Gram(s) IV Push once  dextrose 50% Injectable 12.5 Gram(s) IV Push once  dextrose 50% Injectable 25 Gram(s) IV Push once  dextrose Oral Gel 15 Gram(s) Oral once  enalapril 20 milliGRAM(s) Oral daily  glucagon  Injectable 1 milliGRAM(s) IntraMuscular once  heparin   Injectable 5000 Unit(s) SubCutaneous every 8 hours  influenza  Vaccine (HIGH DOSE) 0.5 milliLiter(s) IntraMuscular once  metoprolol succinate ER 50 milliGRAM(s) Oral daily  saline laxative (FLEET) Rectal Enema 1 Enema Rectal once    MEDICATIONS  (PRN):  ondansetron Injectable 4 milliGRAM(s) IV Push every 6 hours PRN Nausea and/or Vomiting    Vital Signs Last 24 Hrs  T(C): 36.8 (21 Dec 2024 07:37), Max: 36.8 (21 Dec 2024 07:37)  T(F): 98.3 (21 Dec 2024 07:37), Max: 98.3 (21 Dec 2024 07:37)  HR: 89 (21 Dec 2024 07:37) (69 - 89)  BP: 145/81 (21 Dec 2024 07:37) (144/72 - 165/76)  BP(mean): --  RR: 18 (21 Dec 2024 07:37) (18 - 18)  SpO2: 94% (21 Dec 2024 07:37) (92% - 96%)    Parameters below as of 21 Dec 2024 07:37  Patient On (Oxygen Delivery Method): nasal cannula    Physical:  General: A&Ox3. NAD.  Abdomen: Soft nondistended, nontender.    I&O's Detail    20 Dec 2024 07:01  -  21 Dec 2024 07:00  --------------------------------------------------------  IN:    Oral Fluid: 480 mL  Total IN: 480 mL    OUT:    Voided (mL): 800 mL  Total OUT: 800 mL    Total NET: -320 mL    LABS:                        13.2   6.39  )-----------( 183      ( 20 Dec 2024 07:07 )             41.5             12-20    138  |  98  |  14  ----------------------------<  63[L]  3.9   |  29  |  0.53    Ca    9.2      20 Dec 2024 07:07    TPro  6.9  /  Alb  3.4  /  TBili  0.5  /  DBili  x   /  AST  24  /  ALT  11  /  AlkPhos  68  12-20    < from: [facility] Hepatobiliary Imaging (12.20.24 @ 10:35) >  IMPRESSION: Normal hepatobiliary scan. No evidence of acute cholecystitis   or biliary obstruction.    < end of copied text >

## 2024-12-21 NOTE — PROGRESS NOTE ADULT - SUBJECTIVE AND OBJECTIVE BOX
date of service: 12-21-24 @ 13:52  [facility]  REVIEW OF SYSTEMS:  CONSTITUTIONAL: No fever,  no  weight loss  ENT:  No  tinnitus,   no   vertigo  NECK: No pain or stiffness  RESPIRATORY: No cough, wheezing, chills or hemoptysis;    No Shortness of Breath  CARDIOVASCULAR: No chest pain, palpitations, dizziness  GASTROINTESTINAL: No abdominal or epigastric pain. No nausea, vomiting, or hematemesis; No diarrhea  No melena or hematochezia.  GENITOURINARY: No dysuria, frequency, hematuria, or incontinence  NEUROLOGICAL: No headaches  SKIN: No itching,  no   rash  LYMPH Nodes: No enlarged glands  ENDOCRINE: No heat or cold intolerance  MUSCULOSKELETAL: No joint pain or swelling  PSYCHIATRIC: No depression, anxiety  HEME/LYMPH: No easy bruising, or bleeding gums  ALLERGY AND IMMUNOLOGIC: No hives or eczema	    MEDICATIONS  (STANDING):  amLODIPine   Tablet 10 milliGRAM(s) Oral daily  aspirin enteric coated 81 milliGRAM(s) Oral daily  bisacodyl Suppository 10 milliGRAM(s) Rectal once  cloNIDine 0.1 milliGRAM(s) Oral two times a day  dextrose 5% + sodium chloride 0.45%. 500 milliLiter(s) (50 mL/Hr) IV Continuous <Continuous>  dextrose 5%. 1000 milliLiter(s) (50 mL/Hr) IV Continuous <Continuous>  dextrose 5%. 1000 milliLiter(s) (100 mL/Hr) IV Continuous <Continuous>  dextrose 50% Injectable 25 Gram(s) IV Push once  dextrose 50% Injectable 12.5 Gram(s) IV Push once  dextrose 50% Injectable 25 Gram(s) IV Push once  dextrose Oral Gel 15 Gram(s) Oral once  enalapril 20 milliGRAM(s) Oral daily  glucagon  Injectable 1 milliGRAM(s) IntraMuscular once  heparin   Injectable 5000 Unit(s) SubCutaneous every 8 hours  influenza  Vaccine (HIGH DOSE) 0.5 milliLiter(s) IntraMuscular once  metoprolol succinate ER 50 milliGRAM(s) Oral daily  saline laxative (FLEET) Rectal Enema 1 Enema Rectal once    MEDICATIONS  (PRN):  ondansetron Injectable 4 milliGRAM(s) IV Push every 6 hours PRN Nausea and/or Vomiting      Vital Signs Last 24 Hrs  T(C): 36.4 (21 Dec 2024 11:18), Max: 36.8 (21 Dec 2024 07:37)  T(F): 97.5 (21 Dec 2024 11:18), Max: 98.3 (21 Dec 2024 07:37)  HR: 66 (21 Dec 2024 11:18) (66 - 89)  BP: 128/67 (21 Dec 2024 11:18) (128/67 - 165/76)  BP(mean): --  RR: 18 (21 Dec 2024 11:18) (18 - 18)  SpO2: 93% (21 Dec 2024 11:18) (92% - 96%)    Parameters below as of 21 Dec 2024 11:18  Patient On (Oxygen Delivery Method): room air      CAPILLARY BLOOD GLUCOSE      POCT Blood Glucose.: 99 mg/dL (21 Dec 2024 12:15)  POCT Blood Glucose.: 83 mg/dL (21 Dec 2024 06:07)  POCT Blood Glucose.: 86 mg/dL (20 Dec 2024 23:51)  POCT Blood Glucose.: 118 mg/dL (20 Dec 2024 17:06)    I&O's Summary    20 Dec 2024 07:01  -  21 Dec 2024 07:00  --------------------------------------------------------  IN: 480 mL / OUT: 800 mL / NET: -320 mL    21 Dec 2024 07:01  -  21 Dec 2024 13:52  --------------------------------------------------------  IN: 240 mL / OUT: 0 mL / NET: 240 mL          Appearance: Normal	  HEENT:   Normal oral mucosa, PERRL, EOMI	  Lymphatic: No lymphadenopathy  Cardiovascular: Normal S1 S2, No JVD  Respiratory: Lungs clear to auscultation	  Gastrointestinal:  Soft, Non-tender, + BS	  Skin: No rash, No ecchymoses	  Extremities:     LABS:                        13.2   6.39  )-----------( 183      ( 20 Dec 2024 07:07 )             41.5     12-20    138  |  98  |  14  ----------------------------<  63[L]  3.9   |  29  |  0.53    Ca    9.2      20 Dec 2024 07:07    TPro  6.9  /  Alb  3.4  /  TBili  0.5  /  DBili  x   /  AST  24  /  ALT  11  /  AlkPhos  68  12-20          Urinalysis Basic - ( 20 Dec 2024 07:07 )    Color: x / Appearance: x / SG: x / pH: x  Gluc: 63 mg/dL / Ketone: x  / Bili: x / Urobili: x   Blood: x / Protein: x / Nitrite: x   Leuk Esterase: x / RBC: x / WBC x   Sq Epi: x / Non Sq Epi: x / Bacteria: x                      Consultant(s) Notes Reviewed:      Care Discussed with Consultants/Other Providers:

## 2024-12-21 NOTE — PROGRESS NOTE ADULT - ASSESSMENT
89y.o. Female watery diarrhea, distended GB    Recommendations:  - LFTs and prior and current normal HIDA reassuring  - Trend LFTs daily   - GI PCR if has ongoing diarrhea  - Serial abdominal exams  - IVF  - Antiemesis prn  - Diet as tolerated  - Will follow with you    I reviewed the overnight course of events on the unit, re-confirming the patient history. I discussed the care with the patient  Differential diagnosis and plan of care discussed with patient after the evaluation  35 minutes spent on total encounter of which more than fifty percent of the encounter was spent counseling and/or coordinating care by the attending physician.

## 2024-12-21 NOTE — PROGRESS NOTE ADULT - ASSESSMENT
This is a 89 y/o F        w/ PMHx of AF, s/p watchman, no longer on AC , pulmonary HTN, h/o diverticulitis,        who presentes to ER for watery, nonbloody diarrhea.    Pt was on antibiotics outpatient for toe cellulitis.   ICT A/P w/ possible ileus, no diverticulitis, with extensive diverticulosis. Gallbladder distension   Pt afebrile, w/o leukocytosis, diarrhea now resolved.      monitor off ab per  id   V/Q scan low probability for PE  HTN/ HLD    DM.,  follwo  fs     on vdt  ppx/

## 2024-12-22 LAB
GLUCOSE BLDC GLUCOMTR-MCNC: 103 MG/DL — HIGH (ref 70–99)
GLUCOSE BLDC GLUCOMTR-MCNC: 81 MG/DL — SIGNIFICANT CHANGE UP (ref 70–99)

## 2024-12-22 RX ADMIN — HEPARIN SODIUM 5000 UNIT(S): 1000 INJECTION, SOLUTION INTRAVENOUS; SUBCUTANEOUS at 13:53

## 2024-12-22 RX ADMIN — ENALAPRIL MALEATE 20 MILLIGRAM(S): 10 TABLET ORAL at 06:09

## 2024-12-22 RX ADMIN — CLONIDINE HYDROCHLORIDE 0.1 MILLIGRAM(S): 0.2 TABLET ORAL at 06:09

## 2024-12-22 RX ADMIN — Medication 50 MILLIGRAM(S): at 06:09

## 2024-12-22 RX ADMIN — HEPARIN SODIUM 5000 UNIT(S): 1000 INJECTION, SOLUTION INTRAVENOUS; SUBCUTANEOUS at 06:09

## 2024-12-22 RX ADMIN — CLONIDINE HYDROCHLORIDE 0.1 MILLIGRAM(S): 0.2 TABLET ORAL at 18:48

## 2024-12-22 RX ADMIN — Medication 10 MILLIGRAM(S): at 06:09

## 2024-12-22 NOTE — PROGRESS NOTE ADULT - SUBJECTIVE AND OBJECTIVE BOX
Date of Service: 12-22-24 @ 09:51           CARDIOLOGY     PROGRESS  NOTE   ________________________________________________    CHIEF COMPLAINT:Patient is a 89y old  Female who presents with a chief complaint of ischemic foot (21 Dec 2024 13:52)  doing well   	  REVIEW OF SYSTEMS:  CONSTITUTIONAL: No fever, weight loss, or fatigue  EYES: No eye pain, visual disturbances, or discharge  ENT:  No difficulty hearing, tinnitus, vertigo; No sinus or throat pain  NECK: No pain or stiffness  RESPIRATORY: No cough, wheezing, chills or hemoptysis; No Shortness of Breath  CARDIOVASCULAR: No chest pain, palpitations, passing out, dizziness, or leg swelling  GASTROINTESTINAL: No abdominal or epigastric pain. No nausea, vomiting, or hematemesis; No diarrhea or constipation. No melena or hematochezia.  GENITOURINARY: No dysuria, frequency, hematuria, or incontinence  NEUROLOGICAL: No headaches, memory loss, loss of strength, numbness, or tremors  SKIN: No itching, burning, rashes, or lesions   LYMPH Nodes: No enlarged glands  ENDOCRINE: No heat or cold intolerance; No hair loss  MUSCULOSKELETAL: No joint pain or swelling; No muscle, back, or extremity pain  PSYCHIATRIC: No depression, anxiety, mood swings, or difficulty sleeping  HEME/LYMPH: No easy bruising, or bleeding gums  ALLERGY AND IMMUNOLOGIC: No hives or eczema	    [x ] All others negative	  [ ] Unable to obtain    PHYSICAL EXAM:  T(C): 36.7 (12-22-24 @ 04:39), Max: 36.7 (12-21-24 @ 19:54)  HR: 74 (12-22-24 @ 06:00) (66 - 92)  BP: 150/84 (12-22-24 @ 06:00) (115/65 - 170/71)  RR: 18 (12-22-24 @ 04:39) (18 - 18)  SpO2: 92% (12-22-24 @ 04:39) (92% - 95%)  Wt(kg): --  I&O's Summary    21 Dec 2024 07:01  -  22 Dec 2024 07:00  --------------------------------------------------------  IN: 960 mL / OUT: 600 mL / NET: 360 mL        Appearance: Normal	  HEENT:   Normal oral mucosa, PERRL, EOMI	  Lymphatic: No lymphadenopathy  Cardiovascular: Normal S1 S2, No JVD, +murmurs, No edema  Respiratory: rhonchi  Psychiatry: A & O x 3, Mood & affect appropriate  Gastrointestinal:  Soft, Non-tender, + BS	  Skin: No rashes, No ecchymoses, No cyanosis	  Neurologic: Non-focal  Extremities: Normal range of motion, No clubbing, cyanosis or edema  Vascular: + pvd    MEDICATIONS  (STANDING):  amLODIPine   Tablet 10 milliGRAM(s) Oral daily  aspirin enteric coated 81 milliGRAM(s) Oral daily  cloNIDine 0.1 milliGRAM(s) Oral two times a day  dextrose 5% + sodium chloride 0.45%. 500 milliLiter(s) (50 mL/Hr) IV Continuous <Continuous>  dextrose 5%. 1000 milliLiter(s) (50 mL/Hr) IV Continuous <Continuous>  dextrose 5%. 1000 milliLiter(s) (100 mL/Hr) IV Continuous <Continuous>  dextrose 50% Injectable 25 Gram(s) IV Push once  dextrose 50% Injectable 12.5 Gram(s) IV Push once  dextrose 50% Injectable 25 Gram(s) IV Push once  dextrose Oral Gel 15 Gram(s) Oral once  enalapril 20 milliGRAM(s) Oral daily  glucagon  Injectable 1 milliGRAM(s) IntraMuscular once  heparin   Injectable 5000 Unit(s) SubCutaneous every 8 hours  influenza  Vaccine (HIGH DOSE) 0.5 milliLiter(s) IntraMuscular once  metoprolol succinate ER 50 milliGRAM(s) Oral daily  saline laxative (FLEET) Rectal Enema 1 Enema Rectal once      TELEMETRY: 	    ECG:  	  RADIOLOGY:  OTHER: 	  	  LABS:	 	    CARDIAC MARKERS:      proBNP:   Lipid Profile: Cholesterol 180  LDL --  HDL 45      HgA1c:   TSH:         Assessment and plan  ---------------------------  89-year-old female history of A-fib, no longer on AC s/p watchman, Pulm HTN, HTN, recent admission from 7/17-7/25 for diverticulitis, comes to ED for persistent watery nonbloody diarrhea X24hrs. Denies vomiting, no fevers, no abdominal pain, no chest pain, no sob. Pt states she currently is on abxs for toe cellulitis does not remember the name and it caused her diarrhea.  pt with sig medical and cardiac hx with hx of a.fib on no AC sec to s/p watchman  with c/o diarrhea ?nausea an bl cold feet and discoloration on the R Toe.  r/o pvd/ emboli  start iv heparin if vascular agreed  check hr  sec to a.fib, no ac s/p watchman procedure  check arterial Doppler of LE noted, need vascular follow up and recommendation  lipid panel  continue all bp meds  awaiting gall bladder ultrasound, abx dc ed by ID  TTE noted with severe PHTN r/o chronic pulmonary emboli, check vq scan  Dr Aleman to see pt vascular surgery  add Norvasc 5 mg daily for elevated bp, will fu bp closely  add lasix 20 mg daily sec to sig pulmonary htn. VQ low probability  awaiting HIDA scan/ GI appreciated  discussed with vascular, fu as out pt with Dr Aleman  increase Norvasc to 10 mg daily, bp is better controlled  ID appreciated  dc to [facility], oob to the chair, physical therapy increase ambulation

## 2024-12-22 NOTE — PROGRESS NOTE ADULT - SUBJECTIVE AND OBJECTIVE BOX
---___---___---___---___---___---___ ---___---___---___---___---___---___---___---___---                  M E D I C A L   A T T E N D I N G   P R O G R E S S   N O T E  ---___---___---___---___---___---___ ---___---___---___---___---___---___---___---___---        ================================================    ++CHIEF COMPLAINT:   Patient is a 89y old  Female who presents with a chief complaint of ischemic foot (22 Dec 2024 09:51)      Abdominal pain      ---___---___---___---___---___---  PAST MEDICAL / Surgical  HISTORY:  PAST MEDICAL & SURGICAL HISTORY:  Afib      HTN (hypertension)      H/O CHF      Uterine cancer      S/P hysterectomy          ---___---___---___---___---___---  FAMILY HISTORY:   FAMILY HISTORY:        ---___---___---___---___---___---  ALLERGIES:   Allergies    Lipitor (Other; Flushing)  hydrALAZINE (Other; Flushing)    Intolerances    lactose (Unknown)      ---___---___---___---___---___---  MEDICATIONS:  MEDICATIONS  (STANDING):  amLODIPine   Tablet 10 milliGRAM(s) Oral daily  aspirin enteric coated 81 milliGRAM(s) Oral daily  cloNIDine 0.1 milliGRAM(s) Oral two times a day  dextrose 5% + sodium chloride 0.45%. 500 milliLiter(s) (50 mL/Hr) IV Continuous <Continuous>  dextrose 5%. 1000 milliLiter(s) (50 mL/Hr) IV Continuous <Continuous>  dextrose 5%. 1000 milliLiter(s) (100 mL/Hr) IV Continuous <Continuous>  dextrose 50% Injectable 25 Gram(s) IV Push once  dextrose 50% Injectable 12.5 Gram(s) IV Push once  dextrose 50% Injectable 25 Gram(s) IV Push once  dextrose Oral Gel 15 Gram(s) Oral once  enalapril 20 milliGRAM(s) Oral daily  glucagon  Injectable 1 milliGRAM(s) IntraMuscular once  heparin   Injectable 5000 Unit(s) SubCutaneous every 8 hours  influenza  Vaccine (HIGH DOSE) 0.5 milliLiter(s) IntraMuscular once  metoprolol succinate ER 50 milliGRAM(s) Oral daily  saline laxative (FLEET) Rectal Enema 1 Enema Rectal once    MEDICATIONS  (PRN):  ondansetron Injectable 4 milliGRAM(s) IV Push every 6 hours PRN Nausea and/or Vomiting      ---___---___---___---___---___---  REVIEW OF SYSTEM:    GEN: no fever, no chills, no pain  RESP: no SOB, no cough, no sputum  CVS: no chest pain, no palpitations, no edema  GI: no abdominal pain, no nausea, no vomiting, no constipation, no diarrhea  : no dysurea, no frequency, no hematurea  Neuro: no headache, no dizziness  PSYCH: no anxiety, no depression  Derm : no itching, no rash    ---___---___---___---___---___---  VITAL SIGNS:  89y , CAPILLARY BLOOD GLUCOSE      POCT Blood Glucose.: 103 mg/dL (22 Dec 2024 17:05)    T(C): 36.3 (12-22-24 @ 15:59), Max: 36.7 (12-21-24 @ 19:54)  HR: 77 (12-22-24 @ 15:59) (74 - 92)  BP: 127/65 (12-22-24 @ 15:59) (122/76 - 170/71)  RR: 18 (12-22-24 @ 15:59) (18 - 18)  SpO2: 96% (12-22-24 @ 15:59) (92% - 96%)  ---___---___---___---___---___---  PHYSICAL EXAM:    GEN: A&O X 3 , NAD , comfortable  HEENT: NCAT, PERRL, MMM, hearing intact  Neck: supple , no JVD  CVS: S1S2 , regular , No M/R/G appreciated  PULM: CTA B/L,  no W/R/R appreciated  ABD.: soft. non tender, non distended,  bowel sounds present  Extrem: intact pulses , no edema   Derm: No rash , no ecchymoses  PSYCH : normal mood,  no delusion not anxious     ---___---___---___---___---___---            LAB AND IMAGING:                                                  [All pertinent / recent Imaging reviewed]         ---___---___---___---___---___---___ ---___---___---___---___---                         A S S E S S M E N T   A N D   P L A N :      HEALTH ISSUES - PROBLEM Dx:  Severe tricuspid regurgitation  This is a 89 y/o F        w/ PMHx of AF, s/p watchman, no longer on AC , pulmonary HTN, h/o diverticulitis,        who presentes to ER for watery, nonbloody diarrhea.    Pt was on antibiotics outpatient for toe cellulitis.   ICT A/P w/ possible ileus, no diverticulitis, with extensive diverticulosis. Gallbladder distension   Pt afebrile, w/o leukocytosis, diarrhea now resolved.      monitor off ab per  id   V/Q scan low probability for PE  HTN/ HLD    DM.,  follwo  fs     on vdt  ppx/                  ___________________________  Thank you,  P[facility] NelsonCity [facility], [place]  [phone]

## 2024-12-22 NOTE — PROGRESS NOTE ADULT - SUBJECTIVE AND OBJECTIVE BOX
[facility]  Kal Moreno PA-C  [address] [place] [id number] [phone]    INTERVAL HPI/OVERNIGHT EVENTS:  Pt resting comfortably. No acute complaints.   Tolerating saltine crackers.  Mild nausea controlled with antiemesis.  +BM    MEDICATIONS  (STANDING):  amLODIPine   Tablet 10 milliGRAM(s) Oral daily  aspirin enteric coated 81 milliGRAM(s) Oral daily  cloNIDine 0.1 milliGRAM(s) Oral two times a day  dextrose 5% + sodium chloride 0.45%. 500 milliLiter(s) (50 mL/Hr) IV Continuous <Continuous>  dextrose 5%. 1000 milliLiter(s) (50 mL/Hr) IV Continuous <Continuous>  dextrose 5%. 1000 milliLiter(s) (100 mL/Hr) IV Continuous <Continuous>  dextrose 50% Injectable 25 Gram(s) IV Push once  dextrose 50% Injectable 12.5 Gram(s) IV Push once  dextrose 50% Injectable 25 Gram(s) IV Push once  dextrose Oral Gel 15 Gram(s) Oral once  enalapril 20 milliGRAM(s) Oral daily  glucagon  Injectable 1 milliGRAM(s) IntraMuscular once  heparin   Injectable 5000 Unit(s) SubCutaneous every 8 hours  influenza  Vaccine (HIGH DOSE) 0.5 milliLiter(s) IntraMuscular once  metoprolol succinate ER 50 milliGRAM(s) Oral daily  saline laxative (FLEET) Rectal Enema 1 Enema Rectal once    MEDICATIONS  (PRN):  ondansetron Injectable 4 milliGRAM(s) IV Push every 6 hours PRN Nausea and/or Vomiting    Vital Signs Last 24 Hrs  T(C): 36.7 (22 Dec 2024 04:39), Max: 36.7 (21 Dec 2024 19:54)  T(F): 98 (22 Dec 2024 04:39), Max: 98.1 (21 Dec 2024 19:54)  HR: 74 (22 Dec 2024 06:00) (66 - 92)  BP: 150/84 (22 Dec 2024 06:00) (115/65 - 170/71)  BP(mean): --  RR: 18 (22 Dec 2024 04:39) (18 - 18)  SpO2: 92% (22 Dec 2024 04:39) (92% - 95%)    Parameters below as of 22 Dec 2024 04:39  Patient On (Oxygen Delivery Method): room air    Physical:  General: A&Ox3. NAD.  Abdomen: Soft nondistended, nontender.    I&O's Detail    21 Dec 2024 07:01  -  22 Dec 2024 07:00  --------------------------------------------------------  IN:    Oral Fluid: 960 mL  Total IN: 960 mL    OUT:    Voided (mL): 600 mL  Total OUT: 600 mL    Total NET: 360 mL

## 2024-12-23 LAB
GLUCOSE BLDC GLUCOMTR-MCNC: 107 MG/DL — HIGH (ref 70–99)
GLUCOSE BLDC GLUCOMTR-MCNC: 109 MG/DL — HIGH (ref 70–99)
GLUCOSE BLDC GLUCOMTR-MCNC: 129 MG/DL — HIGH (ref 70–99)
GLUCOSE BLDC GLUCOMTR-MCNC: 82 MG/DL — SIGNIFICANT CHANGE UP (ref 70–99)

## 2024-12-23 RX ORDER — ENALAPRIL MALEATE 10 MG/1
1 TABLET ORAL
Qty: 0 | Refills: 0 | DISCHARGE
Start: 2024-12-23

## 2024-12-23 RX ORDER — ASPIRIN 81 MG
1 TABLET, DELAYED RELEASE (ENTERIC COATED) ORAL
Qty: 30 | Refills: 0
Start: 2024-12-23 | End: 2025-01-21

## 2024-12-23 RX ORDER — CLONIDINE HYDROCHLORIDE 0.2 MG/1
1 TABLET ORAL
Qty: 0 | Refills: 0 | DISCHARGE
Start: 2024-12-23

## 2024-12-23 RX ORDER — METOPROLOL TARTRATE 50 MG
1 TABLET ORAL
Qty: 0 | Refills: 0 | DISCHARGE
Start: 2024-12-23

## 2024-12-23 RX ADMIN — CLONIDINE HYDROCHLORIDE 0.1 MILLIGRAM(S): 0.2 TABLET ORAL at 06:16

## 2024-12-23 RX ADMIN — Medication 10 MILLIGRAM(S): at 06:16

## 2024-12-23 RX ADMIN — Medication 50 MILLIGRAM(S): at 06:15

## 2024-12-23 RX ADMIN — CLONIDINE HYDROCHLORIDE 0.1 MILLIGRAM(S): 0.2 TABLET ORAL at 18:09

## 2024-12-23 RX ADMIN — ONDANSETRON 4 MILLIGRAM(S): 4 TABLET ORAL at 12:49

## 2024-12-23 RX ADMIN — ENALAPRIL MALEATE 20 MILLIGRAM(S): 10 TABLET ORAL at 06:16

## 2024-12-23 NOTE — DISCHARGE NOTE PROVIDER - PROVIDER TOKENS
PROVIDER:[TOKEN:[[id number]],FOLLOWUP:[2 weeks]],PROVIDER:[TOKEN:[[id number]],FOLLOWUP:[2 weeks]],PROVIDER:[TOKEN:[[id number]],FOLLOWUP:[1 month]]

## 2024-12-23 NOTE — PROGRESS NOTE ADULT - SUBJECTIVE AND OBJECTIVE BOX
---___---___---___---___---___---___ ---___---___---___---___---___---___---___---___---                  M E D I C A L   A T T E N D I N G   P R O G R E S S   N O T E  ---___---___---___---___---___---___ ---___---___---___---___---___---___---___---___---        ================================================    ++CHIEF COMPLAINT:   Patient is a 89y old  Female who presents with a chief complaint of ischemic foot (23 Dec 2024 14:19)      Abdominal pain      ---___---___---___---___---___---  PAST MEDICAL / Surgical  HISTORY:  PAST MEDICAL & SURGICAL HISTORY:  Afib      HTN (hypertension)      H/O CHF      Uterine cancer      S/P hysterectomy          ---___---___---___---___---___---  FAMILY HISTORY:   FAMILY HISTORY:        ---___---___---___---___---___---  ALLERGIES:   Allergies    Lipitor (Other; Flushing)  hydrALAZINE (Other; Flushing)    Intolerances    lactose (Unknown)      ---___---___---___---___---___---  MEDICATIONS:  MEDICATIONS  (STANDING):  amLODIPine   Tablet 10 milliGRAM(s) Oral daily  aspirin enteric coated 81 milliGRAM(s) Oral daily  cloNIDine 0.1 milliGRAM(s) Oral two times a day  dextrose 5% + sodium chloride 0.45%. 500 milliLiter(s) (50 mL/Hr) IV Continuous <Continuous>  dextrose 5%. 1000 milliLiter(s) (50 mL/Hr) IV Continuous <Continuous>  dextrose 5%. 1000 milliLiter(s) (100 mL/Hr) IV Continuous <Continuous>  dextrose 50% Injectable 25 Gram(s) IV Push once  dextrose 50% Injectable 12.5 Gram(s) IV Push once  dextrose 50% Injectable 25 Gram(s) IV Push once  dextrose Oral Gel 15 Gram(s) Oral once  enalapril 20 milliGRAM(s) Oral daily  glucagon  Injectable 1 milliGRAM(s) IntraMuscular once  heparin   Injectable 5000 Unit(s) SubCutaneous every 8 hours  influenza  Vaccine (HIGH DOSE) 0.5 milliLiter(s) IntraMuscular once  metoprolol succinate ER 50 milliGRAM(s) Oral daily    MEDICATIONS  (PRN):  ondansetron Injectable 4 milliGRAM(s) IV Push every 6 hours PRN Nausea and/or Vomiting      ---___---___---___---___---___---  REVIEW OF SYSTEM:  unable to obtain    ---___---___---___---___---___---  VITAL SIGNS:  89y , CAPILLARY BLOOD GLUCOSE      POCT Blood Glucose.: 109 mg/dL (23 Dec 2024 17:31)    T(C): 36.6 (12-23-24 @ 19:41), Max: 37 (12-23-24 @ 00:53)  HR: 62 (12-23-24 @ 19:41) (56 - 77)  BP: 130/74 (12-23-24 @ 19:41) (120/66 - 144/80)  RR: 18 (12-23-24 @ 19:41) (18 - 18)  SpO2: 96% (12-23-24 @ 19:41) (93% - 97%)  ---___---___---___---___---___---  PHYSICAL EXAM:    GEN: A&O X 3 , NAD , comfortable  HEENT: NCAT, PERRL, MMM, hearing intact  Neck: supple , no JVD  CVS: S1S2 , regular , No M/R/G appreciated  PULM: CTA B/L,  no W/R/R appreciated  ABD.: soft. non tender, non distended,  bowel sounds present  Extrem: intact pulses , no edema   Derm: No rash , no ecchymoses  PSYCH : normal mood,  no delusion not anxious     ---___---___---___---___---___---            LAB AND IMAGING:                                                  [All pertinent / recent Imaging reviewed]         ---___---___---___---___---___---___ ---___---___---___---___---                         A S S E S S M E N T   A N D   P L A N :      HEALTH ISSUES - PROBLEM Dx:  Severe tricuspid regurgitation  This is a 89 y/o F        w/ PMHx of AF, s/p watchman, no longer on AC , pulmonary HTN, h/o diverticulitis,        who presentes to ER for watery, nonbloody diarrhea.    Pt was on antibiotics outpatient for toe cellulitis.   ICT A/P w/ possible ileus, no diverticulitis, with extensive diverticulosis. Gallbladder distension   Pt afebrile, w/o leukocytosis, diarrhea now resolved.      monitor off ab per  id   V/Q scan low probability for PE  HTN/ HLD    DM.,  follwo  fs     on vdt  ppx/         ___________________________  Thank you,  Porter Keller  [phone]

## 2024-12-23 NOTE — PROGRESS NOTE ADULT - ASSESSMENT
89y.o. Female watery diarrhea, distended GB    Recommendations:  - LFTs and prior and current normal HIDA reassuring  - diet as ana  - Antiemesis prn  - Diet as tolerated  - Will follow with you    I reviewed the overnight course of events on the unit, re-confirming the patient history. I discussed the care with the patient  Differential diagnosis and plan of care discussed with patient after the evaluation  35 minutes spent on total encounter of which more than fifty percent of the encounter was spent counseling and/or coordinating care by the attending physician.

## 2024-12-23 NOTE — DISCHARGE NOTE PROVIDER - DETAILS OF MALNUTRITION DIAGNOSIS/DIAGNOSES
This patient has been assessed with a concern for Malnutrition and was treated during this hospitalization for the following Nutrition diagnosis/diagnoses:     -  12/19/2024: Severe protein-calorie malnutrition

## 2024-12-23 NOTE — PROVIDER CONTACT NOTE (MEDICATION) - BACKGROUND
Admitted for water nonbloody diarrhea x24hrs
pt. states she "does not need it because she has a watchman"

## 2024-12-23 NOTE — DISCHARGE NOTE PROVIDER - NSDCFUADDAPPT_GEN_ALL_CORE_FT
APPTS ARE READY TO BE MADE: [X] YES    Best Family or Patient Contact (if needed):    Additional Information about above appointments (if needed):    1: vasc  2: cards  3: pcp    Other comments or requests:    APPTS ARE READY TO BE MADE: [X] YES    Best Family or Patient Contact (if needed):    Additional Information about above appointments (if needed):    1: vasc  2: cards  3: pcp    Other comments or requests:   Patient advised they did not want to proceed with scheduling appointments with the providers on their referrals. They will coordinate care on their own.

## 2024-12-23 NOTE — PROGRESS NOTE ADULT - SUBJECTIVE AND OBJECTIVE BOX
Date of Service: 12-23-24 @ 07:11           CARDIOLOGY     PROGRESS  NOTE   ________________________________________________    CHIEF COMPLAINT:Patient is a 89y old  Female who presents with a chief complaint of ischemic foot (22 Dec 2024 17:24)  no complain  	  REVIEW OF SYSTEMS:  CONSTITUTIONAL: No fever, weight loss, or fatigue  EYES: No eye pain, visual disturbances, or discharge  ENT:  No difficulty hearing, tinnitus, vertigo; No sinus or throat pain  NECK: No pain or stiffness  RESPIRATORY: No cough, wheezing, chills or hemoptysis; No Shortness of Breath  CARDIOVASCULAR: No chest pain, palpitations, passing out, dizziness, or leg swelling  GASTROINTESTINAL: No abdominal or epigastric pain. No nausea, vomiting, or hematemesis; No diarrhea or constipation. No melena or hematochezia.  GENITOURINARY: No dysuria, frequency, hematuria, or incontinence  NEUROLOGICAL: No headaches, memory loss, loss of strength, numbness, or tremors  SKIN: No itching, burning, rashes, or lesions   LYMPH Nodes: No enlarged glands  ENDOCRINE: No heat or cold intolerance; No hair loss  MUSCULOSKELETAL: No joint pain or swelling; No muscle, back, or extremity pain  PSYCHIATRIC: No depression, anxiety, mood swings, or difficulty sleeping  HEME/LYMPH: No easy bruising, or bleeding gums  ALLERGY AND IMMUNOLOGIC: No hives or eczema	    [x ] All others negative	  [ ] Unable to obtain    PHYSICAL EXAM:  T(C): 36.9 (12-23-24 @ 05:16), Max: 37 (12-23-24 @ 00:53)  HR: 74 (12-23-24 @ 05:16) (73 - 88)  BP: 124/74 (12-23-24 @ 05:16) (122/76 - 151/86)  RR: 18 (12-23-24 @ 05:16) (18 - 18)  SpO2: 95% (12-23-24 @ 05:16) (93% - 97%)  Wt(kg): --  I&O's Summary    22 Dec 2024 07:01  -  23 Dec 2024 07:00  --------------------------------------------------------  IN: 680 mL / OUT: 450 mL / NET: 230 mL        Appearance: Normal	  HEENT:   Normal oral mucosa, PERRL, EOMI	  Lymphatic: No lymphadenopathy  Cardiovascular: Normal S1 S2, No JVD, + murmurs, No edema  Respiratory: rhonchi  Psychiatry: A & O x 3, Mood & affect appropriate  Gastrointestinal:  Soft, Non-tender, + BS	  Skin: No rashes, No ecchymoses, No cyanosis	  Neurologic: Non-focal  Extremities:  No clubbing, cyanosis or edema  Vascular + pvd    MEDICATIONS  (STANDING):  amLODIPine   Tablet 10 milliGRAM(s) Oral daily  aspirin enteric coated 81 milliGRAM(s) Oral daily  cloNIDine 0.1 milliGRAM(s) Oral two times a day  dextrose 5% + sodium chloride 0.45%. 500 milliLiter(s) (50 mL/Hr) IV Continuous <Continuous>  dextrose 5%. 1000 milliLiter(s) (50 mL/Hr) IV Continuous <Continuous>  dextrose 5%. 1000 milliLiter(s) (100 mL/Hr) IV Continuous <Continuous>  dextrose 50% Injectable 25 Gram(s) IV Push once  dextrose 50% Injectable 12.5 Gram(s) IV Push once  dextrose 50% Injectable 25 Gram(s) IV Push once  dextrose Oral Gel 15 Gram(s) Oral once  enalapril 20 milliGRAM(s) Oral daily  glucagon  Injectable 1 milliGRAM(s) IntraMuscular once  heparin   Injectable 5000 Unit(s) SubCutaneous every 8 hours  influenza  Vaccine (HIGH DOSE) 0.5 milliLiter(s) IntraMuscular once  metoprolol succinate ER 50 milliGRAM(s) Oral daily  saline laxative (FLEET) Rectal Enema 1 Enema Rectal once      TELEMETRY: 	    ECG:  	  RADIOLOGY:  OTHER: 	  	  LABS:	 	    CARDIAC MARKERS:      proBNP:   Lipid Profile: Cholesterol 180  LDL --  HDL 45      HgA1c:   TSH:         Assessment and plan  ---------------------------  89-year-old female history of A-fib, no longer on AC s/p watchman, Pulm HTN, HTN, recent admission from 7/17-7/25 for diverticulitis, comes to ED for persistent watery nonbloody diarrhea X24hrs. Denies vomiting, no fevers, no abdominal pain, no chest pain, no sob. Pt states she currently is on abxs for toe cellulitis does not remember the name and it caused her diarrhea.  pt with sig medical and cardiac hx with hx of a.fib on no AC sec to s/p watchman  with c/o diarrhea ?nausea an bl cold feet and discoloration on the R Toe.  r/o pvd/ emboli  start iv heparin if vascular agreed  check hr  sec to a.fib, no ac s/p watchman procedure  check arterial Doppler of LE noted, need vascular follow up and recommendation  lipid panel  continue all bp meds  awaiting gall bladder ultrasound, abx dc ed by ID  TTE noted with severe PHTN r/o chronic pulmonary emboli, check vq scan  Dr Aleman to see pt vascular surgery  add Norvasc 5 mg daily for elevated bp, will fu bp closely  add lasix 20 mg daily sec to sig pulmonary htn. VQ low probability  awaiting HIDA scan/ GI appreciated  discussed with vascular, fu as out pt with Dr Aleman  increase Norvasc to 10 mg daily, bp is better controlled  ID appreciated  dc to [facility], oob to the chair, physical therapy increase ambulation  start on Crestor 5 mg qhs    	         Date of Service: 12-23-24 @ 07:11           CARDIOLOGY     PROGRESS  NOTE   ________________________________________________    CHIEF COMPLAINT:Patient is a 89y old  Female who presents with a chief complaint of ischemic foot (22 Dec 2024 17:24)  no complain  	  REVIEW OF SYSTEMS:  CONSTITUTIONAL: No fever, weight loss, or fatigue  EYES: No eye pain, visual disturbances, or discharge  ENT:  No difficulty hearing, tinnitus, vertigo; No sinus or throat pain  NECK: No pain or stiffness  RESPIRATORY: No cough, wheezing, chills or hemoptysis; No Shortness of Breath  CARDIOVASCULAR: No chest pain, palpitations, passing out, dizziness, or leg swelling  GASTROINTESTINAL: No abdominal or epigastric pain. No nausea, vomiting, or hematemesis; No diarrhea or constipation. No melena or hematochezia.  GENITOURINARY: No dysuria, frequency, hematuria, or incontinence  NEUROLOGICAL: No headaches, memory loss, loss of strength, numbness, or tremors  SKIN: No itching, burning, rashes, or lesions   LYMPH Nodes: No enlarged glands  ENDOCRINE: No heat or cold intolerance; No hair loss  MUSCULOSKELETAL: No joint pain or swelling; No muscle, back, or extremity pain  PSYCHIATRIC: No depression, anxiety, mood swings, or difficulty sleeping  HEME/LYMPH: No easy bruising, or bleeding gums  ALLERGY AND IMMUNOLOGIC: No hives or eczema	    [x ] All others negative	  [ ] Unable to obtain    PHYSICAL EXAM:  T(C): 36.9 (12-23-24 @ 05:16), Max: 37 (12-23-24 @ 00:53)  HR: 74 (12-23-24 @ 05:16) (73 - 88)  BP: 124/74 (12-23-24 @ 05:16) (122/76 - 151/86)  RR: 18 (12-23-24 @ 05:16) (18 - 18)  SpO2: 95% (12-23-24 @ 05:16) (93% - 97%)  Wt(kg): --  I&O's Summary    22 Dec 2024 07:01  -  23 Dec 2024 07:00  --------------------------------------------------------  IN: 680 mL / OUT: 450 mL / NET: 230 mL        Appearance: Normal	  HEENT:   Normal oral mucosa, PERRL, EOMI	  Lymphatic: No lymphadenopathy  Cardiovascular: Normal S1 S2, No JVD, + murmurs, No edema  Respiratory: rhonchi  Psychiatry: A & O x 3, Mood & affect appropriate  Gastrointestinal:  Soft, Non-tender, + BS	  Skin: No rashes, No ecchymoses, No cyanosis	  Neurologic: Non-focal  Extremities:  No clubbing, cyanosis or edema  Vascular + pvd    MEDICATIONS  (STANDING):  amLODIPine   Tablet 10 milliGRAM(s) Oral daily  aspirin enteric coated 81 milliGRAM(s) Oral daily  cloNIDine 0.1 milliGRAM(s) Oral two times a day  dextrose 5% + sodium chloride 0.45%. 500 milliLiter(s) (50 mL/Hr) IV Continuous <Continuous>  dextrose 5%. 1000 milliLiter(s) (50 mL/Hr) IV Continuous <Continuous>  dextrose 5%. 1000 milliLiter(s) (100 mL/Hr) IV Continuous <Continuous>  dextrose 50% Injectable 25 Gram(s) IV Push once  dextrose 50% Injectable 12.5 Gram(s) IV Push once  dextrose 50% Injectable 25 Gram(s) IV Push once  dextrose Oral Gel 15 Gram(s) Oral once  enalapril 20 milliGRAM(s) Oral daily  glucagon  Injectable 1 milliGRAM(s) IntraMuscular once  heparin   Injectable 5000 Unit(s) SubCutaneous every 8 hours  influenza  Vaccine (HIGH DOSE) 0.5 milliLiter(s) IntraMuscular once  metoprolol succinate ER 50 milliGRAM(s) Oral daily  saline laxative (FLEET) Rectal Enema 1 Enema Rectal once      TELEMETRY: 	    ECG:  	  RADIOLOGY:  OTHER: 	  	  LABS:	 	    CARDIAC MARKERS:      proBNP:   Lipid Profile: Cholesterol 180  LDL --  HDL 45      HgA1c:   TSH:         Assessment and plan  ---------------------------  89-year-old female history of A-fib, no longer on AC s/p watchman, Pulm HTN, HTN, recent admission from 7/17-7/25 for diverticulitis, comes to ED for persistent watery nonbloody diarrhea X24hrs. Denies vomiting, no fevers, no abdominal pain, no chest pain, no sob. Pt states she currently is on abxs for toe cellulitis does not remember the name and it caused her diarrhea.  pt with sig medical and cardiac hx with hx of a.fib on no AC sec to s/p watchman  with c/o diarrhea ?nausea an bl cold feet and discoloration on the R Toe.  r/o pvd/ emboli  start iv heparin if vascular agreed  check hr  sec to a.fib, no ac s/p watchman procedure  check arterial Doppler of LE noted, need vascular follow up and recommendation  lipid panel  continue all bp meds  awaiting gall bladder ultrasound, abx dc ed by ID  TTE noted with severe PHTN r/o chronic pulmonary emboli, check vq scan  Dr Aleman to see pt vascular surgery  add Norvasc 5 mg daily for elevated bp, will fu bp closely  add lasix 20 mg daily sec to sig pulmonary htn. VQ low probability  awaiting HIDA scan/ GI appreciated  discussed with vascular, fu as out pt with Dr Aleman  increase Norvasc to 10 mg daily, bp is better controlled  ID appreciated  dc to [facility], oob to the chair, physical therapy increase ambulation  lipid panel noted

## 2024-12-23 NOTE — DISCHARGE NOTE PROVIDER - CARE PROVIDERS DIRECT ADDRESSES
[email or web address],DirectAddress_Unknown

## 2024-12-23 NOTE — PROVIDER CONTACT NOTE (MEDICATION) - ACTION/TREATMENT ORDERED:
provider notified and replied "thanks for the heads up" Provider notified and aware. Will continue to educate pt on risks vs benefits.

## 2024-12-23 NOTE — DISCHARGE NOTE PROVIDER - CARE PROVIDER_API CALL
Mati Aleman  Vascular Surgery  [address], [address]  [place] [address]  Phone: [phone]  Fax: [phone]  Follow Up Time: 2 weeks    Issa Wells  Internal Medicine  [address] [place] [address], [address]  [place] [address]  Phone: [phone]  Fax: [phone]  Follow Up Time: 2 weeks    Heavenly Everett  Cardiovascular Disease  [address] [place] [address], [address]  [place] [address]  Phone: [phone]  Fax: [phone]  Follow Up Time: 1 month

## 2024-12-23 NOTE — DISCHARGE NOTE PROVIDER - HOSPITAL COURSE
HPI:  Date of Service, 12-15-24 @ 20:52  CHIEF COMPLAINT:Patient is a 89y old  Female who presents with a chief complaint of     HPI:  89-year-old female history of A-fib, no longer on AC s/p watchman, Pulm HTN, HTN, recent admission from 7/17-7/25 for diverticulitis, comes to ED for persistent watery nonbloody diarrhea X24hrs. Denies vomiting, no fevers, no abdominal pain, no chest pain, no sob. Pt states she currently is on abxs for toe cellulitis does not remember the name and it caused her diarrhea.    Hospital course :   89-year-old female history of A-fib, no longer on AC s/p watchman, Pulm HTN, HTN, recent admission from 7/17-7/25 for diverticulitis, comes to ED for persistent watery nonbloody diarrhea X24hrs. Denies vomiting, no fevers, no abdominal pain, no chest pain, no sob. Pt states she currently is on abxs for toe cellulitis does not remember the name and it caused her diarrhea.  pt with sig medical and cardiac hx with hx of a.fib on no AC sec to s/p watchman  with c/o diarrhea ?nausea an bl cold feet and discoloration on the R Toe.  r/o pvd/ emboli  start iv heparin if vascular agreed  check hr  sec to a.fib, no ac s/p watchman procedure  check arterial Doppler of LE noted, need vascular follow up and recommendation  lipid panel  continue all bp meds  awaiting gall bladder ultrasound, abx dc ed by ID  TTE noted with severe PHTN r/o chronic pulmonary emboli, check vq scan  Dr Aleman to see pt vascular surgery  add Norvasc 5 mg daily for elevated bp, will fu bp closely  add lasix 20 mg daily sec to sig pulmonary htn. VQ low probability  awaiting HIDA scan/ GI appreciated  discussed with vascular, fu as out pt with Dr Aleman  increase Norvasc to 10 mg daily, bp is better controlled  ID appreciated  dc to [facility], oob to the chair, physical therapy increase ambulation  lipid panel noted    Discharge Diagnosis:  HTN  PAD  PVD  severe pHTN  afib

## 2024-12-23 NOTE — DISCHARGE NOTE PROVIDER - NSDCFUSCHEDAPPT_GEN_ALL_CORE_FT
Chuck Cordoba Physician [facility]  CARDIOLOGY [address]  Scheduled Appointment: 02/06/2025

## 2024-12-23 NOTE — PROGRESS NOTE ADULT - SUBJECTIVE AND OBJECTIVE BOX
[facility]  Kal Moreno PA-C  [address] [place] [id number] [phone]    INTERVAL HPI/OVERNIGHT EVENTS:  Pt resting comfortably. No acute complaints.   did not like the eggs for breakfast      MEDICATIONS  (STANDING):  amLODIPine   Tablet 10 milliGRAM(s) Oral daily  aspirin enteric coated 81 milliGRAM(s) Oral daily  cloNIDine 0.1 milliGRAM(s) Oral two times a day  dextrose 5% + sodium chloride 0.45%. 500 milliLiter(s) (50 mL/Hr) IV Continuous <Continuous>  dextrose 5%. 1000 milliLiter(s) (50 mL/Hr) IV Continuous <Continuous>  dextrose 5%. 1000 milliLiter(s) (100 mL/Hr) IV Continuous <Continuous>  dextrose 50% Injectable 25 Gram(s) IV Push once  dextrose 50% Injectable 12.5 Gram(s) IV Push once  dextrose 50% Injectable 25 Gram(s) IV Push once  dextrose Oral Gel 15 Gram(s) Oral once  enalapril 20 milliGRAM(s) Oral daily  glucagon  Injectable 1 milliGRAM(s) IntraMuscular once  heparin   Injectable 5000 Unit(s) SubCutaneous every 8 hours  influenza  Vaccine (HIGH DOSE) 0.5 milliLiter(s) IntraMuscular once  metoprolol succinate ER 50 milliGRAM(s) Oral daily  saline laxative (FLEET) Rectal Enema 1 Enema Rectal once    MEDICATIONS  (PRN):  ondansetron Injectable 4 milliGRAM(s) IV Push every 6 hours PRN Nausea and/or Vomiting    Vital Signs Last 24 Hrs  T(C): 36.7 (22 Dec 2024 04:39), Max: 36.7 (21 Dec 2024 19:54)  T(F): 98 (22 Dec 2024 04:39), Max: 98.1 (21 Dec 2024 19:54)  HR: 74 (22 Dec 2024 06:00) (66 - 92)  BP: 150/84 (22 Dec 2024 06:00) (115/65 - 170/71)  BP(mean): --  RR: 18 (22 Dec 2024 04:39) (18 - 18)  SpO2: 92% (22 Dec 2024 04:39) (92% - 95%)    Parameters below as of 22 Dec 2024 04:39  Patient On (Oxygen Delivery Method): room air    Physical:  General: A&Ox3. NAD.  Abdomen: Soft nondistended, nontender.    I&O's Detail    21 Dec 2024 07:01  -  22 Dec 2024 07:00  --------------------------------------------------------  IN:    Oral Fluid: 960 mL  Total IN: 960 mL    OUT:    Voided (mL): 600 mL  Total OUT: 600 mL    Total NET: 360 mL

## 2024-12-23 NOTE — DISCHARGE NOTE PROVIDER - NSDCQMPCI_CARD_ALL_CORE
Problem: Pressure Injury - Risk of 
Goal: *Prevention of pressure injury Description: Document Oracio Scale and appropriate interventions in the flowsheet. Outcome: Progressing Towards Goal 
Note: Pressure Injury Interventions: 
Sensory Interventions: Float heels, Keep linens dry and wrinkle-free, Minimize linen layers Moisture Interventions: Absorbent underpads Activity Interventions: PT/OT evaluation Mobility Interventions: Float heels Nutrition Interventions: Document food/fluid/supplement intake Friction and Shear Interventions: Apply protective barrier, creams and emollients No

## 2024-12-23 NOTE — DISCHARGE NOTE PROVIDER - NSDCCAREPROVSEEN_GEN_ALL_CORE_FT
Heavenly Everett Marguerite, Heavenly Rodriguez, Herbert Jenkins, Varinder Espinal, Cirilo Wilson, Chuck Mukherjee

## 2024-12-23 NOTE — DISCHARGE NOTE PROVIDER - NSDCMRMEDTOKEN_GEN_ALL_CORE_FT
amLODIPine 10 mg oral tablet: 1 tab(s) orally once a day  aspirin 81 mg oral delayed release tablet: 1 tab(s) orally once a day  cloNIDine 0.1 mg oral tablet: 1 tab(s) orally 2 times a day  Daily-Chanel 400mcg tablet: 1 tablet orally once a day  docusate sodium 100 mg oral capsule: 3 cap(s) orally once a day (at bedtime) as needed for  constipation  enalapril 20 mg oral tablet: 1 tab(s) orally once a day  metoprolol succinate 50 mg oral tablet, extended release: 1 tab(s) orally once a day  ondansetron 4 mg oral tablet: 1 tab(s) orally every 8 hours as needed for  nausea   amLODIPine 10 mg oral tablet: 1 tab(s) orally once a day  aspirin 81 mg oral delayed release tablet: 1 tab(s) orally once a day  cloNIDine 0.1 mg oral tablet: 1 tab(s) orally 2 times a day  Daily-Chanel 400mcg tablet: 1 tablet orally once a day  docusate sodium 100 mg oral capsule: 3 cap(s) orally once a day (at bedtime) as needed for  constipation  enalapril 20 mg oral tablet: 1 tab(s) orally once a day  furosemide 20 mg oral tablet: 1 tab(s) orally once a day  metoprolol succinate 50 mg oral tablet, extended release: 1 tab(s) orally once a day  ondansetron 4 mg oral tablet: 1 tab(s) orally every 8 hours as needed for  nausea

## 2024-12-23 NOTE — DISCHARGE NOTE PROVIDER - NSDCCPCAREPLAN_GEN_ALL_CORE_FT
PRINCIPAL DISCHARGE DIAGNOSIS  Diagnosis: Abdominal pain, vomiting, and diarrhea  Assessment and Plan of Treatment: You presented wit watery, nonbloody diarrhea after being on antibiotics outpatient for toe cellulitis.   CT A/P w/ possible ileus, no diverticulitis, with extensive diverticulosis. Gallbladder distension   During your hospital course you remained afebrile, w/o leukocytosis, diarrhea now resolved. It was recommended by infectious disease to monitor off antibiotics .        SECONDARY DISCHARGE DIAGNOSES  Diagnosis: Benign essential HTN  Assessment and Plan of Treatment: Your blood pressure was found to be elevated while inpatient , norvasc was added and dose increased 10mg daily . BP is better controlled .    Diagnosis: History of pulmonary hypertension  Assessment and Plan of Treatment: You had echo test noted with severe pHTN ,VQ scan was done with low probability for PE .    Diagnosis: Severe tricuspid regurgitation  Assessment and Plan of Treatment: TTE was done revealing severe TR which has acutely worsened from most recent TTE on 7/9/2023 , however you remained asymptomatic, you were evaluated by structural heart, it was recommended to continue current GDMT, and will follow up within 1 month post discharge with cardiology.  Due to the lack of symptoms it is not recommended that you be further assessed at this time for any percutaneous catheter-based intervention for tricuspid valve regurgitation.      Diagnosis: Peripheral artery disease  Assessment and Plan of Treatment: You were seen by vascular for PAD .    DP/PT signals b/l with motor and sensory intact in foot, no pain. Low concern for acute limb ischemia, arterial US lower extremity reviewed   - patient has asymptomatic PAD- can follow up as outpatient.

## 2024-12-24 ENCOUNTER — TRANSCRIPTION ENCOUNTER (OUTPATIENT)
Age: 88
End: 2024-12-24

## 2024-12-24 VITALS
OXYGEN SATURATION: 98 % | TEMPERATURE: 98 F | RESPIRATION RATE: 18 BRPM | HEART RATE: 69 BPM | DIASTOLIC BLOOD PRESSURE: 74 MMHG | SYSTOLIC BLOOD PRESSURE: 125 MMHG

## 2024-12-24 LAB
GLUCOSE BLDC GLUCOMTR-MCNC: 119 MG/DL — HIGH (ref 70–99)
GLUCOSE BLDC GLUCOMTR-MCNC: 88 MG/DL — SIGNIFICANT CHANGE UP (ref 70–99)
GLUCOSE BLDC GLUCOMTR-MCNC: 96 MG/DL — SIGNIFICANT CHANGE UP (ref 70–99)

## 2024-12-24 PROCEDURE — 93306 TTE W/DOPPLER COMPLETE: CPT

## 2024-12-24 PROCEDURE — 76705 ECHO EXAM OF ABDOMEN: CPT

## 2024-12-24 PROCEDURE — 85014 HEMATOCRIT: CPT

## 2024-12-24 PROCEDURE — 86900 BLOOD TYPING SEROLOGIC ABO: CPT

## 2024-12-24 PROCEDURE — 82947 ASSAY GLUCOSE BLOOD QUANT: CPT

## 2024-12-24 PROCEDURE — 80061 LIPID PANEL: CPT

## 2024-12-24 PROCEDURE — 71045 X-RAY EXAM CHEST 1 VIEW: CPT

## 2024-12-24 PROCEDURE — 86901 BLOOD TYPING SEROLOGIC RH(D): CPT

## 2024-12-24 PROCEDURE — A9540: CPT

## 2024-12-24 PROCEDURE — 85730 THROMBOPLASTIN TIME PARTIAL: CPT

## 2024-12-24 PROCEDURE — 93923 UPR/LXTR ART STDY 3+ LVLS: CPT

## 2024-12-24 PROCEDURE — 83605 ASSAY OF LACTIC ACID: CPT

## 2024-12-24 PROCEDURE — 81003 URINALYSIS AUTO W/O SCOPE: CPT

## 2024-12-24 PROCEDURE — 85018 HEMOGLOBIN: CPT

## 2024-12-24 PROCEDURE — 78582 LUNG VENTILAT&PERFUS IMAGING: CPT | Mod: MC

## 2024-12-24 PROCEDURE — 80048 BASIC METABOLIC PNL TOTAL CA: CPT

## 2024-12-24 PROCEDURE — 85610 PROTHROMBIN TIME: CPT

## 2024-12-24 PROCEDURE — A9537: CPT

## 2024-12-24 PROCEDURE — 83036 HEMOGLOBIN GLYCOSYLATED A1C: CPT

## 2024-12-24 PROCEDURE — 99285 EMERGENCY DEPT VISIT HI MDM: CPT | Mod: 25

## 2024-12-24 PROCEDURE — 84132 ASSAY OF SERUM POTASSIUM: CPT

## 2024-12-24 PROCEDURE — 80053 COMPREHEN METABOLIC PANEL: CPT

## 2024-12-24 PROCEDURE — 82435 ASSAY OF BLOOD CHLORIDE: CPT

## 2024-12-24 PROCEDURE — 78226 HEPATOBILIARY SYSTEM IMAGING: CPT | Mod: MC

## 2024-12-24 PROCEDURE — 84295 ASSAY OF SERUM SODIUM: CPT

## 2024-12-24 PROCEDURE — 85027 COMPLETE CBC AUTOMATED: CPT

## 2024-12-24 PROCEDURE — 96374 THER/PROPH/DIAG INJ IV PUSH: CPT

## 2024-12-24 PROCEDURE — 82962 GLUCOSE BLOOD TEST: CPT

## 2024-12-24 PROCEDURE — 93925 LOWER EXTREMITY STUDY: CPT

## 2024-12-24 PROCEDURE — 36415 COLL VENOUS BLD VENIPUNCTURE: CPT

## 2024-12-24 PROCEDURE — 82803 BLOOD GASES ANY COMBINATION: CPT

## 2024-12-24 PROCEDURE — 86850 RBC ANTIBODY SCREEN: CPT

## 2024-12-24 PROCEDURE — 82330 ASSAY OF CALCIUM: CPT

## 2024-12-24 PROCEDURE — 74177 CT ABD & PELVIS W/CONTRAST: CPT | Mod: MC

## 2024-12-24 PROCEDURE — 85025 COMPLETE CBC W/AUTO DIFF WBC: CPT

## 2024-12-24 PROCEDURE — 83690 ASSAY OF LIPASE: CPT

## 2024-12-24 PROCEDURE — 96375 TX/PRO/DX INJ NEW DRUG ADDON: CPT

## 2024-12-24 PROCEDURE — A9567: CPT

## 2024-12-24 RX ORDER — FUROSEMIDE 20 MG
1 TABLET ORAL
Qty: 0 | Refills: 0 | DISCHARGE
Start: 2024-12-24

## 2024-12-24 RX ORDER — FUROSEMIDE 20 MG
20 TABLET ORAL DAILY
Refills: 0 | Status: DISCONTINUED | OUTPATIENT
Start: 2024-12-24 | End: 2024-12-24

## 2024-12-24 RX ADMIN — CLONIDINE HYDROCHLORIDE 0.1 MILLIGRAM(S): 0.2 TABLET ORAL at 06:12

## 2024-12-24 RX ADMIN — CLONIDINE HYDROCHLORIDE 0.1 MILLIGRAM(S): 0.2 TABLET ORAL at 17:21

## 2024-12-24 RX ADMIN — Medication 50 MILLIGRAM(S): at 06:12

## 2024-12-24 RX ADMIN — ENALAPRIL MALEATE 20 MILLIGRAM(S): 10 TABLET ORAL at 06:12

## 2024-12-24 RX ADMIN — ONDANSETRON 4 MILLIGRAM(S): 4 TABLET ORAL at 14:27

## 2024-12-24 RX ADMIN — Medication 20 MILLIGRAM(S): at 13:10

## 2024-12-24 RX ADMIN — Medication 10 MILLIGRAM(S): at 06:12

## 2024-12-24 NOTE — DISCHARGE NOTE NURSING/CASE MANAGEMENT/SOCIAL WORK - PATIENT PORTAL LINK FT
You can access the FollowMyHealth Patient Portal offered by [facility] by registering at the following website: [email or web address]. By joining [facility]’s FollowMyHealth portal, you will also be able to view your health information using other applications (apps) compatible with our system.

## 2024-12-24 NOTE — DISCHARGE NOTE NURSING/CASE MANAGEMENT/SOCIAL WORK - NSDCFUADDAPPT_GEN_ALL_CORE_FT
APPTS ARE READY TO BE MADE: [X] YES    Best Family or Patient Contact (if needed):    Additional Information about above appointments (if needed):    1: vasc  2: cards  3: pcp    Other comments or requests:

## 2024-12-24 NOTE — PROGRESS NOTE ADULT - ASSESSMENT
89y.o. Female watery diarrhea, distended GB    Recommendations:  - LFTs and prior and current normal HIDA reassuring  - diet as ana  - Antiemesis prn  - Diet as tolerated  - dc planning   - Will follow with you    I reviewed the overnight course of events on the unit, re-confirming the patient history. I discussed the care with the patient  Differential diagnosis and plan of care discussed with patient after the evaluation  35 minutes spent on total encounter of which more than fifty percent of the encounter was spent counseling and/or coordinating care by the attending physician.

## 2024-12-24 NOTE — PROGRESS NOTE ADULT - SUBJECTIVE AND OBJECTIVE BOX
Date of Service: 12-24-24 @ 06:17           CARDIOLOGY     PROGRESS  NOTE   ________________________________________________    CHIEF COMPLAINT:Patient is a 89y old  Female who presents with a chief complaint of ischemic foot (23 Dec 2024 21:10)  no complain comfortable  	  REVIEW OF SYSTEMS:  CONSTITUTIONAL: No fever, weight loss, or fatigue  EYES: No eye pain, visual disturbances, or discharge  ENT:  No difficulty hearing, tinnitus, vertigo; No sinus or throat pain  NECK: No pain or stiffness  RESPIRATORY: No cough, wheezing, chills or hemoptysis; No Shortness of Breath  CARDIOVASCULAR: No chest pain, palpitations, passing out, dizziness, or leg swelling  GASTROINTESTINAL: No abdominal or epigastric pain. No nausea, vomiting, or hematemesis; No diarrhea or constipation. No melena or hematochezia.  GENITOURINARY: No dysuria, frequency, hematuria, or incontinence  NEUROLOGICAL: No headaches, memory loss, loss of strength, numbness, or tremors  SKIN: No itching, burning, rashes, or lesions   LYMPH Nodes: No enlarged glands  ENDOCRINE: No heat or cold intolerance; No hair loss  MUSCULOSKELETAL: No joint pain or swelling; No muscle, back, or extremity pain  PSYCHIATRIC: No depression, anxiety, mood swings, or difficulty sleeping  HEME/LYMPH: No easy bruising, or bleeding gums  ALLERGY AND IMMUNOLOGIC: No hives or eczema	    [x ] All others negative	  [ ] Unable to obtain    PHYSICAL EXAM:  T(C): 36.7 (12-24-24 @ 05:01), Max: 36.7 (12-23-24 @ 16:15)  HR: 87 (12-24-24 @ 05:01) (56 - 87)  BP: 157/85 (12-24-24 @ 05:01) (120/66 - 157/85)  RR: 18 (12-24-24 @ 05:01) (18 - 18)  SpO2: 95% (12-24-24 @ 05:01) (93% - 96%)  Wt(kg): --  I&O's Summary    22 Dec 2024 07:01  -  23 Dec 2024 07:00  --------------------------------------------------------  IN: 680 mL / OUT: 450 mL / NET: 230 mL    23 Dec 2024 07:01  -  24 Dec 2024 06:17  --------------------------------------------------------  IN: 280 mL / OUT: 300 mL / NET: -20 mL        Appearance: Normal	  HEENT:   Normal oral mucosa, PERRL, EOMI	  Lymphatic: No lymphadenopathy  Cardiovascular: Normal S1 S2, No JVD, + murmurs, No edema  Respiratory rhonchi  Psychiatry: A & O x 3, Mood & affect appropriate  Gastrointestinal:  Soft, Non-tender, + BS	  Skin: No rashes, No ecchymoses, No cyanosis	  Neurologic: Non-focal  Extremities: Normal range of motion, No clubbing, cyanosis or edema  Vascular: + pvd    MEDICATIONS  (STANDING):  amLODIPine   Tablet 10 milliGRAM(s) Oral daily  aspirin enteric coated 81 milliGRAM(s) Oral daily  cloNIDine 0.1 milliGRAM(s) Oral two times a day  dextrose 5% + sodium chloride 0.45%. 500 milliLiter(s) (50 mL/Hr) IV Continuous <Continuous>  dextrose 5%. 1000 milliLiter(s) (50 mL/Hr) IV Continuous <Continuous>  dextrose 5%. 1000 milliLiter(s) (100 mL/Hr) IV Continuous <Continuous>  dextrose 50% Injectable 25 Gram(s) IV Push once  dextrose 50% Injectable 12.5 Gram(s) IV Push once  dextrose 50% Injectable 25 Gram(s) IV Push once  dextrose Oral Gel 15 Gram(s) Oral once  enalapril 20 milliGRAM(s) Oral daily  glucagon  Injectable 1 milliGRAM(s) IntraMuscular once  heparin   Injectable 5000 Unit(s) SubCutaneous every 8 hours  influenza  Vaccine (HIGH DOSE) 0.5 milliLiter(s) IntraMuscular once  metoprolol succinate ER 50 milliGRAM(s) Oral daily      TELEMETRY: 	    ECG:  	  RADIOLOGY:  OTHER: 	  	  LABS:	 	    CARDIAC MARKERS:        proBNP:   Lipid Profile: Cholesterol 180  LDL --  HDL 45      HgA1c:   TSH:       Notes:  note. Chart reviewed and noted. Informed during  interdisciplinary team rounds that pt is for possible discharge back to her [facility]  tomorrow. Contacted and spoke with pt's son Roderick regarding the same. [id number]  put in pts front chart for completion. Pt will need ambulance transportation at  discharge.  will continue to collaborate with interdisciplinary  team and remain available to assist.    Assessment and plan  ---------------------------  89-year-old female history of A-fib, no longer on AC s/p watchman, Pulm HTN, HTN, recent admission from 7/17-7/25 for diverticulitis, comes to ED for persistent watery nonbloody diarrhea X24hrs. Denies vomiting, no fevers, no abdominal pain, no chest pain, no sob. Pt states she currently is on abxs for toe cellulitis does not remember the name and it caused her diarrhea.  pt with sig medical and cardiac hx with hx of a.fib on no AC sec to s/p watchman  with c/o diarrhea ?nausea an bl cold feet and discoloration on the R Toe.  r/o pvd/ emboli  start iv heparin if vascular agreed  check hr  sec to a.fib, no ac s/p watchman procedure  check arterial Doppler of LE noted, need vascular follow up and recommendation  lipid panel  continue all bp meds  awaiting gall bladder ultrasound, abx dc ed by ID  TTE noted with severe PHTN r/o chronic pulmonary emboli, check vq scan  Dr Aleman to see pt vascular surgery  add Norvasc 5 mg daily for elevated bp, will fu bp closely  add lasix 20 mg daily sec to sig pulmonary htn. VQ low probability  awaiting HIDA scan/ GI appreciated  discussed with vascular, fu as out pt with Dr Aleman  increase Norvasc to 10 mg daily, bp is better controlled  ID appreciated  dc to [facility], oob to the chair, physical therapy increase ambulation  lipid panel noted  pt needs to fu with vascular surgery/ PT as out pt  add lasix 20 mg daily

## 2024-12-24 NOTE — PROGRESS NOTE ADULT - SUBJECTIVE AND OBJECTIVE BOX
---___---___---___---___---___---___ ---___---___---___---___---___---___---___---___---                  M E D I C A L   A T T E N D I N G   P R O G R E S S   N O T E  ---___---___---___---___---___---___ ---___---___---___---___---___---___---___---___---        ================================================    ++CHIEF COMPLAINT:   Patient is a 89y old  Female who presents with a chief complaint of ischemic foot (24 Dec 2024 06:17)      Abdominal pain      ---___---___---___---___---___---  PAST MEDICAL / Surgical  HISTORY:  PAST MEDICAL & SURGICAL HISTORY:  Afib      HTN (hypertension)      H/O CHF      Uterine cancer      S/P hysterectomy          ---___---___---___---___---___---  FAMILY HISTORY:   FAMILY HISTORY:        ---___---___---___---___---___---  ALLERGIES:   Allergies    Lipitor (Other; Flushing)  hydrALAZINE (Other; Flushing)    Intolerances    lactose (Unknown)      ---___---___---___---___---___---  MEDICATIONS:  MEDICATIONS  (STANDING):  amLODIPine   Tablet 10 milliGRAM(s) Oral daily  aspirin enteric coated 81 milliGRAM(s) Oral daily  cloNIDine 0.1 milliGRAM(s) Oral two times a day  dextrose 5% + sodium chloride 0.45%. 500 milliLiter(s) (50 mL/Hr) IV Continuous <Continuous>  dextrose 5%. 1000 milliLiter(s) (50 mL/Hr) IV Continuous <Continuous>  dextrose 5%. 1000 milliLiter(s) (100 mL/Hr) IV Continuous <Continuous>  dextrose 50% Injectable 25 Gram(s) IV Push once  dextrose 50% Injectable 12.5 Gram(s) IV Push once  dextrose 50% Injectable 25 Gram(s) IV Push once  dextrose Oral Gel 15 Gram(s) Oral once  enalapril 20 milliGRAM(s) Oral daily  furosemide    Tablet 20 milliGRAM(s) Oral daily  glucagon  Injectable 1 milliGRAM(s) IntraMuscular once  heparin   Injectable 5000 Unit(s) SubCutaneous every 8 hours  influenza  Vaccine (HIGH DOSE) 0.5 milliLiter(s) IntraMuscular once  metoprolol succinate ER 50 milliGRAM(s) Oral daily    MEDICATIONS  (PRN):  ondansetron Injectable 4 milliGRAM(s) IV Push every 6 hours PRN Nausea and/or Vomiting      ---___---___---___---___---___---  REVIEW OF SYSTEM:    GEN: no fever, no chills, no pain  RESP: no SOB, no cough, no sputum  CVS: no chest pain, no palpitations, no edema  GI: no abdominal pain, no nausea, no vomiting, no constipation, no diarrhea  : no dysurea, no frequency, no hematurea  Neuro: no headache, no dizziness  PSYCH: no anxiety, no depression  Derm : no itching, no rash    ---___---___---___---___---___---  VITAL SIGNS:  89y , CAPILLARY BLOOD GLUCOSE      POCT Blood Glucose.: 88 mg/dL (24 Dec 2024 05:37)    T(C): 36.7 (12-24-24 @ 11:38), Max: 36.7 (12-23-24 @ 16:15)  HR: 69 (12-24-24 @ 11:38) (56 - 87)  BP: 125/74 (12-24-24 @ 11:38) (124/68 - 157/85)  RR: 18 (12-24-24 @ 11:38) (18 - 18)  SpO2: 98% (12-24-24 @ 11:38) (95% - 98%)  ---___---___---___---___---___---  PHYSICAL EXAM:    GEN: A&O X 3 , NAD , comfortable  HEENT: NCAT, PERRL, MMM, hearing intact  Neck: supple , no JVD  CVS: S1S2 , regular , No M/R/G appreciated  PULM: CTA B/L,  no W/R/R appreciated  ABD.: soft. non tender, non distended,  bowel sounds present  Extrem: intact pulses , no edema   Derm: No rash , no ecchymoses  PSYCH : normal mood,  no delusion not anxious     ---___---___---___---___---___---            LAB AND IMAGING:                                                  [All pertinent / recent Imaging reviewed]         ---___---___---___---___---___---___ ---___---___---___---___---                         A S S E S S M E N T   A N D   P L A N :      HEALTH ISSUES - PROBLEM Dx:  Severe tricuspid regurgitation  This is a 89 y/o F        w/ PMHx of AF, s/p watchman, no longer on AC , pulmonary HTN, h/o diverticulitis,        who presentes to ER for watery, nonbloody diarrhea.    Pt was on antibiotics outpatient for toe cellulitis.   ICT A/P w/ possible ileus, no diverticulitis, with extensive diverticulosis. Gallbladder distension   Pt afebrile, w/o leukocytosis, diarrhea now resolved.      monitor off ab per  id   V/Q scan low probability for PE  HTN/ HLD    DM.,  follwo  fs     on vdt  ppx/      start dc planning back to [facility]   ___________________________  Thank you,  [facility]  [phone]

## 2024-12-24 NOTE — DISCHARGE NOTE NURSING/CASE MANAGEMENT/SOCIAL WORK - FINANCIAL ASSISTANCE
[facility] provides services at a reduced cost to those who are determined to be eligible through [facility]’s financial assistance program. Information regarding [facility]’s financial assistance program can be found by going to [email or web address] or by calling [phone].

## 2024-12-24 NOTE — PROGRESS NOTE ADULT - NUTRITIONAL ASSESSMENT
This patient has been assessed with a concern for Malnutrition and has been determined to have a diagnosis/diagnoses of Severe protein-calorie malnutrition.    This patient is being managed with:   Diet Minced and Moist-  Entered: Dec 16 2024 10:24AM  

## 2024-12-24 NOTE — PROGRESS NOTE ADULT - SUBJECTIVE AND OBJECTIVE BOX
[facility]  Kal Moreno PA-C  [address] [place] [id number] [phone]    INTERVAL HPI/OVERNIGHT EVENTS:  Pt resting comfortably. No acute complaints.   ana diet      MEDICATIONS  (STANDING):  amLODIPine   Tablet 10 milliGRAM(s) Oral daily  aspirin enteric coated 81 milliGRAM(s) Oral daily  cloNIDine 0.1 milliGRAM(s) Oral two times a day  dextrose 5% + sodium chloride 0.45%. 500 milliLiter(s) (50 mL/Hr) IV Continuous <Continuous>  dextrose 5%. 1000 milliLiter(s) (50 mL/Hr) IV Continuous <Continuous>  dextrose 5%. 1000 milliLiter(s) (100 mL/Hr) IV Continuous <Continuous>  dextrose 50% Injectable 25 Gram(s) IV Push once  dextrose 50% Injectable 12.5 Gram(s) IV Push once  dextrose 50% Injectable 25 Gram(s) IV Push once  dextrose Oral Gel 15 Gram(s) Oral once  enalapril 20 milliGRAM(s) Oral daily  glucagon  Injectable 1 milliGRAM(s) IntraMuscular once  heparin   Injectable 5000 Unit(s) SubCutaneous every 8 hours  influenza  Vaccine (HIGH DOSE) 0.5 milliLiter(s) IntraMuscular once  metoprolol succinate ER 50 milliGRAM(s) Oral daily  saline laxative (FLEET) Rectal Enema 1 Enema Rectal once    MEDICATIONS  (PRN):  ondansetron Injectable 4 milliGRAM(s) IV Push every 6 hours PRN Nausea and/or Vomiting    Vital Signs Last 24 Hrs  T(C): 36.7 (22 Dec 2024 04:39), Max: 36.7 (21 Dec 2024 19:54)  T(F): 98 (22 Dec 2024 04:39), Max: 98.1 (21 Dec 2024 19:54)  HR: 74 (22 Dec 2024 06:00) (66 - 92)  BP: 150/84 (22 Dec 2024 06:00) (115/65 - 170/71)  BP(mean): --  RR: 18 (22 Dec 2024 04:39) (18 - 18)  SpO2: 92% (22 Dec 2024 04:39) (92% - 95%)    Parameters below as of 22 Dec 2024 04:39  Patient On (Oxygen Delivery Method): room air    Physical:  General: A&Ox3. NAD.  Abdomen: Soft nondistended, nontender.    I&O's Detail    21 Dec 2024 07:01  -  22 Dec 2024 07:00  --------------------------------------------------------  IN:    Oral Fluid: 960 mL  Total IN: 960 mL    OUT:    Voided (mL): 600 mL  Total OUT: 600 mL    Total NET: 360 mL

## 2024-12-24 NOTE — PROGRESS NOTE ADULT - REASON FOR ADMISSION
ischemic foot

## 2024-12-24 NOTE — PROGRESS NOTE ADULT - PROVIDER SPECIALTY LIST ADULT
Cardiology
Family Medicine
Gastroenterology
Infectious Disease
Cardiology
Family Medicine
Gastroenterology
Internal Medicine
Cardiology
Gastroenterology
Gastroenterology
Cardiology
Gastroenterology
Infectious Disease
Infectious Disease
Vascular Surgery

## 2025-01-23 ENCOUNTER — RESULT REVIEW (OUTPATIENT)
Age: 89
End: 2025-01-23

## 2025-01-23 ENCOUNTER — OUTPATIENT (OUTPATIENT)
Dept: OUTPATIENT SERVICES | Facility: HOSPITAL | Age: 89
LOS: 1 days | End: 2025-01-23
Payer: MEDICARE

## 2025-01-23 DIAGNOSIS — Z90.710 ACQUIRED ABSENCE OF BOTH CERVIX AND UTERUS: Chronic | ICD-10-CM

## 2025-01-23 DIAGNOSIS — I35.0 NONRHEUMATIC AORTIC (VALVE) STENOSIS: ICD-10-CM

## 2025-01-23 PROCEDURE — 93356 MYOCRD STRAIN IMG SPCKL TRCK: CPT

## 2025-01-23 PROCEDURE — 93306 TTE W/DOPPLER COMPLETE: CPT | Mod: 26

## 2025-01-23 PROCEDURE — 93306 TTE W/DOPPLER COMPLETE: CPT

## 2025-01-23 NOTE — H&P ADULT - PROBLEM SELECTOR PLAN 1
Attempting to reach the family to schedule surgery for Dr. Andrew Milan. I left a voicemail with my callback: [phone].      Yusef, Complex Surgery Scheduler  [facility]  Office: [phone].    
Fever and significant leukocytosis concern for infection though no clear identifiable source at this time.   - UA negative for UTI  - CT A/P without evidence of diverticulitis, no obvious infectious source. Noted to have distended gallbladder  - Will continue zosyn for empiric treatment  - F/u blood cultures obtained in ED  - Monitor fevers

## 2025-02-10 NOTE — SWALLOW BEDSIDE ASSESSMENT ADULT - SWALLOW EVAL: MANDIBULAR STRENGTH AND MOBILITY
Blood pressure elevated on presentation in setting of volume overload  Improving  Continue home nifedipine   intact

## 2025-02-20 ENCOUNTER — NON-APPOINTMENT (OUTPATIENT)
Age: 89
End: 2025-02-20

## 2025-03-08 ENCOUNTER — INPATIENT (INPATIENT)
Facility: HOSPITAL | Age: 89
LOS: 2 days | Discharge: HOME | DRG: 690 | End: 2025-03-11
Attending: INTERNAL MEDICINE | Admitting: INTERNAL MEDICINE
Payer: MEDICARE

## 2025-03-08 VITALS
SYSTOLIC BLOOD PRESSURE: 157 MMHG | TEMPERATURE: 98 F | DIASTOLIC BLOOD PRESSURE: 84 MMHG | OXYGEN SATURATION: 96 % | RESPIRATION RATE: 20 BRPM | HEART RATE: 104 BPM | HEIGHT: 67 IN | WEIGHT: 139.99 LBS

## 2025-03-08 DIAGNOSIS — Z90.710 ACQUIRED ABSENCE OF BOTH CERVIX AND UTERUS: Chronic | ICD-10-CM

## 2025-03-08 DIAGNOSIS — E86.0 DEHYDRATION: ICD-10-CM

## 2025-03-08 LAB
ALBUMIN SERPL ELPH-MCNC: 4.2 G/DL — SIGNIFICANT CHANGE UP (ref 3.3–5)
ALP SERPL-CCNC: 72 U/L — SIGNIFICANT CHANGE UP (ref 40–120)
ALT FLD-CCNC: 10 U/L — SIGNIFICANT CHANGE UP (ref 10–45)
ANION GAP SERPL CALC-SCNC: 14 MMOL/L — SIGNIFICANT CHANGE UP (ref 5–17)
APPEARANCE UR: ABNORMAL
AST SERPL-CCNC: 19 U/L — SIGNIFICANT CHANGE UP (ref 10–40)
BACTERIA # UR AUTO: ABNORMAL /HPF
BASOPHILS # BLD AUTO: 0.06 K/UL — SIGNIFICANT CHANGE UP (ref 0–0.2)
BASOPHILS NFR BLD AUTO: 0.6 % — SIGNIFICANT CHANGE UP (ref 0–2)
BILIRUB SERPL-MCNC: 0.4 MG/DL — SIGNIFICANT CHANGE UP (ref 0.2–1.2)
BILIRUB UR-MCNC: NEGATIVE — SIGNIFICANT CHANGE UP
BUN SERPL-MCNC: 26 MG/DL — HIGH (ref 7–23)
CALCIUM SERPL-MCNC: 9.8 MG/DL — SIGNIFICANT CHANGE UP (ref 8.4–10.5)
CAST: 1 /LPF — SIGNIFICANT CHANGE UP (ref 0–4)
CHLORIDE SERPL-SCNC: 100 MMOL/L — SIGNIFICANT CHANGE UP (ref 96–108)
CO2 SERPL-SCNC: 24 MMOL/L — SIGNIFICANT CHANGE UP (ref 22–31)
COLOR SPEC: YELLOW — SIGNIFICANT CHANGE UP
CREAT SERPL-MCNC: 0.63 MG/DL — SIGNIFICANT CHANGE UP (ref 0.5–1.3)
DIFF PNL FLD: ABNORMAL
EGFR: 84 ML/MIN/1.73M2 — SIGNIFICANT CHANGE UP
EGFR: 84 ML/MIN/1.73M2 — SIGNIFICANT CHANGE UP
EOSINOPHIL # BLD AUTO: 0.19 K/UL — SIGNIFICANT CHANGE UP (ref 0–0.5)
EOSINOPHIL NFR BLD AUTO: 1.8 % — SIGNIFICANT CHANGE UP (ref 0–6)
FLUAV AG NPH QL: SIGNIFICANT CHANGE UP
FLUBV AG NPH QL: SIGNIFICANT CHANGE UP
GAS PNL BLDV: SIGNIFICANT CHANGE UP
GLUCOSE SERPL-MCNC: 104 MG/DL — HIGH (ref 70–99)
GLUCOSE UR QL: NEGATIVE MG/DL — SIGNIFICANT CHANGE UP
HCT VFR BLD CALC: 35.4 % — SIGNIFICANT CHANGE UP (ref 34.5–45)
HGB BLD-MCNC: 11.4 G/DL — LOW (ref 11.5–15.5)
IMM GRANULOCYTES NFR BLD AUTO: 0.3 % — SIGNIFICANT CHANGE UP (ref 0–0.9)
KETONES UR-MCNC: NEGATIVE MG/DL — SIGNIFICANT CHANGE UP
LEUKOCYTE ESTERASE UR-ACNC: ABNORMAL
LYMPHOCYTES # BLD AUTO: 1.31 K/UL — SIGNIFICANT CHANGE UP (ref 1–3.3)
LYMPHOCYTES # BLD AUTO: 12.5 % — LOW (ref 13–44)
MAGNESIUM SERPL-MCNC: 2.1 MG/DL — SIGNIFICANT CHANGE UP (ref 1.6–2.6)
MCHC RBC-ENTMCNC: 31.4 PG — SIGNIFICANT CHANGE UP (ref 27–34)
MCHC RBC-ENTMCNC: 32.2 G/DL — SIGNIFICANT CHANGE UP (ref 32–36)
MCV RBC AUTO: 97.5 FL — SIGNIFICANT CHANGE UP (ref 80–100)
MONOCYTES # BLD AUTO: 1.03 K/UL — HIGH (ref 0–0.9)
MONOCYTES NFR BLD AUTO: 9.8 % — SIGNIFICANT CHANGE UP (ref 2–14)
NEUTROPHILS # BLD AUTO: 7.89 K/UL — HIGH (ref 1.8–7.4)
NEUTROPHILS NFR BLD AUTO: 75 % — SIGNIFICANT CHANGE UP (ref 43–77)
NITRITE UR-MCNC: POSITIVE
NRBC BLD AUTO-RTO: 0 /100 WBCS — SIGNIFICANT CHANGE UP (ref 0–0)
PH UR: 5.5 — SIGNIFICANT CHANGE UP (ref 5–8)
PLATELET # BLD AUTO: 257 K/UL — SIGNIFICANT CHANGE UP (ref 150–400)
POTASSIUM SERPL-MCNC: 4.6 MMOL/L — SIGNIFICANT CHANGE UP (ref 3.5–5.3)
POTASSIUM SERPL-SCNC: 4.6 MMOL/L — SIGNIFICANT CHANGE UP (ref 3.5–5.3)
PROT SERPL-MCNC: 7.8 G/DL — SIGNIFICANT CHANGE UP (ref 6–8.3)
PROT UR-MCNC: SIGNIFICANT CHANGE UP MG/DL
RBC # BLD: 3.63 M/UL — LOW (ref 3.8–5.2)
RBC # FLD: 16 % — HIGH (ref 10.3–14.5)
RBC CASTS # UR COMP ASSIST: 3 /HPF — SIGNIFICANT CHANGE UP (ref 0–4)
REVIEW: SIGNIFICANT CHANGE UP
RSV RNA NPH QL NAA+NON-PROBE: SIGNIFICANT CHANGE UP
SARS-COV-2 RNA SPEC QL NAA+PROBE: SIGNIFICANT CHANGE UP
SODIUM SERPL-SCNC: 138 MMOL/L — SIGNIFICANT CHANGE UP (ref 135–145)
SP GR SPEC: 1.02 — SIGNIFICANT CHANGE UP (ref 1–1.03)
SQUAMOUS # UR AUTO: 4 /HPF — SIGNIFICANT CHANGE UP (ref 0–5)
TROPONIN T, HIGH SENSITIVITY RESULT: 33 NG/L — SIGNIFICANT CHANGE UP (ref 0–51)
UROBILINOGEN FLD QL: 0.2 MG/DL — SIGNIFICANT CHANGE UP (ref 0.2–1)
WBC # BLD: 10.51 K/UL — HIGH (ref 3.8–10.5)
WBC # FLD AUTO: 10.51 K/UL — HIGH (ref 3.8–10.5)
WBC UR QL: 34 /HPF — HIGH (ref 0–5)
YEAST-LIKE CELLS: PRESENT

## 2025-03-08 PROCEDURE — 99285 EMERGENCY DEPT VISIT HI MDM: CPT | Mod: GC

## 2025-03-08 PROCEDURE — 71045 X-RAY EXAM CHEST 1 VIEW: CPT | Mod: 26

## 2025-03-08 RX ORDER — SODIUM CHLORIDE 9 G/1000ML
500 INJECTION, SOLUTION INTRAVENOUS
Refills: 0 | Status: DISCONTINUED | OUTPATIENT
Start: 2025-03-08 | End: 2025-03-11

## 2025-03-08 RX ORDER — INFLUENZA A VIRUS A/IDAHO/07/2018 (H1N1) ANTIGEN (MDCK CELL DERIVED, PROPIOLACTONE INACTIVATED, INFLUENZA A VIRUS A/INDIANA/08/2018 (H3N2) ANTIGEN (MDCK CELL DERIVED, PROPIOLACTONE INACTIVATED), INFLUENZA B VIRUS B/SINGAPORE/INFTT-16-0610/2016 ANTIGEN (MDCK CELL DERIVED, PROPIOLACTONE INACTIVATED), INFLUENZA B VIRUS B/IOWA/06/2017 ANTIGEN (MDCK CELL DERIVED, PROPIOLACTONE INACTIVATED) 15; 15; 15; 15 UG/.5ML; UG/.5ML; UG/.5ML; UG/.5ML
0.5 INJECTION, SUSPENSION INTRAMUSCULAR ONCE
Refills: 0 | Status: COMPLETED | OUTPATIENT
Start: 2025-03-08 | End: 2025-03-08

## 2025-03-08 RX ORDER — AMLODIPINE BESYLATE 10 MG/1
5 TABLET ORAL DAILY
Refills: 0 | Status: DISCONTINUED | OUTPATIENT
Start: 2025-03-08 | End: 2025-03-11

## 2025-03-08 RX ORDER — CEFTRIAXONE 500 MG/1
1000 INJECTION, POWDER, FOR SOLUTION INTRAMUSCULAR; INTRAVENOUS EVERY 24 HOURS
Refills: 0 | Status: COMPLETED | OUTPATIENT
Start: 2025-03-08 | End: 2025-03-10

## 2025-03-08 RX ORDER — ACETAMINOPHEN 500 MG/5ML
1000 LIQUID (ML) ORAL ONCE
Refills: 0 | Status: COMPLETED | OUTPATIENT
Start: 2025-03-08 | End: 2025-03-08

## 2025-03-08 RX ORDER — HEPARIN SODIUM 1000 [USP'U]/ML
5000 INJECTION INTRAVENOUS; SUBCUTANEOUS EVERY 12 HOURS
Refills: 0 | Status: DISCONTINUED | OUTPATIENT
Start: 2025-03-08 | End: 2025-03-11

## 2025-03-08 RX ORDER — METOPROLOL SUCCINATE 50 MG/1
50 TABLET, EXTENDED RELEASE ORAL DAILY
Refills: 0 | Status: DISCONTINUED | OUTPATIENT
Start: 2025-03-08 | End: 2025-03-11

## 2025-03-08 RX ORDER — ASPIRIN 325 MG
81 TABLET ORAL DAILY
Refills: 0 | Status: DISCONTINUED | OUTPATIENT
Start: 2025-03-08 | End: 2025-03-11

## 2025-03-08 RX ORDER — ENALAPRIL MALEATE 20 MG
20 TABLET ORAL DAILY
Refills: 0 | Status: DISCONTINUED | OUTPATIENT
Start: 2025-03-08 | End: 2025-03-11

## 2025-03-08 RX ADMIN — Medication 20 MILLIGRAM(S): at 17:50

## 2025-03-08 RX ADMIN — Medication 500 MILLILITER(S): at 14:11

## 2025-03-08 RX ADMIN — AMLODIPINE BESYLATE 5 MILLIGRAM(S): 10 TABLET ORAL at 17:50

## 2025-03-08 RX ADMIN — CEFTRIAXONE 100 MILLIGRAM(S): 500 INJECTION, POWDER, FOR SOLUTION INTRAMUSCULAR; INTRAVENOUS at 16:44

## 2025-03-08 RX ADMIN — Medication 400 MILLIGRAM(S): at 14:46

## 2025-03-08 RX ADMIN — Medication 0.1 MILLIGRAM(S): at 17:50

## 2025-03-08 RX ADMIN — SODIUM CHLORIDE 50 MILLILITER(S): 9 INJECTION, SOLUTION INTRAVENOUS at 17:47

## 2025-03-08 NOTE — ED PROVIDER NOTE - PROGRESS NOTE DETAILS
Danna Lacey PGY3: BUN to creatinine ratio 40, patient appears dehydrated.  Most likely cause for patient's symptoms.  Plan for admission to medicine.

## 2025-03-08 NOTE — ED ADULT TRIAGE NOTE - AS PAIN REST
ORTHOPAEDIC FOLLOW-UP VISIT    Date of injury: 12/16/2017     SUBJECTIVE:  The patient presents for follow-up care of right hand fracture and left hand laceration. He reports improving pain. That swelling to his right hand has decreased. He reports some diminished sensation to light touch on the dorsal left thumb.      OBJECTIVE:  There were no vitals taken for this visit.  General:  Well-groomed, well-nourished and in no apparent distress.  Extremities:  No cyanosis, clubbing, or edema.  Skin:  No abnormal skin lesions.  Neurologic: Alert, oriented x 4 and cooperative.   Musculoskeletal: Right hand: mild edema present. ROM of the hand intact, stiff related to swelling and pain. Tenderness over 5th metacarpal base.  Left hand: lacerations on thenar eminence and thumb healing appropriately. Sutures removed. ROM of the thumb intact, flexor and extensor tendon intact. Reports some diminished sensation to light touch on the dorsal thumb just distal to the laceration. Sensation on the pulp intact.    IMPRESSION:  Encounter Diagnoses   Name Primary?   • Laceration of left hand without foreign body, initial encounter Yes   • Closed nondisplaced fracture of base of fifth metacarpal bone of right hand, initial encounter           TREATMENT AND RECOMMENDATIONS:   The patient can begin weaning use of the right hand splint over the next several weeks and increase his activity as tolerated. I discussed with him that time will be the biggest factor in knowing how his sensation will improve on his left thumb, I suspect that this will improve over time. Wound care was discussed.    Follow-up as needed.    On 12/28/2017, Araceli WU APNP scribed the services personally performed by Pablo Salazar, JOVANNI Cerna  12/28/2017    The documentation recorded by the scribe accurately and completely reflects the service(s) I personally performed and the decisions made by me.        0 (no pain/absence of nonverbal indicators of pain)

## 2025-03-08 NOTE — PATIENT PROFILE ADULT - NSPROGENPREVTRANSF_GEN_A_NUR
Date Placed on Pt:  05/12/2022    Patient instructed not to:   -take baths, swim, sauna   -remove device prior to 14 days   -use electric blankets   -shower or sweat excessively within first 24 hours of device application  Patient instructed to:   -shower as needed   -be carefull when toweling off and dressing   -press button when cardiac symptoms occur   -document symptoms in log book   -remove and return device (send via mail to [employer])   -Call [employer] with any questions     no history of blood product transfusion

## 2025-03-08 NOTE — PATIENT PROFILE ADULT - PATIENT’S MOTHER’S MAIDEN LAST NAME (INFO USED BY THE IMMUNIZATION REGISTRY):
[birth year]        [address] [facility] [id number]      Dear Hai [facility],    [facility] records indicate that you have outstanding lab work and or testing that was ordered for you and has not yet been completed:  Orders
n/a

## 2025-03-08 NOTE — ED ADULT NURSE NOTE - PATIENT'S SEXUAL ORIENTATION
Patient is requesting a refills of her pravastatin (PRAVACHOL) 40 MG tablet and clopidogrel (PLAVIX) 75 MG tablet to be sent to [place].  She is out now because of the weather and would like it done as soon as possible.   Heterosexual

## 2025-03-08 NOTE — H&P ADULT - NSHPLABSRESULTS_GEN_ALL_CORE
FluA/FluB/RSV/COVID PCR (03.08.25 @ 13:45)    SARS-CoV-2 Result: NotDetec: This molecular assay uses polymerase chain reaction (PCR) to detect  influenza A virus, influenza B virus, respiratory syncytial virus (RSV),  and SARS-CoV-2 (COVID-19). Test results should be correlated with  clinical presentation, patient history, and epidemiology.    Influenza A Result: NotDetec    Influenza B Result: NotDetec    Resp Syn Virus Result: NotDetec

## 2025-03-08 NOTE — ED ADULT NURSE REASSESSMENT NOTE - NS ED NURSE REASSESS COMMENT FT1
Pt straight catheterized to obtain clean urine sample. Pt educated on procedure and pt tolerated procedure well. 2 RNs present to confirm sterility. 200 urine obtained and sent to lab.

## 2025-03-08 NOTE — ED ADULT TRIAGE NOTE - CHIEF COMPLAINT QUOTE
"not feeling well"  per EMS "went for routine CT scan yesterday and she is concerned she contracted something"

## 2025-03-08 NOTE — H&P ADULT - ASSESSMENT
89-year-old female past medical history of A-fib s/p Watchman procedure no longer on AC, pulmonary HTN, HTN presents emergency department for generally feeling unwell.  Patient states she went to get a CT scan yesterday, patient unsure for what and since then has been feeling generalized weakness.  Patient keeps repeating "I feel very sick".  Patient endorsing chills and generalized body aches.  Patient also endorsing nausea without vomiting.  Denies headache, cough, chest pain, trouble breathing, abdominal pain, vomiting, diarrhea/constipation, dysuria, hematuria.  pt with sig cardiac and medical hx with PHTN, A.FIB s/p watchman with generalized weakness  +ua ?urosepsis  check cultures  start on iv abx  check cultures  dvt prophylaxis  esr/ crp

## 2025-03-08 NOTE — ED PROVIDER NOTE - OBJECTIVE STATEMENT
89-year-old female past medical history of A-fib s/p Watchman procedure no longer on AC, pulmonary HTN, HTN presents emergency department for generally feeling unwell.  Patient states she went to get a CT scan yesterday, patient unsure for what and since then has been feeling generalized weakness.  Patient keeps repeating "I feel very sick".  Patient endorsing chills and generalized body aches.  Patient also endorsing nausea without vomiting.  Denies headache, cough, chest pain, trouble breathing, abdominal pain, vomiting, diarrhea/constipation, dysuria, hematuria.

## 2025-03-08 NOTE — H&P ADULT - HISTORY OF PRESENT ILLNESS
Date of Service, 03-08-25 @ 16:23  CHIEF COMPLAINT:Patient is a 89y old  Female who presents with a chief complaint of     HPI:  89-year-old female past medical history of A-fib s/p Watchman procedure no longer on AC, pulmonary HTN, HTN presents emergency department for generally feeling unwell.  Patient states she went to get a CT scan yesterday, patient unsure for what and since then has been feeling generalized weakness.  Patient keeps repeating "I feel very sick".  Patient endorsing chills and generalized body aches.  Patient also endorsing nausea without vomiting.  Denies headache, cough, chest pain, trouble breathing, abdominal pain, vomiting, diarrhea/constipation, dysuria, hematuria.    PAST MEDICAL & SURGICAL HISTORY:  Afib  HTN (hypertension)  H/O CHF  Uterine cancer  S/P hysterectomy      MEDICATIONS  (STANDING):  furosemide 20 mg oral tablet: 1 tab(s) orally once a day  · 	aspirin 81 mg oral delayed release tablet: 1 tab(s) orally once a day  · 	amLODIPine 10 mg oral tablet: 1 tab(s) orally once a day  · 	metoprolol succinate 50 mg oral tablet, extended release: 1 tab(s) orally once a day  · 	cloNIDine 0.1 mg oral tablet: 1 tab(s) orally 2 times a day  · 	enalapril 20 mg oral tablet: 1 tab(s) orally once a day  · 	Daily-Chanel 400mcg tablet: 1 tablet orally once a day  · 	docusate sodium 100 mg oral capsule: 3 cap(s) orally once a day (at bedtime) as needed for  constipation  · 	ondansetron 4 mg oral tablet: 1 tab(s) orally every 8 hours as needed for  nausea    MEDICATIONS  (PRN):      FAMILY HISTORY:      SOCIAL HISTORY:    [ x] Non-smoker  [ ] Smoker  [ ] Alcohol    Allergies    Lipitor (Other; Flushing)  hydrALAZINE (Other; Flushing)    Intolerances    lactose (Unknown)  	    REVIEW OF SYSTEMS:  CONSTITUTIONAL: No fever, weight loss, or fatigue  EYES: No eye pain, visual disturbances, or discharge  ENT:  No difficulty hearing, tinnitus, vertigo; No sinus or throat pain  NECK: No pain or stiffness  RESPIRATORY: No cough, wheezing, chills or hemoptysis; No Shortness of Breath  CARDIOVASCULAR: No chest pain, palpitations, passing out, dizziness, or leg swelling  GASTROINTESTINAL: No abdominal or epigastric pain. No nausea, vomiting, or hematemesis; No diarrhea or constipation. No melena or hematochezia.  GENITOURINARY: No dysuria, frequency, hematuria, or incontinence  NEUROLOGICAL: No headaches, memory loss, loss of strength, numbness, or tremors  SKIN: No itching, burning, rashes, or lesions   LYMPH Nodes: No enlarged glands  ENDOCRINE: No heat or cold intolerance; No hair loss  MUSCULOSKELETAL: No joint pain or swelling; No muscle, back, or extremity pain  PSYCHIATRIC: No depression, anxiety, mood swings, or difficulty sleeping  HEME/LYMPH: No easy bruising, or bleeding gums  ALLERGY AND IMMUNOLOGIC: No hives or eczema	    [x ] All others negative	  [ ] Unable to obtain    PHYSICAL EXAM:  T(C): 36.4 (03-08-25 @ 12:35), Max: 36.5 (03-08-25 @ 12:10)  HR: 77 (03-08-25 @ 12:35) (77 - 104)  BP: 151/71 (03-08-25 @ 12:35) (151/71 - 157/84)  RR: 20 (03-08-25 @ 12:35) (20 - 20)  SpO2: 96% (03-08-25 @ 12:10) (96% - 96%)  Wt(kg): --  I&O's Summary      Appearance: Normal	  HEENT:   Normal oral mucosa, PERRL, EOMI	  Lymphatic: No lymphadenopathy  Cardiovascular: Normal S1 S2, No JVD, + murmurs, No edema  Respiratory: rhonchi  Psychiatry: A & O x 3, Mood & affect appropriate  Gastrointestinal:  Soft, Non-tender, + BS	  Skin: No rashes, No ecchymoses,   Extremities No clubbing, cyanosis or edema  Vascular: + pvd    TELEMETRY: 	    ECG:  	  RADIOLOGY:  OTHER: 	  	  LABS:	 	    CARDIAC MARKERS:                          11.4   10.51 )-----------( 257      ( 08 Mar 2025 13:46 )             35.4     03-08    138  |  100  |  26[H]  ----------------------------<  104[H]  4.6   |  24  |  0.63    Ca    9.8      08 Mar 2025 13:46  Mg     2.1     03-08    TPro  7.8  /  Alb  4.2  /  TBili  0.4  /  DBili  x   /  AST  19  /  ALT  10  /  AlkPhos  72  03-08    proBNP:   Lipid Profile:   HgA1c:   TSH:       PREVIOUS DIAGNOSTIC TESTING:      < from: 12 Lead ECG (12.15.24 @ 17:24) >  Diagnosis Line ATRIAL FIBRILLATION  ANTERIOR INFARCT (CITED ON OR BEFORE 29-AUG-2024)  ABNORMAL ECG  WHEN COMPARED WITH ECG OF 29-AUG-2024 10:20,  NO SIGNIFICANT CHANGE WAS FOUND    < from: TTE W or WO Ultrasound Enhancing Agent (01.23.25 @ 12:16) >   1. Left ventricular cavity is normal in size. The interventricular septumis flattened in systole and diastole consistent with right ventricular pressure and volume overload. Left ventricular systolic function is normal with an ejection fraction of 52 % by Canchola's method of disks.   2. Mildly enlarged right ventricular cavity size and normal right ventricular systolic function.   3. Left atrium is severely dilated.   4. The right atrium is severely dilated.   5. Structurally normal tricuspid valve with normal leaflet excursion. There is moderate tricuspid regurgitation. The etiology is secondary/functional tricuspid regurgitation (mostly atrial enlargement).   6. There is no other significant valvular regurgitation or stenosis.   7. Estimated pulmonary artery systolic pressure is 68 mmHg, consistent with severe pulmonary hypertension.   8. The inferior vena cava is dilated measuring 2.45 cm in diameter, (dilated >2.1cm) with abnormal inspiratory collapse (abnormal <50%) consistent with elevated right atrial pressure (~15, range 10-20mmHg).   9. Compared to the transthoracic echocardiogram performed on 12/17/2024, this TR quanfies to moderate. The pulmonary pressures are severely elevated (similar to prior) and the IVC is dilated consistent with elevated right atrial pressure.    < from: CT Abdomen and Pelvis w/ IV Cont (12.15.24 @ 18:42) >  1. Since 8/29/2024, there are a few mildly dilated small bowel loops,   probably representing an ileus. No definite cause for diarrhea is seen.    2. Extensive colonic diverticulosis.    3. Advanced atherosclerotic disease.    4. The gallbladder is distended. This is of uncertain significance given   the lack of gallstones on prior ultrasound from 8/29/2024 and normal HIDA   scan on 9/3/2024.  Urinalysis + Microscopic Examination (03.08.25 @ 15:08)    pH Urine: 5.5   Urine Appearance: Cloudy   Color: Yellow   Specific Gravity: 1.020   Protein, Urine: Trace mg/dL   Glucose Qualitative, Urine: Negative mg/dL   Ketone - Urine: Negative mg/dL   Blood, Urine: Moderate   Bilirubin: Negative   Urobilinogen: 0.2 mg/dL   Leukocyte Esterase Concentration: Moderate   Nitrite: Positive   Review: Reviewed   White Blood Cell - Urine: 34 /HPF   Red Blood Cell - Urine: 3 /HPF   Bacteria: Many /HPF   Cast: 1 /LPF   Epithelial Cells: 4 /HPF   Yeast-like Cells: Present

## 2025-03-08 NOTE — PATIENT PROFILE ADULT - NSPROGENSOURCEINFO_GEN_A_NUR
Pre-Surgery Instructions:   Medication Instructions    DANDELION ROOT PO Instructed patient per Anesthesia Guidelines   naproxen (Naprosyn) 500 mg tablet Instructed patient per Anesthesia Guidelines   Omega-3 Fatty Acids (FISH OIL PO) Instructed patient per Anesthesia Guidelines   RESVERATROL PO Instructed patient per Anesthesia Guidelines   sulfamethoxazole-trimethoprim (BACTRIM DS) 800-160 mg per tablet Instructed patient per Anesthesia Guidelines   Turmeric 500 MG CAPS Instructed patient per Anesthesia Guidelines   VITAMIN A PO Instructed patient per Anesthesia Guidelines  Instructed to take tylenol if needed am of surgery with sip of water per anesthesia guidelines 
patient

## 2025-03-08 NOTE — ED PROVIDER NOTE - CLINICAL SUMMARY MEDICAL DECISION MAKING FREE TEXT BOX
89-year-old female past medical history of A-fib s/p Watchman procedure no longer on AC, pulmonary HTN, HTN presents emergency department for generally feeling unwell since yesterday. Pt endorses body aches and generalized weakness. Pt otherwise well appearing. No abd TTP. No focal neuro intact. Considerations include viral illness, PNA, UTI, electrolyte abnormalities, dehydration. Plan for labs, CXR, UA/UC, viral illness. Plan for IVF. Dispo likely admission due to weakness and difficulty walking.

## 2025-03-08 NOTE — ED PROVIDER NOTE - ATTENDING CONTRIBUTION TO CARE
This is a 89-year-old female with multiple chronic medical conditions currently at an assisted living.  At baseline she is ambulatory up and about however today she is feeling fatigued and unwell and is having trouble to get up and about.  Awake alert talking no acute distress.  Irregular rate and regular rhythm.  Clear lungs nontender abdomen warm and well-perfused.  Patient likely has a viral condition with associated dehydration.  Nothing focal on examination will do basic blood work CBC CMP chest x-ray and urinalysis.  I discussed the case with Dr. Everett regarding continued care and the patient's dehydration and we agreed to admit the patient for fluids and continue workup to determine the cause of her symptoms.

## 2025-03-09 LAB
ALBUMIN SERPL ELPH-MCNC: 3.7 G/DL — SIGNIFICANT CHANGE UP (ref 3.3–5)
ALP SERPL-CCNC: 67 U/L — SIGNIFICANT CHANGE UP (ref 40–120)
ALT FLD-CCNC: 11 U/L — SIGNIFICANT CHANGE UP (ref 10–45)
ANION GAP SERPL CALC-SCNC: 13 MMOL/L — SIGNIFICANT CHANGE UP (ref 5–17)
AST SERPL-CCNC: 20 U/L — SIGNIFICANT CHANGE UP (ref 10–40)
BILIRUB SERPL-MCNC: 0.6 MG/DL — SIGNIFICANT CHANGE UP (ref 0.2–1.2)
BUN SERPL-MCNC: 19 MG/DL — SIGNIFICANT CHANGE UP (ref 7–23)
CALCIUM SERPL-MCNC: 9.5 MG/DL — SIGNIFICANT CHANGE UP (ref 8.4–10.5)
CHLORIDE SERPL-SCNC: 102 MMOL/L — SIGNIFICANT CHANGE UP (ref 96–108)
CO2 SERPL-SCNC: 24 MMOL/L — SIGNIFICANT CHANGE UP (ref 22–31)
CREAT SERPL-MCNC: 0.51 MG/DL — SIGNIFICANT CHANGE UP (ref 0.5–1.3)
EGFR: 88 ML/MIN/1.73M2 — SIGNIFICANT CHANGE UP
EGFR: 88 ML/MIN/1.73M2 — SIGNIFICANT CHANGE UP
GLUCOSE SERPL-MCNC: 76 MG/DL — SIGNIFICANT CHANGE UP (ref 70–99)
HCT VFR BLD CALC: 34.7 % — SIGNIFICANT CHANGE UP (ref 34.5–45)
HGB BLD-MCNC: 11.2 G/DL — LOW (ref 11.5–15.5)
MCHC RBC-ENTMCNC: 32.3 G/DL — SIGNIFICANT CHANGE UP (ref 32–36)
MCHC RBC-ENTMCNC: 32.6 PG — SIGNIFICANT CHANGE UP (ref 27–34)
MCV RBC AUTO: 100.9 FL — HIGH (ref 80–100)
NRBC BLD AUTO-RTO: 0 /100 WBCS — SIGNIFICANT CHANGE UP (ref 0–0)
PLATELET # BLD AUTO: 221 K/UL — SIGNIFICANT CHANGE UP (ref 150–400)
POTASSIUM SERPL-MCNC: 4.5 MMOL/L — SIGNIFICANT CHANGE UP (ref 3.5–5.3)
POTASSIUM SERPL-SCNC: 4.5 MMOL/L — SIGNIFICANT CHANGE UP (ref 3.5–5.3)
PROT SERPL-MCNC: 7.1 G/DL — SIGNIFICANT CHANGE UP (ref 6–8.3)
RBC # BLD: 3.44 M/UL — LOW (ref 3.8–5.2)
RBC # FLD: 16.2 % — HIGH (ref 10.3–14.5)
SODIUM SERPL-SCNC: 139 MMOL/L — SIGNIFICANT CHANGE UP (ref 135–145)
TSH SERPL-MCNC: 0.42 UIU/ML — SIGNIFICANT CHANGE UP (ref 0.27–4.2)
WBC # BLD: 6.79 K/UL — SIGNIFICANT CHANGE UP (ref 3.8–10.5)
WBC # FLD AUTO: 6.79 K/UL — SIGNIFICANT CHANGE UP (ref 3.8–10.5)

## 2025-03-09 RX ADMIN — Medication 0.1 MILLIGRAM(S): at 17:29

## 2025-03-09 RX ADMIN — METOPROLOL SUCCINATE 50 MILLIGRAM(S): 50 TABLET, EXTENDED RELEASE ORAL at 04:50

## 2025-03-09 RX ADMIN — AMLODIPINE BESYLATE 5 MILLIGRAM(S): 10 TABLET ORAL at 04:50

## 2025-03-09 RX ADMIN — CEFTRIAXONE 100 MILLIGRAM(S): 500 INJECTION, POWDER, FOR SOLUTION INTRAMUSCULAR; INTRAVENOUS at 17:30

## 2025-03-09 RX ADMIN — Medication 20 MILLIGRAM(S): at 04:50

## 2025-03-09 RX ADMIN — Medication 0.1 MILLIGRAM(S): at 04:50

## 2025-03-09 NOTE — CONSULT NOTE ADULT - SUBJECTIVE AND OBJECTIVE BOX
---___---___---___---___---___---___ ---___---___---___---___---___---___---___---___---                  M E D I C A L   A T T E N D I N G    C O N S U L T A T I O N   N O T E  ---___---___---___---___---___---___ ---___---___---___---___---___---___---___---___---       ++CHIEF COMPLAINT:   Patient is a 89y old  Female who presents with a chief complaint of     ++HPI:  HPI:  Date of Service, 25 @ 16:23  CHIEF COMPLAINT:Patient is a 89y old  Female who presents with a chief complaint of     HPI:  89-year-old female past medical history of A-fib s/p Watchman procedure no longer on AC, pulmonary HTN, HTN presents emergency department for generally feeling unwell.  Patient states she went to get a CT scan yesterday, patient unsure for what and since then has been feeling generalized weakness.  Patient keeps repeating "I feel very sick".  Patient endorsing chills and generalized body aches.  Patient also endorsing nausea without vomiting.  Denies headache, cough, chest pain, trouble breathing, abdominal pain, vomiting, diarrhea/constipation, dysuria, hematuria.    PAST MEDICAL & SURGICAL HISTORY:  Afib  HTN (hypertension)  H/O CHF  Uterine cancer  S/P hysterectomy      MEDICATIONS  (STANDING):  furosemide 20 mg oral tablet: 1 tab(s) orally once a day  · 	aspirin 81 mg oral delayed release tablet: 1 tab(s) orally once a day  · 	amLODIPine 10 mg oral tablet: 1 tab(s) orally once a day  · 	metoprolol succinate 50 mg oral tablet, extended release: 1 tab(s) orally once a day  · 	cloNIDine 0.1 mg oral tablet: 1 tab(s) orally 2 times a day  · 	enalapril 20 mg oral tablet: 1 tab(s) orally once a day  · 	Daily-Chanel 400mcg tablet: 1 tablet orally once a day  · 	docusate sodium 100 mg oral capsule: 3 cap(s) orally once a day (at bedtime) as needed for  constipation  · 	ondansetron 4 mg oral tablet: 1 tab(s) orally every 8 hours as needed for  nausea    MEDICATIONS  (PRN):      FAMILY HISTORY:      SOCIAL HISTORY:    [ x] Non-smoker  [ ] Smoker  [ ] Alcohol    Allergies    Lipitor (Other; Flushing)  hydrALAZINE (Other; Flushing)    Intolerances    lactose (Unknown)  	    REVIEW OF SYSTEMS:  CONSTITUTIONAL: No fever, weight loss, or fatigue  EYES: No eye pain, visual disturbances, or discharge  ENT:  No difficulty hearing, tinnitus, vertigo; No sinus or throat pain  NECK: No pain or stiffness  RESPIRATORY: No cough, wheezing, chills or hemoptysis; No Shortness of Breath  CARDIOVASCULAR: No chest pain, palpitations, passing out, dizziness, or leg swelling  GASTROINTESTINAL: No abdominal or epigastric pain. No nausea, vomiting, or hematemesis; No diarrhea or constipation. No melena or hematochezia.  GENITOURINARY: No dysuria, frequency, hematuria, or incontinence  NEUROLOGICAL: No headaches, memory loss, loss of strength, numbness, or tremors  SKIN: No itching, burning, rashes, or lesions   LYMPH Nodes: No enlarged glands  ENDOCRINE: No heat or cold intolerance; No hair loss  MUSCULOSKELETAL: No joint pain or swelling; No muscle, back, or extremity pain  PSYCHIATRIC: No depression, anxiety, mood swings, or difficulty sleeping  HEME/LYMPH: No easy bruising, or bleeding gums  ALLERGY AND IMMUNOLOGIC: No hives or eczema	    [x ] All others negative	  [ ] Unable to obtain    PHYSICAL EXAM:  T(C): 36.4 (25 @ 12:35), Max: 36.5 (25 @ 12:10)  HR: 77 (25 @ 12:35) (77 - 104)  BP: 151/71 (25 @ 12:35) (151/71 - 157/84)  RR: 20 (25 @ 12:35) (20 - 20)  SpO2: 96% (25 @ 12:10) (96% - 96%)  Wt(kg): --  I&O's Summary      Appearance: Normal	  HEENT:   Normal oral mucosa, PERRL, EOMI	  Lymphatic: No lymphadenopathy  Cardiovascular: Normal S1 S2, No JVD, + murmurs, No edema  Respiratory: rhonchi  Psychiatry: A & O x 3, Mood & affect appropriate  Gastrointestinal:  Soft, Non-tender, + BS	  Skin: No rashes, No ecchymoses,   Extremities No clubbing, cyanosis or edema  Vascular: + pvd    TELEMETRY: 	    ECG:  	  RADIOLOGY:  OTHER: 	  	  LABS:	 	    CARDIAC MARKERS:                          11.4   10.51 )-----------( 257      ( 08 Mar 2025 13:46 )             35.4     03-08    138  |  100  |  26[H]  ----------------------------<  104[H]  4.6   |  24  |  0.63    Ca    9.8      08 Mar 2025 13:46  Mg     2.1     03-08    TPro  7.8  /  Alb  4.2  /  TBili  0.4  /  DBili  x   /  AST  19  /  ALT  10  /  AlkPhos  72  03-08    proBNP:   Lipid Profile:   HgA1c:   TSH:       PREVIOUS DIAGNOSTIC TESTING:      < from: 12 Lead ECG (12.15.24 @ 17:24) >  Diagnosis Line ATRIAL FIBRILLATION  ANTERIOR INFARCT (CITED ON OR BEFORE 29-AUG-2024)  ABNORMAL ECG  WHEN COMPARED WITH ECG OF 29-AUG-2024 10:20,  NO SIGNIFICANT CHANGE WAS FOUND    < from: TTE W or WO Ultrasound Enhancing Agent (25 @ 12:16) >   1. Left ventricular cavity is normal in size. The interventricular septumis flattened in systole and diastole consistent with right ventricular pressure and volume overload. Left ventricular systolic function is normal with an ejection fraction of 52 % by Canchola's method of disks.   2. Mildly enlarged right ventricular cavity size and normal right ventricular systolic function.   3. Left atrium is severely dilated.   4. The right atrium is severely dilated.   5. Structurally normal tricuspid valve with normal leaflet excursion. There is moderate tricuspid regurgitation. The etiology is secondary/functional tricuspid regurgitation (mostly atrial enlargement).   6. There is no other significant valvular regurgitation or stenosis.   7. Estimated pulmonary artery systolic pressure is 68 mmHg, consistent with severe pulmonary hypertension.   8. The inferior vena cava is dilated measuring 2.45 cm in diameter, (dilated >2.1cm) with abnormal inspiratory collapse (abnormal <50%) consistent with elevated right atrial pressure (~15, range 10-20mmHg).   9. Compared to the transthoracic echocardiogram performed on 2024, this TR quanfies to moderate. The pulmonary pressures are severely elevated (similar to prior) and the IVC is dilated consistent with elevated right atrial pressure.    < from: CT Abdomen and Pelvis w/ IV Cont (12.15.24 @ 18:42) >  1. Since 2024, there are a few mildly dilated small bowel loops,   probably representing an ileus. No definite cause for diarrhea is seen.    2. Extensive colonic diverticulosis.    3. Advanced atherosclerotic disease.    4. The gallbladder is distended. This is of uncertain significance given   the lack of gallstones on prior ultrasound from 2024 and normal HIDA   scan on 9/3/2024.  Urinalysis + Microscopic Examination (25 @ 15:08)    pH Urine: 5.5   Urine Appearance: Cloudy   Color: Yellow   Specific Gravity: 1.020   Protein, Urine: Trace mg/dL   Glucose Qualitative, Urine: Negative mg/dL   Ketone - Urine: Negative mg/dL   Blood, Urine: Moderate   Bilirubin: Negative   Urobilinogen: 0.2 mg/dL   Leukocyte Esterase Concentration: Moderate   Nitrite: Positive   Review: Reviewed   White Blood Cell - Urine: 34 /HPF   Red Blood Cell - Urine: 3 /HPF   Bacteria: Many /HPF   Cast: 1 /LPF   Epithelial Cells: 4 /HPF   Yeast-like Cells: Present           (08 Mar 2025 16:22)      ---___---___---___---___---___---  PAST MEDICAL / Surgical  HISTORY:  PAST MEDICAL & SURGICAL HISTORY:  Afib      HTN (hypertension)      H/O CHF      Uterine cancer      S/P hysterectomy          ---___---___---___---___---___---  FAMILY HISTORY:   FAMILY HISTORY:      ---___---___---___---___---___---  SOCIAL HISTORY:  Alcohol: None  Smoking: None    ---___---___---___---___---___---  ALLERGIES:   Allergies    Lipitor (Other; Flushing)  hydrALAZINE (Other; Flushing)    Intolerances    lactose (Unknown)      ---___---___---___---___---___---  MEDICATIONS:  MEDICATIONS  (STANDING):  amLODIPine   Tablet 5 milliGRAM(s) Oral daily  aspirin enteric coated 81 milliGRAM(s) Oral daily  cefTRIAXone   IVPB 1000 milliGRAM(s) IV Intermittent every 24 hours  cloNIDine 0.1 milliGRAM(s) Oral two times a day  enalapril 20 milliGRAM(s) Oral daily  heparin   Injectable 5000 Unit(s) SubCutaneous every 12 hours  lactated ringers. 500 milliLiter(s) (50 mL/Hr) IV Continuous <Continuous>  metoprolol succinate ER 50 milliGRAM(s) Oral daily    MEDICATIONS  (PRN):      ---___---___---___---___---___---  REVIEW OF SYSTEM:    GEN: no fever, no chills, no pain  RESP: no SOB, no cough, no sputum  CVS: no chest pain, no palpitations, no edema  GI: no abdominal pain, no nausea, no vomiting, no constipation, no diarrhea  : no dysurea, no frequency, no hematurea  Neuro: no headache, no dizziness  PSYCH: no anxiety, no depression  Derm : no itching, no rash    ---___---___---___---___---___---  VITAL SIGNS:  89y , CAPILLARY BLOOD GLUCOSE          ---___---___---___---___---___---  PHYSICAL EXAM:    GEN: A&O X 3 , NAD , comfortable  HEENT: NCAT, PERRL, MMM, hearing intact  Neck: supple , no JVD  CVS: S1S2 , regular , No M/R/G appreciated  PULM: CTA B/L,  no W/R/R appreciated  ABD.: soft. non tender, non distended,  bowel sounds present  Extrem: intact pulses , no edema   Derm: No rash , no ecchymoses  PSYCH : normal mood,  no delusion not anxious     ---___---___---___---___---___---            LAB AND IMAGIN.4   10.51 )-----------( 257      ( 08 Mar 2025 13:46 )             35.4               03-08    138  |  100  |  26[H]  ----------------------------<  104[H]  4.6   |  24  |  0.63    Ca    9.8      08 Mar 2025 13:46  Mg     2.1     -08    TPro  7.8  /  Alb  4.2  /  TBili  0.4  /  DBili  x   /  AST  19  /  ALT  10  /  AlkPhos  72  -                            Urinalysis Basic - ( 08 Mar 2025 15:08 )    Color: Yellow / Appearance: Cloudy / S.020 / pH: x  Gluc: x / Ketone: Negative mg/dL  / Bili: Negative / Urobili: 0.2 mg/dL   Blood: x / Protein: Trace mg/dL / Nitrite: Positive   Leuk Esterase: Moderate / RBC: 3 /HPF / WBC 34 /HPF   Sq Epi: x / Non Sq Epi: 4 /HPF / Bacteria: Many /HPF        [All pertinent / recent Imaging reviewed]         ---___---___---___---___---___---___ ---___---___---___---___---                         A S S E S S M E N T   A N D   P L A N :      HEALTH ISSUES - PROBLEM Dx:    ua on ceftriaxone   awaiting cultures   afin on toprol   htn on orvasc   oob to chair   continue to monitor clinically   sp fall doing well             -GI/DVT Prophylaxis.    --------------------------------------------  Case discussed with   Education given on   ___________________________  Will follow with you.  Thank you,  Porter Keller  [phone]

## 2025-03-10 RX ADMIN — Medication 0.1 MILLIGRAM(S): at 04:51

## 2025-03-10 RX ADMIN — CEFTRIAXONE 100 MILLIGRAM(S): 500 INJECTION, POWDER, FOR SOLUTION INTRAMUSCULAR; INTRAVENOUS at 17:25

## 2025-03-10 RX ADMIN — AMLODIPINE BESYLATE 5 MILLIGRAM(S): 10 TABLET ORAL at 04:51

## 2025-03-10 RX ADMIN — Medication 0.1 MILLIGRAM(S): at 17:24

## 2025-03-10 RX ADMIN — Medication 20 MILLIGRAM(S): at 04:51

## 2025-03-10 RX ADMIN — METOPROLOL SUCCINATE 50 MILLIGRAM(S): 50 TABLET, EXTENDED RELEASE ORAL at 04:51

## 2025-03-10 NOTE — DIETITIAN INITIAL EVALUATION ADULT - ORAL INTAKE PTA/DIET HISTORY
Patient reports good appetite and intake PTA. Avoids dairy in setting of lactose intolerance. No Added Salt (WILFRID) diet - regular texture per transfer papers. Patient confirms lactose intolerance and no other food allergies/intolerances. Not taking any oral nutrition supplements PTA. Per transfer papers takes Align probiotic and daily-xi.

## 2025-03-10 NOTE — DIETITIAN INITIAL EVALUATION ADULT - REASON INDICATOR FOR ASSESSMENT
RD assessment warranted for: Consult for Pressure injury stage 2 or >. Chart reviewed, events noted.  Source: medical record, patient.

## 2025-03-10 NOTE — DIETITIAN INITIAL EVALUATION ADULT - OTHER INFO
Patient reports UBW 135lb, reports no recent changes in weight PTA.  Dosing weight: 140lb/63.5kg (3/8).  Daily weights in kg: none  IBW: 135lb.  Weight history per [facility]: 150lb (12/16/24), 110lb (8/29/24), 140lb (8/29/24), 140lb (7/16/24), 137.4lb (5/16/24), 120.5lb (7/13/23)  Weight appears fluctuating PTA. RD to continue to monitor weight trends as available/able.

## 2025-03-10 NOTE — DIETITIAN INITIAL EVALUATION ADULT - ADD RECOMMEND
-Continue Regular diet.  -RD to allow double protein at meals. Patient declined oral nutrition supplements.  -Recommend adding multivitamin and vitamin C for wound healing pending no medical contraindications.

## 2025-03-10 NOTE — DIETITIAN INITIAL EVALUATION ADULT - NSFNSPHYEXAMSKINFT_GEN_A_CORE
per flowsheets:  sacrum stage 1 pressure injury   left heel suspected deep tissue injury   right heel stage 1 pressure injury

## 2025-03-10 NOTE — DIETITIAN INITIAL EVALUATION ADULT - PERTINENT LABORATORY DATA
03-09    139  |  102  |  19  ----------------------------<  76  4.5   |  24  |  0.51    Ca    9.5      09 Mar 2025 06:57  Mg     2.1     03-08    TPro  7.1  /  Alb  3.7  /  TBili  0.6  /  DBili  x   /  AST  20  /  ALT  11  /  AlkPhos  67  03-09  A1C with Estimated Average Glucose Result: 6.1 % (12-18-24 @ 07:42)

## 2025-03-10 NOTE — DIETITIAN INITIAL EVALUATION ADULT - PERSON TAUGHT/METHOD
Educated pt on the importance of consuming a diet adequate in protein and micronutrients for wound healing/skin integrity./verbal instruction/patient instructed

## 2025-03-10 NOTE — PHYSICAL THERAPY INITIAL EVALUATION ADULT - TRANSFER TRAINING, PT EVAL
GOAL: Patient will perform stand pivot transfers with modA at rolling walker with proper hand placement in 2 weeks

## 2025-03-10 NOTE — DIETITIAN INITIAL EVALUATION ADULT - PERTINENT MEDS FT
MEDICATIONS  (STANDING):  amLODIPine   Tablet 5 milliGRAM(s) Oral daily  aspirin enteric coated 81 milliGRAM(s) Oral daily  cefTRIAXone   IVPB 1000 milliGRAM(s) IV Intermittent every 24 hours  cloNIDine 0.1 milliGRAM(s) Oral two times a day  enalapril 20 milliGRAM(s) Oral daily  heparin   Injectable 5000 Unit(s) SubCutaneous every 12 hours  lactated ringers. 500 milliLiter(s) (50 mL/Hr) IV Continuous <Continuous>  metoprolol succinate ER 50 milliGRAM(s) Oral daily

## 2025-03-10 NOTE — PHYSICAL THERAPY INITIAL EVALUATION ADULT - NSPTDISCHREC_GEN_A_CORE
TBD pending functional eval return to [facility] with 24/7 care as prior/Home PT

## 2025-03-10 NOTE — PHYSICAL THERAPY INITIAL EVALUATION ADULT - ADDITIONAL COMMENTS
Pt reports she lives in [facility] and has care 24/7. She reports that the staff often talha lifts her to the chair but she is able to walk and use RW to get to the chair and has been transferring that way in therapy.

## 2025-03-10 NOTE — DIETITIAN INITIAL EVALUATION ADULT - ENERGY INTAKE
Reports good appetite and intake currently, endorses eating most of breakfast. Has been ordering extra protein at meals.

## 2025-03-10 NOTE — PHYSICAL THERAPY INITIAL EVALUATION ADULT - PERTINENT HX OF CURRENT PROBLEM, REHAB EVAL
89-year-old female past medical history of A-fib s/p Watchman procedure no longer on AC, pulmonary HTN, HTN presents emergency department for generally feeling unwell.  Patient states she went to get a CT scan yesterday, patient unsure for what and since then has been feeling generalized weakness.  Patient keeps repeating "I feel very sick".  Patient endorsing chills and generalized body aches.  Patient also endorsing nausea without vomiting.  Denies headache, cough, chest pain, trouble breathing, abdominal pain, vomiting, diarrhea/constipation, dysuria, hematuria.  CXR:No acute cardiopulmonary disease process.

## 2025-03-11 ENCOUNTER — TRANSCRIPTION ENCOUNTER (OUTPATIENT)
Age: 89
End: 2025-03-11

## 2025-03-11 VITALS
RESPIRATION RATE: 18 BRPM | SYSTOLIC BLOOD PRESSURE: 150 MMHG | DIASTOLIC BLOOD PRESSURE: 76 MMHG | HEART RATE: 95 BPM | TEMPERATURE: 98 F | OXYGEN SATURATION: 95 %

## 2025-03-11 LAB
-  AMOXICILLIN/CLAVULANIC ACID: SIGNIFICANT CHANGE UP
-  AMPICILLIN/SULBACTAM: SIGNIFICANT CHANGE UP
-  AMPICILLIN: SIGNIFICANT CHANGE UP
-  AZTREONAM: SIGNIFICANT CHANGE UP
-  CEFAZOLIN: SIGNIFICANT CHANGE UP
-  CEFEPIME: SIGNIFICANT CHANGE UP
-  CEFOXITIN: SIGNIFICANT CHANGE UP
-  CEFTRIAXONE: SIGNIFICANT CHANGE UP
-  CIPROFLOXACIN: SIGNIFICANT CHANGE UP
-  ERTAPENEM: SIGNIFICANT CHANGE UP
-  GENTAMICIN: SIGNIFICANT CHANGE UP
-  IMIPENEM: SIGNIFICANT CHANGE UP
-  LEVOFLOXACIN: SIGNIFICANT CHANGE UP
-  MEROPENEM: SIGNIFICANT CHANGE UP
-  NITROFURANTOIN: SIGNIFICANT CHANGE UP
-  PIPERACILLIN/TAZOBACTAM: SIGNIFICANT CHANGE UP
-  TOBRAMYCIN: SIGNIFICANT CHANGE UP
-  TRIMETHOPRIM/SULFAMETHOXAZOLE: SIGNIFICANT CHANGE UP
CULTURE RESULTS: ABNORMAL
METHOD TYPE: SIGNIFICANT CHANGE UP
ORGANISM # SPEC MICROSCOPIC CNT: ABNORMAL
ORGANISM # SPEC MICROSCOPIC CNT: ABNORMAL
SPECIMEN SOURCE: SIGNIFICANT CHANGE UP

## 2025-03-11 PROCEDURE — 82803 BLOOD GASES ANY COMBINATION: CPT

## 2025-03-11 PROCEDURE — 82435 ASSAY OF BLOOD CHLORIDE: CPT

## 2025-03-11 PROCEDURE — 97161 PT EVAL LOW COMPLEX 20 MIN: CPT

## 2025-03-11 PROCEDURE — 87637 SARSCOV2&INF A&B&RSV AMP PRB: CPT

## 2025-03-11 PROCEDURE — 82330 ASSAY OF CALCIUM: CPT

## 2025-03-11 PROCEDURE — 85027 COMPLETE CBC AUTOMATED: CPT

## 2025-03-11 PROCEDURE — 84443 ASSAY THYROID STIM HORMONE: CPT

## 2025-03-11 PROCEDURE — 84295 ASSAY OF SERUM SODIUM: CPT

## 2025-03-11 PROCEDURE — 85014 HEMATOCRIT: CPT

## 2025-03-11 PROCEDURE — 85018 HEMOGLOBIN: CPT

## 2025-03-11 PROCEDURE — 84484 ASSAY OF TROPONIN QUANT: CPT

## 2025-03-11 PROCEDURE — 81001 URINALYSIS AUTO W/SCOPE: CPT

## 2025-03-11 PROCEDURE — 87186 SC STD MICRODIL/AGAR DIL: CPT

## 2025-03-11 PROCEDURE — 82947 ASSAY GLUCOSE BLOOD QUANT: CPT

## 2025-03-11 PROCEDURE — 83735 ASSAY OF MAGNESIUM: CPT

## 2025-03-11 PROCEDURE — 99285 EMERGENCY DEPT VISIT HI MDM: CPT | Mod: 25

## 2025-03-11 PROCEDURE — 85025 COMPLETE CBC W/AUTO DIFF WBC: CPT

## 2025-03-11 PROCEDURE — 87077 CULTURE AEROBIC IDENTIFY: CPT

## 2025-03-11 PROCEDURE — 71045 X-RAY EXAM CHEST 1 VIEW: CPT

## 2025-03-11 PROCEDURE — 83605 ASSAY OF LACTIC ACID: CPT

## 2025-03-11 PROCEDURE — 87086 URINE CULTURE/COLONY COUNT: CPT

## 2025-03-11 PROCEDURE — 93005 ELECTROCARDIOGRAM TRACING: CPT

## 2025-03-11 PROCEDURE — 80053 COMPREHEN METABOLIC PANEL: CPT

## 2025-03-11 PROCEDURE — 84132 ASSAY OF SERUM POTASSIUM: CPT

## 2025-03-11 PROCEDURE — 96375 TX/PRO/DX INJ NEW DRUG ADDON: CPT

## 2025-03-11 PROCEDURE — 96374 THER/PROPH/DIAG INJ IV PUSH: CPT

## 2025-03-11 RX ORDER — AMLODIPINE BESYLATE 10 MG/1
10 TABLET ORAL DAILY
Refills: 0 | Status: DISCONTINUED | OUTPATIENT
Start: 2025-03-11 | End: 2025-03-11

## 2025-03-11 RX ADMIN — AMLODIPINE BESYLATE 5 MILLIGRAM(S): 10 TABLET ORAL at 05:26

## 2025-03-11 RX ADMIN — Medication 0.1 MILLIGRAM(S): at 05:26

## 2025-03-11 RX ADMIN — Medication 20 MILLIGRAM(S): at 05:26

## 2025-03-11 RX ADMIN — METOPROLOL SUCCINATE 50 MILLIGRAM(S): 50 TABLET, EXTENDED RELEASE ORAL at 05:26

## 2025-03-11 NOTE — DISCHARGE NOTE NURSING/CASE MANAGEMENT/SOCIAL WORK - FINANCIAL ASSISTANCE
Patient comes to the clinic for followup anticoagulation management visit.     INR goal range: 2-3. DIAGNOSIS: PE    Last INR was: 1.3 on 10/12 with dose increased. Today's INR is 3.6 . Patient has had 77.5 mg/week, decrease dose to 70 mg/week (10% decrease) and recheck INR in one week.    Teaching: Reminded to call with any changes or concerns.  See below.  Discussed dose, return date, conditions requiring contact with Ambulatory Anticoagulation Clinic. Patient verbalized understanding of instructions and is aware of need to call with any questions or concerns and to report any unusual bleeding or bruising and/or any changes in medications, diet, health and/or lifestyle.     Call your physician or seek medical care immediately if you notice any of the following symptoms of a bleed:   Red, dark, coffee or cola colored urine  Red or tar like stools  Excessive bleeding from gums or nose  Vomiting coffee colored or bright red material  Coughing up red tinged sputum  Severe or unprovoked pain (ex: severe HA or Abd pain)  Sudden, spontaneous bruising for no reason  Excessive menstrual bleeding  A cut that will not stop bleeding within 10-15 mins  Symptoms associated with abnormal bleeding/high INR reviewed.  Pt encouraged to avoid activities that may result in a serious fall or injury and verbalizes understanding: Yes    
[facility] provides services at a reduced cost to those who are determined to be eligible through [facility]’s financial assistance program. Information regarding [facility]’s financial assistance program can be found by going to [email or web address] or by calling [phone].

## 2025-03-11 NOTE — DISCHARGE NOTE PROVIDER - HOSPITAL COURSE
HPI:  Date of Service, 03-08-25 @ 16:23  CHIEF COMPLAINT:Patient is a 89y old  Female who presents with a chief complaint of     HPI:  89-year-old female past medical history of A-fib s/p Watchman procedure no longer on AC, pulmonary HTN, HTN presents emergency department for generally feeling unwell.  Patient states she went to get a CT scan yesterday, patient unsure for what and since then has been feeling generalized weakness.  Patient keeps repeating "I feel very sick".  Patient endorsing chills and generalized body aches.  Patient also endorsing nausea without vomiting.  Denies headache, cough, chest pain, trouble breathing, abdominal pain, vomiting, diarrhea/constipation, dysuria, hematuria.    Hospital course: The patient presented with generalized weakness, chills, and body aches. A urinalysis (UA) raised concern for urosepsis. Blood and urine cultures were obtained. She was started on ceftriaxone for presumed urosepsis. The urine culture subsequently grew Klebsiella variicola. Her weakness and overall condition improved during her hospitalization. She became more alert and oriented. She was given DVT prophylaxis. ESR and CRP were noted, though the results are not specified. Physical therapy (PT) was consulted to assist with increasing her activity level. She was ambulating to a chair. Her amlodipine (Norvasc) dose was to be increased to 10mg daily if her blood pressure remained elevated. Pt now medically stable for discharge.     Important Medication Changes and Reason :    Active or Pending Issues Requiring Follow -Up:     Advance Directives :  [  ] Full code [  ] DNR  [  ] Hospice     Discharge Diagnosis:  Urosepsis, resolving  Hypertension HPI:  Date of Service, 03-08-25 @ 16:23  CHIEF COMPLAINT:Patient is a 89y old  Female who presents with a chief complaint of     HPI:  89-year-old female past medical history of A-fib s/p Watchman procedure no longer on AC, pulmonary HTN, HTN presents emergency department for generally feeling unwell.  Patient states she went to get a CT scan yesterday, patient unsure for what and since then has been feeling generalized weakness.  Patient keeps repeating "I feel very sick".  Patient endorsing chills and generalized body aches.  Patient also endorsing nausea without vomiting.  Denies headache, cough, chest pain, trouble breathing, abdominal pain, vomiting, diarrhea/constipation, dysuria, hematuria.    Hospital course: The patient presented with generalized weakness, chills, and body aches. A urinalysis (UA) raised concern for urosepsis. Blood and urine cultures were obtained. She was started on ceftriaxone for presumed urosepsis. The urine culture subsequently grew Klebsiella variicola. Her weakness and overall condition improved during her hospitalization. She became more alert and oriented. She was given DVT prophylaxis. ESR and CRP were noted, though the results are not specified. Physical therapy (PT) was consulted to assist with increasing her activity level. She was ambulating to a chair. Her amlodipine (Norvasc) dose was to be increased to 10mg daily if her blood pressure remained elevated. Pt now medically stable for discharge.     Important Medication Changes and Reason :lasix held ,needs reevaluation by PCP at Assisted living    Active or Pending Issues Requiring Follow -Up: F/u with PCP    Advance Directives :  [  x] Full code [  ] DNR  [  ] Hospice     Discharge Diagnosis:  Urosepsis, resolving  Hypertension

## 2025-03-11 NOTE — DISCHARGE NOTE PROVIDER - NSDCMRMEDTOKEN_GEN_ALL_CORE_FT
amLODIPine 10 mg oral tablet: 1 tab(s) orally once a day  aspirin 81 mg oral delayed release tablet: 1 tab(s) orally once a day  cloNIDine 0.1 mg oral tablet: 1 tab(s) orally 2 times a day  Daily-Chanel 400mcg tablet: 1 tablet orally once a day  docusate sodium 100 mg oral capsule: 3 cap(s) orally once a day (at bedtime) as needed for  constipation  enalapril 20 mg oral tablet: 1 tab(s) orally once a day  metoprolol succinate 50 mg oral tablet, extended release: 1 tab(s) orally once a day  ondansetron 4 mg oral tablet: 1 tab(s) orally every 8 hours as needed for  nausea   amLODIPine 10 mg oral tablet: 1 tab(s) orally once a day  aspirin 81 mg oral delayed release tablet: 1 tab(s) orally once a day  cloNIDine 0.1 mg oral tablet: 1 tab(s) orally 2 times a day  Daily-Chanel 400mcg tablet: 1 tablet orally once a day  docusate sodium 100 mg oral capsule: 3 cap(s) orally once a day (at bedtime) as needed for  constipation  enalapril 20 mg oral tablet: 1 tab(s) orally once a day  metoprolol succinate 50 mg oral tablet, extended release: 1 tab(s) orally once a day  ondansetron 4 mg oral tablet: 1 tab(s) orally every 8 hours as needed for  nausea  PT evaluation and treat: evaluation and treat

## 2025-03-11 NOTE — DISCHARGE NOTE PROVIDER - CARE PROVIDER_API CALL
Issa Wells  Internal Medicine  [address] [place] [address], [address]  [place] [address]  Phone: [phone]  Fax: [phone]  Follow Up Time:

## 2025-03-11 NOTE — DISCHARGE NOTE PROVIDER - EXTENDED VTE YES NO FOR MLM ENOXAPARIN
Patient has been calm and compliant, but bizarre today. Responding to internal stimuli; laughing inappropriately and refusing to wear a top while in room. She has taken her meds as directed and not required any prn medications. Denies SI and HI and could not rate anxiety or depression.  Problem: Patient Care Overview  Goal: Plan of Care Review  Outcome: Ongoing (interventions implemented as appropriate)  Goal: Individualization and Mutuality  Outcome: Ongoing (interventions implemented as appropriate)  Goal: Discharge Needs Assessment  Outcome: Ongoing (interventions implemented as appropriate)  Goal: Interprofessional Rounds/Family Conf  Outcome: Ongoing (interventions implemented as appropriate)     Problem: Overarching Goals (Adult)  Goal: Adheres to Safety Considerations for Self and Others  Outcome: Ongoing (interventions implemented as appropriate)  Goal: Optimized Coping Skills in Response to Life Stressors  Outcome: Ongoing (interventions implemented as appropriate)  Goal: Develops/Participates in Therapeutic [place] to Support Successful Transition  Outcome: Ongoing (interventions implemented as appropriate)     Problem: Cognitive Impairment (Psychotic Signs/Symptoms) (Adult)  Goal: Improved Thought Clarity/Organization (Psychotic Signs/Symptoms)  Outcome: Ongoing (interventions implemented as appropriate)     Problem: Mental State/Mood Impairment (Psychotic Signs/Symptoms) (Adult)  Goal: Improved Mental State/Mood (Psychotic Signs/Symptoms)  Outcome: Ongoing (interventions implemented as appropriate)     Problem: Social/Occupational/Functional Impairment (Psychotic Signs/Symptoms) (Adult)  Goal: Improved Social/Occupational/Functional Skills (Psychotic Signs/Symptoms)  Outcome: Ongoing (interventions implemented as appropriate)     Problem: Suicidal Behavior (Adult)  Goal: Suicidal Behavior is Absent/Minimized/Managed  Outcome: Ongoing (interventions implemented as appropriate)      ,

## 2025-03-11 NOTE — DISCHARGE NOTE NURSING/CASE MANAGEMENT/SOCIAL WORK - PATIENT PORTAL LINK FT
You can access the FollowMyHealth Patient Portal offered by [facility] by registering at the following website: [email or web address]. By joining [facility]’s FollowMyHealth portal, you will also be able to view your health information using other applications (apps) compatible with our system.

## 2025-03-11 NOTE — DISCHARGE NOTE PROVIDER - NSDCCPCAREPLAN_GEN_ALL_CORE_FT
PRINCIPAL DISCHARGE DIAGNOSIS  Diagnosis: Acute UTI  Assessment and Plan of Treatment: You were treated for UTI with course of antibiotics   Drink enough water and fluids to keep your urine clear or pale yellow.  Avoid caffeine, tea, and carbonated beverages. They tend to irritate your bladder.  Empty your bladder often. Avoid holding urine for long periods of time.  Empty your bladder before and after sexual intercourse.  After a bowel movement, women should cleanse from front to back. Use each tissue only once.  SEEK MEDICAL CARE IF:  You have back pain.  You develop a fever.  Your symptoms do not begin to resolve within 3 days.  SEEK IMMEDIATE MEDICAL CARE IF:  You have severe back pain or lower abdominal pain.  You develop chills.  You have nausea or vomiting.  You have continued burning or discomfort with urination.      SECONDARY DISCHARGE DIAGNOSES  Diagnosis: HTN (hypertension)  Assessment and Plan of Treatment: Norvasc was increased to 10 mg   Low salt diet  Activity as tolerated.  Take all medication as prescribed.  Follow up with your medical doctor for routine blood pressure monitoring at your next visit.  Notify your doctor if you have any of the following symptoms:   Dizziness, Lightheadedness, Blurry vision, Headache, Chest pain, Shortness of breath

## 2025-03-27 ENCOUNTER — APPOINTMENT (OUTPATIENT)
Dept: CARDIOTHORACIC SURGERY | Facility: CLINIC | Age: 89
End: 2025-03-27
Payer: MEDICARE

## 2025-03-27 VITALS
DIASTOLIC BLOOD PRESSURE: 81 MMHG | BODY MASS INDEX: 21.69 KG/M2 | SYSTOLIC BLOOD PRESSURE: 131 MMHG | OXYGEN SATURATION: 97 % | RESPIRATION RATE: 16 BRPM | HEIGHT: 66 IN | WEIGHT: 135 LBS | HEART RATE: 70 BPM

## 2025-03-27 DIAGNOSIS — R93.1 ABNORMAL FINDINGS ON DIAGNOSTIC IMAGING OF HEART AND CORONARY CIRCULATION: ICD-10-CM

## 2025-03-27 DIAGNOSIS — E78.5 HYPERLIPIDEMIA, UNSPECIFIED: ICD-10-CM

## 2025-03-27 DIAGNOSIS — Z79.01 LONG TERM (CURRENT) USE OF ANTICOAGULANTS: ICD-10-CM

## 2025-03-27 DIAGNOSIS — I10 ESSENTIAL (PRIMARY) HYPERTENSION: ICD-10-CM

## 2025-03-27 DIAGNOSIS — R06.00 DYSPNEA, UNSPECIFIED: ICD-10-CM

## 2025-03-27 DIAGNOSIS — I48.20 CHRONIC ATRIAL FIBRILLATION, UNSP: ICD-10-CM

## 2025-03-27 DIAGNOSIS — I36.1 NONRHEUMATIC TRICUSPID (VALVE) INSUFFICIENCY: ICD-10-CM

## 2025-03-27 DIAGNOSIS — I50.30 UNSPECIFIED DIASTOLIC (CONGESTIVE) HEART FAILURE: ICD-10-CM

## 2025-03-27 PROCEDURE — 99215 OFFICE O/P EST HI 40 MIN: CPT

## 2025-03-27 PROCEDURE — 99205 OFFICE O/P NEW HI 60 MIN: CPT

## 2025-03-27 PROCEDURE — G2211 COMPLEX E/M VISIT ADD ON: CPT

## 2025-03-27 RX ORDER — METOPROLOL SUCCINATE 50 MG/1
50 TABLET, EXTENDED RELEASE ORAL DAILY
Qty: 30 | Refills: 2 | Status: ACTIVE | COMMUNITY
Start: 2025-03-27

## 2025-04-02 PROBLEM — I48.20 ATRIAL FIBRILLATION, CHRONIC: Status: ACTIVE | Noted: 2025-04-02

## 2025-04-02 PROBLEM — R06.00 DYSPNEA: Status: ACTIVE | Noted: 2025-04-02

## 2025-04-02 PROBLEM — I10 HYPERTENSION: Status: ACTIVE | Noted: 2025-04-02

## 2025-04-02 PROBLEM — E78.5 DYSLIPIDEMIA: Status: ACTIVE | Noted: 2025-04-02

## 2025-04-02 PROBLEM — I50.30 DIASTOLIC CONGESTIVE HEART FAILURE, UNSPECIFIED HF CHRONICITY: Status: ACTIVE | Noted: 2025-04-02

## 2025-04-02 PROBLEM — I36.1 NONRHEUMATIC TRICUSPID VALVE REGURGITATION: Status: ACTIVE | Noted: 2025-04-02

## 2025-04-02 PROBLEM — Z79.01 ANTICOAGULANT LONG-TERM USE: Status: ACTIVE | Noted: 2025-04-02

## 2025-04-02 PROBLEM — R93.1 ABNORMAL ECHOCARDIOGRAM: Status: ACTIVE | Noted: 2025-04-02

## 2025-06-06 ENCOUNTER — INPATIENT (INPATIENT)
Facility: HOSPITAL | Age: 89
LOS: 4 days | Discharge: HOSPICE | End: 2025-06-11
Attending: FAMILY MEDICINE | Admitting: INTERNAL MEDICINE
Payer: MEDICARE

## 2025-06-06 VITALS
HEART RATE: 88 BPM | WEIGHT: 139.99 LBS | OXYGEN SATURATION: 94 % | DIASTOLIC BLOOD PRESSURE: 60 MMHG | RESPIRATION RATE: 19 BRPM | SYSTOLIC BLOOD PRESSURE: 132 MMHG | HEIGHT: 63 IN | TEMPERATURE: 98 F

## 2025-06-06 DIAGNOSIS — Z90.710 ACQUIRED ABSENCE OF BOTH CERVIX AND UTERUS: Chronic | ICD-10-CM

## 2025-06-06 LAB
ADD ON TEST-SPECIMEN IN LAB: SIGNIFICANT CHANGE UP
ALBUMIN SERPL ELPH-MCNC: 4.2 G/DL — SIGNIFICANT CHANGE UP (ref 3.3–5)
ALP SERPL-CCNC: 77 U/L — SIGNIFICANT CHANGE UP (ref 40–120)
ALT FLD-CCNC: 10 U/L — SIGNIFICANT CHANGE UP (ref 10–45)
ANION GAP SERPL CALC-SCNC: 15 MMOL/L — SIGNIFICANT CHANGE UP (ref 5–17)
APPEARANCE UR: ABNORMAL
APTT BLD: 32.5 SEC — SIGNIFICANT CHANGE UP (ref 26.1–36.8)
AST SERPL-CCNC: 19 U/L — SIGNIFICANT CHANGE UP (ref 10–40)
BACTERIA # UR AUTO: ABNORMAL /HPF
BASOPHILS # BLD AUTO: 0.05 K/UL — SIGNIFICANT CHANGE UP (ref 0–0.2)
BASOPHILS NFR BLD AUTO: 0.6 % — SIGNIFICANT CHANGE UP (ref 0–2)
BILIRUB SERPL-MCNC: 0.4 MG/DL — SIGNIFICANT CHANGE UP (ref 0.2–1.2)
BILIRUB UR-MCNC: NEGATIVE — SIGNIFICANT CHANGE UP
BUN SERPL-MCNC: 44 MG/DL — HIGH (ref 7–23)
CALCIUM SERPL-MCNC: 9.4 MG/DL — SIGNIFICANT CHANGE UP (ref 8.4–10.5)
CAST: 1 /LPF — SIGNIFICANT CHANGE UP (ref 0–4)
CHLORIDE SERPL-SCNC: 97 MMOL/L — SIGNIFICANT CHANGE UP (ref 96–108)
CO2 SERPL-SCNC: 25 MMOL/L — SIGNIFICANT CHANGE UP (ref 22–31)
COLOR SPEC: YELLOW — SIGNIFICANT CHANGE UP
CREAT SERPL-MCNC: 0.96 MG/DL — SIGNIFICANT CHANGE UP (ref 0.5–1.3)
DIFF PNL FLD: ABNORMAL
EGFR: 56 ML/MIN/1.73M2 — LOW
EGFR: 56 ML/MIN/1.73M2 — LOW
EOSINOPHIL # BLD AUTO: 0.27 K/UL — SIGNIFICANT CHANGE UP (ref 0–0.5)
EOSINOPHIL NFR BLD AUTO: 3.1 % — SIGNIFICANT CHANGE UP (ref 0–6)
GAS PNL BLDV: SIGNIFICANT CHANGE UP
GLUCOSE SERPL-MCNC: 136 MG/DL — HIGH (ref 70–99)
GLUCOSE UR QL: NEGATIVE MG/DL — SIGNIFICANT CHANGE UP
HCT VFR BLD CALC: 35.7 % — SIGNIFICANT CHANGE UP (ref 34.5–45)
HGB BLD-MCNC: 11.6 G/DL — SIGNIFICANT CHANGE UP (ref 11.5–15.5)
IMM GRANULOCYTES NFR BLD AUTO: 0.7 % — SIGNIFICANT CHANGE UP (ref 0–0.9)
INR BLD: 1.93 RATIO — HIGH (ref 0.85–1.16)
KETONES UR QL: NEGATIVE MG/DL — SIGNIFICANT CHANGE UP
LEUKOCYTE ESTERASE UR-ACNC: ABNORMAL
LYMPHOCYTES # BLD AUTO: 1.05 K/UL — SIGNIFICANT CHANGE UP (ref 1–3.3)
LYMPHOCYTES # BLD AUTO: 12 % — LOW (ref 13–44)
MCHC RBC-ENTMCNC: 32.5 G/DL — SIGNIFICANT CHANGE UP (ref 32–36)
MCHC RBC-ENTMCNC: 32.9 PG — SIGNIFICANT CHANGE UP (ref 27–34)
MCV RBC AUTO: 101.1 FL — HIGH (ref 80–100)
MONOCYTES # BLD AUTO: 0.79 K/UL — SIGNIFICANT CHANGE UP (ref 0–0.9)
MONOCYTES NFR BLD AUTO: 9 % — SIGNIFICANT CHANGE UP (ref 2–14)
NEUTROPHILS # BLD AUTO: 6.54 K/UL — SIGNIFICANT CHANGE UP (ref 1.8–7.4)
NEUTROPHILS NFR BLD AUTO: 74.6 % — SIGNIFICANT CHANGE UP (ref 43–77)
NITRITE UR-MCNC: NEGATIVE — SIGNIFICANT CHANGE UP
NRBC BLD AUTO-RTO: 0 /100 WBCS — SIGNIFICANT CHANGE UP (ref 0–0)
PH UR: 5 — SIGNIFICANT CHANGE UP (ref 5–8)
PLATELET # BLD AUTO: 231 K/UL — SIGNIFICANT CHANGE UP (ref 150–400)
POTASSIUM SERPL-MCNC: 5.4 MMOL/L — HIGH (ref 3.5–5.3)
POTASSIUM SERPL-SCNC: 5.4 MMOL/L — HIGH (ref 3.5–5.3)
PROT SERPL-MCNC: 7.6 G/DL — SIGNIFICANT CHANGE UP (ref 6–8.3)
PROT UR-MCNC: 30 MG/DL
PROTHROM AB SERPL-ACNC: 21.9 SEC — HIGH (ref 9.9–13.4)
RBC # BLD: 3.53 M/UL — LOW (ref 3.8–5.2)
RBC # FLD: 14.2 % — SIGNIFICANT CHANGE UP (ref 10.3–14.5)
RBC CASTS # UR COMP ASSIST: 38 /HPF — HIGH (ref 0–4)
REVIEW: SIGNIFICANT CHANGE UP
SODIUM SERPL-SCNC: 137 MMOL/L — SIGNIFICANT CHANGE UP (ref 135–145)
SP GR SPEC: 1.01 — SIGNIFICANT CHANGE UP (ref 1–1.03)
SQUAMOUS # UR AUTO: 4 /HPF — SIGNIFICANT CHANGE UP (ref 0–5)
UROBILINOGEN FLD QL: 0.2 MG/DL — SIGNIFICANT CHANGE UP (ref 0.2–1)
WBC # BLD: 8.76 K/UL — SIGNIFICANT CHANGE UP (ref 3.8–10.5)
WBC # FLD AUTO: 8.76 K/UL — SIGNIFICANT CHANGE UP (ref 3.8–10.5)
WBC UR QL: 138 /HPF — HIGH (ref 0–5)
YEAST-LIKE CELLS: PRESENT

## 2025-06-06 PROCEDURE — 93010 ELECTROCARDIOGRAM REPORT: CPT | Mod: 76

## 2025-06-06 PROCEDURE — 74177 CT ABD & PELVIS W/CONTRAST: CPT | Mod: 26

## 2025-06-06 PROCEDURE — 99285 EMERGENCY DEPT VISIT HI MDM: CPT | Mod: GC

## 2025-06-06 RX ORDER — HEPARIN SODIUM 1000 [USP'U]/ML
6500 INJECTION INTRAVENOUS; SUBCUTANEOUS EVERY 6 HOURS
Refills: 0 | Status: DISCONTINUED | OUTPATIENT
Start: 2025-06-06 | End: 2025-06-11

## 2025-06-06 RX ORDER — ONDANSETRON HCL/PF 4 MG/2 ML
4 VIAL (ML) INJECTION ONCE
Refills: 0 | Status: COMPLETED | OUTPATIENT
Start: 2025-06-06 | End: 2025-06-06

## 2025-06-06 RX ORDER — METOCLOPRAMIDE HCL 10 MG
10 TABLET ORAL ONCE
Refills: 0 | Status: COMPLETED | OUTPATIENT
Start: 2025-06-06 | End: 2025-06-06

## 2025-06-06 RX ORDER — HEPARIN SODIUM 1000 [USP'U]/ML
3000 INJECTION INTRAVENOUS; SUBCUTANEOUS EVERY 6 HOURS
Refills: 0 | Status: DISCONTINUED | OUTPATIENT
Start: 2025-06-06 | End: 2025-06-11

## 2025-06-06 RX ORDER — CEFTRIAXONE 500 MG/1
1000 INJECTION, POWDER, FOR SOLUTION INTRAMUSCULAR; INTRAVENOUS ONCE
Refills: 0 | Status: COMPLETED | OUTPATIENT
Start: 2025-06-06 | End: 2025-06-06

## 2025-06-06 RX ORDER — ACETAMINOPHEN 500 MG/5ML
1000 LIQUID (ML) ORAL ONCE
Refills: 0 | Status: COMPLETED | OUTPATIENT
Start: 2025-06-06 | End: 2025-06-06

## 2025-06-06 RX ORDER — HEPARIN SODIUM 1000 [USP'U]/ML
INJECTION INTRAVENOUS; SUBCUTANEOUS
Qty: 25000 | Refills: 0 | Status: DISCONTINUED | OUTPATIENT
Start: 2025-06-06 | End: 2025-06-11

## 2025-06-06 RX ADMIN — HEPARIN SODIUM 1500 UNIT(S)/HR: 1000 INJECTION INTRAVENOUS; SUBCUTANEOUS at 21:05

## 2025-06-06 RX ADMIN — Medication 4 MILLIGRAM(S): at 19:29

## 2025-06-06 RX ADMIN — Medication 10 MILLIGRAM(S): at 21:10

## 2025-06-06 RX ADMIN — CEFTRIAXONE 100 MILLIGRAM(S): 500 INJECTION, POWDER, FOR SOLUTION INTRAMUSCULAR; INTRAVENOUS at 16:37

## 2025-06-06 RX ADMIN — Medication 10 MILLIGRAM(S): at 15:37

## 2025-06-06 RX ADMIN — Medication 1000 MILLILITER(S): at 14:02

## 2025-06-06 NOTE — ED PROVIDER NOTE - CLINICAL SUMMARY MEDICAL DECISION MAKING FREE TEXT BOX
89F, PMHx A-fib status post watchman's, pulmonary hypertension, HTN, presenting to ED for abdominal pain, nausea, vomiting.  Patient states that she was eating breakfast this morning, in her normal state of health, which shortly after breakfast she developed nausea and vomiting.  Patient states she vomited her entire breakfast, no other resident/other individual who ate the breakfast had similar symptoms.  Endorsing continued distention, and feeling of fullness.  Patient states that she has been unable to pass stool or flatus since that time.  She does not endorse fevers.  Patient had diarrhea 2 days ago, which resolved with Imodium.  She is different than the 1    Vitals:  /60  Resp 19  HR 88  Temp 97.7 SpO2 94%    Uncomfortable but otherwise nontoxic appearing 89-year-old female presenting to ED for abdominal pain, nausea.  Ventral hernia potentially concerning for incarcerated hernia, patient also with hysterectomy history which raises concern for possible SBO.  Additionally considered, however lower in the differential, is viral gastroenteritis, foodborne illness.  Plan for labs, CT abdomen pelvis to assess for alternate etiologies, screening EKG.  Dispo per workup and reassessment.    ***Refer to progress notes as a continuation of this MDM, for updates in clinical course, and for reassessments.***

## 2025-06-06 NOTE — ED PROVIDER NOTE - PROGRESS NOTE DETAILS
Nelson Sagastume DO (PGY1): Advised by radiology that blood flow to the left kidney is completely occluded by atherosclerotic disease.  Vascular surgery consulted for possible intervention.  Pending recs. Attending MD Otto: Informed by resident of read, coags and T&S ordered, surgery aware, will await.  Patient and patient's son aware. Nelson Sagastume DO (PGY1): Conversation had with patient and patient's son regarding findings.  Pending surgery recs. Oskar Hebert MD PGY-1: no acute intervention, will start therapeutic AC, no signs of bleeding or recent surgery, admit to Dr. Everett

## 2025-06-06 NOTE — ED ADULT NURSE NOTE - HIV OFFER
Spoke to pt and yes, pt was called back. See telephone encounter for 05/21/2024  
Previously Declined (within the last year)

## 2025-06-06 NOTE — ED ADULT NURSE NOTE - OBJECTIVE STATEMENT
89 year old female presents to ED via EMS from [facility] complaining of abdominal pain. States two nights ago she had diarrhea, so they gave her Immodium. was feeling better until today when she started having lower abdominal discomfort and became nauseous. EMS placed IV and gave 8mg zofran, patient still having nausea. States "I feel very bloated, like i'm full of gas". Denies headache, chest pain, shortness of breath, fevers, chills, dysuria, hematuria.

## 2025-06-06 NOTE — CONSULT NOTE ADULT - SUBJECTIVE AND OBJECTIVE BOX
Surgery Consult Note  Attending: Gregory  Service: Vascular Surgery    HPI: 89-year-old female history of A-fib, no longer on AC s/p watchman, Pulm HTN, HTN, presents to ED for 1 day of abdominal pain and nausea, vomiting. 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       PAST MEDICAL HISTORY:  PAST MEDICAL & SURGICAL HISTORY:  Afib      HTN (hypertension)      H/O CHF      Uterine cancer      S/P hysterectomy          ALLERGIES:  Allergies    hydrALAZINE (Other; [place])  Lipitor (Other; [place])    Intolerances    lactose (Unknown)      SOCIAL HISTORY: Negative for tobacco, etoh, or drug use    FAMILY HISTORY:  FAMILY HISTORY:      PHYSICAL EXAM:  General: NAD, resting comfortably  Pulmonary: nonlabored respirations   Cardiovascular: NSR  Abdominal: softly distended, nontender     VITAL SIGNS:  Vital Signs Last 24 Hrs  T(C): 36.6 (2025 19:16), Max: 36.6 (2025 19:16)  T(F): 97.8 (2025 19:16), Max: 97.8 (2025 19:16)  HR: 77 (2025 19:16) (71 - 88)  BP: 153/66 (2025 19:16) (132/60 - 164/65)  BP(mean): 95 (2025 19:16) (94 - 95)  RR: 19 (2025 19:16) (19 - 20)  SpO2: 94% (2025 19:16) (94% - 100%)    Parameters below as of 2025 19:16  Patient On (Oxygen Delivery Method): room air        I&O's Summary      LABS:                        11.6   8.76  )-----------( 231      ( 2025 14:05 )             35.7     06-06    137  |  97  |  44[H]  ----------------------------<  136[H]  5.4[H]   |  25  |  0.96    Ca    9.4      2025 14:05    TPro  7.6  /  Alb  4.2  /  TBili  0.4  /  DBili  x   /  AST  19  /  ALT  10  /  AlkPhos  77  06-06    PT/INR - ( 2025 18:57 )   PT: 21.9 sec;   INR: 1.93 ratio         PTT - ( 2025 18:57 )  PTT:32.5 sec  Urinalysis Basic - ( 2025 15:57 )    Color: Yellow / Appearance: Cloudy / S.013 / pH: x  Gluc: x / Ketone: x  / Bili: Negative / Urobili: 0.2 mg/dL   Blood: x / Protein: 30 mg/dL / Nitrite: Negative   Leuk Esterase: Large / RBC: 38 /HPF /  /HPF   Sq Epi: x / Non Sq Epi: 4 /HPF / Bacteria: Occasional /HPF      CAPILLARY BLOOD GLUCOSE        LIVER FUNCTIONS - ( 2025 14:05 )  Alb: 4.2 g/dL / Pro: 7.6 g/dL / ALK PHOS: 77 U/L / ALT: 10 U/L / AST: 19 U/L / GGT: x             CULTURES:      RADIOLOGY & ADDITIONAL STUDIES:    < from: CT Abdomen and Pelvis w/ IV Cont (25 @ 18:12) >  PROCEDURE:  CT of the Abdomen and Pelvis was performed.  Sagittal and coronal reformats were performed.    FINDINGS:  LOWER CHEST: Small bilateral pleural effusions atelectasis. Cardiomegaly   with aortic valvular calcifications dilated right atrium. Loop recorder.    LIVER: Within normal limits.  BILE DUCTS: Normal caliber.  GALLBLADDER: Mild nonspecific gallbladder wall edema which may be related   to CHF.  SPLEEN: Within normal limits.  PANCREAS: Within normal limits.  ADRENALS: Within normal limits.  KIDNEYS/URETERS: Nonenhancing left kidney, which is symmetric in size to   the contralateral side. No hydronephrosis in either kidney. Normal   enhancement throughout the right kidney. Nonobstructing right renal stone   versus vascular calcification.    BLADDER: Within normal limits.  REPRODUCTIVE ORGANS: Hysterectomy.    BOWEL: No bowel obstruction. Appendix is not visualized. Noevidence of   inflammation in the pericecal region. Colonic diverticulosis without   evidence of diverticulitis  PERITONEUM/RETROPERITONEUM: Within normal limits.  VESSELS: Severe atherosclerotic disease with marked narrowing of the   bilateral renalarteries. Left renal artery is not well opacified.  LYMPH NODES: No lymphadenopathy.  ABDOMINAL WALL: Postsurgical changes in the abdominal wall and right   groin. Mild diffuse subcutaneous edema  BONES: Degenerative changes spine.    IMPRESSION:  Nonenhancing left kidney which is symmetric in size to the contralateral   side and demonstrated normal enhancement in  study.    Severe atherosclerotic disease with narrowing of the bilateral renal   artery origins and poor opacification of the left renal artery. 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    Cardiomegaly and small bilateral pleural effusions    < end of copied text >

## 2025-06-06 NOTE — ED PROVIDER NOTE - PHYSICAL EXAMINATION
Gen: AAOx3, non-toxic  HEENT: NCAT. PEERLA, EOMI, oral mucosa moist, normal conjunctiva  Lung: CTAB, no respiratory distress, no wheezes/rhonchi/rales B/L, speaking in full sentences  CV: irregular rate, no murmurs, rubs or gallops  Abd: Large minimally reducible ventral hernia without overlying skin changes, duskiness.  Tenderness to palpation bilateral lower quadrants without rebound, voluntary guarding.  No CVA tenderness.  MSK: no visible deformities  Neuro: No focal sensory or motor deficits  Skin: Warm, well perfused, no rash  Psych: normal affect.

## 2025-06-06 NOTE — ED ADULT NURSE REASSESSMENT NOTE - NS ED NURSE REASSESS COMMENT FT1
Pt actual weight obtained. Heparin administered as per MD order with 2 RN's at the bedside. Heparin dose confirmed via Heparin Nomogram. Pt educated on S/S of bleeding, verbalized understanding. Call bell within reach. Pt actual weight obtained. Heparin administered as per MD order with 2 RN's at the bedside. Heparin dose confirmed via Heparin Nomogram. Pt educated on S/S of bleeding, verbalized understanding. Call estrada within reach. MD Hebert OK with pt not getting initial bolus.

## 2025-06-06 NOTE — ED ADULT NURSE NOTE - NSFALLHARMRISKINTERV_ED_ALL_ED

## 2025-06-06 NOTE — ED PROVIDER NOTE - ATTENDING CONTRIBUTION TO CARE
Attending MD Otto: I personally have seen and examined this patient.  Resident note reviewed and agree on plan of care except where noted.  See below for details.     Seen in [place] [facility], brought in by EMS  Cards Dr. Cordoba  PMD at [facility] (NP)  Allergy: Hydralazine ("I don't know what that is"), Lipitor ("That's one of the drugs I take, I've had no problem with it")    89F with PMH/PSH including AFib s/p Watchman procedure (not on AC), pulmonary HTN, HTN presents to the ED with abdominal pain.  Reports sudden onset nausea, vomiting.  Reports sudden abdominal bloating.  Reports had breakfast, tolerating po.  Reports was then watching TV with her aide when had sudden abdominal bloating.  Reports had one episode of vomiting, gastric content, not bloody.  Reports had eggs and pancakes.  Reports two nights ago had diarrhea and was given Imodium with resolution.  Reports     Exam:   General: calm  HENT: head NCAT, airway patent  Eyes: anicteric, no conjunctival injection   Lungs: lungs CTAB with good inspiratory effort, no wheezing, no rhonchi, no rales  Cardiac: +S1S2, no obvious m/r/g  GI: abdomen soft with +BS, NT, ND  : no CVAT  MSK: ranging neck and extremities freely  Neuro: moving all extremities spontaneously, nonfocal  Psych: normal mood and affect     A/P: 89F with abdominal bloating, nausea, vomiting, recent diarrhea     TO BE COMPLETED Attending MD Otto: I personally have seen and examined this patient.  Resident note reviewed and agree on plan of care except where noted.  See below for details.     Seen in [place] [facility], brought in by EMS  Cards Dr. Cordoba  PMJAYNE at [facility] (NP)  Allergy: Hydralazine ("I don't know what that is"), Lipitor ("That's one of the drugs I take, I've had no problem with it")    89F with PMH/PSH including AFib s/p Watchman procedure (not on AC), pulmonary HTN, HTN presents to the ED with abdominal pain.  Reports sudden onset nausea, vomiting.  Reports sudden abdominal bloating.  Reports had breakfast, tolerating po.  Reports was then watching TV with her aide when had sudden abdominal bloating.  Reports had one episode of vomiting, gastric content, not bloody.  Reports had eggs and pancakes.  Reports two nights ago had diarrhea and was given Imodium with resolution.  Reports     Exam:   General: calm  HENT: head NCAT, airway patent  Eyes: anicteric, no conjunctival injection   Lungs: lungs CTAB with good inspiratory effort  Cardiac: +S1S2, no obvious m/r/g, irregular  GI: abdomen soft with +BS, mild diffuse tenderness, no rebound, no guarding, ND  : no CVAT  MSK: ranging neck and extremities freely  Neuro: moving all extremities spontaneously, nonfocal  Psych: normal mood and affect, AAOx3    A/P: 89F with abdominal bloating, nausea, vomiting, recent diarrhea, will evaluate for metabolic derangement, as per MARIA ISABEL Payne, patient received 8mg of Zofran from EMS, will hold on more antiemetic at this time, will keep npo, will give IVFs, as HR sounded irregular on auscultation, will obtain EKG

## 2025-06-07 LAB
ANION GAP SERPL CALC-SCNC: 17 MMOL/L — SIGNIFICANT CHANGE UP (ref 5–17)
ANION GAP SERPL CALC-SCNC: 18 MMOL/L — HIGH (ref 5–17)
APTT BLD: 125 SEC — CRITICAL HIGH (ref 26.1–36.8)
APTT BLD: 143.8 SEC — CRITICAL HIGH (ref 26.1–36.8)
APTT BLD: 90.2 SEC — HIGH (ref 26.1–36.8)
BUN SERPL-MCNC: 49 MG/DL — HIGH (ref 7–23)
BUN SERPL-MCNC: 52 MG/DL — HIGH (ref 7–23)
CALCIUM SERPL-MCNC: 9.5 MG/DL — SIGNIFICANT CHANGE UP (ref 8.4–10.5)
CALCIUM SERPL-MCNC: 9.5 MG/DL — SIGNIFICANT CHANGE UP (ref 8.4–10.5)
CHLORIDE SERPL-SCNC: 96 MMOL/L — SIGNIFICANT CHANGE UP (ref 96–108)
CHLORIDE SERPL-SCNC: 97 MMOL/L — SIGNIFICANT CHANGE UP (ref 96–108)
CO2 SERPL-SCNC: 21 MMOL/L — LOW (ref 22–31)
CO2 SERPL-SCNC: 21 MMOL/L — LOW (ref 22–31)
CREAT SERPL-MCNC: 1.53 MG/DL — HIGH (ref 0.5–1.3)
CREAT SERPL-MCNC: 1.72 MG/DL — HIGH (ref 0.5–1.3)
EGFR: 28 ML/MIN/1.73M2 — LOW
EGFR: 28 ML/MIN/1.73M2 — LOW
EGFR: 32 ML/MIN/1.73M2 — LOW
EGFR: 32 ML/MIN/1.73M2 — LOW
GLUCOSE SERPL-MCNC: 138 MG/DL — HIGH (ref 70–99)
GLUCOSE SERPL-MCNC: 148 MG/DL — HIGH (ref 70–99)
HCT VFR BLD CALC: 37.4 % — SIGNIFICANT CHANGE UP (ref 34.5–45)
HCT VFR BLD CALC: 37.7 % — SIGNIFICANT CHANGE UP (ref 34.5–45)
HGB BLD-MCNC: 11.8 G/DL — SIGNIFICANT CHANGE UP (ref 11.5–15.5)
HGB BLD-MCNC: 12 G/DL — SIGNIFICANT CHANGE UP (ref 11.5–15.5)
LACTATE SERPL-SCNC: 1.7 MMOL/L — SIGNIFICANT CHANGE UP (ref 0.5–2)
MAGNESIUM SERPL-MCNC: 2.4 MG/DL — SIGNIFICANT CHANGE UP (ref 1.6–2.6)
MCHC RBC-ENTMCNC: 31.3 G/DL — LOW (ref 32–36)
MCHC RBC-ENTMCNC: 31.7 PG — SIGNIFICANT CHANGE UP (ref 27–34)
MCHC RBC-ENTMCNC: 32.1 G/DL — SIGNIFICANT CHANGE UP (ref 32–36)
MCHC RBC-ENTMCNC: 32.1 PG — SIGNIFICANT CHANGE UP (ref 27–34)
MCV RBC AUTO: 100 FL — SIGNIFICANT CHANGE UP (ref 80–100)
MCV RBC AUTO: 101.3 FL — HIGH (ref 80–100)
NRBC BLD AUTO-RTO: 0 /100 WBCS — SIGNIFICANT CHANGE UP (ref 0–0)
NRBC BLD AUTO-RTO: 0 /100 WBCS — SIGNIFICANT CHANGE UP (ref 0–0)
PHOSPHATE SERPL-MCNC: 5.5 MG/DL — HIGH (ref 2.5–4.5)
PLATELET # BLD AUTO: 228 K/UL — SIGNIFICANT CHANGE UP (ref 150–400)
PLATELET # BLD AUTO: 239 K/UL — SIGNIFICANT CHANGE UP (ref 150–400)
POTASSIUM SERPL-MCNC: 5.9 MMOL/L — HIGH (ref 3.5–5.3)
POTASSIUM SERPL-MCNC: 6.1 MMOL/L — HIGH (ref 3.5–5.3)
POTASSIUM SERPL-SCNC: 5.9 MMOL/L — HIGH (ref 3.5–5.3)
POTASSIUM SERPL-SCNC: 6.1 MMOL/L — HIGH (ref 3.5–5.3)
RBC # BLD: 3.72 M/UL — LOW (ref 3.8–5.2)
RBC # BLD: 3.74 M/UL — LOW (ref 3.8–5.2)
RBC # FLD: 14.3 % — SIGNIFICANT CHANGE UP (ref 10.3–14.5)
RBC # FLD: 14.5 % — SIGNIFICANT CHANGE UP (ref 10.3–14.5)
SODIUM SERPL-SCNC: 135 MMOL/L — SIGNIFICANT CHANGE UP (ref 135–145)
SODIUM SERPL-SCNC: 135 MMOL/L — SIGNIFICANT CHANGE UP (ref 135–145)
WBC # BLD: 12.48 K/UL — HIGH (ref 3.8–10.5)
WBC # BLD: 18.47 K/UL — HIGH (ref 3.8–10.5)
WBC # FLD AUTO: 12.48 K/UL — HIGH (ref 3.8–10.5)
WBC # FLD AUTO: 18.47 K/UL — HIGH (ref 3.8–10.5)

## 2025-06-07 PROCEDURE — 99223 1ST HOSP IP/OBS HIGH 75: CPT

## 2025-06-07 PROCEDURE — 71045 X-RAY EXAM CHEST 1 VIEW: CPT | Mod: 26

## 2025-06-07 PROCEDURE — 93975 VASCULAR STUDY: CPT | Mod: 26

## 2025-06-07 RX ORDER — METOPROLOL SUCCINATE 50 MG/1
50 TABLET, EXTENDED RELEASE ORAL DAILY
Refills: 0 | Status: DISCONTINUED | OUTPATIENT
Start: 2025-06-07 | End: 2025-06-11

## 2025-06-07 RX ORDER — METOCLOPRAMIDE HCL 10 MG
10 TABLET ORAL ONCE
Refills: 0 | Status: COMPLETED | OUTPATIENT
Start: 2025-06-07 | End: 2025-06-07

## 2025-06-07 RX ORDER — ASPIRIN 325 MG
81 TABLET ORAL DAILY
Refills: 0 | Status: DISCONTINUED | OUTPATIENT
Start: 2025-06-07 | End: 2025-06-11

## 2025-06-07 RX ORDER — AMLODIPINE BESYLATE 10 MG/1
10 TABLET ORAL DAILY
Refills: 0 | Status: DISCONTINUED | OUTPATIENT
Start: 2025-06-07 | End: 2025-06-10

## 2025-06-07 RX ORDER — ONDANSETRON HCL/PF 4 MG/2 ML
4 VIAL (ML) INJECTION ONCE
Refills: 0 | Status: COMPLETED | OUTPATIENT
Start: 2025-06-07 | End: 2025-06-07

## 2025-06-07 RX ORDER — CEFTRIAXONE 500 MG/1
1000 INJECTION, POWDER, FOR SOLUTION INTRAMUSCULAR; INTRAVENOUS EVERY 24 HOURS
Refills: 0 | Status: DISCONTINUED | OUTPATIENT
Start: 2025-06-07 | End: 2025-06-09

## 2025-06-07 RX ADMIN — HEPARIN SODIUM 1200 UNIT(S)/HR: 1000 INJECTION INTRAVENOUS; SUBCUTANEOUS at 15:26

## 2025-06-07 RX ADMIN — HEPARIN SODIUM 1500 UNIT(S)/HR: 1000 INJECTION INTRAVENOUS; SUBCUTANEOUS at 06:55

## 2025-06-07 RX ADMIN — Medication 4 MILLIGRAM(S): at 18:00

## 2025-06-07 RX ADMIN — HEPARIN SODIUM 0 UNIT(S)/HR: 1000 INJECTION INTRAVENOUS; SUBCUTANEOUS at 13:57

## 2025-06-07 RX ADMIN — HEPARIN SODIUM 1000 UNIT(S)/HR: 1000 INJECTION INTRAVENOUS; SUBCUTANEOUS at 22:16

## 2025-06-07 RX ADMIN — CEFTRIAXONE 100 MILLIGRAM(S): 500 INJECTION, POWDER, FOR SOLUTION INTRAMUSCULAR; INTRAVENOUS at 13:58

## 2025-06-07 RX ADMIN — HEPARIN SODIUM 1200 UNIT(S)/HR: 1000 INJECTION INTRAVENOUS; SUBCUTANEOUS at 19:32

## 2025-06-07 RX ADMIN — Medication 0.1 MILLIGRAM(S): at 17:19

## 2025-06-07 RX ADMIN — Medication 10 MILLIGRAM(S): at 06:16

## 2025-06-07 RX ADMIN — Medication 10 MILLIGRAM(S): at 11:37

## 2025-06-07 NOTE — H&P ADULT - NSHPLABSRESULTS_GEN_ALL_CORE
< from: TTE W or WO Ultrasound Enhancing Agent (01.23.25 @ 12:16) >     1. Left ventricular cavity is normal in size. The interventricular septumis flattened in systole and diastole consistent with right ventricular pressure and volume overload. Left ventricular systolic function is normal with an ejection fraction of 52 % by Canchola's method of disks.   2. Mildly enlarged right ventricular cavity size and normal right ventricular systolic function.   3. Left atrium is severely dilated.   4. The right atrium is severely dilated.   5. Structurally normal tricuspid valve with normal leaflet excursion. There is moderate tricuspid regurgitation. The etiology is secondary/functional tricuspid regurgitation (mostly atrial enlargement).   6. There is no other significant valvular regurgitation or stenosis.   7. Estimated pulmonary artery systolic pressure is 68 mmHg, consistent with severe pulmonary hypertension.   8. The inferior vena cava is dilated measuring 2.45 cm in diameter, (dilated >2.1cm) with abnormal inspiratory collapse (abnormal <50%) consistent with elevated right atrial pressure (~15, range 10-20mmHg).   9. Compared to the transthoracic echocardiogram performed on 12/17/2024, this TR quanfies to moderate. The pulmonary pressures are severely elevated (similar to prior) and the IVC is dilated consistent with elevated right atrial pressure.

## 2025-06-07 NOTE — H&P ADULT - HISTORY OF PRESENT ILLNESS
Date of Service, 06-07-25 @ 06:46  CHIEF COMPLAINT:Patient is a 89y old  Female who presents with a chief complaint of     HPI:  89F, PMHx A-fib status post watchman's, pulmonary hypertension, HTN, presenting to ED for abdominal pain, nausea, vomiting.  Patient states that she was eating breakfast this morning, in her normal state of health, which shortly after breakfast she developed nausea and vomiting.  Patient states she vomited her entire breakfast, no other resident/other individual who ate the breakfast had similar symptoms.  Endorsing continued distention, and feeling of fullness.  Patient states that she has been unable to pass stool or flatus since that time.  She does not endorse fevers.  Patient had diarrhea 2 days ago, which resolved with Imodium.      PAST MEDICAL & SURGICAL HISTORY:  Afib  HTN (hypertension)  H/O CHF  Uterine cancer  S/P hysterectomy      MEDICATIONS  (STANDING):  heparin  Infusion.  Unit(s)/Hr (15 mL/Hr) IV Continuous <Continuous>    MEDICATIONS  (PRN):  heparin   Injectable 6500 Unit(s) IV Push every 6 hours PRN For aPTT less than 40  heparin   Injectable 3000 Unit(s) IV Push every 6 hours PRN For aPTT between 40 - 57      FAMILY HISTORY:      SOCIAL HISTORY:    [x ] Non-smoker  [ ] Smoker  [ ] Alcohol    Allergies    hydrALAZINE (Other; Flushing)  Lipitor (Other; Flushing)    Intolerances    lactose (Unknown)  	    REVIEW OF SYSTEMS:  CONSTITUTIONAL: No fever, weight loss, or fatigue  EYES: No eye pain, visual disturbances, or discharge  ENT:  No difficulty hearing, tinnitus, vertigo; No sinus or throat pain  NECK: No pain or stiffness  RESPIRATORY: No cough, wheezing, chills or hemoptysis; No Shortness of Breath  CARDIOVASCULAR: No chest pain, palpitations, passing out, dizziness, or leg swelling  GASTROINTESTINAL: No abdominal or epigastric pain. + nausea, vomiting, or hematemesis; No diarrhea or constipation. No melena or hematochezia.  GENITOURINARY: No dysuria, frequency, hematuria, or incontinence  NEUROLOGICAL: No headaches, memory loss, loss of strength, numbness, or tremors  SKIN: No itching, burning, rashes, or lesions   LYMPH Nodes: No enlarged glands  ENDOCRINE: No heat or cold intolerance; No hair loss  MUSCULOSKELETAL: No joint pain or swelling; No muscle, back, or extremity pain  PSYCHIATRIC: No depression, anxiety, mood swings, or difficulty sleeping  HEME/LYMPH: No easy bruising, or bleeding gums  ALLERGY AND IMMUNOLOGIC: No hives or eczema	    x[ ] All others negative	  [ ] Unable to obtain    PHYSICAL EXAM:  T(C): 36.9 (06-07-25 @ 04:17), Max: 36.9 (06-07-25 @ 04:17)  HR: 106 (06-07-25 @ 04:44) (71 - 120)  BP: 151/82 (06-07-25 @ 04:17) (132/60 - 164/65)  RR: 18 (06-07-25 @ 04:44) (18 - 20)  SpO2: 100% (06-07-25 @ 04:44) (93% - 100%)  Wt(kg): --  I&O's Summary    06 Jun 2025 07:01  -  07 Jun 2025 06:46  --------------------------------------------------------  IN: 0 mL / OUT: 150 mL / NET: -150 mL        Appearance: Normal	  HEENT:   Normal oral mucosa, PERRL, EOMI	  Lymphatic: No lymphadenopathy  Cardiovascular: Normal S1 S2, No JVD, N+murmurs, No edema  Respiratory:rhonchi  Psychiatry: A & O x 3, Mood & affect appropriate  Gastrointestinal:  Soft, Non-tender, + BS	  Skin: No rashes, No ecchymoses, No cyanosis	  Neurologic: Non-focal  Extremities: Normal range of motion, No clubbing, cyanosis or edema  Vascular: Peripheral pulses palpable 2+ bilaterally    TELEMETRY: 	    ECG:  	  RADIOLOGY:  OTHER: 	  	  LABS:	 	    CARDIAC MARKERS:                              11.8   12.48 )-----------( 228      ( 07 Jun 2025 04:23 )             37.7     06-06    137  |  97  |  44[H]  ----------------------------<  136[H]  5.4[H]   |  25  |  0.96    Ca    9.4      06 Jun 2025 14:05    TPro  7.6  /  Alb  4.2  /  TBili  0.4  /  DBili  x   /  AST  19  /  ALT  10  /  AlkPhos  77  06-06    proBNP:   Lipid Profile:   HgA1c:   TSH:   PT/INR - ( 06 Jun 2025 18:57 )   PT: 21.9 sec;   INR: 1.93 ratio         PTT - ( 07 Jun 2025 04:23 )  PTT:90.2 sec    PREVIOUS DIAGNOSTIC TESTING:      · EKG Date/Time: 06-Jun-2025 14:25  · Rate: 83  · Interpretation: abnormal  · Abnormal Rhythm: atrial fibrillation  · Acute or Evolving MI?: no  · Axis: Normal  · QRS: 76  · QTC: 437    < from: Xray Chest 1 View AP/PA (03.08.25 @ 15:24) >  Single frontal view of the chest demonstrates the lungs to be clear. The   cardiomediastinal silhouette is enlarged. Left atrial occlusion device.   Loop recorder.. No acute osseous abnormalities.      IMPRESSION: No acute cardiopulmonary disease process.    < from: CT Abdomen and Pelvis w/ IV Cont (06.06.25 @ 18:12) >  Nonenhancing left kidney which is symmetric in size to the contralateral   side and demonstrated normal enhancement in 2024 study.    Severe atherosclerotic disease with narrowing of the bilateral renal   artery origins and poor opacification of the left renal artery. 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    Cardiomegaly and small bilateral pleural effusions

## 2025-06-07 NOTE — PROGRESS NOTE ADULT - SUBJECTIVE AND OBJECTIVE BOX
GENERAL SURGERY PROGRESS NOTE    NAN GAYLE  |  [id number]      S: NAEON. may need renal artery stent    O:   Vital Signs Last 24 Hrs  T(C): 36.7 (07 Jun 2025 11:44), Max: 36.9 (07 Jun 2025 04:17)  T(F): 98 (07 Jun 2025 11:44), Max: 98.4 (07 Jun 2025 04:17)  HR: 60 (07 Jun 2025 11:44) (60 - 120)  BP: 138/53 (07 Jun 2025 11:44) (138/53 - 164/65)  BP(mean): 95 (06 Jun 2025 19:16) (94 - 95)  RR: 18 (07 Jun 2025 11:44) (18 - 20)  SpO2: 98% (07 Jun 2025 11:44) (93% - 100%)    Parameters below as of 07 Jun 2025 11:44  Patient On (Oxygen Delivery Method): nasal cannula  O2 Flow (L/min): 3                          11.8   12.48 )-----------( 228      ( 07 Jun 2025 04:23 )             37.7     06-07    135  |  96  |  49[H]  ----------------------------<  148[H]  6.1[H]   |  21[L]  |  1.53[H]    Ca    9.5      07 Jun 2025 09:09    TPro  7.6  /  Alb  4.2  /  TBili  0.4  /  DBili  x   /  AST  19  /  ALT  10  /  AlkPhos  77  06-06 06-06-25 @ 07:01  -  06-07-25 @ 07:00  --------------------------------------------------------  IN: 0 mL / OUT: 150 mL / NET: -150 mL        PHYSICAL EXAM:  GENERAL: NAD, well-groomed, well-developed  HEENT: NC/AT  CHEST/LUNG: Breathing even, unlabored  HEART: Regular rate and rhythm  ABDOMEN: Soft, nondistended.  EXTREMITIES: good distal pulses b/l   NEURO:  No focal deficits    Assessment and Recommendation:   · Assessment	  89-year-old female history of A-fib, no longer on AC s/p watchman, Pulm HTN, HTN, presents with 1 day of abdominal pain and nausea. . Vascular surgery consulted given CT findings of L renal infraction.     Recommendations :  - mesenteric duplex  - Nephrology consult for L renal infarction  - trend Cr  - recommend L renal duplex  - therapeutic AC if able  - cardiology following  - TTE     Vascular Surgery   [phone]  GENERAL SURGERY PROGRESS NOTE    NAN GAYLE  |  [id number]      S: MARY. pending work-up for poss renal artery stent    O:   Vital Signs Last 24 Hrs  T(C): 36.7 (07 Jun 2025 11:44), Max: 36.9 (07 Jun 2025 04:17)  T(F): 98 (07 Jun 2025 11:44), Max: 98.4 (07 Jun 2025 04:17)  HR: 60 (07 Jun 2025 11:44) (60 - 120)  BP: 138/53 (07 Jun 2025 11:44) (138/53 - 164/65)  BP(mean): 95 (06 Jun 2025 19:16) (94 - 95)  RR: 18 (07 Jun 2025 11:44) (18 - 20)  SpO2: 98% (07 Jun 2025 11:44) (93% - 100%)    Parameters below as of 07 Jun 2025 11:44  Patient On (Oxygen Delivery Method): nasal cannula  O2 Flow (L/min): 3                          11.8   12.48 )-----------( 228      ( 07 Jun 2025 04:23 )             37.7     06-07    135  |  96  |  49[H]  ----------------------------<  148[H]  6.1[H]   |  21[L]  |  1.53[H]    Ca    9.5      07 Jun 2025 09:09    TPro  7.6  /  Alb  4.2  /  TBili  0.4  /  DBili  x   /  AST  19  /  ALT  10  /  AlkPhos  77  06-06 06-06-25 @ 07:01  -  06-07-25 @ 07:00  --------------------------------------------------------  IN: 0 mL / OUT: 150 mL / NET: -150 mL        PHYSICAL EXAM:  GENERAL: NAD, well-groomed, well-developed  HEENT: NC/AT  CHEST/LUNG: Breathing even, unlabored  HEART: Regular rate and rhythm  ABDOMEN: Soft, nondistended.  EXTREMITIES: good distal pulses b/l   NEURO:  No focal deficits    Assessment and Recommendation:   · Assessment	  89-year-old female history of A-fib, no longer on AC s/p watchman, Pulm HTN, HTN, presents with 1 day of abdominal pain and nausea. . Vascular surgery consulted given CT findings of L renal infraction.     Recommendations :  - mesenteric duplex  - Nephrology consult for L renal infarction  - trend Cr  - recommend L renal duplex  - therapeutic AC if able  - cardiology following  - TTE     Vascular Surgery   [phone]

## 2025-06-07 NOTE — PATIENT PROFILE ADULT - FALL HARM RISK - HARM RISK INTERVENTIONS

## 2025-06-07 NOTE — H&P ADULT - ASSESSMENT
89F, PMHx A-fib status post watchman's, pulmonary hypertension, HTN, presenting to ED for abdominal pain, nausea, vomiting.  Patient states that she was eating breakfast this morning, in her normal state of health, which shortly after breakfast she developed nausea and vomiting.  Patient states she vomited her entire breakfast, no other resident/other individual who ate the breakfast had similar symptoms.  Endorsing continued distention, and feeling of fullness.  Patient states that she has been unable to pass stool or flatus since that time.  She does not endorse fevers.  Patient had diarrhea 2 days ago, which resolved with Imodium.  pt is well known to me with hx of R heart failure, a.fib, s/p watchman procedure who was recently dc ed from the hospital post uro sepsis with nausea / vomiting and abdomonal pain  ct scan with severe atherosclerosis renal arteries with l renal infarct  vascular noted  continue iv heparin  will adjust cardiac meds with diuretics sec to l renal infarct  renal eval  continue bp meds, no ace/arb

## 2025-06-08 DIAGNOSIS — N17.9 ACUTE KIDNEY FAILURE, UNSPECIFIED: ICD-10-CM

## 2025-06-08 DIAGNOSIS — E87.70 FLUID OVERLOAD, UNSPECIFIED: ICD-10-CM

## 2025-06-08 DIAGNOSIS — I70.1 ATHEROSCLEROSIS OF RENAL ARTERY: ICD-10-CM

## 2025-06-08 DIAGNOSIS — E87.5 HYPERKALEMIA: ICD-10-CM

## 2025-06-08 LAB
ANION GAP SERPL CALC-SCNC: 17 MMOL/L — SIGNIFICANT CHANGE UP (ref 5–17)
APTT BLD: 42.5 SEC — HIGH (ref 26.1–36.8)
BUN SERPL-MCNC: 69 MG/DL — HIGH (ref 7–23)
CALCIUM SERPL-MCNC: 9.3 MG/DL — SIGNIFICANT CHANGE UP (ref 8.4–10.5)
CHLORIDE SERPL-SCNC: 97 MMOL/L — SIGNIFICANT CHANGE UP (ref 96–108)
CO2 SERPL-SCNC: 22 MMOL/L — SIGNIFICANT CHANGE UP (ref 22–31)
CREAT SERPL-MCNC: 2.59 MG/DL — HIGH (ref 0.5–1.3)
CULTURE RESULTS: SIGNIFICANT CHANGE UP
EGFR: 17 ML/MIN/1.73M2 — LOW
EGFR: 17 ML/MIN/1.73M2 — LOW
GLUCOSE SERPL-MCNC: 109 MG/DL — HIGH (ref 70–99)
HCT VFR BLD CALC: 34.7 % — SIGNIFICANT CHANGE UP (ref 34.5–45)
HGB BLD-MCNC: 11.1 G/DL — LOW (ref 11.5–15.5)
MAGNESIUM SERPL-MCNC: 2.4 MG/DL — SIGNIFICANT CHANGE UP (ref 1.6–2.6)
MCHC RBC-ENTMCNC: 32 G/DL — SIGNIFICANT CHANGE UP (ref 32–36)
MCHC RBC-ENTMCNC: 32.5 PG — SIGNIFICANT CHANGE UP (ref 27–34)
MCV RBC AUTO: 101.5 FL — HIGH (ref 80–100)
NRBC BLD AUTO-RTO: 0 /100 WBCS — SIGNIFICANT CHANGE UP (ref 0–0)
PHOSPHATE SERPL-MCNC: 6.1 MG/DL — HIGH (ref 2.5–4.5)
PLATELET # BLD AUTO: 189 K/UL — SIGNIFICANT CHANGE UP (ref 150–400)
POTASSIUM SERPL-MCNC: 5.9 MMOL/L — HIGH (ref 3.5–5.3)
POTASSIUM SERPL-MCNC: 6.3 MMOL/L — CRITICAL HIGH (ref 3.5–5.3)
POTASSIUM SERPL-SCNC: 5.9 MMOL/L — HIGH (ref 3.5–5.3)
POTASSIUM SERPL-SCNC: 6.3 MMOL/L — CRITICAL HIGH (ref 3.5–5.3)
RBC # BLD: 3.42 M/UL — LOW (ref 3.8–5.2)
RBC # FLD: 14.6 % — HIGH (ref 10.3–14.5)
SODIUM SERPL-SCNC: 136 MMOL/L — SIGNIFICANT CHANGE UP (ref 135–145)
SPECIMEN SOURCE: SIGNIFICANT CHANGE UP
WBC # BLD: 27.8 K/UL — HIGH (ref 3.8–10.5)
WBC # FLD AUTO: 27.8 K/UL — HIGH (ref 3.8–10.5)

## 2025-06-08 PROCEDURE — 93010 ELECTROCARDIOGRAM REPORT: CPT

## 2025-06-08 PROCEDURE — 99223 1ST HOSP IP/OBS HIGH 75: CPT | Mod: GC

## 2025-06-08 PROCEDURE — 99232 SBSQ HOSP IP/OBS MODERATE 35: CPT

## 2025-06-08 RX ORDER — DEXTROSE 50 % IN WATER 50 %
25 SYRINGE (ML) INTRAVENOUS ONCE
Refills: 0 | Status: DISCONTINUED | OUTPATIENT
Start: 2025-06-08 | End: 2025-06-11

## 2025-06-08 RX ORDER — BUMETANIDE 1 MG/1
2 TABLET ORAL ONCE
Refills: 0 | Status: DISCONTINUED | OUTPATIENT
Start: 2025-06-08 | End: 2025-06-08

## 2025-06-08 RX ORDER — DEXTROSE 50 % IN WATER 50 %
12.5 SYRINGE (ML) INTRAVENOUS ONCE
Refills: 0 | Status: DISCONTINUED | OUTPATIENT
Start: 2025-06-08 | End: 2025-06-11

## 2025-06-08 RX ORDER — SODIUM ZIRCONIUM CYCLOSILICATE 5 G/5G
10 POWDER, FOR SUSPENSION ORAL ONCE
Refills: 0 | Status: COMPLETED | OUTPATIENT
Start: 2025-06-08 | End: 2025-06-08

## 2025-06-08 RX ORDER — SODIUM CHLORIDE 9 G/1000ML
1000 INJECTION, SOLUTION INTRAVENOUS
Refills: 0 | Status: DISCONTINUED | OUTPATIENT
Start: 2025-06-08 | End: 2025-06-11

## 2025-06-08 RX ORDER — DEXTROSE 50 % IN WATER 50 %
50 SYRINGE (ML) INTRAVENOUS ONCE
Refills: 0 | Status: COMPLETED | OUTPATIENT
Start: 2025-06-08 | End: 2025-06-08

## 2025-06-08 RX ORDER — BUMETANIDE 1 MG/1
2 TABLET ORAL ONCE
Refills: 0 | Status: COMPLETED | OUTPATIENT
Start: 2025-06-08 | End: 2025-06-08

## 2025-06-08 RX ORDER — METOPROLOL SUCCINATE 50 MG/1
5 TABLET, EXTENDED RELEASE ORAL ONCE
Refills: 0 | Status: COMPLETED | OUTPATIENT
Start: 2025-06-08 | End: 2025-06-08

## 2025-06-08 RX ORDER — ONDANSETRON HCL/PF 4 MG/2 ML
4 VIAL (ML) INJECTION ONCE
Refills: 0 | Status: COMPLETED | OUTPATIENT
Start: 2025-06-08 | End: 2025-06-08

## 2025-06-08 RX ADMIN — Medication 0.1 MILLIGRAM(S): at 06:00

## 2025-06-08 RX ADMIN — Medication 4 MILLIGRAM(S): at 03:21

## 2025-06-08 RX ADMIN — Medication 81 MILLIGRAM(S): at 17:47

## 2025-06-08 RX ADMIN — AMLODIPINE BESYLATE 10 MILLIGRAM(S): 10 TABLET ORAL at 06:00

## 2025-06-08 RX ADMIN — HEPARIN SODIUM 1000 UNIT(S)/HR: 1000 INJECTION INTRAVENOUS; SUBCUTANEOUS at 08:20

## 2025-06-08 RX ADMIN — CEFTRIAXONE 100 MILLIGRAM(S): 500 INJECTION, POWDER, FOR SOLUTION INTRAMUSCULAR; INTRAVENOUS at 17:47

## 2025-06-08 RX ADMIN — HEPARIN SODIUM 1200 UNIT(S)/HR: 1000 INJECTION INTRAVENOUS; SUBCUTANEOUS at 20:14

## 2025-06-08 RX ADMIN — METOPROLOL SUCCINATE 5 MILLIGRAM(S): 50 TABLET, EXTENDED RELEASE ORAL at 03:21

## 2025-06-08 RX ADMIN — SODIUM ZIRCONIUM CYCLOSILICATE 10 GRAM(S): 5 POWDER, FOR SUSPENSION ORAL at 17:47

## 2025-06-08 RX ADMIN — BUMETANIDE 2 MILLIGRAM(S): 1 TABLET ORAL at 18:59

## 2025-06-08 RX ADMIN — HEPARIN SODIUM 3000 UNIT(S): 1000 INJECTION INTRAVENOUS; SUBCUTANEOUS at 20:34

## 2025-06-08 RX ADMIN — METOPROLOL SUCCINATE 50 MILLIGRAM(S): 50 TABLET, EXTENDED RELEASE ORAL at 06:00

## 2025-06-08 RX ADMIN — Medication 0.1 MILLIGRAM(S): at 17:47

## 2025-06-08 NOTE — PROVIDER CONTACT NOTE (OTHER) - SITUATION
Pt is sustaining 120-140s BPM in A. fib. At times pt HR is reaching 160 BPM. Pt is asymptomatic. Pt just states that she feels very nauseas.

## 2025-06-08 NOTE — PROGRESS NOTE ADULT - ASSESSMENT
89-year-old female history of A-fib, no longer on AC s/p watchman, Pulm HTN, HTN, presents with 1 day of abdominal pain and nausea. . Vascular surgery consulted given CT findings of L renal infraction.     Recommendations :  - mesenteric duplex  - Apprec Nephrology recs   - trend Cr  - L renal duplex - Right renal artery stenosis at the origin.  Left renal artery could not be seen. Decreased vascularity throughout the left kidney. Left renal artery could be completely occluded versus very small.  - therapeutic AC if able  - cardiology following  - TTE     Vascular Surgery   [phone]

## 2025-06-08 NOTE — PROVIDER CONTACT NOTE (CRITICAL VALUE NOTIFICATION) - ACTION/TREATMENT ORDERED:
Provider stated to follow the heparin nomogram, there is no further intervention or orders at this time.

## 2025-06-08 NOTE — CONSULT NOTE ADULT - ATTENDING COMMENTS
Vascular surgery attending covering for Dr. Jenkins.    Patient with afib s/p Watchman procedure, pulm HTN, CHF presenting with one day history of nausea, vomiting, abdominal distension.   Patient denies any recent flank or abd pain.   On exam, patient appears mildly uncomfortable.   abd soft, distended, no significant tenderness.   CT imaging reviewed. Significant atherosclerotic disease. Left kidney renal infarct.   On arrival, patient with normal Cr.   Unclear timing of left renal infarct with normal creatinine. With unclear ischemia time and no significant complaints consistent with renal infarct, no benefit from revasc left renal artery. Plan was for continued observation on hep gtt.   Today, elevated creatinine with hyperkalemia.   Patient also with significant right renal artery stenosis seen on CT scan and on my review of renal duplex.   Continued abdominal discomfort with nausea and vomiting, which again does not strongly suggest only a renal infarct event.   Please check lactate, mesenteric duplex.  Nephrology evaluation. Trend Cr.   Repeat echo.   Team to discuss findings with patient and consideration for possible angiogram, right renal artery revasc pending above.
called for nola and hyperkalemia  awaiting labs for today  #nola in the setting of contrast 6/6 MALINI +  hypervolemia  cr uptrended as of yesterday  awaiting labs today  #hyperkalemia  awaiting k today  will need lokelma + other measures if elevated  will need ekg if elevated today  diuretics  #hypervolemia  start diuretics as above  #left renal infarct   CT with Severe atherosclerotic disease with narrowing of the bilateral renal artery origins and poor opacification of the left renal artery. 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  follow up with vascular surgery  monitor for now

## 2025-06-08 NOTE — PROVIDER CONTACT NOTE (OTHER) - BACKGROUND
89F, PMHx A-fib status post watchman's, pulmonary hypertension, HTN, presenting to ED for abdominal pain, nausea, vomiting.
Pt is admitted for abdominal pain w/n/v and L renal infarction

## 2025-06-08 NOTE — PROVIDER CONTACT NOTE (OTHER) - ACTION/TREATMENT ORDERED:
Provider aware. Provider prescribed 5mg Lopressor, IV push, and zofran IV push. Provider at bedside when both medications were prescribed. HR is now 100s-110s.
Provider notified. Provider made aware of the situation.

## 2025-06-08 NOTE — PROGRESS NOTE ADULT - SUBJECTIVE AND OBJECTIVE BOX
---___---___---___---___---___---___ ---___---___---___---___---___---___---___---___---                  M E D I C A L   A T T E N D I N G   cem note  ---___---___---___---___---___---___ ---___---___---___---___---___---___---___---___---         ++CHIEF COMPLAINT:   Patient is a 89y old  Female who presents with a chief complaint of     ++HPI:  HPI:  Date of Service, 25 @ 06:46  CHIEF COMPLAINT:Patient is a 89y old  Female who presents with a chief complaint of     HPI:  89F, PMHx A-fib status post watchman's, pulmonary hypertension, HTN, presenting to ED for abdominal pain, nausea, vomiting.  Patient states that she was eating breakfast this morning, in her normal state of health, which shortly after breakfast she developed nausea and vomiting.  Patient states she vomited her entire breakfast, no other resident/other individual who ate the breakfast had similar symptoms.  Endorsing continued distention, and feeling of fullness.  Patient states that she has been unable to pass stool or flatus since that time.  She does not endorse fevers.  Patient had diarrhea 2 days ago, which resolved with Imodium.      PAST MEDICAL & SURGICAL HISTORY:  Afib  HTN (hypertension)  H/O CHF  Uterine cancer  S/P hysterectomy      MEDICATIONS  (STANDING):  heparin  Infusion.  Unit(s)/Hr (15 mL/Hr) IV Continuous <Continuous>    MEDICATIONS  (PRN):  heparin   Injectable 6500 Unit(s) IV Push every 6 hours PRN For aPTT less than 40  heparin   Injectable 3000 Unit(s) IV Push every 6 hours PRN For aPTT between 40 - 57      FAMILY HISTORY:      SOCIAL HISTORY:    [x ] Non-smoker  [ ] Smoker  [ ] Alcohol    Allergies    hydrALAZINE (Other; Flushing)  Lipitor (Other; Flushing)    Intolerances    lactose (Unknown)  	    REVIEW OF SYSTEMS:  CONSTITUTIONAL: No fever, weight loss, or fatigue  EYES: No eye pain, visual disturbances, or discharge  ENT:  No difficulty hearing, tinnitus, vertigo; No sinus or throat pain  NECK: No pain or stiffness  RESPIRATORY: No cough, wheezing, chills or hemoptysis; No Shortness of Breath  CARDIOVASCULAR: No chest pain, palpitations, passing out, dizziness, or leg swelling  GASTROINTESTINAL: No abdominal or epigastric pain. + nausea, vomiting, or hematemesis; No diarrhea or constipation. No melena or hematochezia.  GENITOURINARY: No dysuria, frequency, hematuria, or incontinence  NEUROLOGICAL: No headaches, memory loss, loss of strength, numbness, or tremors  SKIN: No itching, burning, rashes, or lesions   LYMPH Nodes: No enlarged glands  ENDOCRINE: No heat or cold intolerance; No hair loss  MUSCULOSKELETAL: No joint pain or swelling; No muscle, back, or extremity pain  PSYCHIATRIC: No depression, anxiety, mood swings, or difficulty sleeping  HEME/LYMPH: No easy bruising, or bleeding gums  ALLERGY AND IMMUNOLOGIC: No hives or eczema	    x[ ] All others negative	  [ ] Unable to obtain    PHYSICAL EXAM:  T(C): 36.9 (25 @ 04:17), Max: 36.9 (25 @ 04:17)  HR: 106 (25 @ 04:44) (71 - 120)  BP: 151/82 (25 @ 04:17) (132/60 - 164/65)  RR: 18 (25 @ 04:44) (18 - 20)  SpO2: 100% (25 @ 04:44) (93% - 100%)  Wt(kg): --  I&O's Summary    2025 07:01  -  2025 06:46  --------------------------------------------------------  IN: 0 mL / OUT: 150 mL / NET: -150 mL        Appearance: Normal	  HEENT:   Normal oral mucosa, PERRL, EOMI	  Lymphatic: No lymphadenopathy  Cardiovascular: Normal S1 S2, No JVD, N+murmurs, No edema  Respiratory:rhonchi  Psychiatry: A & O x 3, Mood & affect appropriate  Gastrointestinal:  Soft, Non-tender, + BS	  Skin: No rashes, No ecchymoses, No cyanosis	  Neurologic: Non-focal  Extremities: Normal range of motion, No clubbing, cyanosis or edema  Vascular: Peripheral pulses palpable 2+ bilaterally    TELEMETRY: 	    ECG:  	  RADIOLOGY:  OTHER: 	  	  LABS:	 	    CARDIAC MARKERS:                              11.8   12.48 )-----------( 228      ( 2025 04:23 )             37.7         137  |  97  |  44[H]  ----------------------------<  136[H]  5.4[H]   |  25  |  0.96    Ca    9.4      2025 14:05    TPro  7.6  /  Alb  4.2  /  TBili  0.4  /  DBili  x   /  AST  19  /  ALT  10  /  AlkPhos  77      proBNP:   Lipid Profile:   HgA1c:   TSH:   PT/INR - ( 2025 18:57 )   PT: 21.9 sec;   INR: 1.93 ratio         PTT - ( 2025 04:23 )  PTT:90.2 sec    PREVIOUS DIAGNOSTIC TESTING:      · EKG Date/Time: 2025 14:25  · Rate: 83  · Interpretation: abnormal  · Abnormal Rhythm: atrial fibrillation  · Acute or Evolving MI?: no  · Axis: Normal  · QRS: 76  · QTC: 437    < from: Xray Chest 1 View AP/PA (25 @ 15:24) >  Single frontal view of the chest demonstrates the lungs to be clear. The   cardiomediastinal silhouette is enlarged. Left atrial occlusion device.   Loop recorder.. No acute osseous abnormalities.      IMPRESSION: No acute cardiopulmonary disease process.    < from: CT Abdomen and Pelvis w/ IV Cont (25 @ 18:12) >  Nonenhancing left kidney which is symmetric in size to the contralateral   side and demonstrated normal enhancement in  study.    Severe atherosclerotic disease with narrowing of the bilateral renal   artery origins and poor opacification of the left renal artery. 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    Cardiomegaly and small bilateral pleural effusions           (2025 06:45)      ---___---___---___---___---___---  PAST MEDICAL / Surgical  HISTORY:  PAST MEDICAL & SURGICAL HISTORY:  Afib      HTN (hypertension)      H/O CHF      Uterine cancer      S/P hysterectomy          ---___---___---___---___---___---  FAMILY HISTORY:   FAMILY HISTORY:      ---___---___---___---___---___---  SOCIAL HISTORY:  Alcohol: None  Smoking: None    ---___---___---___---___---___---  ALLERGIES:   Allergies    hydrALAZINE (Other; Flushing)  Lipitor (Other; Flushing)    Intolerances    lactose (Unknown)      ---___---___---___---___---___---  MEDICATIONS:  MEDICATIONS  (STANDING):  amLODIPine   Tablet 10 milliGRAM(s) Oral daily  aspirin enteric coated 81 milliGRAM(s) Oral daily  cefTRIAXone   IVPB 1000 milliGRAM(s) IV Intermittent every 24 hours  cloNIDine 0.1 milliGRAM(s) Oral two times a day  heparin  Infusion.  Unit(s)/Hr (15 mL/Hr) IV Continuous <Continuous>  metoprolol succinate ER 50 milliGRAM(s) Oral daily    MEDICATIONS  (PRN):  heparin   Injectable 6500 Unit(s) IV Push every 6 hours PRN For aPTT less than 40  heparin   Injectable 3000 Unit(s) IV Push every 6 hours PRN For aPTT between 40 - 57      ---___---___---___---___---___---  REVIEW OF SYSTEM:    GEN: no fever, no chills, no pain  RESP: no SOB, no cough, no sputum  CVS: no chest pain, no palpitations, no edema  GI: no abdominal pain, no nausea, no vomiting, no constipation, no diarrhea  : no dysurea, no frequency, no hematurea  Neuro: no headache, no dizziness  PSYCH: no anxiety, no depression  Derm : no itching, no rash    ---___---___---___---___---___---  VITAL SIGNS:  89y , CAPILLARY BLOOD GLUCOSE          ---___---___---___---___---___---  PHYSICAL EXAM:    GEN: A&O X 3 , NAD , comfortable  HEENT: NCAT, PERRL, MMM, hearing intact  Neck: supple , no JVD  CVS: S1S2 , regular , No M/R/G appreciated  PULM: CTA B/L,  no W/R/R appreciated  ABD.: soft. non tender, non distended,  bowel sounds present  Extrem: intact pulses , no edema   Derm: No rash , no ecchymoses  PSYCH : normal mood,  no delusion not anxious     ---___---___---___---___---___---            LAB AND IMAGIN.0   18.47 )-----------( 239      ( 2025 13:21 )             37.4               06-07    135  |  97  |  52[H]  ----------------------------<  138[H]  5.9[H]   |  21[L]  |  1.72[H]    Ca    9.5      2025 13:21  Phos  5.5     06-07  Mg     2.4     06-07    TPro  7.6  /  Alb  4.2  /  TBili  0.4  /  DBili  x   /  AST  19  /  ALT  10  /  AlkPhos  77  06-06    PT/INR - ( 2025 18:57 )   PT: 21.9 sec;   INR: 1.93 ratio         PTT - ( 2025 21:32 )  PTT:125.0 sec                        Urinalysis Basic - ( 2025 13:21 )    Color: x / Appearance: x / SG: x / pH: x  Gluc: 138 mg/dL / Ketone: x  / Bili: x / Urobili: x   Blood: x / Protein: x / Nitrite: x   Leuk Esterase: x / RBC: x / WBC x   Sq Epi: x / Non Sq Epi: x / Bacteria: x        [All pertinent / recent Imaging reviewed]         ---___---___---___---___---___---___ ---___---___---___---___---                         A S S E S S M E N T   A N D   P L A N :      HEALTH ISSUES - PROBLEM Dx:  possible left renal infarct   get nephrology consult   cardiology following   appreciate vascular consult   on heparin drip        ___________________________  Will follow with you.  Thank you,  Porter Keller  [phone]

## 2025-06-08 NOTE — PROVIDER CONTACT NOTE (CRITICAL VALUE NOTIFICATION) - SITUATION
Pt is on heparin drip. Heparin nomogram stated to decrease the rate from 12 to 10mL/hr based on the aPTT value.

## 2025-06-08 NOTE — PROVIDER CONTACT NOTE (CRITICAL VALUE NOTIFICATION) - ASSESSMENT
Pt is A&Ox4, forgetful at times. Pt denies chest pain, denies SOB and denies palpitations. There are no visible signs of bleeding.

## 2025-06-08 NOTE — PROVIDER CONTACT NOTE (OTHER) - ASSESSMENT
Pt is A&Ox4, forgetful at times. Pt denies chest pain, denies SOB and denies palpitations. All VSS. Pt /75mmHg,  BPM, Temp 98.8 F, O2 95%.
Pt AOx4, on 2L NC. Pt denies cp, dizziness, or sob

## 2025-06-08 NOTE — CONSULT NOTE ADULT - PROBLEM SELECTOR RECOMMENDATION 4
Vascular following  r/o chronic mesenteric ischemia   Possibly embolic due to Afib and not on AC  Consider hypercoag w/u

## 2025-06-08 NOTE — PROGRESS NOTE ADULT - SUBJECTIVE AND OBJECTIVE BOX
Date of Service: 06-08-25 @ 10:55           CARDIOLOGY     PROGRESS  NOTE   ________________________________________________  CHIEF COMPLAINT:Patient is a 89y old  Female who presents with a chief complaint of   comfortable  	  REVIEW OF SYSTEMS:  CONSTITUTIONAL: No fever, weight loss, or fatigue  EYES: No eye pain, visual disturbances, or discharge  ENT:  No difficulty hearing, tinnitus, vertigo; No sinus or throat pain  NECK: No pain or stiffness  RESPIRATORY: No cough, wheezing, chills or hemoptysis; No Shortness of Breath  CARDIOVASCULAR: No chest pain, palpitations, passing out, dizziness, or leg swelling  GASTROINTESTINAL: No abdominal or epigastric pain. No nausea, vomiting, or hematemesis; No diarrhea or constipation. No melena or hematochezia.  GENITOURINARY: No dysuria, frequency, hematuria, or incontinence  NEUROLOGICAL: No headaches, memory loss, loss of strength, numbness, or tremors  SKIN: No itching, burning, rashes, or lesions   LYMPH Nodes: No enlarged glands  ENDOCRINE: No heat or cold intolerance; No hair loss  MUSCULOSKELETAL: No joint pain or swelling; No muscle, back, or extremity pain  PSYCHIATRIC: No depression, anxiety, mood swings, or difficulty sleeping  HEME/LYMPH: No easy bruising, or bleeding gums  ALLERGY AND IMMUNOLOGIC: No hives or eczema	    [x ] All others negative	  [ ] Unable to obtain    PHYSICAL EXAM:  T(C): 36.7 (06-08-25 @ 04:33), Max: 36.8 (06-07-25 @ 19:05)  HR: 120 (06-08-25 @ 04:33) (60 - 120)  BP: 140/83 (06-08-25 @ 04:33) (135/57 - 156/86)  RR: 18 (06-08-25 @ 04:33) (18 - 18)  SpO2: 97% (06-08-25 @ 04:33) (97% - 98%)  Wt(kg): --  I&O's Summary      Appearance: Normal	  HEENT:   Normal oral mucosa, PERRL, EOMI	  Lymphatic: No lymphadenopathy  Cardiovascular: Normal S1 S2, No JVD, + murmurs, No edema  Respiratory:rhonchi  Gastrointestinal:  Soft, Non-tender, + BS	  Skin: No rashes, No ecchymoses, No cyanosis	  Extremities: Normal range of motion, No clubbing, cyanosis or edema  Vascular: Peripheral pulses palpable 2+ bilaterally    MEDICATIONS  (STANDING):  amLODIPine   Tablet 10 milliGRAM(s) Oral daily  aspirin enteric coated 81 milliGRAM(s) Oral daily  cefTRIAXone   IVPB 1000 milliGRAM(s) IV Intermittent every 24 hours  cloNIDine 0.1 milliGRAM(s) Oral two times a day  heparin  Infusion.  Unit(s)/Hr (15 mL/Hr) IV Continuous <Continuous>  metoprolol succinate ER 50 milliGRAM(s) Oral daily      TELEMETRY: 	    ECG:  	  RADIOLOGY:  OTHER: 	  	  LABS:	 	    CARDIAC MARKERS:                            12.0   18.47 )-----------( 239      ( 07 Jun 2025 13:21 )             37.4     06-07    135  |  97  |  52[H]  ----------------------------<  138[H]  5.9[H]   |  21[L]  |  1.72[H]    Ca    9.5      07 Jun 2025 13:21  Phos  5.5     06-07  Mg     2.4     06-07    TPro  7.6  /  Alb  4.2  /  TBili  0.4  /  DBili  x   /  AST  19  /  ALT  10  /  AlkPhos  77  06-06    proBNP:   Lipid Profile:   HgA1c:   TSH:   PT/INR - ( 06 Jun 2025 18:57 )   PT: 21.9 sec;   INR: 1.93 ratio         PTT - ( 07 Jun 2025 21:32 )  PTT:125.0 sec      - mesenteric duplex  - Nephrology consult for L renal infarction  - trend Cr  - recommend L renal duplex  - therapeutic AC if able  - cardiology following  - TTE         Assessment and plan  ---------------------------  89F, PMHx A-fib status post watchman's, pulmonary hypertension, HTN, presenting to ED for abdominal pain, nausea, vomiting.  Patient states that she was eating breakfast this morning, in her normal state of health, which shortly after breakfast she developed nausea and vomiting.  Patient states she vomited her entire breakfast, no other resident/other individual who ate the breakfast had similar symptoms.  Endorsing continued distention, and feeling of fullness.  Patient states that she has been unable to pass stool or flatus since that time.  She does not endorse fevers.  Patient had diarrhea 2 days ago, which resolved with Imodium.  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

## 2025-06-08 NOTE — CONSULT NOTE ADULT - SUBJECTIVE AND OBJECTIVE BOX
[facility] DIVISION OF KIDNEY DISEASES AND HYPERTENSION -- INITIAL CONSULT NOTE  --------------------------------------------------------------------------------  HPI: 89F PMHx A-fib status post watchman's, pulmonary hypertension, HTN, presenting to ED for abdominal pain, nausea, vomiting.  Nephrology consulted for TURNER.    Pt seen and examined at bedside. Patient states that after she ate breakfast she developed nausea and vomiting. Reports abdominal bloating and constipation. Prior to that, had diarrhea that resolved with Imodium. Pt also reports increased LE edema and orthopnea.    Upon presentation to the ED, BP stable, afebrile, placed on 3L O2. CXR with trace B/L pleural effusions. WBC count rising ~12.5 to ~18.5. Lactate wnl. Cr 0.96 on admission and rising to 1.7. Serum potassium 6.1. Had CT A/P with IV contrast on 6/6/25 that showed nonenhancing left kidney which is symmetric in size to the contralateral side and demonstrated normal enhancement in 2024 study, Severe atherosclerotic disease with narrowing of the bilateral renal artery origins and poor opacification of the left renal artery. Findings are concerning for left renal infarction secondary to vascular compromise. Pt started on Heparin gtt.      PAST HISTORY  --------------------------------------------------------------------------------  PAST MEDICAL & SURGICAL HISTORY:  Afib      HTN (hypertension)      H/O CHF      Uterine cancer      S/P hysterectomy        FAMILY HISTORY:    PAST SOCIAL HISTORY:    ALLERGIES & MEDICATIONS  --------------------------------------------------------------------------------  Allergies    hydrALAZINE (Other; Flushing)  Lipitor (Other; Flushing)    Intolerances    lactose (Unknown)    Standing Inpatient Medications  amLODIPine   Tablet 10 milliGRAM(s) Oral daily  aspirin enteric coated 81 milliGRAM(s) Oral daily  cefTRIAXone   IVPB 1000 milliGRAM(s) IV Intermittent every 24 hours  cloNIDine 0.1 milliGRAM(s) Oral two times a day  heparin  Infusion.  Unit(s)/Hr IV Continuous <Continuous>  metoprolol succinate ER 50 milliGRAM(s) Oral daily    PRN Inpatient Medications  heparin   Injectable 6500 Unit(s) IV Push every 6 hours PRN  heparin   Injectable 3000 Unit(s) IV Push every 6 hours PRN      REVIEW OF SYSTEMS  --------------------------------------------------------------------------------  Gen: No fever  Respiratory: +orthopnea  CV: No chest pain  GI: No diarrhea, nausea, or vomiting  : No dysuria or hematuria  Skin: No rashes  MSK: +LE edema  Neuro: No dizziness/lightheadedness    All other systems were reviewed and are negative, except as noted.    VITALS/PHYSICAL EXAM  --------------------------------------------------------------------------------  T(C): 36.5 (06-08-25 @ 11:46), Max: 36.8 (06-07-25 @ 19:05)  HR: 100 (06-08-25 @ 11:46) (100 - 120)  BP: 132/72 (06-08-25 @ 11:46) (132/72 - 156/86)  RR: 18 (06-08-25 @ 11:46) (18 - 18)  SpO2: 94% (06-08-25 @ 11:46) (94% - 98%)  Wt(kg): --  Height (cm): 160 (06-06-25 @ 13:23)  Weight (kg): 82.7 (06-06-25 @ 19:16)  BMI (kg/m2): 32.3 (06-06-25 @ 19:16)  BSA (m2): 1.86 (06-06-25 @ 19:16)      PHYSICAL EXAM:  Gen: NAD  Neuro: non-focal  HEENT: +NC  Pulm: diminished breath sounds  CV: +S1S2  Abd: distended, tense, non-tender  Extremities: +B/L LE edema  Back: no CVA tenderness  Skin: Warm    LABS/STUDIES  --------------------------------------------------------------------------------              12.0   18.47 >-----------<  239      [06-07-25 @ 13:21]              37.4     135  |  97  |  52  ----------------------------<  138      [06-07-25 @ 13:21]  5.9   |  21  |  1.72        Ca     9.5     [06-07-25 @ 13:21]      Mg     2.4     [06-07-25 @ 13:21]      Phos  5.5     [06-07-25 @ 13:21]    TPro  7.6  /  Alb  4.2  /  TBili  0.4  /  DBili  x   /  AST  19  /  ALT  10  /  AlkPhos  77  [06-06-25 @ 14:05]    PT/INR: PT 21.9 , INR 1.93       [06-06-25 @ 18:57]  PTT: 125.0      [06-07-25 @ 21:32]      Creatinine Trend:  SCr 1.72 [06-07 @ 13:21]  SCr 1.53 [06-07 @ 09:09]  SCr 0.96 [06-06 @ 14:05]      TSH 0.42      [03-09-25 @ 06:57]  Lipid: chol 180, , HDL 45, LDL --      [12-17-24 @ 07:14]

## 2025-06-08 NOTE — PROGRESS NOTE ADULT - SUBJECTIVE AND OBJECTIVE BOX
Vascular Surgery Daily Progress Note  =====================================================    SUBJECTIVE: Patient seen and examined at bedside on AM rounds. The patient is resting comfortably in bed, pain is well controlled. Tolerating diet. Low UO output. Encouraged OOBTC. Denies fever, chills, nausea, vomiting, diarrhea, chest pain, and SOB.     PMH:   PAST MEDICAL & SURGICAL HISTORY:  Afib      HTN (hypertension)      H/O CHF      Uterine cancer      S/P hysterectomy          ALLERGIES:  lactose (Unknown)  hydrALAZINE (Other; Flushing)  Lipitor (Other; Flushing)      --------------------------------------------------------------------------------------    MEDICATIONS:    Neurologic Medications    Respiratory Medications    Cardiovascular Medications  amLODIPine   Tablet 10 milliGRAM(s) Oral daily  cloNIDine 0.1 milliGRAM(s) Oral two times a day  metoprolol succinate ER 50 milliGRAM(s) Oral daily    Gastrointestinal Medications    Genitourinary Medications    Hematologic/Oncologic Medications  aspirin enteric coated 81 milliGRAM(s) Oral daily  heparin   Injectable 6500 Unit(s) IV Push every 6 hours PRN For aPTT less than 40  heparin   Injectable 3000 Unit(s) IV Push every 6 hours PRN For aPTT between 40 - 57  heparin  Infusion.  Unit(s)/Hr IV Continuous <Continuous>    Antimicrobial/Immunologic Medications  cefTRIAXone   IVPB 1000 milliGRAM(s) IV Intermittent every 24 hours    Endocrine/Metabolic Medications    Topical/Other Medications    --------------------------------------------------------------------------------------    VITAL SIGNS:    T(C): 36.5 (06-08-25 @ 11:46), Max: 36.7 (06-08-25 @ 04:33)  HR: 100 (06-08-25 @ 11:46) (100 - 120)  BP: 132/72 (06-08-25 @ 11:46) (132/72 - 140/83)  RR: 18 (06-08-25 @ 11:46) (18 - 18)  SpO2: 94% (06-08-25 @ 11:46) (94% - 97%)    --------------------------------------------------------------------------------------    EXAM    General: NAD, resting in bed comfortably.  Cardiac: regular rate, warm and well perfused  Respiratory: Nonlabored respirations, normal cw expansion.  ABDOMEN: tense, distended, tympanitic on percussion, non-tender, no rebound tenderness  EXTREMITIES: good distal pulses b/l   NEURO:  No focal deficits    --------------------------------------------------------------------------------------    LABS        --------------------------------------      --->>> <<<---------------------------------------------    INS AND OUTS:    06-08-25 @ 07:01  -  06-08-25 @ 21:06  --------------------------------------------------------  IN: 0 mL / OUT: 100 mL / NET: -100 mL        --------------------------------------------------------------------------------------

## 2025-06-09 DIAGNOSIS — I07.1 RHEUMATIC TRICUSPID INSUFFICIENCY: ICD-10-CM

## 2025-06-09 LAB
ANION GAP SERPL CALC-SCNC: 16 MMOL/L — SIGNIFICANT CHANGE UP (ref 5–17)
ANION GAP SERPL CALC-SCNC: 18 MMOL/L — HIGH (ref 5–17)
ANION GAP SERPL CALC-SCNC: 23 MMOL/L — HIGH (ref 5–17)
APTT BLD: 52.1 SEC — HIGH (ref 26.1–36.8)
APTT BLD: 52.4 SEC — HIGH (ref 26.1–36.8)
APTT BLD: 67 SEC — HIGH (ref 26.1–36.8)
BLD GP AB SCN SERPL QL: NEGATIVE — SIGNIFICANT CHANGE UP
BUN SERPL-MCNC: 77 MG/DL — HIGH (ref 7–23)
BUN SERPL-MCNC: 79 MG/DL — HIGH (ref 7–23)
BUN SERPL-MCNC: 84 MG/DL — HIGH (ref 7–23)
CALCIUM SERPL-MCNC: 8.7 MG/DL — SIGNIFICANT CHANGE UP (ref 8.4–10.5)
CALCIUM SERPL-MCNC: 8.8 MG/DL — SIGNIFICANT CHANGE UP (ref 8.4–10.5)
CALCIUM SERPL-MCNC: 9.3 MG/DL — SIGNIFICANT CHANGE UP (ref 8.4–10.5)
CHLORIDE SERPL-SCNC: 94 MMOL/L — LOW (ref 96–108)
CO2 SERPL-SCNC: 15 MMOL/L — LOW (ref 22–31)
CO2 SERPL-SCNC: 22 MMOL/L — SIGNIFICANT CHANGE UP (ref 22–31)
CO2 SERPL-SCNC: 23 MMOL/L — SIGNIFICANT CHANGE UP (ref 22–31)
CREAT SERPL-MCNC: 3 MG/DL — HIGH (ref 0.5–1.3)
CREAT SERPL-MCNC: 3.28 MG/DL — HIGH (ref 0.5–1.3)
CREAT SERPL-MCNC: 3.58 MG/DL — HIGH (ref 0.5–1.3)
EGFR: 12 ML/MIN/1.73M2 — LOW
EGFR: 12 ML/MIN/1.73M2 — LOW
EGFR: 13 ML/MIN/1.73M2 — LOW
EGFR: 13 ML/MIN/1.73M2 — LOW
EGFR: 14 ML/MIN/1.73M2 — LOW
EGFR: 14 ML/MIN/1.73M2 — LOW
GLUCOSE BLDC GLUCOMTR-MCNC: 100 MG/DL — HIGH (ref 70–99)
GLUCOSE SERPL-MCNC: 100 MG/DL — HIGH (ref 70–99)
GLUCOSE SERPL-MCNC: 111 MG/DL — HIGH (ref 70–99)
GLUCOSE SERPL-MCNC: 95 MG/DL — SIGNIFICANT CHANGE UP (ref 70–99)
HCT VFR BLD CALC: 30.7 % — LOW (ref 34.5–45)
HGB BLD-MCNC: 9.8 G/DL — LOW (ref 11.5–15.5)
INR BLD: 1.16 RATIO — SIGNIFICANT CHANGE UP (ref 0.85–1.16)
MAGNESIUM SERPL-MCNC: 1.9 MG/DL — SIGNIFICANT CHANGE UP (ref 1.6–2.6)
MCHC RBC-ENTMCNC: 31.9 G/DL — LOW (ref 32–36)
MCHC RBC-ENTMCNC: 32.5 PG — SIGNIFICANT CHANGE UP (ref 27–34)
MCV RBC AUTO: 101.7 FL — HIGH (ref 80–100)
NRBC BLD AUTO-RTO: 0 /100 WBCS — SIGNIFICANT CHANGE UP (ref 0–0)
PHOSPHATE SERPL-MCNC: 6.1 MG/DL — HIGH (ref 2.5–4.5)
PLATELET # BLD AUTO: 176 K/UL — SIGNIFICANT CHANGE UP (ref 150–400)
POTASSIUM SERPL-MCNC: 5.8 MMOL/L — HIGH (ref 3.5–5.3)
POTASSIUM SERPL-MCNC: 5.8 MMOL/L — HIGH (ref 3.5–5.3)
POTASSIUM SERPL-MCNC: 6.5 MMOL/L — CRITICAL HIGH (ref 3.5–5.3)
POTASSIUM SERPL-SCNC: 5.8 MMOL/L — HIGH (ref 3.5–5.3)
POTASSIUM SERPL-SCNC: 5.8 MMOL/L — HIGH (ref 3.5–5.3)
POTASSIUM SERPL-SCNC: 6.5 MMOL/L — CRITICAL HIGH (ref 3.5–5.3)
PROTHROM AB SERPL-ACNC: 13.2 SEC — SIGNIFICANT CHANGE UP (ref 9.9–13.4)
RBC # BLD: 3.02 M/UL — LOW (ref 3.8–5.2)
RBC # FLD: 14.6 % — HIGH (ref 10.3–14.5)
RH IG SCN BLD-IMP: POSITIVE — SIGNIFICANT CHANGE UP
SODIUM SERPL-SCNC: 132 MMOL/L — LOW (ref 135–145)
SODIUM SERPL-SCNC: 133 MMOL/L — LOW (ref 135–145)
SODIUM SERPL-SCNC: 134 MMOL/L — LOW (ref 135–145)
WBC # BLD: 28.26 K/UL — HIGH (ref 3.8–10.5)
WBC # FLD AUTO: 28.26 K/UL — HIGH (ref 3.8–10.5)

## 2025-06-09 PROCEDURE — 99232 SBSQ HOSP IP/OBS MODERATE 35: CPT

## 2025-06-09 PROCEDURE — 74018 RADEX ABDOMEN 1 VIEW: CPT | Mod: 26

## 2025-06-09 PROCEDURE — 99233 SBSQ HOSP IP/OBS HIGH 50: CPT | Mod: GC

## 2025-06-09 PROCEDURE — 99222 1ST HOSP IP/OBS MODERATE 55: CPT

## 2025-06-09 PROCEDURE — G0545: CPT

## 2025-06-09 PROCEDURE — 74176 CT ABD & PELVIS W/O CONTRAST: CPT | Mod: 26

## 2025-06-09 RX ORDER — DEXTROSE 50 % IN WATER 50 %
50 SYRINGE (ML) INTRAVENOUS ONCE
Refills: 0 | Status: COMPLETED | OUTPATIENT
Start: 2025-06-09 | End: 2025-06-09

## 2025-06-09 RX ORDER — PIPERACILLIN-TAZO-DEXTROSE,ISO 3.375G/5
3.38 IV SOLUTION, PIGGYBACK PREMIX FROZEN(ML) INTRAVENOUS ONCE
Refills: 0 | Status: DISCONTINUED | OUTPATIENT
Start: 2025-06-09 | End: 2025-06-09

## 2025-06-09 RX ORDER — CALCIUM GLUCONATE 20 MG/ML
1 INJECTION, SOLUTION INTRAVENOUS ONCE
Refills: 0 | Status: COMPLETED | OUTPATIENT
Start: 2025-06-09 | End: 2025-06-09

## 2025-06-09 RX ORDER — SODIUM CHLORIDE 9 G/1000ML
1000 INJECTION, SOLUTION INTRAVENOUS
Refills: 0 | Status: DISCONTINUED | OUTPATIENT
Start: 2025-06-09 | End: 2025-06-11

## 2025-06-09 RX ORDER — PIPERACILLIN-TAZO-DEXTROSE,ISO 3.375G/5
3.38 IV SOLUTION, PIGGYBACK PREMIX FROZEN(ML) INTRAVENOUS EVERY 8 HOURS
Refills: 0 | Status: DISCONTINUED | OUTPATIENT
Start: 2025-06-09 | End: 2025-06-09

## 2025-06-09 RX ORDER — DEXTROSE 50 % IN WATER 50 %
25 SYRINGE (ML) INTRAVENOUS ONCE
Refills: 0 | Status: DISCONTINUED | OUTPATIENT
Start: 2025-06-09 | End: 2025-06-11

## 2025-06-09 RX ORDER — SODIUM ZIRCONIUM CYCLOSILICATE 5 G/5G
10 POWDER, FOR SUSPENSION ORAL THREE TIMES A DAY
Refills: 0 | Status: COMPLETED | OUTPATIENT
Start: 2025-06-09 | End: 2025-06-11

## 2025-06-09 RX ORDER — PIPERACILLIN-TAZO-DEXTROSE,ISO 3.375G/5
3.38 IV SOLUTION, PIGGYBACK PREMIX FROZEN(ML) INTRAVENOUS EVERY 12 HOURS
Refills: 0 | Status: DISCONTINUED | OUTPATIENT
Start: 2025-06-10 | End: 2025-06-11

## 2025-06-09 RX ORDER — SODIUM ZIRCONIUM CYCLOSILICATE 5 G/5G
10 POWDER, FOR SUSPENSION ORAL THREE TIMES A DAY
Refills: 0 | Status: DISCONTINUED | OUTPATIENT
Start: 2025-06-09 | End: 2025-06-09

## 2025-06-09 RX ORDER — PIPERACILLIN-TAZO-DEXTROSE,ISO 3.375G/5
3.38 IV SOLUTION, PIGGYBACK PREMIX FROZEN(ML) INTRAVENOUS ONCE
Refills: 0 | Status: COMPLETED | OUTPATIENT
Start: 2025-06-09 | End: 2025-06-09

## 2025-06-09 RX ORDER — DEXTROSE 50 % IN WATER 50 %
12.5 SYRINGE (ML) INTRAVENOUS ONCE
Refills: 0 | Status: DISCONTINUED | OUTPATIENT
Start: 2025-06-09 | End: 2025-06-11

## 2025-06-09 RX ADMIN — Medication 3.38 GRAM(S): at 22:00

## 2025-06-09 RX ADMIN — Medication 5 UNIT(S): at 17:15

## 2025-06-09 RX ADMIN — AMLODIPINE BESYLATE 10 MILLIGRAM(S): 10 TABLET ORAL at 05:39

## 2025-06-09 RX ADMIN — Medication 0.1 MILLIGRAM(S): at 17:10

## 2025-06-09 RX ADMIN — Medication 50 MILLILITER(S): at 03:44

## 2025-06-09 RX ADMIN — HEPARIN SODIUM 1400 UNIT(S)/HR: 1000 INJECTION INTRAVENOUS; SUBCUTANEOUS at 11:58

## 2025-06-09 RX ADMIN — Medication 5 UNIT(S): at 00:11

## 2025-06-09 RX ADMIN — Medication 5 UNIT(S): at 03:44

## 2025-06-09 RX ADMIN — Medication 0.1 MILLIGRAM(S): at 05:39

## 2025-06-09 RX ADMIN — Medication 25 GRAM(S): at 17:10

## 2025-06-09 RX ADMIN — CALCIUM GLUCONATE 100 GRAM(S): 20 INJECTION, SOLUTION INTRAVENOUS at 00:36

## 2025-06-09 RX ADMIN — HEPARIN SODIUM 1400 UNIT(S)/HR: 1000 INJECTION INTRAVENOUS; SUBCUTANEOUS at 02:39

## 2025-06-09 RX ADMIN — SODIUM ZIRCONIUM CYCLOSILICATE 10 GRAM(S): 5 POWDER, FOR SUSPENSION ORAL at 22:41

## 2025-06-09 RX ADMIN — Medication 50 MILLILITER(S): at 17:15

## 2025-06-09 RX ADMIN — SODIUM ZIRCONIUM CYCLOSILICATE 10 GRAM(S): 5 POWDER, FOR SUSPENSION ORAL at 07:04

## 2025-06-09 RX ADMIN — METOPROLOL SUCCINATE 50 MILLIGRAM(S): 50 TABLET, EXTENDED RELEASE ORAL at 05:40

## 2025-06-09 RX ADMIN — HEPARIN SODIUM 3000 UNIT(S): 1000 INJECTION INTRAVENOUS; SUBCUTANEOUS at 02:41

## 2025-06-09 RX ADMIN — Medication 50 MILLILITER(S): at 00:12

## 2025-06-09 RX ADMIN — SODIUM ZIRCONIUM CYCLOSILICATE 10 GRAM(S): 5 POWDER, FOR SUSPENSION ORAL at 13:17

## 2025-06-09 RX ADMIN — Medication 200 GRAM(S): at 09:21

## 2025-06-09 RX ADMIN — Medication 81 MILLIGRAM(S): at 13:16

## 2025-06-09 NOTE — DIETITIAN INITIAL EVALUATION ADULT - REASON FOR ADMISSION
Ischemia and infarction of kidney    per H&P: "89F, PMHx A-fib status post watchman's, pulmonary hypertension, HTN, presenting to ED for abdominal pain, nausea, vomiting."

## 2025-06-09 NOTE — CONSULT NOTE ADULT - SUBJECTIVE AND OBJECTIVE BOX
Interventional Radiology    Evaluate for Procedure: Non-tunneled HD catheter placement    HPI: 89F PMHx A-fib status post watchman's, pulmonary hypertension, HTN, presenting to ED for abdominal pain, nausea, vomiting. Nephrology on board for renal artery stenosis. IR consulted for non-tunneled HD catheter placement iso TURNER.    Allergies: hydrALAZINE (Other; Flushing)  Lipitor (Other; Flushing)    Medications (Abx/Cardiac/Anticoagulation/Blood Products)  amLODIPine   Tablet: 10 milliGRAM(s) Oral (06-09 @ 05:39)  aspirin enteric coated: 81 milliGRAM(s) Oral (06-09 @ 13:16)  bumetanide Injectable: 2 milliGRAM(s) IV Push (06-08 @ 18:59)  cefTRIAXone   IVPB: 100 mL/Hr IV Intermittent (06-08 @ 17:47)  cloNIDine: 0.1 milliGRAM(s) Oral (06-09 @ 05:39)  heparin  Infusion.: 1400 Unit(s)/Hr IV Continuous (06-09 @ 02:40)  metoprolol succinate ER: 50 milliGRAM(s) Oral (06-09 @ 05:40)  metoprolol tartrate Injectable: 5 milliGRAM(s) IV Push (06-08 @ 03:21)  piperacillin/tazobactam IVPB.: 200 mL/Hr IV Intermittent (06-09 @ 09:21)    Data:    T(C): 36.5  HR: 87  BP: 135/78  RR: 16  SpO2: 97%    -WBC 28.26 / HgB 9.8 / Hct 30.7 / Plt 176  -Na 132 / Cl 94 / BUN 79 / Glucose 95  -K 6.5 / CO2 15 / Cr 3.28  -ALT -- / Alk Phos -- / T.Bili --  -INR -- / PTT 67.0    Radiology:     Assessment/Plan: 89F PMHx A-fib status post watchman's, pulmonary hypertension, HTN, presenting to ED for abdominal pain, nausea, vomiting. Nephrology on board for renal artery stenosis. IR consulted for non-tunneled HD catheter placement iso TURNER.    - case reviewed and approved for 6/9/25  - please place IR procedure order under EVELIO Frye  - STAT labs in AM (cbc,coags, bmp, T&S)  - please hold hep gtt unless strictly contraindicated. May resume 6 hours post procedure.  - does not need to be NPO  - d/w primary team

## 2025-06-09 NOTE — DIETITIAN INITIAL EVALUATION ADULT - REASON INDICATOR FOR ASSESSMENT
RD assessment warranted for PO diet restriction day 4   Source: Patient visited, Electronic Medical Record  Chart reviewed, events noted.

## 2025-06-09 NOTE — DIETITIAN INITIAL EVALUATION ADULT - ORAL INTAKE PTA/DIET HISTORY
Pt visited multiple times, Pt sleeping soundly at morning visits and off unit at visit this afternoon. RD to follow up with subjective information as able. Unable to assess appetite and PO intake PTA, adherence to therapeutic diet restrictions.   No food allergies noted per EMR, noted lactose intolerance.   Daily-xi per home medication list

## 2025-06-09 NOTE — DIETITIAN INITIAL EVALUATION ADULT - ADD RECOMMEND
1. As medically feasible, recommend advance PO diet as tolerated to Renal restriction. Defer fluid restriction to medical team discretion   2. Recommend adding Nephrovite pending no medical contraindications to promote skin integrity   3. Monitor plan for diet advancement, skin, weight, nutrition related labs, GI function, goals of care

## 2025-06-09 NOTE — PROGRESS NOTE ADULT - SUBJECTIVE AND OBJECTIVE BOX
Date of Service: 06-09-25 @ 08:25           CARDIOLOGY     PROGRESS  NOTE   ________________________________________________  CHIEF COMPLAINT:Patient is a 89y old  Female who presents with a chief complaint of   confuse  	  REVIEW OF SYSTEMS:  CONSTITUTIONAL: No fever, weight loss, or fatigue  EYES: No eye pain, visual disturbances, or discharge  ENT:  No difficulty hearing, tinnitus, vertigo; No sinus or throat pain  NECK: No pain or stiffness  RESPIRATORY: No cough, wheezing, chills or hemoptysis; No Shortness of Breath  CARDIOVASCULAR: No chest pain, palpitations, passing out, dizziness, or leg swelling  GASTROINTESTINAL: No abdominal or epigastric pain. No nausea, vomiting, or hematemesis; No diarrhea or constipation. No melena or hematochezia.  GENITOURINARY: No dysuria, frequency, hematuria, or incontinence  NEUROLOGICAL: No headaches, memory loss, loss of strength, numbness, or tremors  SKIN: No itching, burning, rashes, or lesions   LYMPH Nodes: No enlarged glands  ENDOCRINE: No heat or cold intolerance; No hair loss  MUSCULOSKELETAL: No joint pain or swelling; No muscle, back, or extremity pain  PSYCHIATRIC: No depression, anxiety, mood swings, or difficulty sleeping  HEME/LYMPH: No easy bruising, or bleeding gums  ALLERGY AND IMMUNOLOGIC: No hives or eczema	    [x ] All others negative	  [ ] Unable to obtain    PHYSICAL EXAM:  T(C): 36.7 (06-09-25 @ 04:57), Max: 37.1 (06-08-25 @ 21:25)  HR: 111 (06-09-25 @ 04:57) (100 - 120)  BP: 116/70 (06-09-25 @ 04:57) (116/70 - 132/72)  RR: 18 (06-09-25 @ 04:57) (18 - 18)  SpO2: 96% (06-09-25 @ 04:57) (94% - 97%)  Wt(kg): --  I&O's Summary    08 Jun 2025 07:01  -  09 Jun 2025 07:00  --------------------------------------------------------  IN: 0 mL / OUT: 100 mL / NET: -100 mL        Appearance: Normal	  HEENT:   Normal oral mucosa, PERRL, EOMI	  Lymphatic: No lymphadenopathy  Cardiovascular: Normal S1 S2, No JVD, + murmurs, No edema  Respiratory: rhonchi  Psychiatry: A & O x 3, Mood & affect appropriate  Gastrointestinal:  Soft, Non-tender, + BS	  Skin: No rashes, No ecchymoses, No cyanosis	  Extremities: Normal range of motion, No clubbing, cyanosis or edema  Vascular: Peripheral pulses palpable 2+ bilaterally    MEDICATIONS  (STANDING):  amLODIPine   Tablet 10 milliGRAM(s) Oral daily  aspirin enteric coated 81 milliGRAM(s) Oral daily  cefTRIAXone   IVPB 1000 milliGRAM(s) IV Intermittent every 24 hours  cloNIDine 0.1 milliGRAM(s) Oral two times a day  dextrose 5%. 1000 milliLiter(s) (100 mL/Hr) IV Continuous <Continuous>  dextrose 5%. 1000 milliLiter(s) (100 mL/Hr) IV Continuous <Continuous>  dextrose 50% Injectable 25 Gram(s) IV Push once  dextrose 50% Injectable 12.5 Gram(s) IV Push once  dextrose 50% Injectable 25 Gram(s) IV Push once  dextrose 50% Injectable 25 Gram(s) IV Push once  dextrose 50% Injectable 12.5 Gram(s) IV Push once  dextrose 50% Injectable 25 Gram(s) IV Push once  heparin  Infusion.  Unit(s)/Hr (15 mL/Hr) IV Continuous <Continuous>  metoprolol succinate ER 50 milliGRAM(s) Oral daily  sodium zirconium cyclosilicate 10 Gram(s) Oral three times a day      TELEMETRY: 	    ECG:  	  RADIOLOGY:  OTHER: 	  	  LABS:	 	    CARDIAC MARKERS:                            9.8    28.26 )-----------( 176      ( 09 Jun 2025 06:47 )             30.7     06-09    134[L]  |  94[L]  |  77[H]  ----------------------------<  111[H]  5.8[H]   |  22  |  3.00[H]    Ca    9.3      09 Jun 2025 02:11  Phos  6.1     06-09  Mg     1.9     06-09      proBNP:   Lipid Profile:   HgA1c:   TSH:   PT/INR - ( 09 Jun 2025 06:47 )   PT: 13.2 sec;   INR: 1.16 ratio         PTT - ( 09 Jun 2025 02:11 )  PTT:52.4 sec      ·  Recommendation: Pt with TURNER possibly due to volume overload (hypoxic, elevated BNP, edematous) vs. MALINI (IV contrast on 6/6) vs. ?B/L renal artery stenosis vs. urinary retention vs. other. Baseline Cr 0.5-0.6. Cr on admission 0.96, uptrending. Cr 1.7 today. Serum K ~6.  - Recommend bladder scan to r/o retention  - Lokelma 10g stat  - Bumex 2mg IVP stat (after r/o retention)  - Repeat BMP today  - Monitor labs and UOP.        Assessment and plan  ---------------------------  89F, PMHx A-fib status post watchman's, pulmonary hypertension, HTN, presenting to ED for abdominal pain, nausea, vomiting.  Patient states that she was eating breakfast this morning, in her normal state of health, which shortly after breakfast she developed nausea and vomiting.  Patient states she vomited her entire breakfast, no other resident/other individual who ate the breakfast had similar symptoms.  Endorsing continued distention, and feeling of fullness.  Patient states that she has been unable to pass stool or flatus since that time.  She does not endorse fevers.  Patient had diarrhea 2 days ago, which resolved with Imodium.  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

## 2025-06-09 NOTE — DIETITIAN INITIAL EVALUATION ADULT - OTHER INFO
elevated phosphorous/potassium, low sodium 6/9  lokelma ordered  TURNER plan for urgent HD per chart      Weights:  - UBW (per patient): unable to assess at this time   - Dosing Weight (per chart): 182.2 pounds (6/6)   - Daily Weights (per flow sheets): 161.3 pounds (6/9)   wt discrepancy noted ?accuracy   -  pounds +/- 10%   - Per [facility] HIE: 135 pounds (3/27/25), 140 pounds (3/8/25), 149 pounds (12/15/24), 110 pounds (8/29/24) - weight variance noted per HIE, RD to continue to monitor weights and trends as able.

## 2025-06-09 NOTE — PROGRESS NOTE ADULT - SUBJECTIVE AND OBJECTIVE BOX
Vascular Surgery Progress Note:    Subjective:  Overnight, patient received hyperkalemia cocktail but has persistent hyperkalemia, most recent K 6.5. Failed medical management and Nephro placed urgent HD today. Abdominal distention with XR showed dilated bowel, high risk for aspiration    ICU Vital Signs Last 24 Hrs  T(C): 36.5 (09 Jun 2025 11:09), Max: 37.1 (08 Jun 2025 21:25)  T(F): 97.7 (09 Jun 2025 11:09), Max: 98.7 (08 Jun 2025 21:25)  HR: 87 (09 Jun 2025 11:09) (87 - 120)  BP: 135/78 (09 Jun 2025 11:09) (116/70 - 135/78)  BP(mean): --  ABP: --  ABP(mean): --  RR: 16 (09 Jun 2025 11:09) (16 - 18)  SpO2: 97% (09 Jun 2025 11:09) (96% - 97%)    O2 Parameters below as of 09 Jun 2025 11:09  Patient On (Oxygen Delivery Method): nasal cannula  O2 Flow (L/min): 2      Physical Exam:   General: appears to be comfortable on 3L NC.   Respiratory: no acute respiratory distress. No use of accessory muscles  Cardiac: normal rhythm  Abdomen: soft, nontender, distended abdomen without any guarding. Tympanic to percussion    Labs                        9.8    28.26 )-----------( 176      ( 09 Jun 2025 06:47 )             30.7   06-09    132[L]  |  94[L]  |  79[H]  ----------------------------<  95  6.5[HH]   |  15[L]  |  3.28[H]    Ca    8.7      09 Jun 2025 10:59  Phos  6.1     06-09  Mg     1.9     06-09         Vascular Surgery Progress Note:    Subjective:  Overnight, patient received hyperkalemia cocktail but has persistent hyperkalemia, most recent K 6.5. Failed medical management and Nephro placed urgent HD today. Abdominal distention with XR showed dilated bowel, high risk for aspiration    ICU Vital Signs Last 24 Hrs  T(C): 36.5 (09 Jun 2025 11:09), Max: 37.1 (08 Jun 2025 21:25)  T(F): 97.7 (09 Jun 2025 11:09), Max: 98.7 (08 Jun 2025 21:25)  HR: 87 (09 Jun 2025 11:09) (87 - 120)  BP: 135/78 (09 Jun 2025 11:09) (116/70 - 135/78)  BP(mean): --  ABP: --  ABP(mean): --  RR: 16 (09 Jun 2025 11:09) (16 - 18)  SpO2: 97% (09 Jun 2025 11:09) (96% - 97%)    O2 Parameters below as of 09 Jun 2025 11:09  Patient On (Oxygen Delivery Method): nasal cannula  O2 Flow (L/min): 2      Physical Exam:   General: appears to be comfortable on 3L NC.   Respiratory: no acute respiratory distress. No use of accessory muscles  Cardiac: normal rhythm  Abdomen: soft, nontender, distended abdomen without any guarding. Tympanic to percussion    Labs                        9.8    28.26 )-----------( 176      ( 09 Jun 2025 06:47 )             30.7   06-09    132[L]  |  94[L]  |  79[H]  ----------------------------<  95  6.5[HH]   |  15[L]  |  3.28[H]    Ca    8.7      09 Jun 2025 10:59  Phos  6.1     06-09  Mg     1.9     06-09      < from: US Duplex Kidneys (06.07.25 @ 10:23) >    FINDINGS:  Right kidney: 10.6 cm. No renal mass, hydronephrosis or calculi.  Left kidney: 12.8 cm. No renal mass, hydronephrosis or calculi.    Urinary bladder: Within normal limits.    Color and spectral Doppler reveals blood flow throughout the right kidney   but not the left.  Peak aortic velocity is 114 cm/sec.  IVC/Renal Veins: Patent. The left renal vein could not be seen.    RIGHT  Renal Artery:  Peak systolic velocity is 305 cm/sec origin, 154 cm/sec proximal, 173   cm/sec mid, 142 cm/sec distal and 164 cm/sec hilum.  Upper Segmental Artery:  RI = 0.9  Middle Segmental Artery: RI = 0.9  Lower Segmental Artery: RI = 0.9    LEFT  Renal Artery: Could not be seen.    IMPRESSION:    Right renal artery stenosis at the origin.    Left renal artery could not be seen. Decreased vascularity throughout the   left kidney. Left renal artery could be completely occluded versus very   small.    < end of copied text >

## 2025-06-09 NOTE — CONSULT NOTE ADULT - SUBJECTIVE AND OBJECTIVE BOX
"HPI:  Date of Service, 06-07-25 @ 06:46  CHIEF COMPLAINT:Patient is a 89y old  Female who presents with a chief complaint of     HPI:  89F, PMHx A-fib status post watchman's, pulmonary hypertension, HTN, presenting to ED for abdominal pain, nausea, vomiting.  Patient states that she was eating breakfast this morning, in her normal state of health, which shortly after breakfast she developed nausea and vomiting.  Patient states she vomited her entire breakfast, no other resident/other individual who ate the breakfast had similar symptoms.  Endorsing continued distention, and feeling of fullness.  Patient states that she has been unable to pass stool or flatus since that time.  She does not endorse fevers.  Patient had diarrhea 2 days ago, which resolved with Imodium.  "    Above reviewed. 89 yo F A Fib, pulm HTN, HTN, abd pain, nausea vomiting  On my eval states no N/V, no abd pain  Patient feels fatigued  Some pain at arms, L bandaged LUE  No cough/sob  No dysuria, no flank pain  ID called for further eval    PAST MEDICAL & SURGICAL HISTORY:  Afib      HTN (hypertension)      H/O CHF      Uterine cancer      S/P hysterectomy    Allergies    hydrALAZINE (Other; Flushing)  Lipitor (Other; Flushing)    Intolerances    lactose (Unknown)    ANTIMICROBIALS:  piperacillin/tazobactam IVPB.- 3.375 once    OTHER MEDS:  amLODIPine   Tablet 10 milliGRAM(s) Oral daily  aspirin enteric coated 81 milliGRAM(s) Oral daily  cloNIDine 0.1 milliGRAM(s) Oral two times a day  dextrose 5%. 1000 milliLiter(s) IV Continuous <Continuous>  dextrose 5%. 1000 milliLiter(s) IV Continuous <Continuous>  dextrose 50% Injectable 25 Gram(s) IV Push once  dextrose 50% Injectable 12.5 Gram(s) IV Push once  dextrose 50% Injectable 25 Gram(s) IV Push once  dextrose 50% Injectable 25 Gram(s) IV Push once  dextrose 50% Injectable 12.5 Gram(s) IV Push once  dextrose 50% Injectable 25 Gram(s) IV Push once  heparin   Injectable 6500 Unit(s) IV Push every 6 hours PRN  heparin   Injectable 3000 Unit(s) IV Push every 6 hours PRN  heparin  Infusion.  Unit(s)/Hr IV Continuous <Continuous>  metoprolol succinate ER 50 milliGRAM(s) Oral daily  sodium zirconium cyclosilicate 10 Gram(s) Oral three times a day    SOCIAL HISTORY: No tobacco, no alcohol, no illicit drugs    FAMILY HISTORY: No pertinent family history relating to chief complaint    Drug Dosing Weight  Height (cm): 160 (06 Jun 2025 13:23)  Weight (kg): 82.7 (06 Jun 2025 19:16)  BMI (kg/m2): 32.3 (06 Jun 2025 19:16)  BSA (m2): 1.86 (06 Jun 2025 19:16)    PE:    Vital Signs Last 24 Hrs  T(C): 36.5 (09 Jun 2025 11:09), Max: 37.1 (08 Jun 2025 21:25)  T(F): 97.7 (09 Jun 2025 11:09), Max: 98.7 (08 Jun 2025 21:25)  HR: 87 (09 Jun 2025 11:09) (87 - 120)  BP: 135/78 (09 Jun 2025 11:09) (116/70 - 135/78)  RR: 16 (09 Jun 2025 11:09) (16 - 18)  SpO2: 97% (09 Jun 2025 11:09) (96% - 97%)    Gen: AOx3, NAD, non-toxic, pleasant  CV: S1+S2 normal, nontachycardic  Resp: Clear bilat, no resp distress, no crackles/wheezes  Abd: Soft, nontender, +BS  Ext: No LE edema, no wounds  : No Forman  IV/Skin: No thrombophlebitis  Msk: No low back pain, no arthralgias, no joint swelling  Neuro: No sensory deficits, no motor deficits    LABS:                        9.8    28.26 )-----------( 176      ( 09 Jun 2025 06:47 )             30.7     06-09    132[L]  |  94[L]  |  79[H]  ----------------------------<  95  6.5[HH]   |  15[L]  |  3.28[H]    Ca    8.7      09 Jun 2025 10:59  Phos  6.1     06-09  Mg     1.9     06-09    Urinalysis Basic - ( 09 Jun 2025 10:59 )    Color: x / Appearance: x / SG: x / pH: x  Gluc: 95 mg/dL / Ketone: x  / Bili: x / Urobili: x   Blood: x / Protein: x / Nitrite: x   Leuk Esterase: x / RBC: x / WBC x   Sq Epi: x / Non Sq Epi: x / Bacteria: x    MICROBIOLOGY:    Clean Catch Clean Catch (Midstream)  06-06-25   >=3 organisms. Probable collection contamination.  --  --    RADIOLOGY:    6/7    FINDINGS:  Loop recorder. Left atrial occlusion device.  The heart is normal in size.  Trace bilateral pleural effusions with adjacent atelectasis.  There is no pneumothorax.    IMPRESSION:  Trace bilateral pleural effusions with adjacent atelectasis.

## 2025-06-09 NOTE — PROGRESS NOTE ADULT - PROBLEM SELECTOR PLAN 2
Pt with hyperkalemia in the setting of TURNER. Serum potassium is 6.5 today. Recommend medical management with IV insulin/D50, calcium gluconate, lokelma 10 gm tid. Obtain EKG. Monitor serum potassium. Plan for HD as above.

## 2025-06-09 NOTE — PROGRESS NOTE ADULT - PROBLEM SELECTOR PLAN 4
Vascular following. Plan for arteriogram with angioplasty after HD today.     If you have any questions, please feel free to contact me  Darian Garcia  Nephrology Fellow  [facility]/Page [phone]  (After 5pm or on weekends please page the on-call fellow)

## 2025-06-09 NOTE — PROGRESS NOTE ADULT - SUBJECTIVE AND OBJECTIVE BOX
---___---___---___---___---___---___ ---___---___---___---___---___---___---___---___---                  M E D I C A L   A T T E N D I N G   P R O G R E S S   N O T E  ---___---___---___---___---___---___ ---___---___---___---___---___---___---___---___---        ================================================    ++CHIEF COMPLAINT:   Patient is a 89y old  Female who presents with a chief complaint of Ischemia and infarction of kidney    per H&P: "89F, PMHx A-fib status post watchman's, pulmonary hypertension, HTN, presenting to ED for abdominal pain, nausea, vomiting."   (2025 14:31)      Ischemia and infarction of kidney      ---___---___---___---___---___---  PAST MEDICAL / Surgical  HISTORY:  PAST MEDICAL & SURGICAL HISTORY:  Afib      HTN (hypertension)      H/O CHF      Uterine cancer      S/P hysterectomy          ---___---___---___---___---___---  FAMILY HISTORY:   FAMILY HISTORY:        ---___---___---___---___---___---  ALLERGIES:   Allergies    hydrALAZINE (Other; Flushing)  Lipitor (Other; Flushing)    Intolerances    lactose (Unknown)      ---___---___---___---___---___---  MEDICATIONS:  MEDICATIONS  (STANDING):  amLODIPine   Tablet 10 milliGRAM(s) Oral daily  aspirin enteric coated 81 milliGRAM(s) Oral daily  cloNIDine 0.1 milliGRAM(s) Oral two times a day  dextrose 5%. 1000 milliLiter(s) (100 mL/Hr) IV Continuous <Continuous>  dextrose 5%. 1000 milliLiter(s) (100 mL/Hr) IV Continuous <Continuous>  dextrose 50% Injectable 25 Gram(s) IV Push once  dextrose 50% Injectable 12.5 Gram(s) IV Push once  dextrose 50% Injectable 25 Gram(s) IV Push once  dextrose 50% Injectable 25 Gram(s) IV Push once  dextrose 50% Injectable 12.5 Gram(s) IV Push once  dextrose 50% Injectable 25 Gram(s) IV Push once  heparin  Infusion.  Unit(s)/Hr (15 mL/Hr) IV Continuous <Continuous>  metoprolol succinate ER 50 milliGRAM(s) Oral daily  sodium zirconium cyclosilicate 10 Gram(s) Oral three times a day    MEDICATIONS  (PRN):  heparin   Injectable 6500 Unit(s) IV Push every 6 hours PRN For aPTT less than 40  heparin   Injectable 3000 Unit(s) IV Push every 6 hours PRN For aPTT between 40 - 57      ---___---___---___---___---___---  REVIEW OF SYSTEM:    GEN: no fever, no chills, no pain  RESP: no SOB, no cough, no sputum  CVS: no chest pain, no palpitations, no edema  GI: no abdominal pain, no nausea, no vomiting, no constipation, no diarrhea  : no dysurea, no frequency, no hematurea  Neuro: no headache, no dizziness  PSYCH: no anxiety, no depression  Derm : no itching, no rash    ---___---___---___---___---___---  VITAL SIGNS:  89y , CAPILLARY BLOOD GLUCOSE      POCT Blood Glucose.: 100 mg/dL (2025 08:34)    T(C): 36.5 (25 @ 11:09), Max: 37.1 (25 @ 21:25)  HR: 87 (25 @ 11:09) (87 - 120)  BP: 135/78 (25 @ 11:09) (116/70 - 135/78)  RR: 16 (25 @ 11:09) (16 - 18)  SpO2: 97% (25 @ 11:09) (96% - 97%)  ---___---___---___---___---___---  PHYSICAL EXAM:    GEN: A&O X 3 , NAD , comfortable  HEENT: NCAT, PERRL, MMM, hearing intact  Neck: supple , no JVD  CVS: S1S2 , regular , No M/R/G appreciated  PULM: CTA B/L,  no W/R/R appreciated  ABD.: soft. non tender, non distended,  bowel sounds present  Extrem: intact pulses , no edema   Derm: No rash , no ecchymoses  PSYCH : normal mood,  no delusion not anxious     ---___---___---___---___---___---            LAB AND IMAGIN.8    28.26 )-----------( 176      ( 2025 06:47 )             30.7               -    133[L]  |  94[L]  |  84[H]  ----------------------------<  100[H]  5.8[H]   |  23  |  3.58[H]    Ca    8.8      2025 16:24  Phos  6.1     06-  Mg     1.9     06-09      PT/INR - ( 2025 06:47 )   PT: 13.2 sec;   INR: 1.16 ratio         PTT - ( 2025 11:26 )  PTT:67.0 sec                        Urinalysis Basic - ( 2025 16:24 )    Color: x / Appearance: x / SG: x / pH: x  Gluc: 100 mg/dL / Ketone: x  / Bili: x / Urobili: x   Blood: x / Protein: x / Nitrite: x   Leuk Esterase: x / RBC: x / WBC x   Sq Epi: x / Non Sq Epi: x / Bacteria: x        [All pertinent / recent Imaging reviewed]         ---___---___---___---___---___---___ ---___---___---___---___---                         A S S E S S M E N T   A N D   P L A N :      HEALTH ISSUES - PROBLEM Dx:  TURNER (acute kidney injury)  Hyperkalemia  Volume overload  Renal artery stenosis    vascular and nephrology appreciated  spoke to son  and he does now want his mother to have dialysis because the risks outweigh the benefit   palliative consult ordered        ___________________________  Thank you,  Porter Keller  [phone]

## 2025-06-09 NOTE — CONSULT NOTE ADULT - SUBJECTIVE AND OBJECTIVE BOX
HPI:  Date of Service, 06-07-25 @ 06:46  CHIEF COMPLAINT:Patient is a 89y old  Female who presents with a chief complaint of     HPI:  89F, PMHx A-fib status post watchman's, pulmonary hypertension, HTN, presenting to ED for abdominal pain, nausea, vomiting.  Patient states that she was eating breakfast this morning, in her normal state of health, which shortly after breakfast she developed nausea and vomiting.  Patient states she vomited her entire breakfast, no other resident/other individual who ate the breakfast had similar symptoms.  Endorsing continued distention, and feeling of fullness.  Patient states that she has been unable to pass stool or flatus since that time.  She does not endorse fevers.  Patient had diarrhea 2 days ago, which resolved with Imodium.       (07 Jun 2025 06:45)    The Structural Team was consulted for evaluation of TR. Ms. Keith is known to the Structural Heart Team. She was seen last in valve clinic in March, her TR was moderate not warranting intervention. It was also the patients wish to pursue conservative management at that time. Seen at bedside with son. Tachypneic, oliguric, renal function worsening with concerns for renal infarct on CT. She is volume up on exam.       PAST MEDICAL & SURGICAL HISTORY:  Afib      HTN (hypertension)      H/O CHF      Uterine cancer      S/P hysterectomy          REVIEW OF SYSTEMS:    CONSTITUTIONAL: No weakness, fevers or chills  EYES/ENT: No visual changes;  No vertigo or throat pain   NECK: No pain or stiffness  RESPIRATORY: No cough, wheezing, hemoptysis; No shortness of breath  CARDIOVASCULAR: No chest pain or palpitations  GASTROINTESTINAL: No abdominal or epigastric pain. No nausea, vomiting, or hematemesis; No diarrhea or constipation. No melena or hematochezia.  GENITOURINARY: No dysuria, frequency or hematuria  NEUROLOGICAL: No numbness or weakness  SKIN: No itching, rashes      MEDICATIONS  (STANDING):  amLODIPine   Tablet 10 milliGRAM(s) Oral daily  aspirin enteric coated 81 milliGRAM(s) Oral daily  cloNIDine 0.1 milliGRAM(s) Oral two times a day  dextrose 5%. 1000 milliLiter(s) (100 mL/Hr) IV Continuous <Continuous>  dextrose 5%. 1000 milliLiter(s) (100 mL/Hr) IV Continuous <Continuous>  dextrose 50% Injectable 25 Gram(s) IV Push once  dextrose 50% Injectable 12.5 Gram(s) IV Push once  dextrose 50% Injectable 25 Gram(s) IV Push once  dextrose 50% Injectable 25 Gram(s) IV Push once  dextrose 50% Injectable 12.5 Gram(s) IV Push once  dextrose 50% Injectable 25 Gram(s) IV Push once  heparin  Infusion.  Unit(s)/Hr (15 mL/Hr) IV Continuous <Continuous>  metoprolol succinate ER 50 milliGRAM(s) Oral daily  piperacillin/tazobactam IVPB.- 3.375 Gram(s) IV Intermittent once  sodium zirconium cyclosilicate 10 Gram(s) Oral three times a day    MEDICATIONS  (PRN):  heparin   Injectable 6500 Unit(s) IV Push every 6 hours PRN For aPTT less than 40  heparin   Injectable 3000 Unit(s) IV Push every 6 hours PRN For aPTT between 40 - 57      Allergies    hydrALAZINE (Other; Flushing)  Lipitor (Other; Flushing)    Intolerances    lactose (Unknown)      SOCIAL HISTORY:    FAMILY HISTORY: no reported history       Vital Signs Last 24 Hrs  T(C): 36.5 (09 Jun 2025 11:09), Max: 37.1 (08 Jun 2025 21:25)  T(F): 97.7 (09 Jun 2025 11:09), Max: 98.7 (08 Jun 2025 21:25)  HR: 87 (09 Jun 2025 11:09) (87 - 120)  BP: 135/78 (09 Jun 2025 11:09) (116/70 - 135/78)  BP(mean): --  RR: 16 (09 Jun 2025 11:09) (16 - 18)  SpO2: 97% (09 Jun 2025 11:09) (96% - 97%)    Parameters below as of 09 Jun 2025 11:09  Patient On (Oxygen Delivery Method): nasal cannula  O2 Flow (L/min): 2      Physical Exam  General: A/ox 3, No acute Distress  Neck: Supple, (+) JVD  Cardiac: S1 S2, No M/R/G  Pulmonary: Diminished at bases bilaterally, tachypneic, No Rhonchi/Rales/Wheezing  Abdomen: Soft, Non -tender, +BS x 4 quads  Extremities: No Rashes, BLE edema extending to knees bilaterally   Neuro: A/o x 3, No focal deficits    LABS:                        9.8    28.26 )-----------( 176      ( 09 Jun 2025 06:47 )             30.7     06-09    132[L]  |  94[L]  |  79[H]  ----------------------------<  95  6.5[HH]   |  15[L]  |  3.28[H]    Ca    8.7      09 Jun 2025 10:59  Phos  6.1     06-09  Mg     1.9     06-09      PT/INR - ( 09 Jun 2025 06:47 )   PT: 13.2 sec;   INR: 1.16 ratio         PTT - ( 09 Jun 2025 11:26 )  PTT:67.0 sec  Urinalysis Basic - ( 09 Jun 2025 10:59 )    Color: x / Appearance: x / SG: x / pH: x  Gluc: 95 mg/dL / Ketone: x  / Bili: x / Urobili: x   Blood: x / Protein: x / Nitrite: x   Leuk Esterase: x / RBC: x / WBC x   Sq Epi: x / Non Sq Epi: x / Bacteria: x        RADIOLOGY & ADDITIONAL STUDIES:  < from: TTE W or WO Ultrasound Enhancing Agent (01.23.25 @ 12:16) >  CONCLUSIONS:      1. Left ventricular cavity is normal in size. The interventricular septumis flattened in systole and diastole consistent with right ventricular pressure and volume overload. Left ventricular systolic function is normal with an ejection fraction of 52 % by Canchola's method of disks.   2. Mildly enlarged right ventricular cavity size and normal right ventricular systolic function.   3. Left atrium is severely dilated.   4. The right atrium is severely dilated.   5. Structurally normal tricuspid valve with normal leaflet excursion. There is moderate tricuspid regurgitation. The etiology is secondary/functional tricuspid regurgitation (mostly atrial enlargement).   6. There is no other significant valvular regurgitation or stenosis.   7. Estimated pulmonary artery systolic pressure is 68 mmHg, consistent with severe pulmonary hypertension.   8. The inferior vena cava is dilated measuring 2.45 cm in diameter, (dilated >2.1cm) with abnormal inspiratory collapse (abnormal <50%) consistent with elevated right atrial pressure (~15, range 10-20mmHg).   9. Compared to the transthoracic echocardiogram performed on 12/17/2024, this TR quanfies to moderate. The pulmonary pressures are severely elevated (similar to prior) and the IVC is dilated consistent with elevated right atrial pressure.      < end of copied text >

## 2025-06-09 NOTE — CONSULT NOTE ADULT - NS ATTEND AMEND GEN_ALL_CORE FT
Events that transpired leading to the patient's current hospitalization along with the patient's hospital course was gone over.  The patient's past medical history/surgical history/family history/social history was gone over.  Agree with 14 point review of systems and physical examination as noted above.    Patient is noted to have moderate tricuspid and mitral regurgitation.  No repeat TTE was performed.  Would not recommend that repeat TTE be performed at this time.  Due to worsening renal dysfunction/urinary output patient developing acute on chronic diastolic heart failure which would likely exacerbate her valvular regurgitation.    Would recommend multidisciplinary team/goals of care meeting to take place between nephrology, hospitalist, vascular surgery and cardiology to discuss potential treatment options in light of the patient's wishes and desires.  The patient and her son expressed to the structural heart team that she does not wish to be on long-term dialysis.      All questions and concerns of the patient and her son were addressed.    Findings and plan discussed with cardiology/Dr. Everett, vascular surgery and hospitalist teams.

## 2025-06-09 NOTE — DIETITIAN INITIAL EVALUATION ADULT - PERTINENT LABORATORY DATA
06-09    132[L]  |  94[L]  |  79[H]  ----------------------------<  95  6.5[HH]   |  15[L]  |  3.28[H]    Ca    8.7      09 Jun 2025 10:59  Phos  6.1     06-09  Mg     1.9     06-09    POCT Blood Glucose.: 100 mg/dL (06-09-25 @ 08:34)  A1C with Estimated Average Glucose Result: 6.1 % (12-18-24 @ 07:42)

## 2025-06-09 NOTE — DIETITIAN INITIAL EVALUATION ADULT - PERTINENT MEDS FT
MEDICATIONS  (STANDING):  amLODIPine   Tablet 10 milliGRAM(s) Oral daily  aspirin enteric coated 81 milliGRAM(s) Oral daily  cloNIDine 0.1 milliGRAM(s) Oral two times a day  dextrose 5%. 1000 milliLiter(s) (100 mL/Hr) IV Continuous <Continuous>  dextrose 5%. 1000 milliLiter(s) (100 mL/Hr) IV Continuous <Continuous>  dextrose 50% Injectable 25 Gram(s) IV Push once  dextrose 50% Injectable 12.5 Gram(s) IV Push once  dextrose 50% Injectable 25 Gram(s) IV Push once  dextrose 50% Injectable 25 Gram(s) IV Push once  dextrose 50% Injectable 12.5 Gram(s) IV Push once  dextrose 50% Injectable 25 Gram(s) IV Push once  heparin  Infusion.  Unit(s)/Hr (15 mL/Hr) IV Continuous <Continuous>  metoprolol succinate ER 50 milliGRAM(s) Oral daily  piperacillin/tazobactam IVPB.- 3.375 Gram(s) IV Intermittent once  sodium zirconium cyclosilicate 10 Gram(s) Oral three times a day    MEDICATIONS  (PRN):  heparin   Injectable 6500 Unit(s) IV Push every 6 hours PRN For aPTT less than 40  heparin   Injectable 3000 Unit(s) IV Push every 6 hours PRN For aPTT between 40 - 57

## 2025-06-09 NOTE — PROGRESS NOTE ADULT - ATTENDING COMMENTS
Vascular surgery attending covering for Dr. Jenkins.    Patient with afib s/p Watchman procedure, pulm HTN, CHF presenting with one day history of nausea, vomiting, abdominal distension.   Patient denies any recent flank or abd pain.   On exam, patient appears mildly uncomfortable.   abd soft, distended, no significant tenderness.   CT imaging reviewed. Significant atherosclerotic disease. Left kidney renal infarct.   On arrival, patient with normal Cr.   Unclear timing of left renal infarct with normal creatinine. With unclear ischemia time and no significant complaints consistent with renal infarct, no benefit from revasc left renal artery. Plan was for continued observation on hep gtt.   Continued elevated creatining with hyperkalemia  Patient also with significant right renal artery stenosis seen on CT scan and on my review of renal duplex.   Continued abdominal discomfort with nausea and vomiting, which again does not strongly suggest only a renal infarct event.   Nephrology evaluation. Trend Cr.   Repeat echo.   Findings discussed with pt and son, management options discussed. management options are limited.   angiogram with possible renal stent to treat karen may be considered but this also carries risk as patient with significant atherosclerotic disease and heavily calcified aorta and renal artery. high chance of unsuccessful angiogram with worsening renal function due to contrast use, possible need for HD, damage to vessels during procedure.   Patient unsure how she would like to proceed. at this time, recommend treat hyperkalemia, trend cr
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 Login is shailesh weldon, schedule under [facility] medicine, psych, derm)

## 2025-06-09 NOTE — PROGRESS NOTE ADULT - ASSESSMENT
89-year-old female history of A-fib, no longer on AC s/p watchman, Pulm HTN, HTN, presents with 1 day of abdominal pain and nausea. Vascular surgery consulted given CT findings of L renal infarction and R renal stenosis     Recommendations :  - Apprec Nephrology recs - fu HD  - trend Cr and K  - L renal duplex - Right renal artery stenosis at the origin.  Left renal artery could not be seen. Decreased vascularity throughout the left kidney. Left renal artery could be completely occluded versus very small.  - therapeutic AC if able  - cardiology following  - TTE   - will palliative care consult  - Agrees with Cards to keep NPO for now given abdominal distention  - If vomits, recs NGT placement  - fu CT A/P non    Vascular Surgery   [phone]  89-year-old female history of A-fib, no longer on AC s/p watchman, Pulm HTN, HTN, presents with 1 day of abdominal pain and nausea. Vascular surgery consulted given CT findings of L renal infarction and R renal stenosis     Recommendations :  - Apprec Nephrology recs - fu HD, needs electrolyte correction before being able to do angiogram   - trend Cr and K  - therapeutic AC if able  - cardiology following, TTE   - Primary team obtaining palliative care consult   - Agrees with Cards to keep NPO for now given abdominal distention  - If vomits, recs NGT placement  - fu CT A/P non    Vascular Surgery   [phone]

## 2025-06-09 NOTE — DIETITIAN INITIAL EVALUATION ADULT - OTHER CALCULATIONS
defer fluid needs to medical team discretion  Estimated protein-energy needs calculated using IBW due to weight discrepancy

## 2025-06-09 NOTE — GOALS OF CARE CONVERSATION - ADVANCED CARE PLANNING - CONVERSATION DETAILS
Son and the patient decided not to pursue temporary hemodialysis.  It would disregard her wishes if it turns out if she (the pt) need long term hemodialysis.  Reviewed the MOLST from the [facility].  The son and the pt does not want her to be intubated.   She is presently on antibiotics.  The son want the pt (his mother ) to be made comfortable (pain medications as needed).

## 2025-06-09 NOTE — PROGRESS NOTE ADULT - SUBJECTIVE AND OBJECTIVE BOX
[facility] Division of Kidney Diseases & Hypertension  FOLLOW UP NOTE  [phone]--------------------------------------------------------------------------------  Chief Complaint: TURNER, hyperkalemia    24 hour events/subjective: Pt seen and evaluated this morning. Unable to obtain ROS due to clinical status.     PAST HISTORY  --------------------------------------------------------------------------------  No significant changes to PMH, PSH, FHx, SHx, unless otherwise noted    ALLERGIES & MEDICATIONS  --------------------------------------------------------------------------------  Allergies    hydrALAZINE (Other; Flushing)  Lipitor (Other; Flushing)    Intolerances    lactose (Unknown)    Standing Inpatient Medications  amLODIPine   Tablet 10 milliGRAM(s) Oral daily  aspirin enteric coated 81 milliGRAM(s) Oral daily  cloNIDine 0.1 milliGRAM(s) Oral two times a day  dextrose 5%. 1000 milliLiter(s) IV Continuous <Continuous>  dextrose 5%. 1000 milliLiter(s) IV Continuous <Continuous>  dextrose 50% Injectable 25 Gram(s) IV Push once  dextrose 50% Injectable 12.5 Gram(s) IV Push once  dextrose 50% Injectable 25 Gram(s) IV Push once  dextrose 50% Injectable 25 Gram(s) IV Push once  dextrose 50% Injectable 12.5 Gram(s) IV Push once  dextrose 50% Injectable 25 Gram(s) IV Push once  heparin  Infusion.  Unit(s)/Hr IV Continuous <Continuous>  metoprolol succinate ER 50 milliGRAM(s) Oral daily  piperacillin/tazobactam IVPB.- 3.375 Gram(s) IV Intermittent once  sodium zirconium cyclosilicate 10 Gram(s) Oral three times a day    PRN Inpatient Medications  heparin   Injectable 6500 Unit(s) IV Push every 6 hours PRN  heparin   Injectable 3000 Unit(s) IV Push every 6 hours PRN    REVIEW OF SYSTEMS  --------------------------------------------------------------------------------  see above     VITALS/PHYSICAL EXAM  --------------------------------------------------------------------------------  T(C): 36.5 (06-09-25 @ 11:09), Max: 37.1 (06-08-25 @ 21:25)  HR: 87 (06-09-25 @ 11:09) (87 - 120)  BP: 135/78 (06-09-25 @ 11:09) (116/70 - 135/78)  RR: 16 (06-09-25 @ 11:09) (16 - 18)  SpO2: 97% (06-09-25 @ 11:09) (96% - 97%)  Wt(kg): --    06-08-25 @ 07:01  -  06-09-25 @ 07:00  --------------------------------------------------------  IN: 240 mL / OUT: 100 mL / NET: 140 mL    Physical Exam:  Gen: NAD  Neuro: non-focal  HEENT: +NC  Pulm: diminished breath sounds  CV: +S1S2  Abd: distended, tense, non-tender  Extremities: +B/L LE edema  Skin: Warm    LABS/STUDIES  --------------------------------------------------------------------------------              9.8    28.26 >-----------<  176      [06-09-25 @ 06:47]              30.7     132  |  94  |  79  ----------------------------<  95      [06-09-25 @ 10:59]  6.5   |  15  |  3.28        Ca     8.7     [06-09-25 @ 10:59]      Mg     1.9     [06-09-25 @ 06:47]      Phos  6.1     [06-09-25 @ 06:47]    PT/INR: PT 13.2 , INR 1.16       [06-09-25 @ 06:47]  PTT: 67.0       [06-09-25 @ 11:26]    Creatinine Trend:  SCr 3.28 [06-09 @ 10:59]  SCr 3.00 [06-09 @ 02:11]  SCr 2.59 [06-08 @ 15:12]  SCr 1.72 [06-07 @ 13:21]  SCr 1.53 [06-07 @ 09:09]

## 2025-06-09 NOTE — PHARMACOTHERAPY INTERVENTION NOTE - COMMENTS
Pt ordered:   piperacillin/tazobactam IVPB.- 3.375 Gram(s) IV Intermittent every 8 hours    CrCl: 15.7ml/min  SCr- 3.00mg/dL    Recommendation:  piperacillin/tazobactam IVPB.- 3.375 Gram(s) IV Intermittent every 12 hours    Elizabeth Grajeda PharmD  Transition of Care Pharmacist  Available on Microsoft Teams (preferred)

## 2025-06-09 NOTE — PROGRESS NOTE ADULT - PROBLEM SELECTOR PLAN 1
Pt with TURNER possibly due to volume overload (hypoxic, elevated BNP, edematous) vs. MALINI (IV contrast on 6/6) vs. ?B/L renal artery stenosis. Baseline Cr 0.5-0.6. Cr on admission 0.96, uptrending. Cr 3.28 today. Serum K 6.5.  - Pt volume overloaded w/ worsening renal failure and hyperkalemia resistant to medical management. Plan for urgent HD today, orders placed. HD consent obtained and placed in chart.  - Consult IR/Vascular sx for NTDC placement.  - Recommend orellana insertion for accurate I/O  - Monitor labs and UOP. Avoid nephrotoxins when possible. Dose meds for HD.

## 2025-06-09 NOTE — PROGRESS NOTE ADULT - ASSESSMENT
89F PMHx A-fib status post watchman's, pulmonary hypertension, HTN, presenting to ED for abdominal pain, nausea, vomiting.  Nephrology consulted for TURNER.    Pt seen and examined at bedside. Patient states that after she ate breakfast she developed nausea and vomiting. Reports abdominal bloating and constipation. Prior to that, had diarrhea that resolved with Imodium. Pt also reports increased LE edema and orthopnea.    Upon presentation to the ED, BP stable, afebrile, placed on 3L O2. CXR with trace B/L pleural effusions. WBC count rising ~12.5 to ~18.5. Lactate wnl. Cr 0.96 on admission and rising to 1.7. Serum potassium 6.1. Had CT A/P with IV contrast on 6/6/25 that showed nonenhancing left kidney which is symmetric in size to the contralateral side and demonstrated normal enhancement in 2024 study, Severe atherosclerotic disease with narrowing of the bilateral renal artery origins and poor opacification of the left renal artery. Findings are concerning for left renal infarction secondary to vascular compromise. Pt started on Heparin gtt.

## 2025-06-10 DIAGNOSIS — F41.9 ANXIETY DISORDER, UNSPECIFIED: ICD-10-CM

## 2025-06-10 DIAGNOSIS — Z71.89 OTHER SPECIFIED COUNSELING: ICD-10-CM

## 2025-06-10 DIAGNOSIS — Z51.5 ENCOUNTER FOR PALLIATIVE CARE: ICD-10-CM

## 2025-06-10 DIAGNOSIS — K59.01 SLOW TRANSIT CONSTIPATION: ICD-10-CM

## 2025-06-10 LAB
ANION GAP SERPL CALC-SCNC: 21 MMOL/L — HIGH (ref 5–17)
APTT BLD: 70.8 SEC — HIGH (ref 26.1–36.8)
BUN SERPL-MCNC: 92 MG/DL — HIGH (ref 7–23)
CALCIUM SERPL-MCNC: 8.4 MG/DL — SIGNIFICANT CHANGE UP (ref 8.4–10.5)
CHLORIDE SERPL-SCNC: 92 MMOL/L — LOW (ref 96–108)
CO2 SERPL-SCNC: 18 MMOL/L — LOW (ref 22–31)
CREAT SERPL-MCNC: 3.97 MG/DL — HIGH (ref 0.5–1.3)
EGFR: 10 ML/MIN/1.73M2 — LOW
EGFR: 10 ML/MIN/1.73M2 — LOW
GLUCOSE SERPL-MCNC: 71 MG/DL — SIGNIFICANT CHANGE UP (ref 70–99)
HBV SURFACE AB SER-ACNC: <3.3 MIU/ML — LOW
HCT VFR BLD CALC: 26.5 % — LOW (ref 34.5–45)
HCV AB S/CO SERPL IA: 0.06 S/CO — SIGNIFICANT CHANGE UP
HCV AB SERPL-IMP: SIGNIFICANT CHANGE UP
HGB BLD-MCNC: 8.7 G/DL — LOW (ref 11.5–15.5)
MCHC RBC-ENTMCNC: 32.8 G/DL — SIGNIFICANT CHANGE UP (ref 32–36)
MCHC RBC-ENTMCNC: 32.8 PG — SIGNIFICANT CHANGE UP (ref 27–34)
MCV RBC AUTO: 100 FL — SIGNIFICANT CHANGE UP (ref 80–100)
MRSA PCR RESULT.: SIGNIFICANT CHANGE UP
NRBC BLD AUTO-RTO: 0 /100 WBCS — SIGNIFICANT CHANGE UP (ref 0–0)
PLATELET # BLD AUTO: 152 K/UL — SIGNIFICANT CHANGE UP (ref 150–400)
POTASSIUM SERPL-MCNC: 5.4 MMOL/L — HIGH (ref 3.5–5.3)
POTASSIUM SERPL-SCNC: 5.4 MMOL/L — HIGH (ref 3.5–5.3)
RBC # BLD: 2.65 M/UL — LOW (ref 3.8–5.2)
RBC # FLD: 14 % — SIGNIFICANT CHANGE UP (ref 10.3–14.5)
S AUREUS DNA NOSE QL NAA+PROBE: SIGNIFICANT CHANGE UP
SODIUM SERPL-SCNC: 131 MMOL/L — LOW (ref 135–145)
WBC # BLD: 21.35 K/UL — HIGH (ref 3.8–10.5)
WBC # FLD AUTO: 21.35 K/UL — HIGH (ref 3.8–10.5)

## 2025-06-10 PROCEDURE — 99232 SBSQ HOSP IP/OBS MODERATE 35: CPT

## 2025-06-10 PROCEDURE — 99222 1ST HOSP IP/OBS MODERATE 55: CPT

## 2025-06-10 PROCEDURE — 99223 1ST HOSP IP/OBS HIGH 75: CPT

## 2025-06-10 PROCEDURE — G0545: CPT

## 2025-06-10 RX ORDER — AMLODIPINE BESYLATE 10 MG/1
5 TABLET ORAL DAILY
Refills: 0 | Status: DISCONTINUED | OUTPATIENT
Start: 2025-06-10 | End: 2025-06-11

## 2025-06-10 RX ORDER — HYDROMORPHONE/SOD CHLOR,ISO/PF 2 MG/10 ML
0.2 SYRINGE (ML) INJECTION
Refills: 0 | Status: DISCONTINUED | OUTPATIENT
Start: 2025-06-10 | End: 2025-06-11

## 2025-06-10 RX ORDER — POLYETHYLENE GLYCOL 3350 17 G/17G
17 POWDER, FOR SOLUTION ORAL
Refills: 0 | Status: DISCONTINUED | OUTPATIENT
Start: 2025-06-10 | End: 2025-06-11

## 2025-06-10 RX ORDER — SENNA 187 MG
2 TABLET ORAL AT BEDTIME
Refills: 0 | Status: DISCONTINUED | OUTPATIENT
Start: 2025-06-10 | End: 2025-06-11

## 2025-06-10 RX ADMIN — Medication 2 TABLET(S): at 21:29

## 2025-06-10 RX ADMIN — Medication 81 MILLIGRAM(S): at 11:37

## 2025-06-10 RX ADMIN — SODIUM ZIRCONIUM CYCLOSILICATE 10 GRAM(S): 5 POWDER, FOR SUSPENSION ORAL at 13:38

## 2025-06-10 RX ADMIN — HEPARIN SODIUM 3000 UNIT(S): 1000 INJECTION INTRAVENOUS; SUBCUTANEOUS at 00:28

## 2025-06-10 RX ADMIN — AMLODIPINE BESYLATE 10 MILLIGRAM(S): 10 TABLET ORAL at 06:34

## 2025-06-10 RX ADMIN — Medication 25 GRAM(S): at 06:36

## 2025-06-10 RX ADMIN — SODIUM ZIRCONIUM CYCLOSILICATE 10 GRAM(S): 5 POWDER, FOR SUSPENSION ORAL at 06:37

## 2025-06-10 RX ADMIN — HEPARIN SODIUM 1600 UNIT(S)/HR: 1000 INJECTION INTRAVENOUS; SUBCUTANEOUS at 00:27

## 2025-06-10 RX ADMIN — METOPROLOL SUCCINATE 50 MILLIGRAM(S): 50 TABLET, EXTENDED RELEASE ORAL at 06:33

## 2025-06-10 RX ADMIN — HEPARIN SODIUM 1600 UNIT(S)/HR: 1000 INJECTION INTRAVENOUS; SUBCUTANEOUS at 21:21

## 2025-06-10 RX ADMIN — Medication 25 GRAM(S): at 17:15

## 2025-06-10 RX ADMIN — HEPARIN SODIUM 1600 UNIT(S)/HR: 1000 INJECTION INTRAVENOUS; SUBCUTANEOUS at 08:29

## 2025-06-10 RX ADMIN — Medication 0.1 MILLIGRAM(S): at 06:34

## 2025-06-10 RX ADMIN — SODIUM ZIRCONIUM CYCLOSILICATE 10 GRAM(S): 5 POWDER, FOR SUSPENSION ORAL at 21:23

## 2025-06-10 NOTE — ADVANCED PRACTICE NURSE CONSULT - RECOMMEDATIONS
Impression:    B/L buttocks/sacral hyperpigmentation, cannot rule out a deep tissue injury present on admission  B/L heel hyperpigmentation, cannot rule out a deep tissue injury present on admission  urinary incontinence      Recommendations:    1) turn and position q2 and PRN utilizing offloading assistive devices  2) routine pericare daily and PRN soiling  3) encourage optimal nutrition  4) waffle cushion or pillow on seat when oob to chair  5) B/L LE complete cair air fluidized boots or dominic-lock pillow to offload heels/feet  6) triad protective barrier cream to B/L buttocks/sacrum daily and PRN soiling  7) incontinence management - consider external urinary catheter to divert urine from skin if incontinent  8) sween 24 moisturizer to dry skin daily     Plan discussed with MARIA ISABEL Harden on unit      For questions/comments regarding the recommendations in this consult, please contact Cecilia Cross via Microsoft Teams. Wound care will not actively follow. For new concerns, please enter new consult. Thank you!

## 2025-06-10 NOTE — ADVANCED PRACTICE NURSE CONSULT - ASSESSMENT
Patient encountered on 3 DSU. When wound care RN arrived on unit, patient was found lying in a low air loss pressure redistribution support surface style bed. Patient was alert and oriented and gave consent to skin consult. Ms Keith is unable to turn independently and staff assistance x 1 was provided. Once turned, urinary incontinence noted, perineal care provided. Purewick external female urinary device in place, diverting urine from skin. The wound care RN was able to visualize an area of persistent nonblanchable hyperpigmentation as well as scattered areas of hypopigmentation over B/L buttocks/sacral skin, total area measures approximately 4cm x 7cm x 0cm - cannot rule out a deep tissue injury present on admission. Hypopigmentation is indicative of a prior skin injury that has since healed. A skin injury is more likely to occur in the same area as a prior skin injury due to the reduced tensile strength of the replaced tissue. B/L heels with hyperpigmentation measuring approximately 4cm x 4cm x 0cm - initial phases of a deep tissue injury cannot be ruled out at this time. Once consult was complete, patient was educated regarding the need for routine turning and positioning to prevent pressure injuries and patient was placed in an upright position at patient's request, in order to eat breakfast when it arrives.

## 2025-06-10 NOTE — PROGRESS NOTE ADULT - SUBJECTIVE AND OBJECTIVE BOX
Date of Service: 06-10-25 @ 07:43           CARDIOLOGY     PROGRESS  NOTE   ________________________________________________    CHIEF COMPLAINT:Patient is a 89y old  Female who presents with a chief complaint of Ischemia and infarction of kidney  comfortable    	  REVIEW OF SYSTEMS:  CONSTITUTIONAL: No fever, weight loss, or fatigue  EYES: No eye pain, visual disturbances, or discharge  ENT:  No difficulty hearing, tinnitus, vertigo; No sinus or throat pain  NECK: No pain or stiffness  RESPIRATORY: No cough, wheezing, chills or hemoptysis; No Shortness of Breath  CARDIOVASCULAR: No chest pain, palpitations, passing out, dizziness, or leg swelling  GASTROINTESTINAL: No abdominal or epigastric pain. No nausea, vomiting, or hematemesis; No diarrhea or constipation. No melena or hematochezia.  GENITOURINARY: No dysuria, frequency, hematuria, or incontinence  NEUROLOGICAL: No headaches, memory loss, loss of strength, numbness, or tremors  SKIN: No itching, burning, rashes, or lesions   LYMPH Nodes: No enlarged glands  ENDOCRINE: No heat or cold intolerance; No hair loss  MUSCULOSKELETAL: No joint pain or swelling; No muscle, back, or extremity pain  PSYCHIATRIC: No depression, anxiety, mood swings, or difficulty sleeping  HEME/LYMPH: No easy bruising, or bleeding gums  ALLERGY AND IMMUNOLOGIC: No hives or eczema	    [ x] All others negative	  [ ] Unable to obtain    PHYSICAL EXAM:  T(C): 36.3 (06-10-25 @ 05:05), Max: 36.5 (06-09-25 @ 11:09)  HR: 83 (06-10-25 @ 05:05) (83 - 92)  BP: 107/67 (06-10-25 @ 05:05) (107/67 - 135/78)  RR: 18 (06-10-25 @ 05:05) (16 - 18)  SpO2: 98% (06-10-25 @ 05:05) (97% - 98%)  Wt(kg): --  I&O's Summary    09 Jun 2025 07:01  -  10 Edmar 2025 07:00  --------------------------------------------------------  IN: 340 mL / OUT: 0 mL / NET: 340 mL        Appearance: Normal	  HEENT:   Normal oral mucosa, PERRL, EOMI	  Lymphatic: No lymphadenopathy  Cardiovascular: Normal S1 S2, No JVD,+murmurs, No edema  Respiratory: rhonchi  Psychiatry:?dementia  Gastrointestinal:  Soft, Non-tender, + BS	  Skin: No rashes, No ecchymoses, No cyanosis	  Extremities: Normal range of motion, No clubbing, cyanosis or edema  Vascular: Peripheral pulses palpable 2+ bilaterally    MEDICATIONS  (STANDING):  amLODIPine   Tablet 10 milliGRAM(s) Oral daily  aspirin enteric coated 81 milliGRAM(s) Oral daily  cloNIDine 0.1 milliGRAM(s) Oral two times a day  dextrose 5%. 1000 milliLiter(s) (100 mL/Hr) IV Continuous <Continuous>  dextrose 5%. 1000 milliLiter(s) (100 mL/Hr) IV Continuous <Continuous>  dextrose 50% Injectable 25 Gram(s) IV Push once  dextrose 50% Injectable 12.5 Gram(s) IV Push once  dextrose 50% Injectable 25 Gram(s) IV Push once  dextrose 50% Injectable 25 Gram(s) IV Push once  dextrose 50% Injectable 12.5 Gram(s) IV Push once  dextrose 50% Injectable 25 Gram(s) IV Push once  heparin  Infusion.  Unit(s)/Hr (15 mL/Hr) IV Continuous <Continuous>  metoprolol succinate ER 50 milliGRAM(s) Oral daily  piperacillin/tazobactam IVPB.. 3.375 Gram(s) IV Intermittent every 12 hours  sodium zirconium cyclosilicate 10 Gram(s) Oral three times a day      TELEMETRY: 	    ECG:  	  RADIOLOGY:  OTHER: 	  	  LABS:	 	    CARDIAC MARKERS:                            9.8    28.26 )-----------( 176      ( 09 Jun 2025 06:47 )             30.7     06-09    133[L]  |  94[L]  |  84[H]  ----------------------------<  100[H]  5.8[H]   |  23  |  3.58[H]    Ca    8.8      09 Jun 2025 16:24  Phos  6.1     06-09  Mg     1.9     06-09      proBNP:   Lipid Profile:   HgA1c:   TSH:   PT/INR - ( 09 Jun 2025 06:47 )   PT: 13.2 sec;   INR: 1.16 ratio         PTT - ( 10 Edmar 2025 07:08 )  PTT:70.8 sec    Nephrology team spoke to pts son multiple times both on the phone and at bedside. Pts son (Roderick Zabala) understands that pt with normal renal fxn on admission which has now worsened w/ associated severe hyperkalemia iso renal artery stenosis. Nephrology offering trial of dialysis to optimize prior to and after angiogram. However pts son does not wish to proceed with angiogram and also does not want dialysis anymore. He understands that as renal fxn continues to worsen so too will the metabolic derangements which can eventually lead to cardiac arrest/death. Ok with continuing medical management. He would like to speak with palliative care and possibly proceed to comfort measures.    HD orders canceled. Continue to medically manage. Consult palliative care. GOC as per primary.      Assessment and plan  ---------------------------  89F, PMHx A-fib status post watchman's, pulmonary hypertension, HTN, presenting to ED for abdominal pain, nausea, vomiting.  Patient states that she was eating breakfast this morning, in her normal state of health, which shortly after breakfast she developed nausea and vomiting.  Patient states she vomited her entire breakfast, no other resident/other individual who ate the breakfast had similar symptoms.  Endorsing continued distention, and feeling of fullness.  Patient states that she has been unable to pass stool or flatus since that time.  She does not endorse fevers.  Patient had diarrhea 2 days ago, which resolved with Imodium.  pt is well known to me with hx of R heart failure, a.fib, s/p watchman procedure who was recently dc ed from the hospital post uro sepsis with nausea / vomiting and abdomonal pain  ct scan with severe atherosclerosis renal arteries with l renal infarct  vascular noted  continue iv heparin  will adjust cardiac meds with sec to l renal infarct  renal eval awaiting   continue bp meds, no ace/arb  add Lokelma for elevated K level  a.fib with RVR increase beta blocker , decrease clonidine dose if decrease bp  blood tests stat noted  may consider palliative care consult  npo for risk of aspiration, start Zosyn, ID consult, NGT for meds  discussed with Dr Keller  discussed with vascular surgery, son, agree with conservative measures  dc clonidine

## 2025-06-10 NOTE — PROGRESS NOTE ADULT - SUBJECTIVE AND OBJECTIVE BOX
---___---___---___---___---___---___ ---___---___---___---___---___---___---___---___---                  M E D I C A L   A T T E N D I N G   P R O G R E S S   N O T E  ---___---___---___---___---___---___ ---___---___---___---___---___---___---___---___---        ================================================    ++CHIEF COMPLAINT:   Patient is a 89y old  Female who presents with a chief complaint of Ischemia and infarction of kidney    per H&P: "89F, PMHx A-fib status post watchman's, pulmonary hypertension, HTN, presenting to ED for abdominal pain, nausea, vomiting."   (2025 14:31)      Ischemia and infarction of kidney      ---___---___---___---___---___---  PAST MEDICAL / Surgical  HISTORY:  PAST MEDICAL & SURGICAL HISTORY:  Afib      HTN (hypertension)      H/O CHF      Uterine cancer      S/P hysterectomy          ---___---___---___---___---___---  FAMILY HISTORY:   FAMILY HISTORY:        ---___---___---___---___---___---  ALLERGIES:   Allergies    hydrALAZINE (Other; Flushing)  Lipitor (Other; Flushing)    Intolerances    lactose (Unknown)      ---___---___---___---___---___---  MEDICATIONS:  MEDICATIONS  (STANDING):  amLODIPine   Tablet 5 milliGRAM(s) Oral daily  aspirin enteric coated 81 milliGRAM(s) Oral daily  dextrose 5%. 1000 milliLiter(s) (100 mL/Hr) IV Continuous <Continuous>  dextrose 5%. 1000 milliLiter(s) (100 mL/Hr) IV Continuous <Continuous>  dextrose 50% Injectable 25 Gram(s) IV Push once  dextrose 50% Injectable 12.5 Gram(s) IV Push once  dextrose 50% Injectable 25 Gram(s) IV Push once  dextrose 50% Injectable 25 Gram(s) IV Push once  dextrose 50% Injectable 12.5 Gram(s) IV Push once  dextrose 50% Injectable 25 Gram(s) IV Push once  heparin  Infusion.  Unit(s)/Hr (15 mL/Hr) IV Continuous <Continuous>  metoprolol succinate ER 50 milliGRAM(s) Oral daily  piperacillin/tazobactam IVPB.. 3.375 Gram(s) IV Intermittent every 12 hours  polyethylene glycol 3350 17 Gram(s) Oral two times a day  senna 2 Tablet(s) Oral at bedtime  sodium zirconium cyclosilicate 10 Gram(s) Oral three times a day    MEDICATIONS  (PRN):  heparin   Injectable 6500 Unit(s) IV Push every 6 hours PRN For aPTT less than 40  heparin   Injectable 3000 Unit(s) IV Push every 6 hours PRN For aPTT between 40 - 57  HYDROmorphone  Injectable 0.2 milliGRAM(s) IV Push every 3 hours PRN Severe Pain (7 - 10)      ---___---___---___---___---___---  REVIEW OF SYSTEM:    GEN: no fever, no chills, no pain  RESP: no SOB, no cough, no sputum  CVS: no chest pain, no palpitations, no edema  GI: no abdominal pain, no nausea, no vomiting, no constipation, no diarrhea  : no dysurea, no frequency, no hematurea  Neuro: no headache, no dizziness  PSYCH: no anxiety, no depression  Derm : no itching, no rash    ---___---___---___---___---___---  VITAL SIGNS:  89y , CAPILLARY BLOOD GLUCOSE      POCT Blood Glucose.: 100 mg/dL (2025 08:34)    T(C): 36.6 (06-10-25 @ 19:31), Max: 36.6 (06-10-25 @ 11:43)  HR: 82 (06-10-25 @ 19:31) (82 - 97)  BP: 95/56 (06-10-25 @ 19:31) (95/56 - 120/68)  RR: 18 (06-10-25 @ 19:31) (17 - 18)  SpO2: 95% (06-10-25 @ 19:31) (95% - 98%)  ---___---___---___---___---___---  PHYSICAL EXAM:    GEN: A&O X 3 , NAD , comfortable  HEENT: NCAT, PERRL, MMM, hearing intact  Neck: supple , no JVD  CVS: S1S2 , regular , No M/R/G appreciated  PULM: CTA B/L,  no W/R/R appreciated  ABD.: soft. non tender, non distended,  bowel sounds present  Extrem: intact pulses , no edema   Derm: No rash , no ecchymoses  PSYCH : normal mood,  no delusion not anxious     ---___---___---___---___---___---            LAB AND IMAGIN.7    21.35 )-----------( 152      ( 10 Edmar 2025 07:09 )             26.5               06-10    131[L]  |  92[L]  |  92[H]  ----------------------------<  71  5.4[H]   |  18[L]  |  3.97[H]    Ca    8.4      10 Edmar 2025 07:07  Phos  6.1     06-09  Mg     1.9     06-09      PT/INR - ( 2025 06:47 )   PT: 13.2 sec;   INR: 1.16 ratio         PTT - ( 10 Edmar 2025 07:08 )  PTT:70.8 sec                        Urinalysis Basic - ( 10 Edmar 2025 07:07 )    Color: x / Appearance: x / SG: x / pH: x  Gluc: 71 mg/dL / Ketone: x  / Bili: x / Urobili: x   Blood: x / Protein: x / Nitrite: x   Leuk Esterase: x / RBC: x / WBC x   Sq Epi: x / Non Sq Epi: x / Bacteria: x        [All pertinent / recent Imaging reviewed]         ---___---___---___---___---___---___ ---___---___---___---___---                         A S S E S S M E N T   A N D   P L A N :      HEALTH ISSUES - PROBLEM Dx:  TURNER (acute kidney injury)  Hyperkalemia  Volume overload  Renal artery stenosis    vascular and nephrology appreciated  spoke to son  and he does now want his mother to have dialysis because the risks outweigh the benefit   palliative consult appreciated                      ___________________________  Thank you,  Porter Keller  [phone]

## 2025-06-10 NOTE — CONSULT NOTE ADULT - SUBJECTIVE AND OBJECTIVE BOX
Date of Service:06-10-25 @ 17:11  HPI:  Date of Service, 06-07-25 @ 06:46  CHIEF COMPLAINT:Patient is a 89y old  Female who presents with a chief complaint of     HPI:  89F, PMHx A-fib status post watchman's, pulmonary hypertension, HTN, presenting to ED for abdominal pain, nausea, vomiting.  Patient states that she was eating breakfast this morning, in her normal state of health, which shortly after breakfast she developed nausea and vomiting.  Patient states she vomited her entire breakfast, no other resident/other individual who ate the breakfast had similar symptoms.  Endorsing continued distention, and feeling of fullness.  Patient states that she has been unable to pass stool or flatus since that time.  She does not endorse fevers.  Patient had diarrhea 2 days ago, which resolved with Imodium.      Palliative HPI:    Met with patient at bedside. She reports "I want palliative" and asked if       PERTINENT PM/SXH:   Afib    HTN (hypertension)    H/O CHF    Uterine cancer      S/P hysterectomy      FAMILY HISTORY:    Family Hx substance abuse [ ]yes [ ]no  ITEMS NOT CHECKED ARE NOT PRESENT    SOCIAL HISTORY:   Significant other/partner[ ]  Children[ ]  Orthodoxy/Spirituality:  Substance hx:  [ ]   Tobacco hx:  [ ]   Alcohol hx: [ ]   Home Opioid hx:  [ ] I-Stop Reference No:  Living Situation: [ ]Home  [ ]Long term care  [ ]Rehab [ ]Other    ADVANCE DIRECTIVES:    DNR/MOLST  [ ]  Living Will  [ ]   DECISION MAKER(s):  [ ] Health Care Proxy(s)  [ ] Surrogate(s)  [ ] Guardian           Name(s): Phone Number(s):    BASELINE (I)ADL(s) (prior to admission):  [place]: [ ]Total  [ ] Moderate [ ]Dependent    Allergies    hydrALAZINE (Other; Flushing)  Lipitor (Other; Flushing)    Intolerances    lactose (Unknown)  MEDICATIONS  (STANDING):  amLODIPine   Tablet 5 milliGRAM(s) Oral daily  aspirin enteric coated 81 milliGRAM(s) Oral daily  dextrose 5%. 1000 milliLiter(s) (100 mL/Hr) IV Continuous <Continuous>  dextrose 5%. 1000 milliLiter(s) (100 mL/Hr) IV Continuous <Continuous>  dextrose 50% Injectable 25 Gram(s) IV Push once  dextrose 50% Injectable 12.5 Gram(s) IV Push once  dextrose 50% Injectable 25 Gram(s) IV Push once  dextrose 50% Injectable 25 Gram(s) IV Push once  dextrose 50% Injectable 12.5 Gram(s) IV Push once  dextrose 50% Injectable 25 Gram(s) IV Push once  heparin  Infusion.  Unit(s)/Hr (15 mL/Hr) IV Continuous <Continuous>  metoprolol succinate ER 50 milliGRAM(s) Oral daily  piperacillin/tazobactam IVPB.. 3.375 Gram(s) IV Intermittent every 12 hours  sodium zirconium cyclosilicate 10 Gram(s) Oral three times a day    MEDICATIONS  (PRN):  heparin   Injectable 6500 Unit(s) IV Push every 6 hours PRN For aPTT less than 40  heparin   Injectable 3000 Unit(s) IV Push every 6 hours PRN For aPTT between 40 - 57    PRESENT SYMPTOMS: [ ]Unable to self-report  [ ] CPOT [ ] PAINADs [ ] RDOS  Source if other than patient:  [ ]Family   [ ]Team     Pain: [ ]yes [ ]no  QOL impact -   Location -                    Aggravating factors -  Quality -  Radiation -  Timing-  Severity (0-10 scale):  Minimal acceptable level (0-10 scale):     CPOT:    https://www.[facility].org/getattachment/[email or web address]/Critical-Care-Pain-Observation-Tool-(CPOT)    PAINAD Score: See PAINAD tool and score below     Dyspnea:                           [ ]Mild [ ]Moderate [ ]Severe    RDOS: See RDOS tool and score below   0 to 2  minimal or no respiratory distress   3  mild distress  4 to 6 moderate distress  >7 severe distress      Anxiety:                             [ ]Mild [ ]Moderate [ ]Severe  Fatigue:                             [ ]Mild [ ]Moderate [ ]Severe  Nausea:                             [ ]Mild [ ]Moderate [ ]Severe  Loss of appetite:              [ ]Mild [ ]Moderate [ ]Severe  Constipation:                    [ ]Mild [ ]Moderate [ ]Severe    PCSSQ[Palliative Care Spiritual Screening Question]   Severity (0-10):  Score of 4 or > indicate consideration of Chaplaincy referral.  Chaplaincy Referral: [ ] yes [ ] refused [ ] following [ ] Deferred     Caregiver Erin? : [ ] yes [ ] no [ ] Deferred [ ] Declined             Social work referral [ ] Patient & Family Centered Care Referral [ ]     Anticipatory Grief present?:  [ ] yes [ ] no  [ ] Deferred                  Social work referral [ ] Chaplaincy Referral [ ]    		  Other Symptoms:  [ ]All other review of systems negative     Palliative Performance Status Version 2:   See PPSv2 tool and score below          PHYSICAL EXAM:  Vital Signs Last 24 Hrs  T(C): 36.6 (10 Edmar 2025 11:43), Max: 36.6 (10 Edmar 2025 11:43)  T(F): 97.8 (10 Edmar 2025 11:43), Max: 97.8 (10 Edmar 2025 11:43)  HR: 97 (10 Edmar 2025 11:43) (83 - 97)  BP: 120/68 (10 Edmar 2025 11:43) (107/67 - 120/68)  BP(mean): --  RR: 18 (10 Edmar 2025 11:43) (17 - 18)  SpO2: 98% (10 Edmar 2025 11:43) (97% - 98%)    Parameters below as of 10 Edmar 2025 11:43  Patient On (Oxygen Delivery Method): nasal cannula  O2 Flow (L/min): 2   I&O's Summary    09 Jun 2025 07:01  -  10 Edmar 2025 07:00  --------------------------------------------------------  IN: 356 mL / OUT: 0 mL / NET: 356 mL    10 Edmar 2025 07:01  -  10 Edmar 2025 17:11  --------------------------------------------------------  IN: 294 mL / OUT: 0 mL / NET: 294 mL      GENERAL: [ ]Cachexia    [ ]Alert  [ ]Oriented x   [ ]Lethargic  [ ]Unarousable  [ ]Verbal  [ ]Non-Verbal  Behavioral:   [ ] Anxiety  [ ] Delirium [ ] Agitation [ ] Other  HEENT:  [ ]Normal   [ ]Dry mouth   [ ]ET Tube/Trach  [ ]Oral lesions  PULMONARY:   [ ]Clear [ ]Tachypnea  [ ]Audible excessive secretions   [ ]Rhonchi        [ ]Right [ ]Left [ ]Bilateral  [ ]Crackles        [ ]Right [ ]Left [ ]Bilateral  [ ]Wheezing     [ ]Right [ ]Left [ ]Bilateral  [ ]Diminished breath sounds [ ]right [ ]left [ ]bilateral  CARDIOVASCULAR:    [ ]Regular [ ]Irregular [ ]Tachy  [ ]Marek [ ]Murmur [ ]Other  GASTROINTESTINAL:  [ ]Soft  [ ]Distended   [ ]+BS  [ ]Non tender [ ]Tender  [ ]Other [ ]PEG [ ]OGT/ NGT  Last BM:  GENITOURINARY:  [ ]Normal [ ] Incontinent   [ ]Oliguria/Anuria   [ ]Forman  MUSCULOSKELETAL:   [ ]Normal   [ ]Weakness  [ ]Bed/Wheelchair bound [ ]Edema  NEUROLOGIC:   [ ]No focal deficits  [ ]Cognitive impairment  [ ]Dysphagia [ ]Dysarthria [ ]Paresis [ ]Other   SKIN:   [ ]Normal  [ ]Rash  [ ]Other  [ ]Pressure ulcer(s)       Present on admission [ ]y [ ]n    CRITICAL CARE:  [ ] Shock Present  [ ]Septic [ ]Cardiogenic [ ]Neurologic [ ]Hypovolemic  [ ]  Vasopressors [ ]  Inotropes   [ ]Respiratory failure present [ ]Mechanical ventilation [ ]Non-invasive ventilatory support [ ]High flow    [ ]Acute  [ ]Chronic [ ]Hypoxic  [ ]Hypercarbic [ ]Other  [ ]Other organ failure     LABS:                        8.7    21.35 )-----------( 152      ( 10 Edmar 2025 07:09 )             26.5   06-10    131[L]  |  92[L]  |  92[H]  ----------------------------<  71  5.4[H]   |  18[L]  |  3.97[H]    Ca    8.4      10 Edmar 2025 07:07  Phos  6.1     06-09  Mg     1.9     06-09    PT/INR - ( 09 Jun 2025 06:47 )   PT: 13.2 sec;   INR: 1.16 ratio         PTT - ( 10 Edmar 2025 07:08 )  PTT:70.8 sec    Urinalysis Basic - ( 10 Edmar 2025 07:07 )    Color: x / Appearance: x / SG: x / pH: x  Gluc: 71 mg/dL / Ketone: x  / Bili: x / Urobili: x   Blood: x / Protein: x / Nitrite: x   Leuk Esterase: x / RBC: x / WBC x   Sq Epi: x / Non Sq Epi: x / Bacteria: x      RADIOLOGY & ADDITIONAL STUDIES:    PROTEIN CALORIE MALNUTRITION PRESENT: [ ]mild [ ]moderate [ ]severe [ ]underweight [ ]morbid obesity  https://www.andeal.org/vault/2440/web/files/ONC/Table_Clinical%20Characteristics%20to%20Document%20Malnutrition-White%20JV%20et%20al%282281.pdf    Height (cm): 160 (06-06-25 @ 13:23), 170.2 (03-08-25 @ 12:10), 167.6 (12-15-24 @ 14:46)  Weight (kg): 82.7 (06-06-25 @ 19:16), 63.5 (03-08-25 @ 12:10), 68 (12-15-24 @ 14:46)  BMI (kg/m2): 32.3 (06-06-25 @ 19:16), 24.8 (06-06-25 @ 13:23), 21.9 (03-08-25 @ 12:10)    [ ]PPSV2 < or = to 30% [ ]significant weight loss  [ ]poor nutritional intake  [ ]anasarca[ ]Artificial Nutrition      Other REFERRALS:  [ ]Hospice  [ ]Child Life  [ ]Social Work  [ ]Case management [ ]Holistic Therapy     Goals of Care Document:  Date of Service:06-10-25 @ 17:11  HPI:  Date of Service, 06-07-25 @ 06:46  CHIEF COMPLAINT:Patient is a 89y old  Female who presents with a chief complaint of     HPI:  89F, PMHx A-fib status post watchman's, pulmonary hypertension, HTN, presenting to ED for abdominal pain, nausea, vomiting.  Patient states that she was eating breakfast this morning, in her normal state of health, which shortly after breakfast she developed nausea and vomiting.  Patient states she vomited her entire breakfast, no other resident/other individual who ate the breakfast had similar symptoms.  Endorsing continued distention, and feeling of fullness.  Patient states that she has been unable to pass stool or flatus since that time.  She does not endorse fevers.  Patient had diarrhea 2 days ago, which resolved with Imodium.      Palliative HPI:    Met patient at bedside. she reported she decided that she wants palliative. 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 with permission i summarized that i had heard that she has renal failure and that dialysis was not acceptable so she's opting to focus on her comfort at end of life. Patient requested that she could come to palliative unit in the hospital and we discussed that this could be an option but also that it's a short term stay unit so if she stabilized then team would help identify disposition plan for next place that could provide same philosophy of care. she states she does not want to go back to the [facility] but would not elaborate. She is very interested in going to [place]. Was not open to much other discussion today and just wants to rest.    PERTINENT PM/SXH:   Afib    HTN (hypertension)    H/O CHF    Uterine cancer      S/P hysterectomy      FAMILY HISTORY:    Family Hx substance abuse [ ]yes [ ]no  ITEMS NOT CHECKED ARE NOT PRESENT    SOCIAL HISTORY:   Significant other/partner[ ]  Children[x ]  Mandaeism/Spirituality: Orthodox  Substance hx:  [ ]   Tobacco hx:  [ ]   Alcohol hx: [ ]   Home Opioid hx:  [x ] I-Stop Reference No:  This report was requested by: Anna Zelaya | Reference #: [id number]    Practitioner Count: 0  Pharmacy Count: 0  Current Opioid Prescriptions: 0  Current Benzodiazepine Prescriptions: 0  Current Stimulant Prescriptions: 0      Patient Demographic Information (PDI)       PDI	First Name	Last Name	Birth Date	Gender	Street Address	[facility] Code  JUAN Keith	10/16/1933	Female	[address] [place] [address] [address]	[place]	[id number]    Prescription Information      PDI Filter:    PDI	Current Rx	Drug Type	Rx Written	Rx Dispensed	Drug	Quantity	Days Supply	Prescriber Name	Prescriber THIERNO #	Payment Method  A	N	O	07/01/2024	07/01/2024	oxycontin er 10 mg tablet	30	15	Usha Briscoe	[id number]	[place]  [facility]  A	N	O	06/30/2024	06/30/2024	oxycontin er 10 mg tablet	4	2	Mariam Mehta	[id number]	[place]  [facility]  A	N	O	06/21/2024	06/21/2024	oxycontin er 10 mg tablet	14	7	Usha Briscoe	[id number]	[place]  [facility]    * - Details of Drug Type : O = Opioid, B = Benzodiazepine, S = Stimulant    * - Drugs marked with an asterisk are compound drugs. If the compound drug is made up of more than one controlled substance, then each controlled substance will be a separate row in the table.      Living Situation: [ ]Home  [ ]Long term care  [ ]Rehab [ ]Other    ADVANCE DIRECTIVES:    DNR/MOLST  [x ]  Living Will  [ ]   DECISION MAKER(s):  [ x] Health Care Proxy(s)  [ ] Surrogate(s)  [ ] Guardian           Name(s): Phone Number(s):    BASELINE (I)ADL(s) (prior to admission):  [place]: [ ]Total  [ ] Moderate [ x]Dependent    Allergies    hydrALAZINE (Other; Flushing)  Lipitor (Other; Flushing)    Intolerances    lactose (Unknown)  MEDICATIONS  (STANDING):  amLODIPine   Tablet 5 milliGRAM(s) Oral daily  aspirin enteric coated 81 milliGRAM(s) Oral daily  dextrose 5%. 1000 milliLiter(s) (100 mL/Hr) IV Continuous <Continuous>  dextrose 5%. 1000 milliLiter(s) (100 mL/Hr) IV Continuous <Continuous>  dextrose 50% Injectable 25 Gram(s) IV Push once  dextrose 50% Injectable 12.5 Gram(s) IV Push once  dextrose 50% Injectable 25 Gram(s) IV Push once  dextrose 50% Injectable 25 Gram(s) IV Push once  dextrose 50% Injectable 12.5 Gram(s) IV Push once  dextrose 50% Injectable 25 Gram(s) IV Push once  heparin  Infusion.  Unit(s)/Hr (15 mL/Hr) IV Continuous <Continuous>  metoprolol succinate ER 50 milliGRAM(s) Oral daily  piperacillin/tazobactam IVPB.. 3.375 Gram(s) IV Intermittent every 12 hours  sodium zirconium cyclosilicate 10 Gram(s) Oral three times a day    MEDICATIONS  (PRN):  heparin   Injectable 6500 Unit(s) IV Push every 6 hours PRN For aPTT less than 40  heparin   Injectable 3000 Unit(s) IV Push every 6 hours PRN For aPTT between 40 - 57    PRESENT SYMPTOMS: [ ]Unable to self-report  [ ] CPOT [ ] PAINADs [ ] RDOS  Source if other than patient:  [ ]Family   [ ]Team     Pain: [x ]yes [ ]no  QOL impact - dec qol, dec mvmt  Location -                  patient did not wish to discuss  Aggravating factors - being moved  Quality -  patient did not wish to discuss  Radiation -  Timing- intermittent  Severity (0-10 scale):  patient did not wish to discuss  Minimal acceptable level (0-10 scale):     CPOT:    https://www.[facility].org/getattachment/[email or web address]/Critical-Care-Pain-Observation-Tool-(CPOT)    PAINAD Score: See PAINAD tool and score below     Dyspnea:                           [ ]Mild [ ]Moderate [ ]Severe    RDOS: See RDOS tool and score below   0 to 2  minimal or no respiratory distress   3  mild distress  4 to 6 moderate distress  >7 severe distress      Anxiety:                             [ ]Mild [ ]Moderate [ ]Severe  Fatigue:                             [ ]Mild [ ]Moderate [ ]Severe  Nausea:                             [ ]Mild [ ]Moderate [ ]Severe  Loss of appetite:              [ ]Mild [ ]Moderate [ ]Severe  Constipation:                    [ ]Mild [ ]Moderate [ ]Severe    PCSSQ[Palliative Care Spiritual Screening Question]   Severity (0-10):  Score of 4 or > indicate consideration of Chaplaincy referral.  Chaplaincy Referral: [ ] yes [ ] refused [ ] following [ ] Deferred     Caregiver [place]? : [ ] yes [ ] no [ ] Deferred [ ] Declined             Social work referral [ ] Patient & Family Centered Care Referral [ ]     Anticipatory Grief present?:  [ ] yes [ ] no  [ ] Deferred                  Social work referral [ ] Chaplaincy Referral [ ]    		  Other Symptoms:  [ ]All other review of systems negative     Palliative Performance Status Version 2:   See PPSv2 tool and score below          PHYSICAL EXAM:  Vital Signs Last 24 Hrs  T(C): 36.6 (10 Edmar 2025 11:43), Max: 36.6 (10 Edmar 2025 11:43)  T(F): 97.8 (10 Edmar 2025 11:43), Max: 97.8 (10 Edmar 2025 11:43)  HR: 97 (10 Edmar 2025 11:43) (83 - 97)  BP: 120/68 (10 Edmar 2025 11:43) (107/67 - 120/68)  BP(mean): --  RR: 18 (10 Edmar 2025 11:43) (17 - 18)  SpO2: 98% (10 Edmar 2025 11:43) (97% - 98%)    Parameters below as of 10 Edmar 2025 11:43  Patient On (Oxygen Delivery Method): nasal cannula  O2 Flow (L/min): 2   I&O's Summary    09 Jun 2025 07:01  -  10 Edmar 2025 07:00  --------------------------------------------------------  IN: 356 mL / OUT: 0 mL / NET: 356 mL    10 Edmar 2025 07:01  -  10 Edmar 2025 17:11  --------------------------------------------------------  IN: 294 mL / OUT: 0 mL / NET: 294 mL      GENERAL: [ ]Cachexia    [ x]Alert  [x ]Oriented x   [ ]Lethargic  [ ]Unarousable  [ x]Verbal  [ ]Non-Verbal  Behavioral:   [x ] Anxiety  [ ] Delirium [ ] Agitation [ ] Other  HEENT:  [ ]Normal   [ ]Dry mouth   [ ]ET Tube/Trach  [ ]Oral lesions  PULMONARY:   [ ]Clear [ ]Tachypnea  [ ]Audible excessive secretions   [ ]Rhonchi        [ ]Right [ ]Left [ ]Bilateral  [ ]Crackles        [ ]Right [ ]Left [ ]Bilateral  [ ]Wheezing     [ ]Right [ ]Left [ ]Bilateral  [ ]Diminished breath sounds [ ]right [ ]left [ ]bilateral  [x] no inc wob  CARDIOVASCULAR:    [ ]Regular [ ]Irregular [ ]Tachy  [ ]Marek [ ]Murmur [ ]Other  GASTROINTESTINAL:  [ ]Soft  [x ]Distended   [ ]+BS  [ ]Non tender [ ]Tender  [ ]Other [ ]PEG [ ]OGT/ NGT  Last BM:  GENITOURINARY:  [ ]Normal [ ] Incontinent   [ ]Oliguria/Anuria   [ ]Forman  MUSCULOSKELETAL:   [ ]Normal   [ ]Weakness  [ ]Bed/Wheelchair bound [ ]Edema  NEUROLOGIC:   [ ]No focal deficits  [ ]Cognitive impairment  [ ]Dysphagia [ ]Dysarthria [ ]Paresis [ ]Other   SKIN:   [ ]Normal  [ ]Rash  [ ]Other  [ ]Pressure ulcer(s)       Present on admission [ ]y [ ]n    CRITICAL CARE:  [ ] Shock Present  [ ]Septic [ ]Cardiogenic [ ]Neurologic [ ]Hypovolemic  [ ]  Vasopressors [ ]  Inotropes   [ ]Respiratory failure present [ ]Mechanical ventilation [ ]Non-invasive ventilatory support [ ]High flow    [ ]Acute  [ ]Chronic [ ]Hypoxic  [ ]Hypercarbic [ ]Other  [x ]Other organ failure - renal    LABS:                        8.7    21.35 )-----------( 152      ( 10 Edmar 2025 07:09 )             26.5   06-10    131[L]  |  92[L]  |  92[H]  ----------------------------<  71  5.4[H]   |  18[L]  |  3.97[H]    Ca    8.4      10 Edmar 2025 07:07  Phos  6.1     06-09  Mg     1.9     06-09    PT/INR - ( 09 Jun 2025 06:47 )   PT: 13.2 sec;   INR: 1.16 ratio         PTT - ( 10 Edmar 2025 07:08 )  PTT:70.8 sec    Urinalysis Basic - ( 10 Edmar 2025 07:07 )    Color: x / Appearance: x / SG: x / pH: x  Gluc: 71 mg/dL / Ketone: x  / Bili: x / Urobili: x   Blood: x / Protein: x / Nitrite: x   Leuk Esterase: x / RBC: x / WBC x   Sq Epi: x / Non Sq Epi: x / Bacteria: x      RADIOLOGY & ADDITIONAL STUDIES:    PROTEIN CALORIE MALNUTRITION PRESENT: [ ]mild [ ]moderate [ ]severe [ ]underweight [ ]morbid obesity  https://www.andeal.org/vault/2440/web/files/ONC/Table_Clinical%20Characteristics%20to%20Document%20Malnutrition-White%20JV%20et%20al%207954.pdf    Height (cm): 160 (06-06-25 @ 13:23), 170.2 (03-08-25 @ 12:10), 167.6 (12-15-24 @ 14:46)  Weight (kg): 82.7 (06-06-25 @ 19:16), 63.5 (03-08-25 @ 12:10), 68 (12-15-24 @ 14:46)  BMI (kg/m2): 32.3 (06-06-25 @ 19:16), 24.8 (06-06-25 @ 13:23), 21.9 (03-08-25 @ 12:10)    [x ]PPSV2 < or = to 30% [ ]significant weight loss  [ ]poor nutritional intake  [ ]anasarca[ ]Artificial Nutrition      Other REFERRALS:  [ ]Hospice  [ ]Child Life  [ ]Social Work  [x ]Case management [ ]Holistic Therapy     Goals of Care Document:

## 2025-06-10 NOTE — CONSULT NOTE ADULT - CONVERSATION DETAILS
Patient did not wish to revisit entire prior conversations, but confirmed does not want HD and wants palliative care which she is familiar with and wants to focus on comfort at end of life. Wants to be allowed to rest and not moved too much. Interested in PCU at [facility] or [facility].

## 2025-06-10 NOTE — CONSULT NOTE ADULT - ASSESSMENT
89-year-old female history of A-fib, no longer on AC s/p watchman, Pulm HTN, HTN, presents with 1 day of abdominal pain and nausea. . Vascular surgery consulted given CT findings of L renal infraction.     Recommendations :  - no acute vascular surgery intervention.   - recommend admission to medicine  - recommend L renal duplex  - therapeutic AC if able  - cardiology consult   - TTE     Plan Discussed with Vascular Surgery Fellow on behalf of Dr. Jenkins    Vascular Surgery   [phone] 
Assessment/Plan: 89F PMHx A-fib status post watchman's, pulmonary hypertension, HTN, presenting to ED for abdominal pain, nausea, vomiting. Nephrology on board for renal artery stenosis. IR consulted for non-tunneled HD catheter placement iso TURNER. Structural Team consulted for Tricuspid Regurgitation.     
89F PMHx A-fib status post watchman's, pulmonary hypertension, HTN, presenting to ED for abdominal pain, nausea, vomiting.  Nephrology consulted for TURNER.    Pt seen and examined at bedside. Patient states that after she ate breakfast she developed nausea and vomiting. Reports abdominal bloating and constipation. Prior to that, had diarrhea that resolved with Imodium. Pt also reports increased LE edema and orthopnea.    Upon presentation to the ED, BP stable, afebrile, placed on 3L O2. CXR with trace B/L pleural effusions. WBC count rising ~12.5 to ~18.5. Lactate wnl. Cr 0.96 on admission and rising to 1.7. Serum potassium 6.1. Had CT A/P with IV contrast on 6/6/25 that showed nonenhancing left kidney which is symmetric in size to the contralateral side and demonstrated normal enhancement in 2024 study, Severe atherosclerotic disease with narrowing of the bilateral renal artery origins and poor opacification of the left renal artery. Findings are concerning for left renal infarction secondary to vascular compromise. Pt started on Heparin gtt.
89 yo F A Fib, pulm HTN, HTN, abd pain, nausea vomiting  No fevers, has leukocytosis  Abd pain, nausea, vomiting  After admission, had acute rise in WBC  CT atherosclerotic disease, narrowing of bilateral renal arteries, and poor opacification of L renal artery--renal infarction?  6/7 L renal artery not seen USG  CXR effusions, atelectasis  Leukocytosis reactive to renal infarct vs other occult? Cover with antibiotic for now in case occult bacteremia  Overall, Leukocytosis, N/V  - Zosyn 3.375g q 12  - Check BCXs x 2  - Care for suspected renal infarct per team  - Trend WBC to normal  - Monitor for any further GI symptoms, diarrhea  - Monitor for any signs thrombophlebitis    Porter Sierra MD  Contact on TEAMS messaging from 9am - 5pm  From 5pm-9am, on weekends, or if no response call [phone]    Antibiotic decision making based on local antibiogram and available recent culture results
89W HTN, pulm HTN, afib s/p GAEL wells admitted from assisted living on 6/6/25 with emesis. Found to have renal failure int he setting of [facility]. Patient declined dialysis and wishes for palliative care at end of life. Palliative consulted for PCU evaluation.

## 2025-06-10 NOTE — CONSULT NOTE ADULT - PROBLEM SELECTOR RECOMMENDATION 2
will continue assess for referrals, meds  patient focused on dispo planning today
See above plan re: Lokelma and diuretics  Low K diet

## 2025-06-10 NOTE — PROGRESS NOTE ADULT - SUBJECTIVE AND OBJECTIVE BOX
CC: F/U for Leukocytosis    Saw/spoke to patient. No fevers, no chills. No new complaints.    Allergies  hydrALAZINE (Other; Flushing)  Lipitor (Other; Flushing)    ANTIMICROBIALS:  piperacillin/tazobactam IVPB.. 3.375 every 12 hours    PE:    Vital Signs Last 24 Hrs  T(C): 36.6 (10 Edmar 2025 11:43), Max: 36.6 (10 Edmar 2025 11:43)  T(F): 97.8 (10 Edmar 2025 11:43), Max: 97.8 (10 Edmar 2025 11:43)  HR: 97 (10 Edmar 2025 11:43) (83 - 97)  BP: 120/68 (10 Edmar 2025 11:43) (107/67 - 120/68)  RR: 18 (10 Edmar 2025 11:43) (17 - 18)  SpO2: 98% (10 Edmar 2025 11:43) (97% - 98%)    Gen: AOx3, NAD, non-toxic  CV: Nontachycardic  Resp: Breathing comfortably, RA  Abd: Soft, nontender  IV/Skin: No thrombophlebitis    LABS:                        8.7    21.35 )-----------( 152      ( 10 Edmar 2025 07:09 )             26.5     06-10    131[L]  |  92[L]  |  92[H]  ----------------------------<  71  5.4[H]   |  18[L]  |  3.97[H]    Ca    8.4      10 Edmar 2025 07:07  Phos  6.1     06-09  Mg     1.9     06-09    Urinalysis Basic - ( 10 Edmar 2025 07:07 )    Color: x / Appearance: x / SG: x / pH: x  Gluc: 71 mg/dL / Ketone: x  / Bili: x / Urobili: x   Blood: x / Protein: x / Nitrite: x   Leuk Esterase: x / RBC: x / WBC x   Sq Epi: x / Non Sq Epi: x / Bacteria: x    MICROBIOLOGY:    Clean Catch Clean Catch (Midstream)  06-06-25   >=3 organisms. Probable collection contamination.  --  --    RADIOLOGY:    6/9 CT    IMPRESSION:  Left lower abdominal wall subcutaneous hematoma. Persistent right renal   nephrogram suspicious for ATN/contrast nephropathy. Suspect bronchiolitis   at the lung bases. Other incidental observations as above.

## 2025-06-10 NOTE — CONSULT NOTE ADULT - CONSULT REQUESTED DATE/TIME
10-Edmar-2025 17:10
06-Jun-2025 19:40
09-Jun-2025 13:00
09-Jun-2025 14:02
08-Jun-2025 13:04
09-Jun-2025 13:23

## 2025-06-10 NOTE — CONSULT NOTE ADULT - PROBLEM SELECTOR RECOMMENDATION 4
HCP form in EMR - HCP is israel Roderick Zamanon [phone] ; alt is israel Navarro  goals for DNR/DNI, no HD, comfort measures established by primary team  patient hoping for U and/or [facility]  transfer to U when bed available HCP form in EMR - HCP is israel Zabala [phone] ; alt is israel Navarro  goals for DNR/DNI, no HD, comfort measures established by primary team  patient hoping for U and/or [facility]  transfer to U when bed available    can continue to discuss discontinuing non-essential medications with patient in coming days. was not able to do so during encounter today

## 2025-06-10 NOTE — CONSULT NOTE ADULT - TIME BILLING
minutes spent on total encounter. The necessity of the time spent during the encounter on this date of service was due to:     Total time spent including the following  [x ] Physical chart review and documentation   An extensive review of the physical chart, electronic health record, and documentation was conducted to obtain collateral information including but not limited to:   - Current inpatient records (ED, H&P, primary team, and consultants)   - Inpatient values/results (CBC, CMP)   - Prior inpatient records    - Current or proposed treatment plans   - Pharmacotherapy review   [ x]discussion with the interdisciplinary palliative care team - e.g. RN, , clinicians, trainees,   [ x]discussion with the patient/family/decision maker  [x ]Physical Exam and/or review of systems   [x ]Formulation of assessment and plan   [ ]Evaluating for response to treatment and side effects of opioids or benzodiazepines.  [ x]Review of care coordination documentation.
education, assessment and coordination of care.

## 2025-06-10 NOTE — CONSULT NOTE ADULT - CONSULT REASON
Tricuspid Regurgitation
Faith placement for HD today.
Leukocytosis
GOC
L renal artery occlusion
TURNER

## 2025-06-10 NOTE — CONSULT NOTE ADULT - PROBLEM SELECTOR RECOMMENDATION 5
will continue to follow for evolution of sx, support  transfer to PCU when bed available  will assess for additional referrals    recommend making hydromorphone 0.2mg IV q3h PRN pain available, patient not wishing to discuss much today. will continue to explore additional symptoms for further recs.

## 2025-06-10 NOTE — ADVANCED PRACTICE NURSE CONSULT - REASON FOR CONSULT
Wound care consult initiated by RN to assess patient's skin for a possible deep tissue injury on sacrum, present on admission     History of Present Illness:   Date of Service, 06-07-25 @ 06:46  CHIEF COMPLAINT:Patient is a 89y old  Female who presents with a chief complaint of     HPI:  89F, PMHx A-fib status post watchman's, pulmonary hypertension, HTN, presenting to ED for abdominal pain, nausea, vomiting.  Patient states that she was eating breakfast this morning, in her normal state of health, which shortly after breakfast she developed nausea and vomiting.  Patient states she vomited her entire breakfast, no other resident/other individual who ate the breakfast had similar symptoms.  Endorsing continued distention, and feeling of fullness.  Patient states that she has been unable to pass stool or flatus since that time.  She does not endorse fevers.  Patient had diarrhea 2 days ago, which resolved with Imodium.

## 2025-06-10 NOTE — CONSULT NOTE ADULT - PROBLEM SELECTOR RECOMMENDATION 3
Diuretics as above
reports no BM since admission  - goal for 1 bowel movement every 1-2 days without straining    RECOMMEND  - please monitor and chart bowel movements in medical record  - polyethylene glycol (miralax) 17g daily (may hold for loose stool)  - senna 8.6mg, 2 tabs PO qhs (do not give if concern for bowel obstruction)  - out of bed as able  - if unable to tolerate PO, may give bisacodyl suppository 10mg - as long as no contraindications (e.g. thrombocytopenia, leukopenia)  - May consider tap water or docusate enema if refractory to the above and no contraindications (e.g. thrombocytopenia, leukopenia)

## 2025-06-10 NOTE — PROGRESS NOTE ADULT - ASSESSMENT
89 yo F A Fib, pulm HTN, HTN, abd pain, nausea vomiting  No fevers, has leukocytosis  Abd pain, nausea, vomiting  After admission, had acute rise in WBC  CT atherosclerotic disease, narrowing of bilateral renal arteries, and poor opacification of L renal artery--renal infarction?  6/7 L renal artery not seen USG  CXR effusions, atelectasis  Leukocytosis reactive to renal infarct vs other occult? Cover with antibiotic for now in case occult bacteremia  Overall, Leukocytosis, N/V  - Zosyn 3.375g q 12  - Check BCXs x 2  - Care for suspected renal infarct per team  - Trend WBC to normal  - Monitor for any further GI symptoms, diarrhea  - Monitor for any signs thrombophlebitis    Porter Sierra MD  Contact on TEAMS messaging from 9am - 5pm  From 5pm-9am, on weekends, or if no response call [phone]    Antibiotic decision making based on local antibiogram and available recent culture results

## 2025-06-10 NOTE — CONSULT NOTE ADULT - NSCONSULTADDITIONALINFOA_GEN_ALL_CORE
Anna Zelaya MD  Geriatrics and Palliative Care Attending  [facility]      Please contact me via Teams from Monday through Friday between 9am-5pm. If not answering, please call the palliative care pager [phone]    After 5pm and on weekends, please see the contact information below:    In the event of newly developing, evolving, or worsening symptoms, please contact the Palliative Medicine team via pager (if the patient is at [facility] #[phone] or if the patient is at [facility] #[phone]) The Geriatric and Palliative Medicine service has coverage 24 hours a day/ 7 days a week to provide medical recommendations regarding symptom management needs via telephone

## 2025-06-10 NOTE — CONSULT NOTE ADULT - PROBLEM SELECTOR RECOMMENDATION 9
Pt. last seen in clinic in March, TR was moderate at that time, not warranting intervention. There have been no repeat echocardiograms. Would not repeat TTE now given patients acute decompensated state, TURNER/CKD and possible renal infarct.   Plan for HD cath today for urgent dialysis.  Vascular following for possible arteriogram with angioplasty after HD today.  No plan for structural intervention at this time. Can consider repeat TTE once acute events have resolved and volume status is optimized for reassessment of TR.     VIOLET Miller NP  TEAMS
Pt with TURNER possibly due to volume overload (hypoxic, elevated BNP, edematous) vs. MALINI (IV contrast on 6/6) vs. ?B/L renal artery stenosis vs. urinary retention vs. other. Baseline Cr 0.5-0.6. Cr on admission 0.96, uptrending. Cr 1.7 today. Serum K ~6.  - Recommend bladder scan to r/o retention  - Lokelma 10g stat  - Bumex 2mg IVP stat (after r/o retention)  - Repeat BMP today  - Monitor labs and UOP
c/b esrd  renal consult, assessment/mgmt/prognosis per nephrology  HD not aligned with [facility]  terminal prognosis

## 2025-06-11 ENCOUNTER — TRANSCRIPTION ENCOUNTER (OUTPATIENT)
Age: 89
End: 2025-06-11

## 2025-06-11 VITALS
TEMPERATURE: 98 F | RESPIRATION RATE: 18 BRPM | DIASTOLIC BLOOD PRESSURE: 71 MMHG | HEART RATE: 97 BPM | OXYGEN SATURATION: 96 % | SYSTOLIC BLOOD PRESSURE: 109 MMHG

## 2025-06-11 LAB
ANION GAP SERPL CALC-SCNC: 19 MMOL/L — HIGH (ref 5–17)
APTT BLD: 96.3 SEC — HIGH (ref 26.1–36.8)
BUN SERPL-MCNC: 100 MG/DL — HIGH (ref 7–23)
CALCIUM SERPL-MCNC: 8.3 MG/DL — LOW (ref 8.4–10.5)
CHLORIDE SERPL-SCNC: 89 MMOL/L — LOW (ref 96–108)
CO2 SERPL-SCNC: 21 MMOL/L — LOW (ref 22–31)
CREAT SERPL-MCNC: 4.56 MG/DL — HIGH (ref 0.5–1.3)
EGFR: 9 ML/MIN/1.73M2 — LOW
EGFR: 9 ML/MIN/1.73M2 — LOW
GLUCOSE SERPL-MCNC: 106 MG/DL — HIGH (ref 70–99)
HCT VFR BLD CALC: 25.3 % — LOW (ref 34.5–45)
HGB BLD-MCNC: 8.4 G/DL — LOW (ref 11.5–15.5)
MCHC RBC-ENTMCNC: 32.2 PG — SIGNIFICANT CHANGE UP (ref 27–34)
MCHC RBC-ENTMCNC: 33.2 G/DL — SIGNIFICANT CHANGE UP (ref 32–36)
MCV RBC AUTO: 96.9 FL — SIGNIFICANT CHANGE UP (ref 80–100)
NRBC BLD AUTO-RTO: 0 /100 WBCS — SIGNIFICANT CHANGE UP (ref 0–0)
PLATELET # BLD AUTO: 159 K/UL — SIGNIFICANT CHANGE UP (ref 150–400)
POTASSIUM SERPL-MCNC: 4.6 MMOL/L — SIGNIFICANT CHANGE UP (ref 3.5–5.3)
POTASSIUM SERPL-SCNC: 4.6 MMOL/L — SIGNIFICANT CHANGE UP (ref 3.5–5.3)
RBC # BLD: 2.61 M/UL — LOW (ref 3.8–5.2)
RBC # FLD: 13.8 % — SIGNIFICANT CHANGE UP (ref 10.3–14.5)
SODIUM SERPL-SCNC: 129 MMOL/L — LOW (ref 135–145)
WBC # BLD: 16.4 K/UL — HIGH (ref 3.8–10.5)
WBC # FLD AUTO: 16.4 K/UL — HIGH (ref 3.8–10.5)

## 2025-06-11 PROCEDURE — 85610 PROTHROMBIN TIME: CPT

## 2025-06-11 PROCEDURE — 86900 BLOOD TYPING SEROLOGIC ABO: CPT

## 2025-06-11 PROCEDURE — 74177 CT ABD & PELVIS W/CONTRAST: CPT

## 2025-06-11 PROCEDURE — 81001 URINALYSIS AUTO W/SCOPE: CPT

## 2025-06-11 PROCEDURE — 84132 ASSAY OF SERUM POTASSIUM: CPT

## 2025-06-11 PROCEDURE — 93975 VASCULAR STUDY: CPT

## 2025-06-11 PROCEDURE — 86803 HEPATITIS C AB TEST: CPT

## 2025-06-11 PROCEDURE — 84295 ASSAY OF SERUM SODIUM: CPT

## 2025-06-11 PROCEDURE — 93005 ELECTROCARDIOGRAM TRACING: CPT

## 2025-06-11 PROCEDURE — 85014 HEMATOCRIT: CPT

## 2025-06-11 PROCEDURE — 82962 GLUCOSE BLOOD TEST: CPT

## 2025-06-11 PROCEDURE — 85730 THROMBOPLASTIN TIME PARTIAL: CPT

## 2025-06-11 PROCEDURE — 85018 HEMOGLOBIN: CPT

## 2025-06-11 PROCEDURE — 99285 EMERGENCY DEPT VISIT HI MDM: CPT

## 2025-06-11 PROCEDURE — 71045 X-RAY EXAM CHEST 1 VIEW: CPT

## 2025-06-11 PROCEDURE — 85025 COMPLETE CBC W/AUTO DIFF WBC: CPT

## 2025-06-11 PROCEDURE — 85027 COMPLETE CBC AUTOMATED: CPT

## 2025-06-11 PROCEDURE — 74176 CT ABD & PELVIS W/O CONTRAST: CPT

## 2025-06-11 PROCEDURE — 87086 URINE CULTURE/COLONY COUNT: CPT

## 2025-06-11 PROCEDURE — 84484 ASSAY OF TROPONIN QUANT: CPT

## 2025-06-11 PROCEDURE — 86706 HEP B SURFACE ANTIBODY: CPT

## 2025-06-11 PROCEDURE — 83735 ASSAY OF MAGNESIUM: CPT

## 2025-06-11 PROCEDURE — 83880 ASSAY OF NATRIURETIC PEPTIDE: CPT

## 2025-06-11 PROCEDURE — 80053 COMPREHEN METABOLIC PANEL: CPT

## 2025-06-11 PROCEDURE — G0545: CPT

## 2025-06-11 PROCEDURE — 36415 COLL VENOUS BLD VENIPUNCTURE: CPT

## 2025-06-11 PROCEDURE — 96375 TX/PRO/DX INJ NEW DRUG ADDON: CPT

## 2025-06-11 PROCEDURE — 82330 ASSAY OF CALCIUM: CPT

## 2025-06-11 PROCEDURE — 96374 THER/PROPH/DIAG INJ IV PUSH: CPT

## 2025-06-11 PROCEDURE — 99232 SBSQ HOSP IP/OBS MODERATE 35: CPT

## 2025-06-11 PROCEDURE — 86901 BLOOD TYPING SEROLOGIC RH(D): CPT

## 2025-06-11 PROCEDURE — 87640 STAPH A DNA AMP PROBE: CPT

## 2025-06-11 PROCEDURE — 83605 ASSAY OF LACTIC ACID: CPT

## 2025-06-11 PROCEDURE — 87641 MR-STAPH DNA AMP PROBE: CPT

## 2025-06-11 PROCEDURE — 82435 ASSAY OF BLOOD CHLORIDE: CPT

## 2025-06-11 PROCEDURE — 84100 ASSAY OF PHOSPHORUS: CPT

## 2025-06-11 PROCEDURE — 82803 BLOOD GASES ANY COMBINATION: CPT

## 2025-06-11 PROCEDURE — 82947 ASSAY GLUCOSE BLOOD QUANT: CPT

## 2025-06-11 PROCEDURE — 86850 RBC ANTIBODY SCREEN: CPT

## 2025-06-11 PROCEDURE — 80048 BASIC METABOLIC PNL TOTAL CA: CPT

## 2025-06-11 PROCEDURE — 74018 RADEX ABDOMEN 1 VIEW: CPT

## 2025-06-11 PROCEDURE — 87040 BLOOD CULTURE FOR BACTERIA: CPT

## 2025-06-11 RX ORDER — APIXABAN 2.5 MG/1
1 TABLET, FILM COATED ORAL
Qty: 0 | Refills: 0 | DISCHARGE
Start: 2025-06-11

## 2025-06-11 RX ORDER — APIXABAN 2.5 MG/1
2.5 TABLET, FILM COATED ORAL EVERY 12 HOURS
Refills: 0 | Status: DISCONTINUED | OUTPATIENT
Start: 2025-06-11 | End: 2025-06-11

## 2025-06-11 RX ORDER — CEFUROXIME SODIUM 1.5 G
250 VIAL (EA) INJECTION
Refills: 0 | Status: DISCONTINUED | OUTPATIENT
Start: 2025-06-11 | End: 2025-06-11

## 2025-06-11 RX ORDER — CEFUROXIME SODIUM 1.5 G
1 VIAL (EA) INJECTION
Qty: 0 | Refills: 0 | DISCHARGE
Start: 2025-06-11

## 2025-06-11 RX ORDER — AMLODIPINE BESYLATE 10 MG/1
1 TABLET ORAL
Qty: 0 | Refills: 0 | DISCHARGE
Start: 2025-06-11

## 2025-06-11 RX ORDER — SENNA 187 MG
2 TABLET ORAL
Qty: 0 | Refills: 0 | DISCHARGE
Start: 2025-06-11

## 2025-06-11 RX ORDER — POLYETHYLENE GLYCOL 3350 17 G/17G
17 POWDER, FOR SOLUTION ORAL
Qty: 0 | Refills: 0 | DISCHARGE
Start: 2025-06-11

## 2025-06-11 RX ORDER — HYDROMORPHONE/SOD CHLOR,ISO/PF 2 MG/10 ML
1 SYRINGE (ML) INJECTION
Qty: 0 | Refills: 0 | DISCHARGE
Start: 2025-06-11

## 2025-06-11 RX ADMIN — SODIUM ZIRCONIUM CYCLOSILICATE 10 GRAM(S): 5 POWDER, FOR SUSPENSION ORAL at 05:43

## 2025-06-11 RX ADMIN — HEPARIN SODIUM 1600 UNIT(S)/HR: 1000 INJECTION INTRAVENOUS; SUBCUTANEOUS at 10:02

## 2025-06-11 RX ADMIN — AMLODIPINE BESYLATE 5 MILLIGRAM(S): 10 TABLET ORAL at 06:06

## 2025-06-11 RX ADMIN — APIXABAN 2.5 MILLIGRAM(S): 2.5 TABLET, FILM COATED ORAL at 18:25

## 2025-06-11 RX ADMIN — POLYETHYLENE GLYCOL 3350 17 GRAM(S): 17 POWDER, FOR SOLUTION ORAL at 06:06

## 2025-06-11 RX ADMIN — Medication 25 GRAM(S): at 05:42

## 2025-06-11 RX ADMIN — METOPROLOL SUCCINATE 50 MILLIGRAM(S): 50 TABLET, EXTENDED RELEASE ORAL at 05:43

## 2025-06-11 NOTE — PROGRESS NOTE ADULT - SUBJECTIVE AND OBJECTIVE BOX
CC: F/U for Leukocytosis    Saw/spoke to patient. No fevers, no chills. No new complaints.    Allergies  hydrALAZINE (Other; [place])  Lipitor (Other; [place])    ANTIMICROBIALS:  cefuroxime   Tablet 250 <User Schedule>    PE:    Vital Signs Last 24 Hrs  T(C): 36.4 (11 Jun 2025 11:40), Max: 36.6 (10 Edmar 2025 19:31)  T(F): 97.6 (11 Jun 2025 11:40), Max: 97.8 (10 Edmar 2025 19:31)  HR: 97 (11 Jun 2025 11:40) (72 - 99)  BP: 109/71 (11 Jun 2025 11:40) (95/56 - 122/75)  RR: 18 (11 Jun 2025 11:40) (18 - 18)  SpO2: 96% (11 Jun 2025 11:40) (95% - 97%)    Gen: AOx3, NAD, non-toxic  CV: Nontachycardic  Resp: Breathing comfortably, RA  Abd: Soft, nontender  IV/Skin: No thrombophlebitis    LABS:                        8.4    16.40 )-----------( 159      ( 11 Jun 2025 06:26 )             25.3     06-11    129[L]  |  89[L]  |  100[H]  ----------------------------<  106[H]  4.6   |  21[L]  |  4.56[H]    Ca    8.3[L]      11 Jun 2025 06:26    Urinalysis Basic - ( 11 Jun 2025 06:26 )    Color: x / Appearance: x / SG: x / pH: x  Gluc: 106 mg/dL / Ketone: x  / Bili: x / Urobili: x   Blood: x / Protein: x / Nitrite: x   Leuk Esterase: x / RBC: x / WBC x   Sq Epi: x / Non Sq Epi: x / Bacteria: x    MICROBIOLOGY:    Clean Catch Clean Catch (Midstream)  06-06-25   >=3 organisms. Probable collection contamination.  --  --    RADIOLOGY:    CT    IMPRESSION:  Left lower abdominal wall subcutaneous hematoma. Persistent right renal   nephrogram suspicious for ATN/contrast nephropathy. Suspect bronchiolitis   at the lung bases. Other incidental observations as above.

## 2025-06-11 NOTE — DISCHARGE NOTE NURSING/CASE MANAGEMENT/SOCIAL WORK - PATIENT PORTAL LINK FT
You can access the FollowMyHealth Patient Portal offered by [facility] by registering at the following website: [email or web address]. By joining [employer]’s FollowMyHealth portal, you will also be able to view your health information using other applications (apps) compatible with our system.

## 2025-06-11 NOTE — DISCHARGE NOTE PROVIDER - NSDCCPCAREPLAN_GEN_ALL_CORE_FT
PRINCIPAL DISCHARGE DIAGNOSIS  Diagnosis: Renal artery stenosis  Assessment and Plan of Treatment: Imaging compatible with renal artery stenosis  Comfort Care: You and your family have made important decisions regarding your goals of care. You have chosen to focus on comfort measures and have declined dialysis and intubation. This information will be communicated to all healthcare providers involved in your care.      SECONDARY DISCHARGE DIAGNOSES  Diagnosis: Encounter for palliative care  Assessment and Plan of Treatment: Palliative care to continue at [place] in line with patient's goals of care     PRINCIPAL DISCHARGE DIAGNOSIS  Diagnosis: Renal artery stenosis  Assessment and Plan of Treatment: Imaging compatible with renal artery stenosis  Comfort Care: You and your family have made important decisions regarding your goals of care. You have chosen to focus on comfort measures and have declined dialysis and intubation. This information will be communicated to all healthcare providers involved in your care.      SECONDARY DISCHARGE DIAGNOSES  Diagnosis: Encounter for palliative care  Assessment and Plan of Treatment: Palliative care to continue at [place] in line with patient's goals of care    Diagnosis: Slow transit constipation  Assessment and Plan of Treatment: Monitor for bowel movements  Add lactulose if needed    Diagnosis: Hyperkalemia  Assessment and Plan of Treatment: You have decided against starting dialysis and, instead wish to focus on comfort measures.  Continue comfort measures at [place]    Diagnosis: Volume overload  Assessment and Plan of Treatment: See above    Diagnosis: Bronchiolitis  Assessment and Plan of Treatment: You will take Ceftin 250mg oral daily through [place] 15, 2025 and then discontinue.     PRINCIPAL DISCHARGE DIAGNOSIS  Diagnosis: Renal artery stenosis  Assessment and Plan of Treatment: Imaging compatible with renal artery stenosis  Comfort Care: You and your family have made important decisions regarding your goals of care. You have chosen to focus on comfort measures and have declined dialysis and intubation. This information will be communicated to all healthcare providers involved in your care.      SECONDARY DISCHARGE DIAGNOSES  Diagnosis: Encounter for palliative care  Assessment and Plan of Treatment: Palliative care to continue at [place] in line with patient's goals of care    Diagnosis: Slow transit constipation  Assessment and Plan of Treatment: Monitor for bowel movements  Add lactulose if needed    Diagnosis: Hyperkalemia  Assessment and Plan of Treatment: You have decided against starting dialysis and, instead wish to focus on comfort measures.  Continue comfort measures at [place]    Diagnosis: Volume overload  Assessment and Plan of Treatment: See above    Diagnosis: Bronchiolitis  Assessment and Plan of Treatment: You will take Ceftin 250mg oral daily through Ofelia 15, 2025 and then discontinue.    Diagnosis: Afib  Assessment and Plan of Treatment: Atrial fibrillation is the most common heart rhythm problem.  The condition puts you at risk for has stroke and heart attack  It helps if you control your blood pressure, not drink more than 1-2 alcohol drinks per day, cut down on caffeine, getting treatment for over active thyroid gland, and get regular exercise  Call your doctor if you feel your heart racing or beating unusually, chest tightness or pain, lightheaded, faint, shortness of breath especially with exercise  It is important to take your heart medication as prescribed      Diagnosis: Renal infarction  Assessment and Plan of Treatment:      PRINCIPAL DISCHARGE DIAGNOSIS  Diagnosis: Renal artery stenosis  Assessment and Plan of Treatment: Imaging compatible with renal artery stenosis  Comfort Care: You and your family have made important decisions regarding your goals of care. You have chosen to focus on comfort measures and have declined dialysis and intubation. This information will be communicated to all healthcare providers involved in your care.      SECONDARY DISCHARGE DIAGNOSES  Diagnosis: Encounter for palliative care  Assessment and Plan of Treatment: Palliative care to continue at [place] in line with patient's goals of care    Diagnosis: Slow transit constipation  Assessment and Plan of Treatment: Monitor for bowel movements  Add lactulose if needed    Diagnosis: Hyperkalemia  Assessment and Plan of Treatment: You have decided against starting dialysis and, instead wish to focus on comfort measures.  Continue comfort measures at [place]    Diagnosis: Volume overload  Assessment and Plan of Treatment: See above    Diagnosis: Bronchiolitis  Assessment and Plan of Treatment: You will take Ceftin 250mg oral daily through Ofelia 15, 2025 and then discontinue.    Diagnosis: Afib  Assessment and Plan of Treatment: s/p Heparin gtt - now started on Eliquis 2.5mg oral every 12 hours  Atrial fibrillation is the most common heart rhythm problem.  The condition puts you at risk for has stroke and heart attack  It helps if you control your blood pressure, not drink more than 1-2 alcohol drinks per day, cut down on caffeine, getting treatment for over active thyroid gland, and get regular exercise  Call your doctor if you feel your heart racing or beating unusually, chest tightness or pain, lightheaded, faint, shortness of breath especially with exercise  It is important to take your heart medication as prescribed      Diagnosis: Renal infarction  Assessment and Plan of Treatment:

## 2025-06-11 NOTE — DISCHARGE NOTE PROVIDER - NSDCMRMEDTOKEN_GEN_ALL_CORE_FT
amLODIPine 10 mg oral tablet: 1 tab(s) orally once a day  aspirin 81 mg oral delayed release tablet: 1 tab(s) orally once a day  cloNIDine 0.1 mg oral tablet: 1 tab(s) orally 2 times a day  Daily-Chanel 400mcg tablet: 1 tablet orally once a day  docusate sodium 100 mg oral capsule: 3 cap(s) orally once a day (at bedtime) as needed for  constipation  enalapril 20 mg oral tablet: 1 tab(s) orally once a day  metoprolol succinate 50 mg oral tablet, extended release: 1 tab(s) orally once a day  ondansetron 4 mg oral tablet: 1 tab(s) orally every 8 hours as needed for  nausea  PT evaluation and treat: evaluation and treat   amLODIPine 5 mg oral tablet: 1 tab(s) orally once a day  aspirin 81 mg oral delayed release tablet: 1 tab(s) orally once a day  cefuroxime 250 mg oral tablet: 1 tab(s) orally every 24 hours Take through Ofelia 15, 2025 and then discontinue  HYDROmorphone 0.2 mg/mL-NaCl 0.9% intravenous solution: 1 milliliter(s) intravenous every 3 hours As needed Severe Pain (7 - 10)  metoprolol succinate 50 mg oral tablet, extended release: 1 tab(s) orally once a day  ondansetron 4 mg oral tablet: 1 tab(s) orally every 8 hours as needed for  nausea  polyethylene glycol 3350 oral powder for reconstitution: 17 gram(s) orally 2 times a day  senna leaf extract oral tablet: 2 tab(s) orally once a day (at bedtime)   amLODIPine 5 mg oral tablet: 1 tab(s) orally once a day  apixaban 2.5 mg oral tablet: 1 tab(s) orally every 12 hours  aspirin 81 mg oral delayed release tablet: 1 tab(s) orally once a day  cefuroxime 250 mg oral tablet: 1 tab(s) orally every 24 hours Take through Ofelia 15, 2025 and then discontinue  HYDROmorphone 0.2 mg/mL-NaCl 0.9% intravenous solution: 1 milliliter(s) intravenous every 3 hours As needed Severe Pain (7 - 10)  metoprolol succinate 50 mg oral tablet, extended release: 1 tab(s) orally once a day  ondansetron 4 mg oral tablet: 1 tab(s) orally every 8 hours as needed for  nausea  polyethylene glycol 3350 oral powder for reconstitution: 17 gram(s) orally 2 times a day  senna leaf extract oral tablet: 2 tab(s) orally once a day (at bedtime)

## 2025-06-11 NOTE — PROGRESS NOTE ADULT - SUBJECTIVE AND OBJECTIVE BOX
Date of Service: 06-11-25 @ 07:01           CARDIOLOGY     PROGRESS  NOTE   ________________________________________________  CHIEF COMPLAINT:Patient is a 89y old  Female who presents with a chief complaint of Ischemia and infarction of kidney  no complain    	  REVIEW OF SYSTEMS:  CONSTITUTIONAL: No fever, weight loss, or fatigue  EYES: No eye pain, visual disturbances, or discharge  ENT:  No difficulty hearing, tinnitus, vertigo; No sinus or throat pain  NECK: No pain or stiffness  RESPIRATORY: No cough, wheezing, chills or hemoptysis; No Shortness of Breath  CARDIOVASCULAR: No chest pain, palpitations, passing out, dizziness, or leg swelling  GASTROINTESTINAL: No abdominal or epigastric pain. No nausea, vomiting, or hematemesis; No diarrhea or constipation. No melena or hematochezia.  GENITOURINARY: No dysuria, frequency, hematuria, or incontinence  NEUROLOGICAL: No headaches, memory loss, loss of strength, numbness, or tremors  SKIN: No itching, burning, rashes, or lesions   LYMPH Nodes: No enlarged glands  ENDOCRINE: No heat or cold intolerance; No hair loss  MUSCULOSKELETAL: No joint pain or swelling; No muscle, back, or extremity pain  PSYCHIATRIC: No depression, anxiety, mood swings, or difficulty sleeping  HEME/LYMPH: No easy bruising, or bleeding gums  ALLERGY AND IMMUNOLOGIC: No hives or eczema	    [x ] All others negative	  [ ] Unable to obtain    PHYSICAL EXAM:  T(C): 36.4 (06-11-25 @ 04:33), Max: 36.6 (06-10-25 @ 11:43)  HR: 99 (06-11-25 @ 04:33) (72 - 99)  BP: 122/75 (06-11-25 @ 04:33) (95/56 - 122/75)  RR: 18 (06-11-25 @ 04:33) (18 - 18)  SpO2: 97% (06-11-25 @ 04:33) (95% - 98%)  Wt(kg): --  I&O's Summary    10 Edmar 2025 07:01  -  11 Jun 2025 07:00  --------------------------------------------------------  IN: 582 mL / OUT: 75 mL / NET: 507 mL        Appearance: Normal	  HEENT:   Normal oral mucosa, PERRL, EOMI	  Lymphatic: No lymphadenopathy  Cardiovascular: Normal S1 S2, No JVD, + murmurs, No edema  Respiratory: Lungs clear to auscultation	  Psychiatry: A & O x 3, Mood & affect appropriate  Gastrointestinal:  Soft, Non-tender, + BS	  Skin: No rashes, No ecchymoses, No cyanosis	  Neurologic: Non-focal  Extremities: Normal range of motion, No clubbing, cyanosis or edema  Vascular: Peripheral pulses palpable 2+ bilaterally    MEDICATIONS  (STANDING):  amLODIPine   Tablet 5 milliGRAM(s) Oral daily  aspirin enteric coated 81 milliGRAM(s) Oral daily  dextrose 5%. 1000 milliLiter(s) (100 mL/Hr) IV Continuous <Continuous>  dextrose 5%. 1000 milliLiter(s) (100 mL/Hr) IV Continuous <Continuous>  dextrose 50% Injectable 25 Gram(s) IV Push once  dextrose 50% Injectable 12.5 Gram(s) IV Push once  dextrose 50% Injectable 25 Gram(s) IV Push once  dextrose 50% Injectable 25 Gram(s) IV Push once  dextrose 50% Injectable 12.5 Gram(s) IV Push once  dextrose 50% Injectable 25 Gram(s) IV Push once  heparin  Infusion.  Unit(s)/Hr (15 mL/Hr) IV Continuous <Continuous>  metoprolol succinate ER 50 milliGRAM(s) Oral daily  piperacillin/tazobactam IVPB.. 3.375 Gram(s) IV Intermittent every 12 hours  polyethylene glycol 3350 17 Gram(s) Oral two times a day  senna 2 Tablet(s) Oral at bedtime      TELEMETRY: 	    ECG:  	  RADIOLOGY:  OTHER: 	  	  LABS:	 	    CARDIAC MARKERS:                          8.4    16.40 )-----------( 159      ( 11 Jun 2025 06:26 )             25.3     06-11    129[L]  |  89[L]  |  100[H]  ----------------------------<  106[H]  4.6   |  21[L]  |  4.56[H]    Ca    8.3[L]      11 Jun 2025 06:26      proBNP:   Lipid Profile:   HgA1c:   TSH:   PTT - ( 11 Jun 2025 06:26 )  PTT:96.3 sec        Assessment and plan  ---------------------------  89F, PMHx A-fib status post watchman's, pulmonary hypertension, HTN, presenting to ED for abdominal pain, nausea, vomiting.  Patient states that she was eating breakfast this morning, in her normal state of health, which shortly after breakfast she developed nausea and vomiting.  Patient states she vomited her entire breakfast, no other resident/other individual who ate the breakfast had similar symptoms.  Endorsing continued distention, and feeling of fullness.  Patient states that she has been unable to pass stool or flatus since that time.  She does not endorse fevers.  Patient had diarrhea 2 days ago, which resolved with Imodium.  pt is well known to me with hx of R heart failure, a.fib, s/p watchman procedure who was recently dc ed from the hospital post uro sepsis with nausea / vomiting and abdomonal pain  ct scan with severe atherosclerosis renal arteries with l renal infarct  vascular noted  continue iv heparin  will adjust cardiac meds with sec to l renal infarct  renal eval awaiting   continue bp meds, no ace/arb  add Lokelma for elevated K level  a.fib with RVR increase beta blocker , decrease clonidine dose if decrease bp  blood tests stat noted  may consider palliative care consult  npo for risk of aspiration, start Zosyn, ID consult, NGT for meds  discussed with Dr Keller  discussed with vascular surgery, son, agree with conservative measures  dc clonidine  will sperak to renal about any possibility of HD in near future or comfort care

## 2025-06-11 NOTE — DISCHARGE NOTE PROVIDER - CARE PROVIDER_API CALL
Issa Wells  Internal Medicine  [address] [place] [address], [address]  [place] [address]  Phone: [phone]  Fax: [phone]  Follow Up Time:

## 2025-06-11 NOTE — DISCHARGE NOTE PROVIDER - HOSPITAL COURSE
HPI: 89F, PMHx A-fib status post watchman's, pulmonary hypertension, HTN, presenting to ED for abdominal pain, nausea, vomiting.    Hospital Course: CT imaging revealed atherosclerotic disease, narrowing of bilateral renal arteries, and poor opacification of the left renal artery, raising concern for renal infarction.  A subsequent renal ultrasound on 6/7 did not visualize the left renal artery.  Chest x-ray showed pleural effusions and atelectasis.  Patient's leukocytosis was thought to be reactive to a possible renal infarct or other occult process.  Empiric antibiotic coverage with Zosyn was initiated. Hospital course was complicated by worsening kidney function, associated severe hyperkalemia iso renal artery stenosis. After extensive goals of care discussions, patient and her son decided against pursuing temporary hemodialysis, prioritizing patient's wishes to avoid long-term dialysis.  They also declined intubation.  The focus of care shifted to comfort measures, including pain management as needed.    Discharge/Dispo/Med rec discussed with attending  ____. Patient medically cleared for discharge to [place].    Important Medication Changes and Reason:    Active or Pending Issues Requiring Follow-up:    Advanced Directives:   [ ] Full code  [X] DNR  [ ] Hospice    Discharge Diagnoses:         HPI: 89F, PMHx A-fib status post watchman's, pulmonary hypertension, HTN, presenting to ED for abdominal pain, nausea, vomiting.    Hospital Course: CT imaging revealed atherosclerotic disease, narrowing of bilateral renal arteries, and poor opacification of the left renal artery, raising concern for renal infarction.  A subsequent renal ultrasound on 6/7 did not visualize the left renal artery.  Chest x-ray showed pleural effusions and atelectasis.  Patient's leukocytosis was thought to be reactive to a possible renal infarct or other occult process.  Empiric antibiotic coverage with Zosyn was initiated. Hospital course was complicated by worsening kidney function, associated severe hyperkalemia iso renal artery stenosis. After extensive goals of care discussions, patient and her son decided against pursuing temporary hemodialysis, prioritizing patient's wishes to avoid long-term dialysis.  They also declined intubation.  The focus of care shifted to comfort measures, including pain management as needed.  As per ID, patient will be discharged on Ceftin 250mg oral daily for bronchiolitis, to complete a 7 day course of antibiotics.  As per cardiology, patient to be discharged on Eliquis 2.5mg po BID.      Discharge/Dispo/Med rec discussed with attending Dr. Gurrola. Patient medically cleared for discharge to [place].    Important Medication Changes and Reason:  Ceftin for bronchiolitis  Eliquis - left renal infarct/Afib    Active or Pending Issues Requiring Follow-up:  F/u with physician at [place]     Advanced Directives:   [ ] Full code  [X] DNR  [ ] Hospice    Discharge Diagnoses:  Left renal infarct  Bronchiolitis  Afib         HPI: 89F, PMHx A-fib status post watchman's, pulmonary hypertension, HTN, presenting to ED for abdominal pain, nausea, vomiting.    Hospital Course: CT imaging revealed atherosclerotic disease, narrowing of bilateral renal arteries, and poor opacification of the left renal artery, raising concern for renal infarction.  A subsequent renal ultrasound on 6/7 did not visualize the left renal artery.  Chest x-ray showed pleural effusions and atelectasis.  Patient's leukocytosis was thought to be reactive to a possible renal infarct or other occult process.  Empiric antibiotic coverage with Zosyn was initiated. Hospital course was complicated by worsening kidney function, associated severe hyperkalemia iso renal artery stenosis. After extensive goals of care discussions, patient and her son decided against pursuing temporary hemodialysis, prioritizing patient's wishes to avoid long-term dialysis.  They also declined intubation.  The focus of care shifted to comfort measures, including pain management as needed.  As per ID, patient will be discharged on Ceftin 250mg oral daily for bronchiolitis, to complete a 7 day course of antibiotics.  As per cardiology/medical attending, patient to be discharged on Eliquis 2.5mg po BID.      Discharge/Dispo/Med rec discussed with attending Dr. Gurrola. Patient medically cleared for discharge to [place].    Important Medication Changes and Reason:  Ceftin for bronchiolitis  Eliquis - left renal infarct/Afib    Active or Pending Issues Requiring Follow-up:  F/u with physician at [place]     Advanced Directives:   [ ] Full code  [X] DNR  [ ] Hospice    Discharge Diagnoses:  Left renal infarct  Bronchiolitis  Afib

## 2025-06-11 NOTE — DISCHARGE NOTE PROVIDER - NSDCFUSCHEDAPPT_GEN_ALL_CORE_FT
Lisandra Shields  [facility] Physician [facility]  CTSUR[address]  Scheduled Appointment: 07/31/2025    Chuck Cordoba  [facility]  CTSUR[address]  Scheduled Appointment: 07/31/2025

## 2025-06-11 NOTE — DISCHARGE NOTE NURSING/CASE MANAGEMENT/SOCIAL WORK - FINANCIAL ASSISTANCE
[facility] provides services at a reduced cost to those who are determined to be eligible through [facility]’s financial assistance program. Information regarding [facility]’s financial assistance program can be found by going to [email or web address] or by calling [phone].

## 2025-06-11 NOTE — CHART NOTE - NSCHARTNOTEFT_GEN_A_CORE
GOC note reviewed. Pt now on comfort measures.   Nephrology will sign off.    If you have any questions, please feel free to contact me  Darian Garcia  Nephrology Fellow  [employer]/Page [phone]  (After 5pm or on weekends please page the on-call fellow)
Notified by RN; pt with Afib RVR ranging 120's-140's with pt complaining of nausea    Pt seen and evaluated at bedside with RN. Pt reporting severe nausea and with no other acute complaints denying active chest pain, palpitations, shortness of breath, abdominal pain, vomiting, headache, dizziness, acute visual changes, or fevers.    VS  /75    T 98.8F  SpO2 95% 3LNC    Telemetry monitor reviewed; Afib -140's intermittent blips to 160's    Intervention:  Zofran 4mg IV x1 given  Metoprolol 5mg IV x1 given     Telemetry reviewed; HR 80-90's. Pt resting comfortably in no acute distress. Will continue to monitor
Palliative care consult overnight. Both patient and HCP (son, Roderick) want to pursue with DNI/DNR, comfort measures only.    Will not proceed with aggressive surgical intervention.  Thank you for your consult.    Plan dw Attending Dr Richard Hannon MD  Vascular fellow
Pt w/ AM labs 6/8 significant for hyperK, s/p lokelma 10mg x1 during the day w/ repeat K+ 5.9  Pt seen and examined at bedside, resting, NAD. Denies chest pain, palpitations, SOB, fever, chills, n/v, abd pain, HA, dizziness, numbness or tingling in extremities   EKG STAT w/o changes compared to prior     Vital Signs Last 24 Hrs  T(C): 36.5 (09 Jun 2025 00:25), Max: 37.1 (08 Jun 2025 21:25)  T(F): 97.7 (09 Jun 2025 00:25), Max: 98.7 (08 Jun 2025 21:25)  HR: 120 (09 Jun 2025 00:25) (100 - 120)  BP: 130/77 (09 Jun 2025 00:25) (130/77 - 140/83)  BP(mean): --  RR: 18 (09 Jun 2025 00:25) (18 - 18)  SpO2: 97% (09 Jun 2025 00:25) (94% - 97%)    Parameters below as of 09 Jun 2025 00:25  Patient On (Oxygen Delivery Method): nasal cannula  O2 Flow (L/min): 2    Notified by vascular surgery to expedite CT A/P, attempted however no available slots, will re-attempt   Pt shifted w/ insulin/dextrose x1. Post BMP significant for persistent hyperK w/ K+ 5.8 and uptrending Cr 3.00 from 2.59 on 6/8.   Per RN, pt incontinent episode x1 at appx 21:30, bladder scan w/ 11cc   Discussed above w/ nephrology fellow overnight Dr. Enrique, recs include:      >shifting patient again w/ insulin/dextrose x1      >f/u AM labs for Cr trend       >CT A/P when able  Care remains ongoing  Will endorse to day team    -Shanti Borrero PA-C  [phone]
Request from Dr. Gurrola to facilitate patient discharge.  As per Dr. Keller, heparin gtt d/c'd and patient started on Eliquis 2.5mg oral every 12 hours.  As per ID, Dr. Sierra, patient discharged on Ceftin 250mg oral every 24hours to complete 7 day course (through [place] 15, 2025).  Medication reconciliation reviewed, revised, and resolved with Dr. Gurrola, who has medically cleared patient for discharge with follow up as advised.  Please refer to discharge note for detailed hospital course.
Brief GaP Note:    Provider off-site today but notified by team that [facility] accepted patient and plan is for transfer today. Called Roderick by phone - notified of transfer to [facility]. He is in agreement. I also discussed that generally with comfort measures in palliative/hospice, we focus on letting people rest and avoiding ongoing uncomfortable interventions like frequent blood draws. He is in agreement with this - will stop blood draws and heparin drip. Re: PO meds, he is open to idea that we will discontinue chronic meds that are no longer helpful. He states patient has had  and son who have been in hospice - so she understands it well.    He states his mothers goes in/out of being oriented to situation but was understanding of it yesterday. She has been calling him lots today. He gave me permission to reach her by phone to see if she has any questions for my team - her cell: [phone].    Patient understood she's going to [facility] but otherwise was not able to engage in further discussions about her care - she continued to ask if I was her , only seconds after repeating to me that I am her palliative care doctor. She was not aware of any of her medications and deferred these discussions to her son Roderick. She is in agreement with going to [facility].    RECOMMEND:  - [facility] will be able to continue low dose hydromorphone 0.2mg IV q3h PRN and adjust as needed  - recommend writing patient for bowel regimen since she has not had BM in many days - can start with miralax/lactulose, senna (so long as no concern for bowel obstruction at this time); AVOID magnesium containing meds given renal failure  - risk/benefit of AC per primary team/cardiology ; if teams feel it will benefit her comfort given SALLY and renal infarct, reasonable to continue low dose AC while able to take PO, so long as no clear contraindications  - stop labs/blood draws    Anna Zelaya MD  Geriatrics and Palliative Care Attending  [facility]      Please contact me via Teams from Monday through Friday between 9am-5pm. If not answering, please call the palliative care pager [phone]    After 5pm and on weekends, please see the contact information below:    In the event of newly developing, evolving, or worsening symptoms, please contact the Palliative Medicine team via pager (if the patient is at [facility] #[phone] or if the patient is at [facility] #[phone]) The Geriatric and Palliative Medicine service has coverage 24 hours a day/ 7 days a week to provide medical recommendations regarding symptom management needs via telephone
Interventional Radiology    Son no longer requesting for HD to be performed today.  Will defer placement of catheter at this time.  Please reconsult as needed.    Dimitrios Frye PA-C  IR call back ext. [phone]  Also available on Teams    - Non-emergent consults: Place IR consult order in [place]  - Emergent issues (pager): [facility] [phone]; [facility] [phone]; [phone]  - Scheduling questions: [facility] [phone]; [facility] [phone]  - Clinic/outpatient booking: [facility] [phone]; [facility] [phone]
Nephrology team spoke to pts son multiple times both on the phone and at bedside. Pts son (Roderick Zabala) understands that pt with normal renal fxn on admission which has now worsened w/ associated severe hyperkalemia iso renal artery stenosis. Nephrology offering trial of dialysis to optimize prior to and after angiogram. However pts son does not wish to proceed with angiogram and also does not want dialysis anymore. He understands that as renal fxn continues to worsen so too will the metabolic derangements which can eventually lead to cardiac arrest/death. Ok with continuing medical management. He would like to speak with palliative care and possibly proceed to comfort measures.    HD orders canceled. Continue to medically manage. Consult palliative care. GOC as per primary.    If you have any questions, please feel free to contact me  Darian Garcia  Nephrology Fellow  [employer]/Page [phone]  (After 5pm or on weekends please page the on-call fellow)
Vascular Surgery team went to bedside to speak to both son (Roderick Zabala) and patient regarding temporary dialysis for worsening of renal function and elevated potassium 6.5 despite medical management. Pt has a distended abdomen/nausea with risks for aspiration. Patient is at high risk for post-angio hemodialysis (temporary and potential longterm due to risk of contrast use) and distal embolization. After a thorough and extensive discussion regarding the risks, benefits and alternatives, patient's son decides not  to proceed with aggressive intervention including temporary dialysis access for her hyperkalemia at this moment. Son also understands that if they do not want to pursue with any intervention, palliative care will come and evaluate the patient for GOC.    - will follow up pt and family regarding final GOC   - please continue medical management for hyperk treatment    Plan dw Attending  Chaparrita Hannon MD  Vascular fellow

## 2025-06-11 NOTE — PROGRESS NOTE ADULT - ASSESSMENT
89 yo F A Fib, pulm HTN, HTN, abd pain, nausea vomiting  No fevers, has leukocytosis  Abd pain, nausea, vomiting  After admission, had acute rise in WBC  CT atherosclerotic disease, narrowing of bilateral renal arteries, and poor opacification of L renal artery--renal infarction?  6/7 L renal artery not seen USG  CXR effusions, atelectasis  Low suspicion infection, will complete course of antibiotic in case WBC improvement 2/2 treatment of bronchiolitis seen on CT (but suspect the WBC is 2/2 infarct)  Overall, Leukocytosis, N/V  - Zosyn 3.375g q 12, when DC planning can send out on Ceftin 250mg q 24 to complete 7 days including Zosyn days  - F/U BCXs  - Care for suspected renal infarct per team  - Trend WBC to normal  - Monitor for any further GI symptoms, diarrhea  - Monitor for any signs thrombophlebitis    Porter Sierra MD  Contact on TEAMS messaging from 9am - 5pm  From 5pm-9am, on weekends, or if no response call [phone]    Antibiotic decision making based on local antibiogram and available recent culture results

## 2025-06-11 NOTE — DISCHARGE NOTE PROVIDER - NSDCFUADDAPPT_GEN_ALL_CORE_FT
APPTS ARE READY TO BE MADE: [X] YES    Best Family or Patient Contact (if needed):    Additional Information about above appointments (if needed):    1:   2:   3:     Other comments or requests:    You must follow up with the physician at [facility] for further treatment.      APPTS ARE READY TO BE MADE: [X] YES    Best Family or Patient Contact (if needed):    Additional Information about above appointments (if needed):    1: PCP  2: Cardiology  3:     Other comments or requests:    You must follow up with the physician at [place] for further treatment.      APPTS ARE READY TO BE MADE: [X] YES    Best Family or Patient Contact (if needed):    Additional Information about above appointments (if needed):    1: PCP  2: Cardiology  3:     Other comments or requests:     Patient is being transferred. Caregiver will arrange follow up.

## 2025-06-11 NOTE — PROGRESS NOTE ADULT - PROVIDER SPECIALTY LIST ADULT
Cardiology
Family Medicine
Vascular Surgery
Cardiology
Family Medicine
Family Medicine
Vascular Surgery
Vascular Surgery
Infectious Disease
Infectious Disease
Nephrology

## 2025-07-31 ENCOUNTER — APPOINTMENT (OUTPATIENT)
Dept: CARDIOTHORACIC SURGERY | Facility: CLINIC | Age: 89
End: 2025-07-31